# Patient Record
Sex: MALE | Race: WHITE | Employment: OTHER | URBAN - METROPOLITAN AREA
[De-identification: names, ages, dates, MRNs, and addresses within clinical notes are randomized per-mention and may not be internally consistent; named-entity substitution may affect disease eponyms.]

---

## 2017-07-09 ENCOUNTER — HOSPITAL ENCOUNTER (INPATIENT)
Age: 82
LOS: 1 days | Discharge: HOME | DRG: 682 | End: 2017-07-10
Attending: EMERGENCY MEDICINE | Admitting: INTERNAL MEDICINE
Payer: MEDICARE

## 2017-07-09 ENCOUNTER — APPOINTMENT (OUTPATIENT)
Dept: ULTRASOUND IMAGING | Age: 82
DRG: 682 | End: 2017-07-09
Payer: MEDICARE

## 2017-07-09 ENCOUNTER — APPOINTMENT (OUTPATIENT)
Dept: GENERAL RADIOLOGY | Age: 82
DRG: 682 | End: 2017-07-09
Attending: EMERGENCY MEDICINE
Payer: MEDICARE

## 2017-07-09 ENCOUNTER — APPOINTMENT (OUTPATIENT)
Dept: CT IMAGING | Age: 82
DRG: 682 | End: 2017-07-09
Attending: EMERGENCY MEDICINE
Payer: MEDICARE

## 2017-07-09 DIAGNOSIS — N30.00 ACUTE CYSTITIS WITHOUT HEMATURIA: ICD-10-CM

## 2017-07-09 DIAGNOSIS — R41.0 CONFUSION: ICD-10-CM

## 2017-07-09 DIAGNOSIS — D72.829 LEUKOCYTOSIS, UNSPECIFIED TYPE: ICD-10-CM

## 2017-07-09 DIAGNOSIS — R53.83 MALAISE AND FATIGUE: Primary | ICD-10-CM

## 2017-07-09 DIAGNOSIS — R53.81 MALAISE AND FATIGUE: Primary | ICD-10-CM

## 2017-07-09 PROBLEM — N18.30 CKD (CHRONIC KIDNEY DISEASE) STAGE 3, GFR 30-59 ML/MIN (HCC): Chronic | Status: ACTIVE | Noted: 2017-07-09

## 2017-07-09 PROBLEM — Z99.89 OSA ON CPAP: Chronic | Status: ACTIVE | Noted: 2017-07-09

## 2017-07-09 PROBLEM — I48.91 A-FIB (HCC): Chronic | Status: ACTIVE | Noted: 2017-07-09

## 2017-07-09 PROBLEM — G93.40 ACUTE ENCEPHALOPATHY: Status: ACTIVE | Noted: 2017-07-09

## 2017-07-09 PROBLEM — I95.9 HYPOTENSION: Status: ACTIVE | Noted: 2017-07-09

## 2017-07-09 PROBLEM — G47.33 OSA ON CPAP: Chronic | Status: ACTIVE | Noted: 2017-07-09

## 2017-07-09 PROBLEM — N39.0 UTI (URINARY TRACT INFECTION): Status: ACTIVE | Noted: 2017-07-09

## 2017-07-09 PROBLEM — R79.89 ELEVATED SERUM CREATININE: Status: ACTIVE | Noted: 2017-07-09

## 2017-07-09 PROBLEM — G93.41 METABOLIC ENCEPHALOPATHY: Status: ACTIVE | Noted: 2017-07-09

## 2017-07-09 LAB
ALBUMIN SERPL BCP-MCNC: 3.2 G/DL (ref 3.2–4.6)
ALBUMIN/GLOB SERPL: 0.9 {RATIO} (ref 1.2–3.5)
ALP SERPL-CCNC: 45 U/L (ref 50–136)
ALT SERPL-CCNC: 17 U/L (ref 12–65)
ANION GAP BLD CALC-SCNC: 13 MMOL/L (ref 7–16)
AST SERPL W P-5'-P-CCNC: 15 U/L (ref 15–37)
ATRIAL RATE: 71 BPM
BACTERIA URNS QL MICRO: 0 /HPF
BASOPHILS # BLD AUTO: 0 K/UL (ref 0–0.2)
BASOPHILS # BLD: 0 % (ref 0–2)
BILIRUB SERPL-MCNC: 1 MG/DL (ref 0.2–1.1)
BNP SERPL-MCNC: 609 PG/ML
BUN SERPL-MCNC: 26 MG/DL (ref 8–23)
CALCIUM SERPL-MCNC: 8.5 MG/DL (ref 8.3–10.4)
CALCULATED P AXIS, ECG09: 80 DEGREES
CALCULATED R AXIS, ECG10: 61 DEGREES
CALCULATED T AXIS, ECG11: -69 DEGREES
CASTS URNS QL MICRO: ABNORMAL /LPF
CHLORIDE SERPL-SCNC: 100 MMOL/L (ref 98–107)
CO2 SERPL-SCNC: 24 MMOL/L (ref 21–32)
CREAT SERPL-MCNC: 1.85 MG/DL (ref 0.8–1.5)
DIAGNOSIS, 93000: NORMAL
DIFFERENTIAL METHOD BLD: ABNORMAL
EOSINOPHIL # BLD: 0 K/UL (ref 0–0.8)
EOSINOPHIL NFR BLD: 0 % (ref 0.5–7.8)
EPI CELLS #/AREA URNS HPF: 0 /HPF
ERYTHROCYTE [DISTWIDTH] IN BLOOD BY AUTOMATED COUNT: 13.8 % (ref 11.9–14.6)
GLOBULIN SER CALC-MCNC: 3.7 G/DL (ref 2.3–3.5)
GLUCOSE SERPL-MCNC: 194 MG/DL (ref 65–100)
HCT VFR BLD AUTO: 41.1 % (ref 41.1–50.3)
HGB BLD-MCNC: 13.8 G/DL (ref 13.6–17.2)
IMM GRANULOCYTES # BLD: 0.1 K/UL (ref 0–0.5)
IMM GRANULOCYTES NFR BLD AUTO: 0.3 % (ref 0–5)
INR PPP: 1.3 (ref 0.9–1.2)
LACTATE BLD-SCNC: 1.7 MMOL/L (ref 0.5–1.9)
LIPASE SERPL-CCNC: 55 U/L (ref 73–393)
LYMPHOCYTES # BLD AUTO: 2 % (ref 13–44)
LYMPHOCYTES # BLD: 0.5 K/UL (ref 0.5–4.6)
MAGNESIUM SERPL-MCNC: 1.7 MG/DL (ref 1.8–2.4)
MCH RBC QN AUTO: 30.3 PG (ref 26.1–32.9)
MCHC RBC AUTO-ENTMCNC: 33.6 G/DL (ref 31.4–35)
MCV RBC AUTO: 90.1 FL (ref 79.6–97.8)
MONOCYTES # BLD: 2.2 K/UL (ref 0.1–1.3)
MONOCYTES NFR BLD AUTO: 10 % (ref 4–12)
MUCOUS THREADS URNS QL MICRO: 0 /LPF
NEUTS SEG # BLD: 19.4 K/UL (ref 1.7–8.2)
NEUTS SEG NFR BLD AUTO: 88 % (ref 43–78)
OTHER OBSERVATIONS,UCOM: ABNORMAL
PLATELET # BLD AUTO: 216 K/UL (ref 150–450)
PMV BLD AUTO: 9.9 FL (ref 10.8–14.1)
POTASSIUM SERPL-SCNC: 4 MMOL/L (ref 3.5–5.1)
PROCALCITONIN SERPL-MCNC: 2.6 NG/ML
PROT SERPL-MCNC: 6.9 G/DL (ref 6.3–8.2)
PROTHROMBIN TIME: 13.9 SEC (ref 9.6–12)
Q-T INTERVAL, ECG07: 380 MS
QRS DURATION, ECG06: 76 MS
QTC CALCULATION (BEZET), ECG08: 418 MS
RBC # BLD AUTO: 4.56 M/UL (ref 4.23–5.67)
RBC #/AREA URNS HPF: ABNORMAL /HPF
SODIUM SERPL-SCNC: 137 MMOL/L (ref 136–145)
TROPONIN I BLD-MCNC: 0.03 NG/ML (ref 0–0.08)
VENTRICULAR RATE, ECG03: 73 BPM
WBC # BLD AUTO: 22.2 K/UL (ref 4.3–11.1)
WBC URNS QL MICRO: >100 /HPF

## 2017-07-09 PROCEDURE — 86580 TB INTRADERMAL TEST: CPT

## 2017-07-09 PROCEDURE — 77030018846 HC SOL IRR STRL H20 ICUM -A

## 2017-07-09 PROCEDURE — 74011000302 HC RX REV CODE- 302

## 2017-07-09 PROCEDURE — 87040 BLOOD CULTURE FOR BACTERIA: CPT | Performed by: EMERGENCY MEDICINE

## 2017-07-09 PROCEDURE — 83880 ASSAY OF NATRIURETIC PEPTIDE: CPT | Performed by: EMERGENCY MEDICINE

## 2017-07-09 PROCEDURE — 74011250637 HC RX REV CODE- 250/637

## 2017-07-09 PROCEDURE — 83605 ASSAY OF LACTIC ACID: CPT

## 2017-07-09 PROCEDURE — 96361 HYDRATE IV INFUSION ADD-ON: CPT | Performed by: EMERGENCY MEDICINE

## 2017-07-09 PROCEDURE — 74011250636 HC RX REV CODE- 250/636

## 2017-07-09 PROCEDURE — 84145 PROCALCITONIN (PCT): CPT | Performed by: EMERGENCY MEDICINE

## 2017-07-09 PROCEDURE — 65660000000 HC RM CCU STEPDOWN

## 2017-07-09 PROCEDURE — 71010 XR CHEST PORT: CPT

## 2017-07-09 PROCEDURE — 93005 ELECTROCARDIOGRAM TRACING: CPT | Performed by: EMERGENCY MEDICINE

## 2017-07-09 PROCEDURE — 87086 URINE CULTURE/COLONY COUNT: CPT | Performed by: EMERGENCY MEDICINE

## 2017-07-09 PROCEDURE — 96365 THER/PROPH/DIAG IV INF INIT: CPT | Performed by: EMERGENCY MEDICINE

## 2017-07-09 PROCEDURE — 81015 MICROSCOPIC EXAM OF URINE: CPT | Performed by: EMERGENCY MEDICINE

## 2017-07-09 PROCEDURE — 85610 PROTHROMBIN TIME: CPT | Performed by: EMERGENCY MEDICINE

## 2017-07-09 PROCEDURE — 83690 ASSAY OF LIPASE: CPT | Performed by: EMERGENCY MEDICINE

## 2017-07-09 PROCEDURE — 87186 SC STD MICRODIL/AGAR DIL: CPT | Performed by: EMERGENCY MEDICINE

## 2017-07-09 PROCEDURE — 84484 ASSAY OF TROPONIN QUANT: CPT

## 2017-07-09 PROCEDURE — 80053 COMPREHEN METABOLIC PANEL: CPT | Performed by: EMERGENCY MEDICINE

## 2017-07-09 PROCEDURE — 93880 EXTRACRANIAL BILAT STUDY: CPT

## 2017-07-09 PROCEDURE — 81003 URINALYSIS AUTO W/O SCOPE: CPT | Performed by: EMERGENCY MEDICINE

## 2017-07-09 PROCEDURE — 70450 CT HEAD/BRAIN W/O DYE: CPT

## 2017-07-09 PROCEDURE — 74011250636 HC RX REV CODE- 250/636: Performed by: EMERGENCY MEDICINE

## 2017-07-09 PROCEDURE — 85025 COMPLETE CBC W/AUTO DIFF WBC: CPT | Performed by: EMERGENCY MEDICINE

## 2017-07-09 PROCEDURE — 87088 URINE BACTERIA CULTURE: CPT | Performed by: EMERGENCY MEDICINE

## 2017-07-09 PROCEDURE — 77030033269 HC SLV COMPR SCD KNE2 CARD -B

## 2017-07-09 PROCEDURE — 74011000258 HC RX REV CODE- 258: Performed by: EMERGENCY MEDICINE

## 2017-07-09 PROCEDURE — 83735 ASSAY OF MAGNESIUM: CPT | Performed by: INTERNAL MEDICINE

## 2017-07-09 PROCEDURE — 99285 EMERGENCY DEPT VISIT HI MDM: CPT | Performed by: EMERGENCY MEDICINE

## 2017-07-09 RX ORDER — SODIUM CHLORIDE 0.9 % (FLUSH) 0.9 %
5 SYRINGE (ML) INJECTION EVERY 8 HOURS
Status: DISCONTINUED | OUTPATIENT
Start: 2017-07-09 | End: 2017-07-10 | Stop reason: HOSPADM

## 2017-07-09 RX ORDER — SODIUM CHLORIDE 9 MG/ML
100 INJECTION, SOLUTION INTRAVENOUS CONTINUOUS
Status: DISCONTINUED | OUTPATIENT
Start: 2017-07-09 | End: 2017-07-10 | Stop reason: HOSPADM

## 2017-07-09 RX ORDER — DOFETILIDE 0.12 MG/1
125 CAPSULE ORAL 2 TIMES DAILY
Status: DISCONTINUED | OUTPATIENT
Start: 2017-07-09 | End: 2017-07-09

## 2017-07-09 RX ORDER — DOFETILIDE 0.12 MG/1
125 CAPSULE ORAL EVERY 12 HOURS
Status: DISCONTINUED | OUTPATIENT
Start: 2017-07-09 | End: 2017-07-10 | Stop reason: HOSPADM

## 2017-07-09 RX ORDER — DOFETILIDE 0.12 MG/1
125 CAPSULE ORAL 2 TIMES DAILY
Status: DISCONTINUED | OUTPATIENT
Start: 2017-07-09 | End: 2017-07-09 | Stop reason: SDUPTHER

## 2017-07-09 RX ORDER — SODIUM CHLORIDE 0.9 % (FLUSH) 0.9 %
5-10 SYRINGE (ML) INJECTION AS NEEDED
Status: DISCONTINUED | OUTPATIENT
Start: 2017-07-09 | End: 2017-07-10 | Stop reason: HOSPADM

## 2017-07-09 RX ORDER — NITROGLYCERIN 0.4 MG/1
0.4 TABLET SUBLINGUAL AS NEEDED
Status: DISCONTINUED | OUTPATIENT
Start: 2017-07-09 | End: 2017-07-10 | Stop reason: HOSPADM

## 2017-07-09 RX ORDER — SIMVASTATIN 20 MG/1
20 TABLET, FILM COATED ORAL
Status: DISCONTINUED | OUTPATIENT
Start: 2017-07-09 | End: 2017-07-10 | Stop reason: HOSPADM

## 2017-07-09 RX ORDER — SOLIFENACIN SUCCINATE 10 MG/1
10 TABLET, FILM COATED ORAL DAILY
Status: DISCONTINUED | OUTPATIENT
Start: 2017-07-10 | End: 2017-07-10 | Stop reason: HOSPADM

## 2017-07-09 RX ORDER — RANOLAZINE 500 MG/1
500 TABLET, EXTENDED RELEASE ORAL 2 TIMES DAILY
Status: DISCONTINUED | OUTPATIENT
Start: 2017-07-09 | End: 2017-07-10 | Stop reason: HOSPADM

## 2017-07-09 RX ADMIN — DOFETILIDE 125 MCG: 0.12 CAPSULE ORAL at 21:00

## 2017-07-09 RX ADMIN — SODIUM CHLORIDE 100 ML/HR: 900 INJECTION, SOLUTION INTRAVENOUS at 21:29

## 2017-07-09 RX ADMIN — CEFTRIAXONE 1 G: 1 INJECTION, POWDER, FOR SOLUTION INTRAMUSCULAR; INTRAVENOUS at 14:03

## 2017-07-09 RX ADMIN — Medication 5 ML: at 22:00

## 2017-07-09 RX ADMIN — SODIUM CHLORIDE 1000 ML: 900 INJECTION, SOLUTION INTRAVENOUS at 13:45

## 2017-07-09 RX ADMIN — RANOLAZINE 500 MG: 500 TABLET, FILM COATED, EXTENDED RELEASE ORAL at 21:29

## 2017-07-09 RX ADMIN — TUBERCULIN PURIFIED PROTEIN DERIVATIVE 5 UNITS: 5 INJECTION, SOLUTION INTRADERMAL at 21:30

## 2017-07-09 RX ADMIN — SIMVASTATIN 20 MG: 20 TABLET, FILM COATED ORAL at 21:30

## 2017-07-09 RX ADMIN — Medication 5 ML: at 16:50

## 2017-07-09 RX ADMIN — SODIUM CHLORIDE 1000 ML: 900 INJECTION, SOLUTION INTRAVENOUS at 14:09

## 2017-07-09 RX ADMIN — APIXABAN 2.5 MG: 2.5 TABLET, FILM COATED ORAL at 18:27

## 2017-07-09 NOTE — Clinical Note
Patient Class[de-identified] Observation [682] Type of Bed: Medical [8] Reason for Observation: metabolic encephalopathy Admitting Diagnosis: Metabolic encephalopathy [060.53. ICD-9-CM] Admitting Physician: Serene Garland Attending Physician: Serene Garland

## 2017-07-09 NOTE — IP AVS SNAPSHOT
Current Discharge Medication List  
  
START taking these medications Dose & Instructions Dispensing Information Comments Morning Noon Evening Bedtime  
 cefpodoxime 200 mg tablet Commonly known as:  Royal Ramming Your last dose was: Your next dose is:    
   
   
 Dose:  200 mg Take 1 Tab by mouth two (2) times a day for 8 days. Quantity:  16 Tab Refills:  0 CONTINUE these medications which have NOT CHANGED Dose & Instructions Dispensing Information Comments Morning Noon Evening Bedtime  
 apixaban 2.5 mg tablet Commonly known as:  Carolyn Raynesford Your last dose was: Your next dose is:    
   
   
 Dose:  2.5 mg Take 1 Tab by mouth two (2) times a day. Quantity:  60 Tab Refills:  11 DETROL LA 4 mg ER capsule Generic drug:  tolterodine ER Your last dose was: Your next dose is:    
   
   
 Dose:  4 mg Take 4 mg by mouth daily. Refills:  0  
     
   
   
   
  
 metoprolol succinate 25 mg XL tablet Commonly known as:  TOPROL-XL Your last dose was: Your next dose is:    
   
   
 Dose:  12.5 mg Take 12.5 mg by mouth nightly. Refills:  0  
     
   
   
   
  
 nitroglycerin 400 mcg/spray spray Commonly known as:  Burnard Plain Your last dose was: Your next dose is:    
   
   
 Dose:  1 Spray 1 Spray by SubLINGual route every five (5) minutes as needed. Refills:  0  
     
   
   
   
  
 ranolazine  mg SR tablet Commonly known as:  RANEXA Your last dose was: Your next dose is:    
   
   
 Dose:  500 mg Take 1 Tab by mouth two (2) times a day. Quantity:  60 Tab Refills:  11  
     
   
   
   
  
 simvastatin 20 mg tablet Commonly known as:  ZOCOR Your last dose was: Your next dose is:    
   
   
 Dose:  20 mg Take 1 Tab by mouth nightly. Quantity:  90 Tab Refills:  3 tamsulosin 0.4 mg capsule Commonly known as:  FLOMAX Your last dose was: Your next dose is:    
   
   
 Dose:  0.4 mg Take 0.4 mg by mouth daily. Refills:  0 TIKOSYN 125 mcg capsule Generic drug:  dofetilide Your last dose was: Your next dose is:    
   
   
 Dose:  125 mcg Take 125 mcg by mouth two (2) times a day. Refills:  0 Where to Get Your Medications Information on where to get these meds will be given to you by the nurse or doctor. ! Ask your nurse or doctor about these medications  
  cefpodoxime 200 mg tablet

## 2017-07-09 NOTE — PROGRESS NOTES
Pt admitted to room 374. On arrival, pt alert and oriented x4. Follows commands appropriately. Neuro intact. Lung sounds clear. O2 Sat 95% on RA, RR even and unlabored. NSR w/freq PACs and 1st degree HB; alternating w/Afib, HR 60-70s. SBP 100s. Abdomen soft, intact, bowel sounds present. Voided via urinal in ER; monitoring UOP. Urinal in reach at bedside. Dual skin assessment complete w/ADRIAN Lindsey. No redness or breakdown noted. Allevyn placed to sacrum. Pt bathed w/CHG wipes and repositioned in bed for comfort. Denies pain or complaints at this time. Call light in reach. Son at bedside.

## 2017-07-09 NOTE — PROGRESS NOTES
TRANSFER - IN REPORT:    Verbal report received from ADRIAN Blevins on Susy Mcdaniel  being received from ER for routine progression of care      Report consisted of patients Situation, Background, Assessment and   Recommendations(SBAR). Information from the following report(s) SBAR, Kardex, ED Summary, Intake/Output, MAR, Recent Results and Cardiac Rhythm Afib was reviewed with the receiving nurse. Opportunity for questions and clarification was provided. Assessment completed upon patients arrival to unit and care assumed.

## 2017-07-09 NOTE — ROUTINE PROCESS
TRANSFER - OUT REPORT:    Verbal report given to Jony Cosme RN(name) on Jeff Guerra  being transferred to room #374(unit) for routine progression of care       Report consisted of patients Situation, Background, Assessment and   Recommendations(SBAR). Information from the following report(s) ED Summary was reviewed with the receiving nurse. Lines:       Opportunity for questions and clarification was provided.

## 2017-07-09 NOTE — PROGRESS NOTES
Bedside shift report received from [facility]. Pt is alert, oriented x 4, follows commands, PERRLA. Pt is afebrile, hr 78, /68. Pt is eating dinner, denies pain, and states he is feeling much better after IV fluids. Lungs are clear, bowel sounds are active. Full assessment in flow sheet.

## 2017-07-09 NOTE — ED PROVIDER NOTES
HPI Comments: Patient with history of high blood pressure heart disease and chronic kidney disease. Has recently been in the hospital with his sick wife caring for. Was there for the past 2 days. Yesterday developed some weakness and slight confusion which is slightly worse this morning so came in. Son is noted some mild slurred speech. Patient is hypotensive in the ER bed. 05B systolic. Patient is a 80 y.o. male presenting with dizziness. The history is provided by the patient. No  was used. Dizziness   This is a new problem. The current episode started yesterday. The problem has not changed since onset. There was no focality noted. Primary symptoms include slurred speech, speech difficulty and mental status change. Pertinent negatives include no focal weakness, no loss of sensation, no movement disorder, no visual change and no unresponsiveness. There has been no fever. Associated symptoms include confusion. Pertinent negatives include no shortness of breath, no chest pain, no vomiting, no headaches, no nausea, no bowel incontinence and no bladder incontinence. Past Medical History:   Diagnosis Date    Arrhythmia     PALPITATION    Arthritis     CAD (coronary artery disease)     STENTS HEART 2008    Chronic kidney disease     HAS ONLY 1 KIDNEY    Hypertension        Past Surgical History:   Procedure Laterality Date    CARDIAC SURG PROCEDURE UNLIST      stent to circ    COLONOSCOPY      HX APPENDECTOMY      HX UROLOGICAL      Kidney removed 1970    OTHER CELL      LEFT KIDNEY REMOVED    PTCA W/ STENT, EA ADD           Family History:   Problem Relation Age of Onset    Heart Attack Mother 80     MI    Heart Disease Mother        Social History     Social History    Marital status:      Spouse name: N/A    Number of children: N/A    Years of education: N/A     Occupational History    Not on file.      Social History Main Topics    Smoking status: Former Smoker     Packs/day: 0.25     Years: 20.00     Quit date: 1/1/1967    Smokeless tobacco: Never Used    Alcohol use Yes      Comment: OCCAISONALLY    Drug use: No    Sexual activity: Not on file     Other Topics Concern    Not on file     Social History Narrative         ALLERGIES: Adhesive tape-silicones and Pcn [penicillins]    Review of Systems   Constitutional: Positive for fatigue. Negative for chills and fever. HENT: Negative for rhinorrhea and sore throat. Eyes: Negative for pain and redness. Respiratory: Negative for chest tightness, shortness of breath and wheezing. Cardiovascular: Negative for chest pain and leg swelling. Gastrointestinal: Negative for abdominal pain, bowel incontinence, diarrhea, nausea and vomiting. Genitourinary: Negative for bladder incontinence, dysuria and hematuria. Musculoskeletal: Negative for back pain, gait problem, neck pain and neck stiffness. Skin: Negative for color change and rash. Neurological: Positive for dizziness, speech difficulty, weakness and light-headedness. Negative for focal weakness, numbness and headaches. Psychiatric/Behavioral: Positive for confusion. Vitals:    07/09/17 1201   BP: 96/51   Pulse: 78   Resp: 18   Temp: 97.7 °F (36.5 °C)   SpO2: 96%   Weight: 67.1 kg (148 lb)            Physical Exam   Constitutional: He is oriented to person, place, and time. He appears well-developed and well-nourished. No distress. HENT:   Head: Normocephalic and atraumatic. Eyes: Conjunctivae and EOM are normal. Pupils are equal, round, and reactive to light. Neck: Normal range of motion. Neck supple. Cardiovascular: Normal rate. An irregular rhythm present. No murmur heard. Pulmonary/Chest: Effort normal and breath sounds normal. He has no wheezes. Abdominal: Soft. Bowel sounds are normal. There is no tenderness. Musculoskeletal: Normal range of motion. He exhibits no edema.    Neurological: He is alert and oriented to person, place, and time. No cranial nerve deficit. He exhibits normal muscle tone. Coordination normal.   Skin: Skin is warm and dry. Nursing note and vitals reviewed. MDM  Number of Diagnoses or Management Options  Diagnosis management comments: Weak, fatigued, and confused with hypotension and elevated WBC. Likely UTI. Will admit. Amount and/or Complexity of Data Reviewed  Clinical lab tests: ordered and reviewed  Tests in the radiology section of CPT®: ordered and reviewed  Tests in the medicine section of CPT®: ordered and reviewed    Patient Progress  Patient progress: stable    ED Course       Procedures    EKG: normal sinus rhythm, nonspecific ST and T waves changes, PAC's noted, 1st degree AV block. Rate 73.       Results Include:    Recent Results (from the past 24 hour(s))   EKG, 12 LEAD, INITIAL    Collection Time: 07/09/17 12:26 PM   Result Value Ref Range    Ventricular Rate 73 BPM    Atrial Rate 71 BPM    QRS Duration 76 ms    Q-T Interval 380 ms    QTC Calculation (Bezet) 418 ms    Calculated P Axis 80 degrees    Calculated R Axis 61 degrees    Calculated T Axis -69 degrees    Diagnosis       !! AGE AND GENDER SPECIFIC ECG ANALYSIS !!  Undetermined rhythm  ST & T wave abnormality, consider inferolateral ischemia  Abnormal ECG  When compared with ECG of 16-JUL-2016 07:47,  Current undetermined rhythm precludes rhythm comparison, needs review  Non-specific change in ST segment in Inferior leads  Non-specific change in ST segment in Anterior leads  T wave inversion now evident in Inferior leads     POC TROPONIN-I    Collection Time: 07/09/17 12:34 PM   Result Value Ref Range    Troponin-I (POC) 0.03 0.0 - 0.08 ng/ml   BNP    Collection Time: 07/09/17 12:34 PM   Result Value Ref Range     pg/mL   CBC WITH AUTOMATED DIFF    Collection Time: 07/09/17 12:35 PM   Result Value Ref Range    WBC 22.2 (H) 4.3 - 11.1 K/uL    RBC 4.56 4.23 - 5.67 M/uL    HGB 13.8 13.6 - 17.2 g/dL    HCT 41.1 41.1 - 50.3 %    MCV 90.1 79.6 - 97.8 FL    MCH 30.3 26.1 - 32.9 PG    MCHC 33.6 31.4 - 35.0 g/dL    RDW 13.8 11.9 - 14.6 %    PLATELET 880 331 - 430 K/uL    MPV 9.9 (L) 10.8 - 14.1 FL    DF AUTOMATED      NEUTROPHILS 88 (H) 43 - 78 %    LYMPHOCYTES 2 (L) 13 - 44 %    MONOCYTES 10 4.0 - 12.0 %    EOSINOPHILS 0 (L) 0.5 - 7.8 %    BASOPHILS 0 0.0 - 2.0 %    IMMATURE GRANULOCYTES 0.3 0.0 - 5.0 %    ABS. NEUTROPHILS 19.4 (H) 1.7 - 8.2 K/UL    ABS. LYMPHOCYTES 0.5 0.5 - 4.6 K/UL    ABS. MONOCYTES 2.2 (H) 0.1 - 1.3 K/UL    ABS. EOSINOPHILS 0.0 0.0 - 0.8 K/UL    ABS. BASOPHILS 0.0 0.0 - 0.2 K/UL    ABS. IMM. GRANS. 0.1 0.0 - 0.5 K/UL   METABOLIC PANEL, COMPREHENSIVE    Collection Time: 07/09/17 12:35 PM   Result Value Ref Range    Sodium 137 136 - 145 mmol/L    Potassium 4.0 3.5 - 5.1 mmol/L    Chloride 100 98 - 107 mmol/L    CO2 24 21 - 32 mmol/L    Anion gap 13 7 - 16 mmol/L    Glucose 194 (H) 65 - 100 mg/dL    BUN 26 (H) 8 - 23 MG/DL    Creatinine 1.85 (H) 0.8 - 1.5 MG/DL    GFR est AA 45 (L) >60 ml/min/1.73m2    GFR est non-AA 37 (L) >60 ml/min/1.73m2    Calcium 8.5 8.3 - 10.4 MG/DL    Bilirubin, total 1.0 0.2 - 1.1 MG/DL    ALT (SGPT) 17 12 - 65 U/L    AST (SGOT) 15 15 - 37 U/L    Alk.  phosphatase 45 (L) 50 - 136 U/L    Protein, total 6.9 6.3 - 8.2 g/dL    Albumin 3.2 3.2 - 4.6 g/dL    Globulin 3.7 (H) 2.3 - 3.5 g/dL    A-G Ratio 0.9 (L) 1.2 - 3.5     PROTHROMBIN TIME + INR    Collection Time: 07/09/17 12:35 PM   Result Value Ref Range    Prothrombin time 13.9 (H) 9.6 - 12.0 sec    INR 1.3 (H) 0.9 - 1.2     LIPASE    Collection Time: 07/09/17 12:35 PM   Result Value Ref Range    Lipase 55 (L) 73 - 393 U/L   POC LACTIC ACID    Collection Time: 07/09/17  1:57 PM   Result Value Ref Range    Lactic Acid (POC) 1.7 0.5 - 1.9 mmol/L       CT HEAD WO CONT (Final result) Result time: 07/09/17 13:36:35     Final result by Josi Garcia MD (07/09/17 13:36:35)     Impression:     Impression:    No Acute Abnormality         Narrative:     CT Brain dated 7/9/2017      Comparison: None    Clinical Information:  Increasing confusion today       5 mm axial images were obtained from skull base to vertex without contrast.    Radiation dose reduction techniques were used for this study.  Our scanners use  one or all of the following:  Automated exposure control, adjustment of the mA  and/or kV according to patient size, iterative reconstruction. Findings:    Atrophy is present. There is no midline shift. Ill-defined hypodensity in the  cerebral white matter bilaterally is consistent chronic ischemic white matter  change. No hemorrhage, mass, mass effect or acute abnormality. .  No extra-axial  abnormality. No skull fracture.  Mastoid air cells and visualized paranasal  sinuses are aerated and unremarkable.                 XR CHEST PORT (Final result) Result time: 07/09/17 13:24:23     Final result by Annamarie Carrington MD (07/09/17 13:24:23)     Impression:     IMPRESSION: No acute abnormality     Narrative:     Portable AP supine chest dated 7/9/2017    CLINICAL INFORMATION: Lightheaded and confused    No comparison    Heart is upper limits normal in size and mediastinum unremarkable. Lungs are  clear.  No pleural effusion or pneumothorax.

## 2017-07-09 NOTE — IP AVS SNAPSHOT
Michael [address] 
[phone] Patient: Carlos Nicolas MRN: [id number] :1929 You are allergic to the following Allergen Reactions Adhesive Tape-Silicones Contact Dermatitis Pcn (Penicillins) Rash Immunizations Administered for This Admission Name Date  
 TB Skin Test (PPD) Intradermal 2017 Recent Documentation Height Weight BMI Smoking Status 1.727 m 71.3 kg 23.9 kg/m2 Former Smoker Unresulted Labs Order Current Status CULTURE, BLOOD Preliminary result CULTURE, BLOOD Preliminary result CULTURE, URINE Preliminary result Emergency Contacts Name Discharge Info Relation Home Work Mobile Duncan Hoffman  Child [2]   [phone] About your hospitalization You were admitted on:  2017 You last received care in the:  [facility] 3 ICU You were discharged on:  July 10, 2017 Unit phone number:  [phone] Why you were hospitalized Your primary diagnosis was:  Metabolic Encephalopathy Your diagnoses also included:  Elevated Serum Creatinine, Leukocytosis, Ckd (Chronic Kidney Disease) Stage 3, Gfr 30-59 Ml/Min, Hypotension, Hypertension, Magno On Cpap, Acute Encephalopathy, Uti (Urinary Tract Infection), A-Fib (Hcc), Dyslipidemia Providers Seen During Your Hospitalizations Provider Role Specialty Primary office phone Jeanette Francis MD Attending Provider Emergency Medicine [phone] Elizabeth Stokes DO Attending Provider Internal Medicine [phone] Your Primary Care Physician (PCP) Primary Care Physician Office Phone Office Fax Mike Pettit [phone] Follow-up Information Follow up With Details Comments Contact Info Dina Echeverria MD Schedule an appointment as soon as possible for a visit on 2017 at [address] [place] [address] [facility] [place] [address] 
[phone] Your Appointments Thursday August 17, 2017  9:30 AM EDT Office Visit with Marisol Felix MD  
[facility] ([address]) [address]  
[address] Faisal Gonzales 81  
[phone] Current Discharge Medication List  
  
START taking these medications Dose & Instructions Dispensing Information Comments Morning Noon Evening Bedtime  
 cefpodoxime 200 mg tablet Commonly known as:  Marcio Ochoa Your last dose was: Your next dose is:    
   
   
 Dose:  200 mg Take 1 Tab by mouth two (2) times a day for 8 days. Quantity:  16 Tab Refills:  0 CONTINUE these medications which have NOT CHANGED Dose & Instructions Dispensing Information Comments Morning Noon Evening Bedtime  
 apixaban 2.5 mg tablet Commonly known as:  Adwoa Fairchild Your last dose was: Your next dose is:    
   
   
 Dose:  2.5 mg Take 1 Tab by mouth two (2) times a day. Quantity:  60 Tab Refills:  11 DETROL LA 4 mg ER capsule Generic drug:  tolterodine ER Your last dose was: Your next dose is:    
   
   
 Dose:  4 mg Take 4 mg by mouth daily. Refills:  0  
     
   
   
   
  
 metoprolol succinate 25 mg XL tablet Commonly known as:  TOPROL-XL Your last dose was: Your next dose is:    
   
   
 Dose:  12.5 mg Take 12.5 mg by mouth nightly. Refills:  0  
     
   
   
   
  
 nitroglycerin 400 mcg/spray spray Commonly known as:  Raiza Philip Your last dose was: Your next dose is:    
   
   
 Dose:  1 Spray 1 Spray by SubLINGual route every five (5) minutes as needed. Refills:  0  
     
   
   
   
  
 ranolazine  mg SR tablet Commonly known as:  RANEXA Your last dose was:     
   
Your next dose is:    
   
   
 Dose:  500 mg  
 Take 1 Tab by mouth two (2) times a day. Quantity:  60 Tab Refills:  11  
     
   
   
   
  
 simvastatin 20 mg tablet Commonly known as:  ZOCOR Your last dose was: Your next dose is:    
   
   
 Dose:  20 mg Take 1 Tab by mouth nightly. Quantity:  90 Tab Refills:  3  
     
   
   
   
  
 tamsulosin 0.4 mg capsule Commonly known as:  FLOMAX Your last dose was: Your next dose is:    
   
   
 Dose:  0.4 mg Take 0.4 mg by mouth daily. Refills:  0 TIKOSYN 125 mcg capsule Generic drug:  dofetilide Your last dose was: Your next dose is:    
   
   
 Dose:  125 mcg Take 125 mcg by mouth two (2) times a day. Refills:  0 Where to Get Your Medications Information on where to get these meds will be given to you by the nurse or doctor. ! Ask your nurse or doctor about these medications  
  cefpodoxime 200 mg tablet Discharge Instructions DISCHARGE SUMMARY from Nurse The following personal items are in your possession at time of discharge: 
 
Dental Appliances: None Visual Aid: Glasses, With patient Hearing Aids/Status: Bilateral, With patient Home Medications: Sent to pharmacy (Tikosyn verified by pharmacy) Jewelry: Ring (wedding band) Clothing: Belt, Pants, Undergarments, Socks, Footwear, [employer] Other Valuables: Eyeglasses, Cell Phone Personal Items Sent to Safe: none PATIENT INSTRUCTIONS: 
 
 
F-face looks uneven A-arms unable to move or move unevenly S-speech slurred or non-existent T-time-call 911 as soon as signs and symptoms begin-DO NOT go  
 Back to bed or wait to see if you get better-TIME IS BRAIN. Warning Signs of HEART ATTACK Call 911 if you have these symptoms: 
? Chest discomfort. Most heart attacks involve discomfort in the center of the chest that lasts more than a few minutes, or that goes away and comes back. It can feel like uncomfortable pressure, squeezing, fullness, or pain. ? Discomfort in other areas of the upper body. Symptoms can include pain or discomfort in one or both arms, the back, neck, jaw, or stomach. ? Shortness of breath with or without chest discomfort. ? Other signs may include breaking out in a cold sweat, nausea, or lightheadedness. Don't wait more than five minutes to call [address]! Fast action can save your life. Calling 911 is almost always the fastest way to get lifesaving treatment. Emergency Medical Services staff can begin treatment when they arrive  up to an hour sooner than if someone gets to the hospital by car. The discharge information has been reviewed with the patient. The patient verbalized understanding. Discharge medications reviewed with the patient and appropriate educational materials and side effects teaching were provided. Discharge Instructions Attachments/References DEHYDRATION (ENGLISH) RENAL FAILURE: ACUTE (ENGLISH) Discharge Orders None [employer] Announcement We are excited to announce that we are making your provider's discharge notes available to you in Open[employer]. You will see these notes when they are completed and signed by the physician that discharged you from your recent hospital stay. If you have any questions or concerns about any information you see in Mamaher[employer], please call the Health Information Department where you were seen or reach out to your Primary Care Provider for more information about your plan of care. Introducing [facility]! Dear Elizabeth Will: Thank you for requesting a [employer] account. Our records indicate that you already have an active [employer] account. You can access your account anytime at [email or web address]. [email or web address] Did you know that you can access your hospital and ER discharge instructions at any time in [employer]? You can also review all of your test results from your hospital stay or ER visit. Additional Information If you have questions, please visit the Frequently Asked Questions section of the [employer] website at [email or web address]. [email or web address] Remember, [employer] is NOT to be used for urgent needs. For medical emergencies, dial 911. Now available from your iPhone and Android! General Information Please provide this summary of care documentation to your next provider. Patient Signature:  ____________________________________________________________ Date:  ____________________________________________________________  
  
Eduarda Neal Provider Signature:  ____________________________________________________________ Date:  ____________________________________________________________ More Information Dehydration: Care Instructions Your Care Instructions Dehydration happens when your body loses too much fluid. This might happen when you do not drink enough water or you lose large amounts of fluids from your body because of diarrhea, vomiting, or sweating. Severe dehydration can be life-threatening. Water and minerals called electrolytes help put your body fluids back in balance. Learn the early signs of fluid loss, and drink more fluids to prevent dehydration. Follow-up care is a key part of your treatment and safety. Be sure to make and go to all appointments, and call your doctor if you are having problems. It's also a good idea to know your test results and keep a list of the medicines you take. How can you care for yourself at home? · To prevent dehydration, drink plenty of fluids, enough so that your urine is light yellow or clear like water. Choose water and other caffeine-free clear liquids until you feel better. If you have kidney, heart, or liver disease and have to limit fluids, talk with your doctor before you increase the amount of fluids you drink. · If you do not feel like eating or drinking, try taking small sips of water, sports drinks, or other rehydration drinks. · Get plenty of rest. 
To prevent dehydration · Add more fluids to your diet and daily routine, unless your doctor has told you not to. · During hot weather, drink more fluids. Drink even more fluids if you exercise a lot. Stay away from drinks with alcohol or caffeine. · Watch for the symptoms of dehydration. These include: ¨ A dry, sticky mouth. ¨ Dark yellow urine, and not much of it. ¨ Dry and sunken eyes. ¨ Feeling very tired. · Learn what problems can lead to dehydration. These include: ¨ Diarrhea, fever, and vomiting. ¨ Any illness with a fever, such as pneumonia or the flu. ¨ Activities that cause heavy sweating, such as endurance races and heavy outdoor work in hot or humid weather. ¨ Alcohol or drug abuse or withdrawal. 
¨ Certain medicines, such as cold and allergy pills (antihistamines), diet pills (diuretics), and laxatives. ¨ Certain diseases, such as diabetes, cancer, and heart or kidney disease. When should you call for help? Call 911 anytime you think you may need emergency care. For example, call if: 
· You passed out (lost consciousness). Call your doctor now or seek immediate medical care if: 
· You are confused and cannot think clearly. · You are dizzy or lightheaded, or you feel like you may faint. · You have signs of needing more fluids. You have sunken eyes and a dry mouth, and you pass only a little dark urine. · You cannot keep fluids down. Watch closely for changes in your health, and be sure to contact your doctor if: · You are not making tears. · Your skin is very dry and sags slowly back into place after you pinch it. · Your mouth and eyes are very dry. Where can you learn more? Go to http://tejas-brittney.info/. Enter E513 in the search box to learn more about \"Dehydration: Care Instructions. \" Current as of: March 20, 2017 Content Version: 11.3 © 0530-8945 [employer]. Care instructions adapted under license by [employer] (which disclaims liability or warranty for this information). If you have questions about a medical condition or this instruction, always ask your healthcare professional. [place] [address] any warranty or liability for your use of this information. Acute Kidney Injury: Care Instructions Your Care Instructions Acute kidney injury is the sudden loss of kidney function that happens when the kidneys stop working over a period of hours, days or, in some cases, weeks. It is also known as acute renal failure. Common causes of acute kidney injury are dehydration, blood loss, and medicines. When acute kidney injury happens, the kidneys cannot remove waste and excess fluids from the body. The waste and fluids build up and become harmful. Kidney function may return to normal if the cause of acute kidney injury is treated quickly. Your chance of a full recovery depends on what caused the problem, how quickly the cause was treated, and what other medical problems you have. A machine may be used to help your kidneys remove waste and fluids for a short period of time. This is called dialysis. Follow-up care is a key part of your treatment and safety. Be sure to make and go to all appointments, and call your doctor if you are having problems. It's also a good idea to know your test results and keep a list of the medicines you take. How can you care for yourself at home? · Talk to your doctor about how much fluid you should drink. · Eat a balanced diet. Talk to your doctor or a dietitian about what type of diet may be best for you. · Follow the instructions and schedule for dialysis that your doctor gives you. · Do not smoke. Smoking can make your condition worse. If you need help quitting, talk to your doctor about stop-smoking programs and medicines. These can increase your chances of quitting for good. · Do not drink alcohol. · Review all of your medicines with your doctor. Do not take any medicines, including nonsteroidal anti-inflammatory drugs (NSAIDs) such as ibuprofen (Advil, Motrin) or naproxen (Aleve), unless your doctor says it is safe for you to do so. · Make sure that anyone treating you for any health problem knows that you have had acute kidney injury. When should you call for help? Call 911 anytime you think you may need emergency care. For example, call if: 
· You passed out (lost consciousness). · You have severe trouble breathing. Call your doctor now or seek immediate medical care if: 
· You have less urine than normal or no urine. · You have trouble urinating or can urinate only very small amounts. · You are confused or have trouble thinking clearly. · You feel weaker or more tired than usual. 
· You are very thirsty, lightheaded, or dizzy. · You have nausea and vomiting. · You have new swelling of your arms or feet, or your swelling is worse. · You have blood in your urine. · Your body weight goes up every day. · You have new or worse trouble breathing. Watch closely for changes in your health, and be sure to contact your doctor if: 
· You do not get better as expected. Where can you learn more? Go to http://tejas-brittney.info/. Enter P341 in the search box to learn more about \"Acute Kidney Injury: Care Instructions. \" Current as of: April 3, 2017 Content Version: 11.3 © 8898-9260 [employer].  Care instructions adapted under license by [address] (which disclaims liability or warranty for this information). If you have questions about a medical condition or this instruction, always ask your healthcare professional. [address] any warranty or liability for your use of this information.

## 2017-07-09 NOTE — PROGRESS NOTES
Bedside and Verbal shift change report given to Dora Pride RN (oncoming nurse) by Linwood Figueroa RN (offgoing nurse). Report included the following information SBAR, Kardex, ED Summary, Intake/Output, MAR, Recent Results and Cardiac Rhythm NSR/Afib w/1st deg HB. Dual skin assessment reviewed.

## 2017-07-09 NOTE — H&P
HOSPITALIST H&P  NAME:  Shantanu Islas   Age:  80 y.o.  :   1929   MRN:   [id number]  PCP: Cristobal Perez MD  Treatment Team: Attending Provider: Nora Harris DO; Primary Nurse: Salvador Henriquez RN  HPI:   Shantanu Islas is a 80 y.o. male that presented to the ED today with 2 day history of generalized weakness, dizziness, confusion, dysarthria, chills, mild dyspnea on exertion, decreased oral intake and concentrated urine. He denies fever, cough, chest pain, abdominal pain, dysuria or diarrhea. He has recently been caring for his spouse who is currently hospitalized. ED evaluation detailed below. He was noted to be hypotensive on arrival with SBP in the 90's and WBC of 22.2, Creatinine 1.85 (baseline 1.3-1.4). Urinalysis c/w UTI. The hospitalist have been asked to admit.      Results summary of Diagnostic Studies/Procedures copied from within [facility] EMR:  CXR  IMPRESSION: No acute abnormality    Head CT  No Acute Abnormality    Complete ROS done and is as stated in HPI or otherwise negative  Past Medical History:   Diagnosis Date    A-fib ([address]     Arrhythmia     PALPITATION    Arthritis     CAD (coronary artery disease)     STENTS HEART     Chronic kidney disease     HAS ONLY 1 KIDNEY    Hypertension     ALEX on CPAP       Past Surgical History:   Procedure Laterality Date    CARDIAC SURG PROCEDURE UNLIST      stent to circ    COLONOSCOPY      HX APPENDECTOMY      HX UROLOGICAL      Kidney removed     OTHER CELL      LEFT KIDNEY REMOVED    PTCA W/ STENT, EA ADD        Allergies   Allergen Reactions    Adhesive Tape-Silicones Contact Dermatitis    Pcn [Penicillins] Rash      Social History   Substance Use Topics    Smoking status: Former Smoker     Packs/day: 0.25     Years: 20.00     Quit date: 1967    Smokeless tobacco: Never Used    Alcohol use Yes      Comment: OCCAISONALLY      Family History   Problem Relation Age of Onset    Heart Attack Mother 80 MI    Heart Disease Mother         Objective:     Visit Vitals    BP 95/55    Pulse 67    Temp 97.7 °F (36.5 °C)    Resp 15    Wt 67.1 kg (148 lb)    SpO2 95%    BMI 23.18 kg/m2      Temp (24hrs), Av.7 °F (36.5 °C), Min:97.7 °F (36.5 °C), Max:97.7 °F (36.5 °C)    Oxygen Therapy  O2 Sat (%): 95 % (17 1530)  Pulse via Oximetry: 66 beats per minute (17 1530)  Physical Exam:  General:    Alert, cooperative, no distress, appears stated age. Head:   Normocephalic, without obvious abnormality, atraumatic. Nose:  Nares normal. No drainage or sinus tenderness. Lungs:   CTA  Heart:  irreg irreg   Abdomen:   Soft, non-tender. Not distended. Bowel sounds normal. No masses  Extremities: No cyanosis. No edema. No clubbing  Skin:     Texture, turgor normal. No rashes or lesions.   Not Jaundiced  Neurologic: Alert and oriented times 3, mild dysarthria, no facial asymmetry, generalized weakness, non-focal    Data Review:   Recent Results (from the past 24 hour(s))   EKG, 12 LEAD, INITIAL    Collection Time: 17 12:26 PM   Result Value Ref Range    Ventricular Rate 73 BPM    Atrial Rate 71 BPM    QRS Duration 76 ms    Q-T Interval 380 ms    QTC Calculation (Bezet) 418 ms    Calculated P Axis 80 degrees    Calculated R Axis 61 degrees    Calculated T Axis -69 degrees    Diagnosis       !! AGE AND GENDER SPECIFIC ECG ANALYSIS !!  NSR wIth first degree AV block   with PAC's  ST & T wave abnormality, consider inferolateral ischemia  Abnormal ECG  When compared with ECG of 2016 07:47,  Current undetermined rhythm precludes rhythm comparison, needs review  Non-specific change in ST segment in Inferior leads  Non-specific change in ST segment in Anterior leads  T wave inversion now evident in Inferior leads  Confirmed by FRANCISCO DONOVAN (), DAMIEN CAGLE ([id number]) on 2017 3:12:33 PM     POC TROPONIN-I    Collection Time: 17 12:34 PM   Result Value Ref Range    Troponin-I (POC) 0.03 0.0 - 0.08 ng/ml BNP    Collection Time: 07/09/17 12:34 PM   Result Value Ref Range     pg/mL   CBC WITH AUTOMATED DIFF    Collection Time: 07/09/17 12:35 PM   Result Value Ref Range    WBC 22.2 (H) 4.3 - 11.1 K/uL    RBC 4.56 4.23 - 5.67 M/uL    HGB 13.8 13.6 - 17.2 g/dL    HCT 41.1 41.1 - 50.3 %    MCV 90.1 79.6 - 97.8 FL    MCH 30.3 26.1 - 32.9 PG    MCHC 33.6 31.4 - 35.0 g/dL    RDW 13.8 11.9 - 14.6 %    PLATELET 397 759 - 407 K/uL    MPV 9.9 (L) 10.8 - 14.1 FL    DF AUTOMATED      NEUTROPHILS 88 (H) 43 - 78 %    LYMPHOCYTES 2 (L) 13 - 44 %    MONOCYTES 10 4.0 - 12.0 %    EOSINOPHILS 0 (L) 0.5 - 7.8 %    BASOPHILS 0 0.0 - 2.0 %    IMMATURE GRANULOCYTES 0.3 0.0 - 5.0 %    ABS. NEUTROPHILS 19.4 (H) 1.7 - 8.2 K/UL    ABS. LYMPHOCYTES 0.5 0.5 - 4.6 K/UL    ABS. MONOCYTES 2.2 (H) 0.1 - 1.3 K/UL    ABS. EOSINOPHILS 0.0 0.0 - 0.8 K/UL    ABS. BASOPHILS 0.0 0.0 - 0.2 K/UL    ABS. IMM. GRANS. 0.1 0.0 - 0.5 K/UL   METABOLIC PANEL, COMPREHENSIVE    Collection Time: 07/09/17 12:35 PM   Result Value Ref Range    Sodium 137 136 - 145 mmol/L    Potassium 4.0 3.5 - 5.1 mmol/L    Chloride 100 98 - 107 mmol/L    CO2 24 21 - 32 mmol/L    Anion gap 13 7 - 16 mmol/L    Glucose 194 (H) 65 - 100 mg/dL    BUN 26 (H) 8 - 23 MG/DL    Creatinine 1.85 (H) 0.8 - 1.5 MG/DL    GFR est AA 45 (L) >60 ml/min/1.73m2    GFR est non-AA 37 (L) >60 ml/min/1.73m2    Calcium 8.5 8.3 - 10.4 MG/DL    Bilirubin, total 1.0 0.2 - 1.1 MG/DL    ALT (SGPT) 17 12 - 65 U/L    AST (SGOT) 15 15 - 37 U/L    Alk.  phosphatase 45 (L) 50 - 136 U/L    Protein, total 6.9 6.3 - 8.2 g/dL    Albumin 3.2 3.2 - 4.6 g/dL    Globulin 3.7 (H) 2.3 - 3.5 g/dL    A-G Ratio 0.9 (L) 1.2 - 3.5     PROTHROMBIN TIME + INR    Collection Time: 07/09/17 12:35 PM   Result Value Ref Range    Prothrombin time 13.9 (H) 9.6 - 12.0 sec    INR 1.3 (H) 0.9 - 1.2     LIPASE    Collection Time: 07/09/17 12:35 PM   Result Value Ref Range    Lipase 55 (L) 73 - 393 U/L   PROCALCITONIN    Collection Time: 07/09/17 12:35 PM   Result Value Ref Range    Procalcitonin 2.6 ng/mL   POC LACTIC ACID    Collection Time: 07/09/17  1:57 PM   Result Value Ref Range    Lactic Acid (POC) 1.7 0.5 - 1.9 mmol/L   URINE MICROSCOPIC    Collection Time: 07/09/17  2:20 PM   Result Value Ref Range    WBC >100 (H) 0 /hpf    RBC 0-3 0 /hpf    Epithelial cells 0 0 /hpf    Bacteria 0 0 /hpf    Casts 3-5 0 /lpf    Mucus 0 0 /lpf    Other observations RESULTS VERIFIED MANUALLY       Patient discussed with and assessment and plan made in collaboration with  supervising physician, Dr. Alcazar Mems and Plan: Active Hospital Problems    Diagnosis Date Noted    Metabolic encephalopathy 58/67/8309    Elevated serum creatinine 07/09/2017    Leukocytosis 07/09/2017    CKD (chronic kidney disease) stage 3, GFR 30-59 ml/min 07/09/2017    Hypotension 07/09/2017    ALEX on CPAP 07/09/2017    Acute encephalopathy 07/09/2017    UTI (urinary tract infection) 07/09/2017    A-fib ([address] 07/09/2017    Hypertension 07/12/2016    Dyslipidemia 07/12/2016   Admit to telemetry   Continue IVF hydration and Rocephin.  Follow urine and blood cultures  Consider MRI in AM if not improving   Check ECHO and Carotid US  Follow up labs in AM  Continue home medications as ordered   FULL CODE   Surrogate decision maker: Tony mckeon  Estimated length of stay:>2 midnights   Gordo Patience., PA

## 2017-07-10 VITALS
TEMPERATURE: 98.9 F | WEIGHT: 157.2 LBS | HEART RATE: 73 BPM | RESPIRATION RATE: 22 BRPM | DIASTOLIC BLOOD PRESSURE: 63 MMHG | SYSTOLIC BLOOD PRESSURE: 115 MMHG | BODY MASS INDEX: 23.82 KG/M2 | OXYGEN SATURATION: 94 % | HEIGHT: 68 IN

## 2017-07-10 LAB
ANION GAP BLD CALC-SCNC: 8 MMOL/L (ref 7–16)
BASOPHILS # BLD AUTO: 0 K/UL (ref 0–0.2)
BASOPHILS # BLD: 0 % (ref 0–2)
BUN SERPL-MCNC: 27 MG/DL (ref 8–23)
CALCIUM SERPL-MCNC: 7.6 MG/DL (ref 8.3–10.4)
CHLORIDE SERPL-SCNC: 105 MMOL/L (ref 98–107)
CO2 SERPL-SCNC: 23 MMOL/L (ref 21–32)
CREAT SERPL-MCNC: 1.55 MG/DL (ref 0.8–1.5)
DIFFERENTIAL METHOD BLD: ABNORMAL
EOSINOPHIL # BLD: 0 K/UL (ref 0–0.8)
EOSINOPHIL NFR BLD: 0 % (ref 0.5–7.8)
ERYTHROCYTE [DISTWIDTH] IN BLOOD BY AUTOMATED COUNT: 13.7 % (ref 11.9–14.6)
GLUCOSE SERPL-MCNC: 126 MG/DL (ref 65–100)
HCT VFR BLD AUTO: 35.6 % (ref 41.1–50.3)
HGB BLD-MCNC: 11.6 G/DL (ref 13.6–17.2)
IMM GRANULOCYTES # BLD: 0 K/UL (ref 0–0.5)
IMM GRANULOCYTES NFR BLD AUTO: 0.2 % (ref 0–5)
LYMPHOCYTES # BLD AUTO: 5 % (ref 13–44)
LYMPHOCYTES # BLD: 0.5 K/UL (ref 0.5–4.6)
MCH RBC QN AUTO: 29.5 PG (ref 26.1–32.9)
MCHC RBC AUTO-ENTMCNC: 32.6 G/DL (ref 31.4–35)
MCV RBC AUTO: 90.6 FL (ref 79.6–97.8)
MONOCYTES # BLD: 1.4 K/UL (ref 0.1–1.3)
MONOCYTES NFR BLD AUTO: 13 % (ref 4–12)
NEUTS SEG # BLD: 8.7 K/UL (ref 1.7–8.2)
NEUTS SEG NFR BLD AUTO: 82 % (ref 43–78)
PLATELET # BLD AUTO: 169 K/UL (ref 150–450)
PMV BLD AUTO: 9.8 FL (ref 10.8–14.1)
POTASSIUM SERPL-SCNC: 4.2 MMOL/L (ref 3.5–5.1)
RBC # BLD AUTO: 3.93 M/UL (ref 4.23–5.67)
SODIUM SERPL-SCNC: 136 MMOL/L (ref 136–145)
WBC # BLD AUTO: 10.7 K/UL (ref 4.3–11.1)

## 2017-07-10 PROCEDURE — 74011250637 HC RX REV CODE- 250/637: Performed by: INTERNAL MEDICINE

## 2017-07-10 PROCEDURE — 74011250636 HC RX REV CODE- 250/636

## 2017-07-10 PROCEDURE — 85025 COMPLETE CBC W/AUTO DIFF WBC: CPT

## 2017-07-10 PROCEDURE — 80048 BASIC METABOLIC PNL TOTAL CA: CPT

## 2017-07-10 PROCEDURE — 97161 PT EVAL LOW COMPLEX 20 MIN: CPT

## 2017-07-10 PROCEDURE — 97165 OT EVAL LOW COMPLEX 30 MIN: CPT

## 2017-07-10 PROCEDURE — 92610 EVALUATE SWALLOWING FUNCTION: CPT | Performed by: SPEECH-LANGUAGE PATHOLOGIST

## 2017-07-10 PROCEDURE — 94760 N-INVAS EAR/PLS OXIMETRY 1: CPT

## 2017-07-10 PROCEDURE — 74011250637 HC RX REV CODE- 250/637

## 2017-07-10 PROCEDURE — 36415 COLL VENOUS BLD VENIPUNCTURE: CPT

## 2017-07-10 RX ORDER — CEFPODOXIME PROXETIL 200 MG/1
200 TABLET, FILM COATED ORAL 2 TIMES DAILY
Qty: 16 TAB | Refills: 0 | Status: SHIPPED | OUTPATIENT
Start: 2017-07-10 | End: 2017-07-10

## 2017-07-10 RX ORDER — CEFPODOXIME PROXETIL 200 MG/1
200 TABLET, FILM COATED ORAL 2 TIMES DAILY
Qty: 16 TAB | Refills: 0 | Status: SHIPPED | OUTPATIENT
Start: 2017-07-10 | End: 2017-07-18

## 2017-07-10 RX ADMIN — SODIUM CHLORIDE 100 ML/HR: 900 INJECTION, SOLUTION INTRAVENOUS at 07:24

## 2017-07-10 RX ADMIN — Medication 5 ML: at 05:56

## 2017-07-10 RX ADMIN — RANOLAZINE 500 MG: 500 TABLET, FILM COATED, EXTENDED RELEASE ORAL at 08:35

## 2017-07-10 RX ADMIN — DOFETILIDE 125 MCG: 0.12 CAPSULE ORAL at 08:35

## 2017-07-10 RX ADMIN — SOLIFENACIN SUCCINATE 10 MG: 10 TABLET, FILM COATED ORAL at 08:35

## 2017-07-10 RX ADMIN — APIXABAN 2.5 MG: 2.5 TABLET, FILM COATED ORAL at 08:35

## 2017-07-10 NOTE — DISCHARGE SUMMARY
Physician Discharge Summary       Patient: Shantanu Islas MRN: [id number]  SSN: [id number]    YOB: 1929  Age: 80 y.o. Sex: male    PCP: Cristobal Perez MD    Admit date: 7/9/2017  Admitting Provider: Nora Harris DO    Discharge date: 7/10/2017  Discharging Provider: Raiza Hernandez MD    * Admission Diagnoses: Metabolic encephalopathy  Acute encephalopathy    * Discharge Diagnoses:    Hospital Problems as of 7/10/2017  Date Reviewed: 2/16/2017          Codes Class Noted - Resolved POA    * (Principal)Metabolic encephalopathy EBU-45-LP: G93.41  ICD-9-CM: 348.31  7/9/2017 - Present Unknown        Elevated serum creatinine ICD-10-CM: R79.89  ICD-9-CM: 790.99  7/9/2017 - Present Unknown        Leukocytosis ICD-10-CM: W68.850  ICD-9-CM: 288.60  7/9/2017 - Present Unknown        CKD (chronic kidney disease) stage 3, GFR 30-59 ml/min (Chronic) ICD-10-CM: N18.3  ICD-9-CM: 585.3  7/9/2017 - Present Unknown        Hypotension ICD-10-CM: I95.9  ICD-9-CM: 458.9  7/9/2017 - Present Unknown        ALEX on CPAP (Chronic) ICD-10-CM: G47.33, Z99.89  ICD-9-CM: 327.23, V46.8  7/9/2017 - Present Unknown        Acute encephalopathy ICD-10-CM: G93.40  ICD-9-CM: 348.30  7/9/2017 - Present Unknown        UTI (urinary tract infection) ICD-10-CM: N39.0  ICD-9-CM: 599.0  7/9/2017 - Present Unknown        A-fib (HCC) (Chronic) ICD-10-CM: I48.91  ICD-9-CM: 427.31  7/9/2017 - Present Unknown        Hypertension ICD-10-CM: I10  ICD-9-CM: 401.9  7/12/2016 - Present Yes        Dyslipidemia ICD-10-CM: E78.5  ICD-9-CM: 272.4  7/12/2016 - Present Yes              * Hospital Course:     Mr. Samara Camacho is an 81 yo WM, with a pmh of atrial fibrillation for which he takes coumadin, who presented 7/9 for increased confusion, dysuria and concentrated urine and was found to have pre renal azotemia/ROD from dehydration along with a UTI. CT head negative for acute abnormality.   He was aggressively hydrated overnight with IVFs and given IV rocephin and states he feels back to his baseline. He is being changed to oral vantin for the remainder of his antibiotic course and is medically stable for discharge. Significant Diagnostic Studies:     CT Brain dated 7/9/2017       Comparison: None     Clinical Information:  Increasing confusion today     5 mm axial images were obtained from skull base to vertex without contrast.     Radiation dose reduction techniques were used for this study. Our scanners use  one or all of the following:  Automated exposure control, adjustment of the mA  and/or kV according to patient size, iterative reconstruction.     Findngs:     Atrophy is present. There is no midline shift. Ill-defined hypodensity in the  cerebral white matter bilaterally is consistent chronic ischemic white matter  change. No hemorrhage, mass, mass effect or acute abnormality. .  No extra-axial  abnormality. No skull fracture. Mastoid air cells and visualized paranasal  sinuses are aerated and unremarkable.     IMPRESSION  Impression:     No Acute Abnormality       TITLE:  Carotid and Vertebral Ultrasound Examination.     INDICATION:  Transient ischemic attack.     TECHNIQUE: Grayscale, color, and Doppler interrogation performed. All velocity  measurements are made in relation to the distal internal carotid artery. The  degree of stenosis is inferred from velocity parameters and cross referenced to  published correlations. Velocity criteria are extrapolated from diameter data  as defined in the [address] [place] [address] Road symptomatic carotid endarterectomy trial  (NASCET).      COMPARISON: None.     RIGHT NECK:  The peak systolic velocity in the Common Carotid Artery = 55  cm/sec; Internal Carotid Artery = 62 cm/sec. Ratio = 1.1.       Intimal thickening in the mid and distal CCA. Intimal thickening with sessile  plaque in the carotid bulb/proximal ICA.   Anterograde flow in the vertebral  artery.     LEFT  NECK:  The peak systolic velocity in the Common Carotid Artery = 58  cm/sec; Internal Carotid Artery = 55 cm/sec. Ratio = 0.9.       Intimal thickening in the distal CCA. Intimal thickening and echogenic plaque in  the carotid bulb/proximal ICA. Anterograde flow in the vertebral artery.     IMPRESSION  IMPRESSION:    MARLENA:  No hemodynamically significant stenosis.     LICA:  No hemodynamically significant stenosis.     There is atherosclerotic disease in both common carotid and internal carotid  arteries    Discharge Exam:    General: awake, alert, oriented, no acute distress  Eyes; non icteric, EoMI  Neck; supple  CV: IRIR, normal rate  PULM: CTAB  Abd; soft, non tender, non distended, no rebound/guarding, active bowel sounds  Neuro: cranial nerves II-XII grossly intact, 5/5 strength of all extremities. * Discharge Condition: stable  * Disposition: home    Discharge Medications:  Current Discharge Medication List      START taking these medications    Details   cefpodoxime (VANTIN) 200 mg tablet Take 1 Tab by mouth two (2) times a day for 8 days. Qty: 16 Tab, Refills: 0         CONTINUE these medications which have NOT CHANGED    Details   dofetilide (TIKOSYN) 125 mcg capsule Take 125 mcg by mouth two (2) times a day. simvastatin (ZOCOR) 20 mg tablet Take 1 Tab by mouth nightly. Qty: 90 Tab, Refills: 3      apixaban (ELIQUIS) 2.5 mg tablet Take 1 Tab by mouth two (2) times a day. Qty: 60 Tab, Refills: 11    Associated Diagnoses: Atrial fibrillation with rapid ventricular response (HCC)      tolterodine ER (DETROL LA) 4 mg ER capsule Take 4 mg by mouth daily. ranolazine ER (RANEXA) 500 mg SR tablet Take 1 Tab by mouth two (2) times a day. Qty: 60 Tab, Refills: 11      tamsulosin (FLOMAX) 0.4 mg capsule Take 0.4 mg by mouth daily. metoprolol-XL (TOPROL-XL) 25 mg XL tablet Take 12.5 mg by mouth nightly. nitroglycerin (NITROLINGUAL) 0.4 mg/dose spray 1 Spray by SubLINGual route every five (5) minutes as needed.                * Follow-up Care/Patient Instructions: Activity: ad salbador  Diet: cardiac    Follow-up Information     Follow up With Details Comments Mario So MD Schedule an appointment as soon as possible for a visit in 1 week  [address] [facility]  [phone]          35 minutes spent in discharge planning and coordination of care.      Signed:  Arias Moran MD  7/10/2017  10:45 AM

## 2017-07-10 NOTE — PROGRESS NOTES
Problem: Self Care Deficits Care Plan (Adult)  Goal: *Acute Goals and Plan of Care (Insert Text)      OCCUPATIONAL THERAPY: Initial Assessment and Discharge 7/10/2017  INPATIENT: Hospital Day: 2  [place] / Product Type: Medicare /      NAME/AGE/GENDER: Shantanu Islas is a 80 y.o. male           PRIMARY DIAGNOSIS:  Metabolic encephalopathy  Acute encephalopathy Metabolic encephalopathy Metabolic encephalopathy        ICD-10: Treatment Diagnosis:        · Other lack of cordination (R27.8)   Precautions/Allergies:         Adhesive tape-silicones and Pcn [penicillins]       ASSESSMENT:      Mr. Samara Camacho admitted with above diagnosis; pt is independent with ADLs and supervision with functional mobility. No skilled OT indicated at this time. Will discharge OT. This section established at most recent assessment   PROBLEM LIST (Impairments causing functional limitations):       INTERVENTIONS PLANNED: (Benefits and precautions of occupational therapy have been discussed with the patient)      TREATMENT PLAN: Frequency/Duration:discharge OT  Rehabilitation Potential For Stated Goals: GOOD      RECOMMENDED REHABILITATION/EQUIPMENT: (at time of discharge pending progress): None. OCCUPATIONAL PROFILE AND HISTORY:   History of Present Injury/Illness (Reason for Referral):  Metabolic encephalopathy  Past Medical History/Comorbidities:   Mr. Samara Camacho  has a past medical history of A-fib ([address] Arrhythmia; Arthritis; CAD (coronary artery disease); Chronic kidney disease; Hypertension; and ALEX on CPAP. He also has no past medical history of Aneurysm ([address] Asthma; Autoimmune disease ([address] Cancer ([address] Chronic pain; Coagulation defects; COPD; Diabetes ([address] Difficult intubation; GERD (gastroesophageal reflux disease); Heart failure ([address] Liver disease; Malignant hyperthermia due to anesthesia; Morbid obesity ([address] Nausea & vomiting;  Other ill-defined conditions; Pseudocholinesterase deficiency; Psychiatric disorder; PUD (peptic ulcer disease); Seizures ([facility] Stroke [facility]); Thromboembolus ([facility] Thyroid disease; Unspecified adverse effect of anesthesia; or Unspecified sleep apnea. Mr. Aguila Ramey  has a past surgical history that includes ptca w/ stent, ea add; other cell; appendectomy; colonoscopy; cardiac surg procedure unlist; and urological.  Social History/Living Environment:   Home Environment: Independent living [facility]  # Steps to Enter: 0  One/Two Story Residence: One story  Living Alone: Yes  Support Systems: Child(esther)  Patient Expects to be Discharged to[de-identified] Private residence  Current DME Used/Available at Home: None  Prior Level of Function/Work/Activity:  Independent at [employer]      Number of Personal Factors/Comorbidities that affect the Plan of Care: Brief history (0):  LOW COMPLEXITY   ASSESSMENT OF OCCUPATIONAL PERFORMANCE[de-identified]   Activities of Daily Living:           Basic ADLs (From Assessment) Complex ADLs (From Assessment)   Basic ADL  Feeding: Independent  Oral Facial Hygiene/Grooming: Independent  Bathing: Independent  Upper Body Dressing: Independent  Lower Body Dressing: Independent  Toileting: Independent     Grooming/Bathing/Dressing Activities of Daily Living     Cognitive Retraining  Safety/Judgement: Awareness of environment                 Functional Transfers  Toilet Transfer : Supervision  Shower Transfer: Supervision     Bed/Mat Mobility  Supine to Sit: Independent  Sit to Supine: Independent  Sit to Stand: Supervision  Bed to Chair: Supervision  Scooting: Independent          Most Recent Physical Functioning:   Gross Assessment:  AROM: Within functional limits (BUEs)  Strength: Within functional limits (BUEs)  Coordination: Within functional limits (BUEs)  Tone: Normal  Sensation: Intact               Posture:     Balance:  Sitting: Intact  Standing: Intact; Without support Bed Mobility:  Supine to Sit: Independent  Sit to Supine: Independent  Scooting: Independent  Wheelchair Mobility:     Transfers:  Sit to Stand: Supervision  Stand to Sit: Supervision  Bed to Chair: Supervision                 Patient Vitals for the past 6 hrs:       BP SpO2 Pulse   07/10/17 0326 112/66 - 75   07/10/17 0352 112/66 94 % 71   07/10/17 0426 101/53 - 70   07/10/17 0526 103/52 - 71   07/10/17 0559 - 94 % 75   07/10/17 0626 97/56 95 % 74   07/10/17 0726 113/60 95 % 64   07/10/17 0826 110/65 95 % 77   07/10/17 0835 110/65 - 80        Mental Status  Neurologic State: Alert  Orientation Level: Oriented X4  Cognition: Follows commands  Perception: Appears intact  Perseveration: No perseveration noted  Safety/Judgement: Awareness of environment              LLE Assessment  LLE Assessment (WDL): Within defined limits RLE Assessment  RLE Assessment (WDL): Within defined limits              Physical Skills Involved:  1. Balance  2. Strength  3. Activity Tolerance Cognitive Skills Affected (resulting in the inability to perform in a timely and safe manner): 1. none Psychosocial Skills Affected:  1. none   Number of elements that affect the Plan of Care: 1-3:  LOW COMPLEXITY   CLINICAL DECISION MAKIN[address] [place] [id number] AM-PAC 6 Clicks   Daily Activity Inpatient Short Form  How much help from another person does the patient currently need. .. Total A Lot A Little None   1. Putting on and taking off regular lower body clothing?   [ ] 1   [ ] 2   [ ] 3   [X] 4   2. Bathing (including washing, rinsing, drying)? [ ] 1   [ ] 2   [ ] 3   [X] 4   3. Toileting, which includes using toilet, bedpan or urinal?   [ ] 1   [ ] 2   [ ] 3   [X] 4   4. Putting on and taking off regular upper body clothing?   [ ] 1   [ ] 2   [ ] 3   [X] 4   5. Taking care of personal grooming such as brushing teeth? [ ] 1   [ ] 2   [ ] 3   [X] 4   6. Eating meals? [ ] 1   [ ] 2   [ ] 3   [X] 4   © , Trustees of [address] [place] [address] [address] [address], under license to [employer].  All rights reserved    Score:  Initial: 24 Most Recent: X (Date: -- )     Interpretation of Tool:  Represents activities that are increasingly more difficult (i.e. Bed mobility, Transfers, Gait).        Score 24 23 22-20 19-15 14-10 9-7 6       Modifier CH CI CJ CK CL CM CN         · Self Care:               - CURRENT STATUS:    CH - 0% impaired, limited or restricted               - GOAL STATUS:           CH - 0% impaired, limited or restricted               - D/C STATUS:                       [address] [place] [address] - 0% impaired, limited or restricted  [place] / Product Type: Medicare /       Medical Necessity:     · Discharge OT  Reason for Services/Other Comments: discharge OT   Use of outcome tool(s) and clinical judgement create a POC that gives a: LOW COMPLEXITY             TREATMENT:   (In addition to Assessment/Re-Assessment sessions the following treatments were rendered)      Pre-treatment Symptoms/Complaints:  No compliants  Pain: Initial:   Pain Intensity 1: 0  Post Session:  0      Assessment/Reassessment only, no treatment provided today     Braces/Orthotics/Lines/Etc:   · ICU monitors  · O2 Device: CPAP nasal (home)  Treatment/Session Assessment:    · Response to Treatment:  Tolerated well  · Interdisciplinary Collaboration:  · Physical Therapist  · Registered Nurse  · After treatment position/precautions:  · Supine in bed  · Bed/Chair-wheels locked  · Bed in low position  · Caregiver at bedside  · Call light within reach  · RN notified  · Side rails x 3  · Compliance with Program/Exercises: discharge OT  · Recommendations/Intent for next treatment session: discharge OT  Total Treatment Duration:  OT Patient Time In/Time Out  Time In: 0745  Time Out: 0800     Shahida Pinto OT

## 2017-07-10 NOTE — PROGRESS NOTES
Problem: Mobility Impaired (Adult and Pediatric)  Goal: *Acute Goals and Plan of Care (Insert Text)  ALL GOALS MET 7/10/17  (1.)Mr. Omega Spatz will move from supine to sit and sit to supine with INDEPENDENT. (2.)Mr. Omega Spatz will transfer from bed to chair and chair to bed with SUPERVISION   (3.)Mr. Omega Spatz will ambulate with SUPERVISION for 300 feet.     ________________________________________________________________________________________________       PHYSICAL THERAPY: INITIAL ASSESSMENT, TREATMENT DAY: DAY OF ASSESSMENT, AM 7/10/2017  INPATIENT: Hospital Day: 2  [place] / Product Type: Medicare /      NAME/AGE/GENDER: Vitaliy Muniz is a 80 y.o. male           PRIMARY DIAGNOSIS: Metabolic encephalopathy  Acute encephalopathy Metabolic encephalopathy Metabolic encephalopathy        ICD-10: Treatment Diagnosis:       · Generalized Muscle Weakness (M62.81)   Precaution/Allergies:  Adhesive tape-silicones and Pcn [penicillins]       ASSESSMENT:      Mr. Omega Spatz presents with slight weakness with but not remarkable deficit with gait or transfers. No skilled PT follow up indicated. Pt should continue to improve with normal activity. This section established at most recent assessment   PROBLEM LIST (Impairments causing functional limitations):  1. NA    INTERVENTIONS PLANNED: (Benefits and precautions of physical therapy have been discussed with the patient.)  1. NA      TREATMENT PLAN: Frequency/Duration:NA  Rehabilitation Potential For Stated Goals: NA      RECOMMENDED REHABILITATION/EQUIPMENT: (at time of discharge pending progress): pt to have someone close when first attempting uneven or inclined surface. HISTORY:   History of Present Injury/Illness (Reason for Referral):   Vitaliy Muniz is a 80 y.o. male that presented to the ED today with 2 day history of generalized weakness, dizziness, confusion, dysarthria, chills, mild dyspnea on exertion, decreased oral intake and concentrated urine. He denies fever, cough, chest pain, abdominal pain, dysuria or diarrhea. He has recently been caring for his spouse who is currently hospitalized. ED evaluation detailed below. He was noted to be hypotensive on arrival with SBP in the 90's and WBC of 22.2, Creatinine 1.85 (baseline 1.3-1.4). Urinalysis c/w UTI. The hospitalist have been asked to admit. Past Medical History/Comorbidities:   Mr. Sheppard Moritz  has a past medical history of A-fib ([facility] Arrhythmia; Arthritis; CAD (coronary artery disease); Chronic kidney disease; Hypertension; and ALEX on CPAP. He also has no past medical history of Aneurysm ([facility] Asthma; Autoimmune disease ([facility] Cancer ([address] Chronic pain; Coagulation defects; COPD; Diabetes ([address] Difficult intubation; GERD (gastroesophageal reflux disease); Heart failure ([address] Liver disease; Malignant hyperthermia due to anesthesia; Morbid obesity ([address] Nausea & vomiting; Other ill-defined conditions; Pseudocholinesterase deficiency; Psychiatric disorder; PUD (peptic ulcer disease); Seizures ([facility] Stroke [facility]); Thromboembolus ([facility] Thyroid disease; Unspecified adverse effect of anesthesia; or Unspecified sleep apnea. Mr. Sheppard Moritz  has a past surgical history that includes ptca w/ stent, ea add; other cell; appendectomy; colonoscopy; cardiac surg procedure unlist; and urological.  Social History/Living Environment:   Home Environment: Independent living [facility]  # Steps to Enter: 0  One/Two Story Residence: One story  Living Alone: Yes  Support Systems: Child(esther)  Patient Expects to be Discharged to[de-identified] Private residence  Current DME Used/Available at Home: None  Prior Level of Function/Work/Activity:  Pt was independent without an assistive device prior to this admission  Personal Factors:           Other factors that influence how disability is experienced by the patient:  Current & PMH   Number of Personal Factors/Comorbidities that affect the Plan of Care: 3+: HIGH COMPLEXITY   EXAMINATION:   Most Recent Physical Functioning:   Gross Assessment: both LE's  AROM: Within functional limits  Strength: Within functional limits  Coordination: Generally decreased, functional                     Balance:  Sitting: Intact; Without support  Standing: Intact; Without support Bed Mobility:  Supine to Sit: Independent  Sit to Supine: Independent  Scooting: Independent        Transfers:  Sit to Stand: Supervision  Stand to Sit: Supervision  Bed to Chair: Supervision  Gait:     Gait Abnormalities: Decreased step clearance  Distance (ft): 300 Feet (ft)  Assistive Device:  (none)  Ambulation - Level of Assistance: Supervision   Functional Mobility:         Gait/Ambulation:  sup        Transfers:  sup        Bed Mobility:  Ind   Body Structures Involved:  1. Joints  2. Muscles Body Functions Affected:  1. Movement Related Activities and Participation Affected:  1. General Tasks and Demands  2. Mobility   Number of elements that affect the Plan of Care: 4+: HIGH COMPLEXITY   CLINICAL PRESENTATION:   Presentation: Stable and uncomplicated: LOW COMPLEXITY   CLINICAL DECISION MAKIN [place] [address] AM-PAC 6 Clicks   Basic Mobility Inpatient Short Form  How much difficulty does the patient currently have. .. Unable A Lot A Little None   1. Turning over in bed (including adjusting bedclothes, sheets and blankets)? [ ] 1   [ ] 2   [ ] 3   [X] 4   2. Sitting down on and standing up from a chair with arms ( e.g., wheelchair, bedside commode, etc.)   [ ] 1   [ ] 2   [ ] 3   [X] 4   3. Moving from lying on back to sitting on the side of the bed? [ ] 1   [ ] 2   [ ] 3   [X] 4   How much help from another person does the patient currently need. .. Total A Lot A Little None   4. Moving to and from a bed to a chair (including a wheelchair)? [ ] 1   [ ] 2   [ ] 3   [X] 4   5. Need to walk in hospital room? [ ] 1   [ ] 2   [ ] 3   [X] 4   6.   Climbing 3-5 steps with a railing? [ ] 1   [ ] 2   [X] 3   [ ] 4   © 2007, Trustees of [address] [place] [address] [address] [address], under license to [employer]. All rights reserved    Score:  Initial: 23 Most Recent: X (Date: -- )     Interpretation of Tool:  Represents activities that are increasingly more difficult (i.e. Bed mobility, Transfers, Gait). Score 24 23 22-20 19-15 14-10 9-7 6       Modifier CH CI CJ CK CL CM CN         · Mobility - Walking and Moving Around:               - CURRENT STATUS:    CI - 1%-19% impaired, limited or restricted               - GOAL STATUS:           CI - 1%-19% impaired, limited or restricted               - D/C STATUS:                       CI - 1%-19% impaired, limited or restricted  [place] / Product Type: Medicare /       Medical Necessity:     · no skilled PT follow indicated. Reason for Services/Other Comments:  · No skilled PT follow up needed. Use of outcome tool(s) and clinical judgement create a POC that gives a: Clear prediction of patient's progress: LOW COMPLEXITY                 TREATMENT:   (In addition to Assessment/Re-Assessment sessions the following treatments were rendered)   Pre-treatment Symptoms/Complaints:  none  Pain: Initial: visual scale  Pain Intensity 1: 0  Post Session:  0/10      Assessment/Reassessment only, no treatment provided today     Braces/Orthotics/Lines/Etc:   · icu monitors  Treatment/Session Assessment:    · Response to Treatment: tolerated well  · Interdisciplinary Collaboration:  · Occupational Therapist  · Registered Nurse  · After treatment position/precautions:  · Supine in bed  · Bed/Chair-wheels locked  · Bed in low position  · Call light within reach  · RN notified  · Family at bedside  · Compliance with Program/Exercises: compliant all of the time.   · Recommendations/Discharge PT services after evaluation  Total Treatment Duration:  PT Patient Time In/Time Out  Time In: 0730  Time Out: Humberto Lopez Kelle Zhao

## 2017-07-10 NOTE — DISCHARGE INSTRUCTIONS
DISCHARGE SUMMARY from Nurse    The following personal items are in your possession at time of discharge:    Dental Appliances: None  Visual Aid: Glasses, With patient  Hearing Aids/Status: Bilateral, With patient  Home Medications: Sent to pharmacy (Tikosyn verified by pharmacy)  Jewelry: Ring (wedding band)  Clothing: Belt, Pants, Undergarments, Socks, Footwear, Shirt  Other Valuables: Eyeglasses, Cell Phone  Personal Items Sent to Safe: none          PATIENT INSTRUCTIONS:    After general anesthesia or intravenous sedation, for 24 hours or while taking prescription Narcotics:  · Limit your activities  · Do not drive and operate hazardous machinery  · Do not make important personal or business decisions  · Do  not drink alcoholic beverages  · If you have not urinated within 8 hours after discharge, please contact your surgeon on call. Report the following to your surgeon:  · Excessive pain, swelling, redness or odor of or around the surgical area  · Temperature over 100.5  · Nausea and vomiting lasting longer than 4 hours or if unable to take medications  · Any signs of decreased circulation or nerve impairment to extremity: change in color, persistent  numbness, tingling, coldness or increase pain  · Any questions        What to do at Home:  Recommended activity: Activity as tolerated    If you experience any of the following symptoms dizziness, please follow up with primary care physician or please go to the nearest emergency room. *  Please give a list of your current medications to your Primary Care Provider. *  Please update this list whenever your medications are discontinued, doses are      changed, or new medications (including over-the-counter products) are added. *  Please carry medication information at all times in case of emergency situations.           These are general instructions for a healthy lifestyle:    No smoking/ No tobacco products/ Avoid exposure to second hand smoke    Surgeon General's Warning:  Quitting smoking now greatly reduces serious risk to your health. Obesity, smoking, and sedentary lifestyle greatly increases your risk for illness    A healthy diet, regular physical exercise & weight monitoring are important for maintaining a healthy lifestyle    You may be retaining fluid if you have a history of heart failure or if you experience any of the following symptoms:  Weight gain of 3 pounds or more overnight or 5 pounds in a week, increased swelling in our hands or feet or shortness of breath while lying flat in bed. Please call your doctor as soon as you notice any of these symptoms; do not wait until your next office visit. Recognize signs and symptoms of STROKE:    F-face looks uneven    A-arms unable to move or move unevenly    S-speech slurred or non-existent    T-time-call 911 as soon as signs and symptoms begin-DO NOT go       Back to bed or wait to see if you get better-TIME IS BRAIN. Warning Signs of HEART ATTACK     Call 911 if you have these symptoms:   Chest discomfort. Most heart attacks involve discomfort in the center of the chest that lasts more than a few minutes, or that goes away and comes back. It can feel like uncomfortable pressure, squeezing, fullness, or pain.  Discomfort in other areas of the upper body. Symptoms can include pain or discomfort in one or both arms, the back, neck, jaw, or stomach.  Shortness of breath with or without chest discomfort.  Other signs may include breaking out in a cold sweat, nausea, or lightheadedness. Don't wait more than five minutes to call 911 - MINUTES MATTER! Fast action can save your life. Calling 911 is almost always the fastest way to get lifesaving treatment. Emergency Medical Services staff can begin treatment when they arrive -- up to an hour sooner than if someone gets to the hospital by car. The discharge information has been reviewed with the patient.   The patient verbalized understanding. Discharge medications reviewed with the patient and appropriate educational materials and side effects teaching were provided.

## 2017-07-10 NOTE — PROGRESS NOTES
's initial visit with Mr. Chani Alanis. Offered spiritual interventions, including empathic listening, affirmation of emotions/markus, and exploration of support system and coping skills.      Cuco Ann 68  Board Certified

## 2017-07-10 NOTE — PROGRESS NOTES
Problem: Dysphagia (Adult)  Goal: *Speech Goal: (INSERT TEXT)  NO SPEECH GOALS  SPEECH LANGUAGE PATHOLOGY: BEDSIDE SWALLOW NOTE: INITIAL ASSESSMENT AND DISCHARGE     NAME/AGE/GENDER: Vitaliy Muniz is a 80 y.o. male  DATE: 7/10/2017  PRIMARY DIAGNOSIS: Metabolic encephalopathy  Acute encephalopathy       ICD-10: Treatment Diagnosis: Dysphagia Other, R13.19  INTERDISCIPLINARY COLLABORATION: Speech Therapist  PRECAUTIONS/ALLERGIES: Adhesive tape-silicones and Pcn [penicillins]   ASSESSMENT:   Based on the objective data described below, Mr. Omega Spatz presents with zero clinical s/sx of Dysphagia. OME was essentially WNL's for patient's age. Patient was given trials of thin liquids via cup/straw, puree, mixed consistencies and solids. No overt clinical s/sx of aspiration was observed with any trials. Swallow function is WNL's for patient's age. He was alert and oriented. No further ST is warranted at this time. If changes in patient's current status, please re-consult. SLP recommends continue with current diet. Patient will benefit from skilled intervention to address the below impairments. PLAN OF CARE:   Patient will benefit from skilled intervention to address the following impairments. RECOMMENDATIONS AND PLANNED INTERVENTIONS (Benefits and precautions of therapy have been discussed with the patient.):  · continue prescribed diet  · Liquids:  regular thin  MEDICATIONS:  · With liquid  · With Thickened Liquid  COMPENSATORY STRATEGIES/MODIFICATIONS INCLUDING:  · Small sips and bites  OTHER RECOMMENDATIONS (including follow up treatment recommendations):   · none  RECOMMENDED DIET MODIFICATIONS DISCUSSED WITH:  · Patient      RECOMMENDED REHABILITATION/EQUIPMENT: (at time of discharge pending progress):   None. SUBJECTIVE:   \"I am doing much better\"  History of Present Injury/Illness: Mr. Omega Spatz  has a past medical history of A-fib ([facility] Arrhythmia; Arthritis; CAD (coronary artery disease);  Chronic kidney disease; Hypertension; and ALEX on CPAP. He also has no past medical history of Aneurysm ([facility] [address] Asthma; Autoimmune disease ([facility] Cancer ([facility] Chronic pain; Coagulation defects; COPD; Diabetes ([facility] Difficult intubation; GERD (gastroesophageal reflux disease); Heart failure ([facility] Liver disease; Malignant hyperthermia due to anesthesia; Morbid obesity ([facility] Nausea & vomiting; Other ill-defined conditions; Pseudocholinesterase deficiency; Psychiatric disorder; PUD (peptic ulcer disease); Seizures ([facility] Stroke [facility]); Thromboembolus ([facility] Thyroid disease; Unspecified adverse effect of anesthesia; or Unspecified sleep apnea. .  He also  has a past surgical history that includes ptca w/ stent, ea add; other cell; appendectomy; colonoscopy; cardiac surg procedure unlist; and urological.   Present Symptoms:    Pain Intensity 1: 0  Current Medications:   No current facility-administered medications on file prior to encounter. Current Outpatient Prescriptions on File Prior to Encounter   Medication Sig Dispense Refill    dofetilide (TIKOSYN) 125 mcg capsule Take 125 mcg by mouth two (2) times a day.  simvastatin (ZOCOR) 20 mg tablet Take 1 Tab by mouth nightly. 90 Tab 3    apixaban (ELIQUIS) 2.5 mg tablet Take 1 Tab by mouth two (2) times a day. 60 Tab 11    tolterodine ER (DETROL LA) 4 mg ER capsule Take 4 mg by mouth daily.  ranolazine ER (RANEXA) 500 mg SR tablet Take 1 Tab by mouth two (2) times a day. 60 Tab 11    tamsulosin (FLOMAX) 0.4 mg capsule Take 0.4 mg by mouth daily.  metoprolol-XL (TOPROL-XL) 25 mg XL tablet Take 12.5 mg by mouth nightly.  nitroglycerin (NITROLINGUAL) 0.4 mg/dose spray 1 Spray by SubLINGual route every five (5) minutes as needed.            Current Dietary Status:  Regular/Thin           History of reflux:  NO   · Reflux medication:None  Social History/Home Situation:    Home Environment: Independent living [facility]  # Steps to Enter: 0  One/Two Story Residence: One story  Living Alone: Yes  Support Systems: Child(esther)  Patient Expects to be Discharged to[de-identified] Private residence  Current DME Used/Available at Home: None      OBJECTIVE:   Respiratory Status:  Room air     CXR Results:N/A  MRI/CT Results:no acute abnormality  Oral Motor Structure/Speech:  Oral-Motor Structure/Motor Speech  Labial: No impairment  Dentition: Natural  Oral Hygiene: good  Lingual: No impairment  Velum: No impairment     Cognitive and Communication Status:  Neurologic State: Alert  Orientation Level: Oriented X4  Cognition: Follows commands  Perception: Appears intact  Perseveration: No perseveration noted  Safety/Judgement: Awareness of environment     BEDSIDE SWALLOW EVALUATION  Oral Assessment:  Oral Assessment  Labial: No impairment  Dentition: Natural  Oral Hygiene: good  Lingual: No impairment  Velum: No impairment  Gag Reflex: Present  P.O. Trials:  Patient Position: upright in bed     The patient was given teaspoon to tablespoon   amounts of the following:   Consistency Presented: Thin liquid; Solid;Puree;Mixed consistency  How Presented: Self-fed/presented;Cup/sip;Straw;Spoon; Successive swallows     ORAL PHASE:  Bolus Acceptance: No impairment  Bolus Formation/Control: No impairment  Propulsion: No impairment     Oral Residue: None     PHARYNGEAL PHASE:  Initiation of Swallow: No impairment  Laryngeal Elevation: Functional  Aspiration Signs/Symptoms: None  Vocal Quality: No impairment           Pharyngeal Phase Characteristics: No impairment, issues, or problems      OTHER OBSERVATIONS:  Rate/bite size: WNL         Endurance: WNL            Comments:        Tool Used: Dysphagia Outcome and Severity Scale (JUSTINA)     Score Comments   Normal Diet  [X] 7 With no strategies or extra time needed   Functional Swallow  [ ] 6 May have mild oral or pharyngeal delay         Mild Dysphagia     [ ] 5 Which may require one diet consistency restricted (those who demonstrate penetration which is entirely cleared on MBS would be included)   Mild-Moderate Dysphagia  [ ] 4 With 1-2 diet consistencies restricted         Moderate Dysphagia  [ ] 3 With 2 or more diet consistencies restricted         Moderately Severe Dysphagia  [ ] 2 With partial PO strategies (trials with ST only)         Severe Dysphagia  [ ] 1 With inability to tolerate any PO safely            Score:  Initial: 7 Most Recent: X (Date: -- )   Interpretation of Tool: The Dysphagia Outcome and Severity Scale (JUSTINA) is a simple, easy-to-use, 7-point scale developed to systematically rate the functional severity of dysphagia based on objective assessment and make recommendations for diet level, independence level, and type of nutrition.        Score 7 6 5 4 3 2 1   Modifier CH CI CJ CK CL CM CN   · Swallowing:               - CURRENT STATUS:           CH - 0% impaired, limited or restricted               - GOAL STATUS:                   CH - 0% impaired, limited or restricted               - D/C STATUS:                       CH - 0% impaired, limited or restricted  [place] / [place] / Product Type: Medicare /       TREATMENT:         (In addition to Assessment/Re-Assessment sessions the following treatments were rendered)  Assessment/Reassessment only, no treatment provided today  MODALITIES:                                                                     ORAL MOTOR  EXERCISES:                                                                                                                                                                       LARYNGEAL / PHARYNGEAL EXERCISES:                                                                                                                                      __________________________________________________________________________________________________  Safety:   After treatment position/precautions:  · Upright in Bed  Treatment Assessment:  Swallow evaluation. Swallow function is essentially WNL's. No further ST is warranted at this time. Total Treatment Duration:  Time In: 0930  Time Out: Fredi Bonilla, Derrick Ortiz. Dane Mosley

## 2017-07-10 NOTE — PROGRESS NOTES
Patient have home CPAP at bedside with in reach and ready for pt use. Sterile water added to pt's cpap for humidity.

## 2017-07-13 LAB
BACTERIA SPEC CULT: ABNORMAL
SERVICE CMNT-IMP: ABNORMAL

## 2017-07-14 LAB
BACTERIA SPEC CULT: NORMAL
BACTERIA SPEC CULT: NORMAL
SERVICE CMNT-IMP: NORMAL
SERVICE CMNT-IMP: NORMAL

## 2019-08-29 ENCOUNTER — HOSPITAL ENCOUNTER (OUTPATIENT)
Dept: LAB | Age: 84
Discharge: HOME | End: 2019-08-29
Attending: INTERNAL MEDICINE
Payer: MEDICARE

## 2019-08-29 DIAGNOSIS — I48.91 ATRIAL FIBRILLATION WITH RAPID VENTRICULAR RESPONSE (HCC): ICD-10-CM

## 2019-08-29 LAB
ANION GAP SERPL CALC-SCNC: 6 MMOL/L (ref 7–16)
BUN SERPL-MCNC: 20 MG/DL (ref 8–23)
CALCIUM SERPL-MCNC: 9.2 MG/DL (ref 8.3–10.4)
CHLORIDE SERPL-SCNC: 104 MMOL/L (ref 98–107)
CO2 SERPL-SCNC: 27 MMOL/L (ref 21–32)
CREAT SERPL-MCNC: 1.6 MG/DL (ref 0.8–1.5)
GLUCOSE SERPL-MCNC: 149 MG/DL (ref 65–100)
POTASSIUM SERPL-SCNC: 4.4 MMOL/L (ref 3.5–5.1)
PSA SERPL-MCNC: 2.4 NG/ML
SODIUM SERPL-SCNC: 137 MMOL/L (ref 136–145)

## 2019-08-29 PROCEDURE — 84153 ASSAY OF PSA TOTAL: CPT

## 2019-08-29 PROCEDURE — 36415 COLL VENOUS BLD VENIPUNCTURE: CPT

## 2019-08-29 PROCEDURE — 80048 BASIC METABOLIC PNL TOTAL CA: CPT

## 2021-09-22 ENCOUNTER — HOSPITAL ENCOUNTER (OUTPATIENT)
Dept: LAB | Age: 86
Discharge: HOME | End: 2021-09-22
Payer: MEDICARE

## 2021-09-22 DIAGNOSIS — N18.32 STAGE 3B CHRONIC KIDNEY DISEASE (HCC): Chronic | ICD-10-CM

## 2021-09-22 DIAGNOSIS — I48.0 PAROXYSMAL ATRIAL FIBRILLATION (HCC): Chronic | ICD-10-CM

## 2021-09-22 LAB
ANION GAP SERPL CALC-SCNC: 6 MMOL/L (ref 7–16)
BASOPHILS # BLD: 0 K/UL (ref 0–0.2)
BASOPHILS NFR BLD: 0 % (ref 0–2)
BUN SERPL-MCNC: 31 MG/DL (ref 8–23)
CALCIUM SERPL-MCNC: 9.2 MG/DL (ref 8.3–10.4)
CHLORIDE SERPL-SCNC: 109 MMOL/L (ref 98–107)
CO2 SERPL-SCNC: 28 MMOL/L (ref 21–32)
CREAT SERPL-MCNC: 1.71 MG/DL (ref 0.8–1.5)
DIFFERENTIAL METHOD BLD: ABNORMAL
EOSINOPHIL # BLD: 0.1 K/UL (ref 0–0.8)
EOSINOPHIL NFR BLD: 2 % (ref 0.5–7.8)
ERYTHROCYTE [DISTWIDTH] IN BLOOD BY AUTOMATED COUNT: 14.3 % (ref 11.9–14.6)
GLUCOSE SERPL-MCNC: 142 MG/DL (ref 65–100)
HCT VFR BLD AUTO: 42.7 % (ref 41.1–50.3)
HGB BLD-MCNC: 13.3 G/DL (ref 13.6–17.2)
IMM GRANULOCYTES # BLD AUTO: 0.1 K/UL (ref 0–0.5)
IMM GRANULOCYTES NFR BLD AUTO: 1 % (ref 0–5)
LYMPHOCYTES # BLD: 1.4 K/UL (ref 0.5–4.6)
LYMPHOCYTES NFR BLD: 17 % (ref 13–44)
MCH RBC QN AUTO: 28.4 PG (ref 26.1–32.9)
MCHC RBC AUTO-ENTMCNC: 31.1 G/DL (ref 31.4–35)
MCV RBC AUTO: 91.2 FL (ref 79.6–97.8)
MONOCYTES # BLD: 1.2 K/UL (ref 0.1–1.3)
MONOCYTES NFR BLD: 14 % (ref 4–12)
NEUTS SEG # BLD: 5.4 K/UL (ref 1.7–8.2)
NEUTS SEG NFR BLD: 66 % (ref 43–78)
NRBC # BLD: 0 K/UL (ref 0–0.2)
PLATELET # BLD AUTO: 261 K/UL (ref 150–450)
PMV BLD AUTO: 10.2 FL (ref 9.4–12.3)
POTASSIUM SERPL-SCNC: 4.6 MMOL/L (ref 3.5–5.1)
RBC # BLD AUTO: 4.68 M/UL (ref 4.23–5.6)
SODIUM SERPL-SCNC: 143 MMOL/L (ref 136–145)
WBC # BLD AUTO: 8.2 K/UL (ref 4.3–11.1)

## 2021-09-22 PROCEDURE — 80048 BASIC METABOLIC PNL TOTAL CA: CPT

## 2021-09-22 PROCEDURE — 85025 COMPLETE CBC W/AUTO DIFF WBC: CPT

## 2021-09-22 PROCEDURE — 36415 COLL VENOUS BLD VENIPUNCTURE: CPT

## 2022-03-19 PROBLEM — D72.829 LEUKOCYTOSIS: Status: ACTIVE | Noted: 2017-07-09

## 2022-03-19 PROBLEM — R79.89 ELEVATED SERUM CREATININE: Status: ACTIVE | Noted: 2017-07-09

## 2022-03-19 PROBLEM — G47.33 OSA ON CPAP: Status: ACTIVE | Noted: 2017-07-09

## 2022-03-19 PROBLEM — I95.9 HYPOTENSION: Status: ACTIVE | Noted: 2017-07-09

## 2022-03-19 PROBLEM — N18.30 CKD (CHRONIC KIDNEY DISEASE) STAGE 3, GFR 30-59 ML/MIN (HCC): Status: ACTIVE | Noted: 2017-07-09

## 2022-03-19 PROBLEM — N39.0 UTI (URINARY TRACT INFECTION): Status: ACTIVE | Noted: 2017-07-09

## 2022-03-19 PROBLEM — G93.40 ACUTE ENCEPHALOPATHY: Status: ACTIVE | Noted: 2017-07-09

## 2022-03-19 PROBLEM — Z99.89 OSA ON CPAP: Status: ACTIVE | Noted: 2017-07-09

## 2022-03-19 PROBLEM — I48.91 A-FIB (HCC): Status: ACTIVE | Noted: 2017-07-09

## 2022-03-20 PROBLEM — G93.41 METABOLIC ENCEPHALOPATHY: Status: ACTIVE | Noted: 2017-07-09

## 2022-04-11 PROBLEM — G47.31 CENTRAL SLEEP APNEA: Status: ACTIVE | Noted: 2022-04-11

## 2022-05-24 ENCOUNTER — TELEPHONE (OUTPATIENT)
Dept: PULMONOLOGY | Age: 87
End: 2022-05-24

## 2022-06-27 ENCOUNTER — OFFICE VISIT (OUTPATIENT)
Dept: UROLOGY | Age: 87
End: 2022-06-27
Payer: MEDICARE

## 2022-06-27 DIAGNOSIS — N40.1 BENIGN PROSTATIC HYPERPLASIA WITH LOWER URINARY TRACT SYMPTOMS, SYMPTOM DETAILS UNSPECIFIED: Primary | ICD-10-CM

## 2022-06-27 DIAGNOSIS — N32.81 OVERACTIVE BLADDER: ICD-10-CM

## 2022-06-27 DIAGNOSIS — R39.15 URINARY URGENCY: ICD-10-CM

## 2022-06-27 LAB
BILIRUBIN, URINE, POC: NEGATIVE
BLOOD URINE, POC: NORMAL
GLUCOSE URINE, POC: NEGATIVE
KETONES, URINE, POC: NEGATIVE
LEUKOCYTE ESTERASE, URINE, POC: NORMAL
NITRITE, URINE, POC: NEGATIVE
PH, URINE, POC: 6 (ref 4.6–8)
PROTEIN,URINE, POC: 100
SPECIFIC GRAVITY, URINE, POC: 1.02 (ref 1–1.03)
URINALYSIS CLARITY, POC: NORMAL
URINALYSIS COLOR, POC: NORMAL
UROBILINOGEN, POC: NORMAL

## 2022-06-27 PROCEDURE — 81003 URINALYSIS AUTO W/O SCOPE: CPT | Performed by: UROLOGY

## 2022-06-27 PROCEDURE — 1123F ACP DISCUSS/DSCN MKR DOCD: CPT | Performed by: UROLOGY

## 2022-06-27 PROCEDURE — 99213 OFFICE O/P EST LOW 20 MIN: CPT | Performed by: UROLOGY

## 2022-06-27 RX ORDER — TAMSULOSIN HYDROCHLORIDE 0.4 MG/1
0.4 CAPSULE ORAL DAILY
Qty: 90 CAPSULE | Refills: 3 | Status: SHIPPED | OUTPATIENT
Start: 2022-06-27 | End: 2022-07-04

## 2022-06-27 ASSESSMENT — ENCOUNTER SYMPTOMS: BACK PAIN: 0

## 2022-06-27 NOTE — PROGRESS NOTES
[facility]  [address] [facility] [address] [address], [address] [facility]  [phone]    Javier Guo  : 1929    Chief Complaint   Patient presents with    Follow-up    Benign Prostatic Hypertrophy          HPI     Javier Guo is a 80 y.o.  male with a PMH of BPH/LUTS s/p PAE   and congenital solitary kidney who was referred to clinic for persistent BPH/LUTS. Seen 22 and had minimal improvement with flomax. Completed of urinary urgency, frequency, nocturia 2-3 times per night, urge urinary incontinence daily. No retention. No hematuria. Mirabegron 50 mg daily was started and is working well. Needs refills.        PVR: 35 cc at last visit.      IPSS: 20 at last visit. 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      Past Surgical History:   Procedure Laterality Date    APPENDECTOMY      exploratory     COLONOSCOPY      COLONOSCOPY      OTHER CELL      LEFT KIDNEY REMOVED for malformation; no cancer     OK CARDIAC SURG PROCEDURE UNLIST      stent to circ    PTCA W/ STENT, EA ADD      UROLOGICAL SURGERY      Kidney removed      Current Outpatient Medications   Medication Sig Dispense Refill    mirabegron (MYRBETRIQ) 50 MG TB24 Take 50 mg by mouth daily 90 tablet 3    Lancets MISC Check fasting blood sugar daily      apixaban (ELIQUIS) 2.5 MG TABS tablet TAKE 1 TABLET TWICE A DAY      dofetilide (TIKOSYN) 125 MCG capsule TAKE 1 CAPSULE TWICE A DAY      gabapentin (NEURONTIN) 100 MG capsule Take 300 mg by mouth.       melatonin 1 MG tablet 1 mg      metoprolol succinate (TOPROL XL) 25 MG extended release tablet TAKE ONE-HALF (/2) TABLET NIGHTLY      nitroGLYCERIN (NITROLINGUAL) 0.4 MG/SPRAY 0.4 mg spray Place 1 spray under the tongue      ranolazine (RANEXA) 500 MG extended release tablet Take 500 mg by mouth 2 times daily      simvastatin (ZOCOR) 20 MG tablet TAKE 1 TABLET NIGHTLY      tamsulosin (FLOMAX) 0.4 MG capsule Take 1 capsule by mouth daily for 10 days 10 capsule 0    cefdinir (OMNICEF) 300 MG capsule Take 1 capsule by mouth 2 times daily for 7 days 14 capsule 0     No current facility-administered medications for this visit. Allergies   Allergen Reactions    Penicillins Rash     Social History     Socioeconomic History    Marital status:      Spouse name: Not on file    Number of children: Not on file    Years of education: Not on file    Highest education level: Not on file   Occupational History    Not on file   Tobacco Use    Smoking status: Former Smoker     Packs/day: 0.50     Quit date: 1967     Years since quittin.5    Smokeless tobacco: Never Used   Substance and Sexual Activity    Alcohol use: Yes    Drug use: No    Sexual activity: Not on file   Other Topics Concern    Not on file   Social History Narrative    Not on file     Social Determinants of Health     Financial Resource Strain:     Difficulty of Paying Living Expenses: Not on file   Food Insecurity:     Worried About Running Out of Food in the Last Year: Not on file    Hazel of Food in the Last Year: Not on file   Transportation Needs:     Lack of Transportation (Medical): Not on file    Lack of Transportation (Non-Medical):  Not on file   Physical Activity:     Days of Exercise per Week: Not on file    Minutes of Exercise per Session: Not on file   Stress:     Feeling of Stress : Not on file   Social Connections:     Frequency of Communication with Friends and Family: Not on file    Frequency of Social Gatherings with Friends and Family: Not on file    Attends Restoration Services: Not on file   Keyla Holliday Active Member of Clubs or Organizations: Not on file    Attends Club or Organization Meetings: Not on file    Marital Status: Not on file   Intimate Partner Violence:     Fear of Current or Ex-Partner: Not on file    Emotionally Abused: Not on file    Physically Abused: Not on file    Sexually Abused: Not on file   Housing Stability:     Unable to Pay for Housing in the Last Year: Not on file    Number of Places Lived in the Last Year: Not on file    Unstable Housing in the Last Year: Not on file     Family History   Problem Relation Age of Onset    Diabetes Brother     Diabetes Sister     Diabetes Mother     Heart Disease Mother     Heart Attack Mother 80        MI    Kidney Disease Brother         ESRD 2/2 DM     Cancer Brother         liver        Review of Systems  Constitutional:   Negative for fever. Genitourinary:  Negative for urinary burning. Musculoskeletal:  Negative for back pain. Urinalysis  UA - Dipstick  Results for orders placed or performed in visit on 06/27/22   AMB POC URINALYSIS DIP STICK AUTO W/O MICRO   Result Value Ref Range    Color (UA POC)      Clarity (UA POC)      Glucose, Urine, POC Negative Negative    Bilirubin, Urine, POC Negative Negative    KETONES, Urine, POC Negative Negative    Specific Gravity, Urine, POC 1.025 1.001 - 1.035    Blood (UA POC) Trace-intact Negative    pH, Urine, POC 6.0 4.6 - 8.0    Protein, Urine,   Negative    Urobilinogen, POC Normal     Nitrite, Urine, POC Negative Negative    Leukocyte Esterase, Urine, POC Small Negative       There were no vitals taken for this visit.      GENERAL: No acute distress, Awake, Alert, Oriented X 3, Gait normal  CARDIAC: regular rate and rhythm  CHEST AND LUNG: Easy work of breathing, clear to auscultation bilaterally, no cyanosis  ABDOMEN: soft, non tender, non-distended, positive bowel sounds, no organomegaly, no palpable masses, no guarding, no rebound tenderness  SKIN: No rash, no erythema, no lacerations or abrasions, no ecchymosis  NEUROLOGIC: cranial nerves 2-12 grossly intact         Assessment and Plan    ICD-10-CM    1. Benign prostatic hyperplasia with lower urinary tract symptoms, symptom details unspecified  N40.1 AMB POC URINALYSIS DIP STICK AUTO W/O MICRO     DISCONTINUED: tamsulosin (FLOMAX) 0.4 MG capsule   2. Urinary urgency  R39.15 mirabegron (MYRBETRIQ) 50 MG TB24   3. Overactive bladder  N32.81      BPH/LUTS and Overactive Bladder:   Much improved on flomax + mirabegron 50 mg daily. Refills sent. Follow up 1 year or sooner if needed    I have spent 20 minutes today reviewing previous notes, test results and face to face with the patient as well as documenting. David Keith M.D.     [facility]  [place] [address] [address] [facility]  Phone: [phone]  Fax: [phone]

## 2022-07-04 ENCOUNTER — HOSPITAL ENCOUNTER (EMERGENCY)
Dept: CT IMAGING | Age: 87
Discharge: HOME | End: 2022-07-07
Payer: MEDICARE

## 2022-07-04 ENCOUNTER — HOSPITAL ENCOUNTER (EMERGENCY)
Age: 87
Discharge: HOME | End: 2022-07-04
Attending: STUDENT IN AN ORGANIZED HEALTH CARE EDUCATION/TRAINING PROGRAM
Payer: MEDICARE

## 2022-07-04 VITALS
TEMPERATURE: 98.9 F | WEIGHT: 142 LBS | BODY MASS INDEX: 21.52 KG/M2 | OXYGEN SATURATION: 97 % | DIASTOLIC BLOOD PRESSURE: 72 MMHG | HEIGHT: 68 IN | HEART RATE: 68 BPM | SYSTOLIC BLOOD PRESSURE: 155 MMHG | RESPIRATION RATE: 16 BRPM

## 2022-07-04 DIAGNOSIS — K80.20 CALCULUS OF GALLBLADDER WITHOUT CHOLECYSTITIS WITHOUT OBSTRUCTION: ICD-10-CM

## 2022-07-04 DIAGNOSIS — N30.90 CYSTITIS WITHOUT HEMATURIA: ICD-10-CM

## 2022-07-04 DIAGNOSIS — N20.0 KIDNEY STONE: Primary | ICD-10-CM

## 2022-07-04 LAB
ALBUMIN SERPL-MCNC: 3.9 G/DL (ref 3.2–4.6)
ALBUMIN/GLOB SERPL: 1 {RATIO} (ref 1.2–3.5)
ALP SERPL-CCNC: 58 U/L (ref 50–136)
ALT SERPL-CCNC: 15 U/L (ref 12–65)
ANION GAP SERPL CALC-SCNC: 6 MMOL/L (ref 7–16)
APPEARANCE UR: CLEAR
AST SERPL-CCNC: 18 U/L (ref 15–37)
BACTERIA URNS QL MICRO: 0 /HPF
BASOPHILS # BLD: 0 K/UL (ref 0–0.2)
BASOPHILS NFR BLD: 0 % (ref 0–2)
BILIRUB SERPL-MCNC: 1 MG/DL (ref 0.2–1.1)
BILIRUB UR QL: NEGATIVE
BUN SERPL-MCNC: 27 MG/DL (ref 8–23)
CALCIUM SERPL-MCNC: 9.2 MG/DL (ref 8.3–10.4)
CHLORIDE SERPL-SCNC: 106 MMOL/L (ref 98–107)
CO2 SERPL-SCNC: 26 MMOL/L (ref 21–32)
COLOR UR: ABNORMAL
CREAT SERPL-MCNC: 1.43 MG/DL (ref 0.8–1.5)
DIFFERENTIAL METHOD BLD: ABNORMAL
EOSINOPHIL # BLD: 0.1 K/UL (ref 0–0.8)
EOSINOPHIL NFR BLD: 1 % (ref 0.5–7.8)
EPI CELLS #/AREA URNS HPF: ABNORMAL /HPF
ERYTHROCYTE [DISTWIDTH] IN BLOOD BY AUTOMATED COUNT: 13.9 % (ref 11.9–14.6)
GLOBULIN SER CALC-MCNC: 3.8 G/DL (ref 2.3–3.5)
GLUCOSE SERPL-MCNC: 112 MG/DL (ref 65–100)
GLUCOSE UR STRIP.AUTO-MCNC: NEGATIVE MG/DL
HCT VFR BLD AUTO: 42 % (ref 41.1–50.3)
HGB BLD-MCNC: 13.6 G/DL (ref 13.6–17.2)
HGB UR QL STRIP: NEGATIVE
IMM GRANULOCYTES # BLD AUTO: 0.1 K/UL (ref 0–0.5)
IMM GRANULOCYTES NFR BLD AUTO: 1 % (ref 0–5)
KETONES UR QL STRIP.AUTO: NEGATIVE MG/DL
LACTATE SERPL-SCNC: 1.3 MMOL/L (ref 0.4–2)
LEUKOCYTE ESTERASE UR QL STRIP.AUTO: ABNORMAL
LYMPHOCYTES # BLD: 0.8 K/UL (ref 0.5–4.6)
LYMPHOCYTES NFR BLD: 7 % (ref 13–44)
MCH RBC QN AUTO: 29.2 PG (ref 26.1–32.9)
MCHC RBC AUTO-ENTMCNC: 32.4 G/DL (ref 31.4–35)
MCV RBC AUTO: 90.3 FL (ref 79.6–97.8)
MONOCYTES # BLD: 1.4 K/UL (ref 0.1–1.3)
MONOCYTES NFR BLD: 12 % (ref 4–12)
NEUTS SEG # BLD: 9.4 K/UL (ref 1.7–8.2)
NEUTS SEG NFR BLD: 80 % (ref 43–78)
NITRITE UR QL STRIP.AUTO: NEGATIVE
NRBC # BLD: 0 K/UL (ref 0–0.2)
OTHER OBSERVATIONS: ABNORMAL
PH UR STRIP: 6 [PH] (ref 5–9)
PLATELET # BLD AUTO: 248 K/UL (ref 150–450)
PMV BLD AUTO: 9.4 FL (ref 9.4–12.3)
POTASSIUM SERPL-SCNC: 4.5 MMOL/L (ref 3.5–5.1)
PROT SERPL-MCNC: 7.7 G/DL (ref 6.3–8.2)
PROT UR STRIP-MCNC: 100 MG/DL
RBC # BLD AUTO: 4.65 M/UL (ref 4.23–5.6)
RBC #/AREA URNS HPF: ABNORMAL /HPF
SODIUM SERPL-SCNC: 138 MMOL/L (ref 136–145)
SP GR UR REFRACTOMETRY: 1.02 (ref 1–1.02)
UROBILINOGEN UR QL STRIP.AUTO: 1 EU/DL (ref 0.2–1)
WBC # BLD AUTO: 11.8 K/UL (ref 4.3–11.1)
WBC URNS QL MICRO: ABNORMAL /HPF

## 2022-07-04 PROCEDURE — 85025 COMPLETE CBC W/AUTO DIFF WBC: CPT

## 2022-07-04 PROCEDURE — 74176 CT ABD & PELVIS W/O CONTRAST: CPT

## 2022-07-04 PROCEDURE — 87186 SC STD MICRODIL/AGAR DIL: CPT

## 2022-07-04 PROCEDURE — 80053 COMPREHEN METABOLIC PANEL: CPT

## 2022-07-04 PROCEDURE — 87086 URINE CULTURE/COLONY COUNT: CPT

## 2022-07-04 PROCEDURE — 99284 EMERGENCY DEPT VISIT MOD MDM: CPT

## 2022-07-04 PROCEDURE — 6360000004 HC RX CONTRAST MEDICATION

## 2022-07-04 PROCEDURE — 6370000000 HC RX 637 (ALT 250 FOR IP)

## 2022-07-04 PROCEDURE — 87088 URINE BACTERIA CULTURE: CPT

## 2022-07-04 PROCEDURE — 81001 URINALYSIS AUTO W/SCOPE: CPT

## 2022-07-04 PROCEDURE — 83605 ASSAY OF LACTIC ACID: CPT

## 2022-07-04 RX ORDER — CEFDINIR 300 MG/1
300 CAPSULE ORAL 2 TIMES DAILY
Qty: 14 CAPSULE | Refills: 0 | Status: SHIPPED | OUTPATIENT
Start: 2022-07-04 | End: 2022-07-11

## 2022-07-04 RX ORDER — CEFDINIR 300 MG/1
300 CAPSULE ORAL ONCE
Status: COMPLETED | OUTPATIENT
Start: 2022-07-04 | End: 2022-07-04

## 2022-07-04 RX ORDER — TAMSULOSIN HYDROCHLORIDE 0.4 MG/1
0.4 CAPSULE ORAL DAILY
Qty: 10 CAPSULE | Refills: 0 | Status: SHIPPED | OUTPATIENT
Start: 2022-07-04 | End: 2022-07-11

## 2022-07-04 RX ADMIN — CEFDINIR 300 MG: 300 CAPSULE ORAL at 21:54

## 2022-07-04 RX ADMIN — DIATRIZOATE MEGLUMINE AND DIATRIZOATE SODIUM 15 ML: 660; 100 LIQUID ORAL; RECTAL at 19:37

## 2022-07-04 ASSESSMENT — ENCOUNTER SYMPTOMS
VOMITING: 0
NAUSEA: 0
ABDOMINAL PAIN: 1
SHORTNESS OF BREATH: 0
DIARRHEA: 0

## 2022-07-04 ASSESSMENT — PAIN - FUNCTIONAL ASSESSMENT: PAIN_FUNCTIONAL_ASSESSMENT: NONE - DENIES PAIN

## 2022-07-04 NOTE — ED TRIAGE NOTES
Pt c/o fever of 100.6, urinary pain and urinary frequency. Pt states he has a history of UTI's. Pt denies taking tylenol or ibuprofen today.

## 2022-07-04 NOTE — ED PROVIDER NOTES
[facility] Emergency Department Provider Note                   PCP:                Inder Kang DO               Age: 80 y.o. Sex: male       ICD-10-CM    1. Kidney stone  N20.0 tamsulosin (FLOMAX) 0.4 MG capsule   2. Calculus of gallbladder without cholecystitis without obstruction  K80.20    3. Cystitis without hematuria  N30.90 cefdinir (OMNICEF) 300 MG capsule       DISPOSITION Decision To Discharge 07/04/2022 09:31:05 PM       New Prescriptions    CEFDINIR (OMNICEF) 300 MG CAPSULE    Take 1 capsule by mouth 2 times daily for 7 days    TAMSULOSIN (FLOMAX) 0.4 MG CAPSULE    Take 1 capsule by mouth daily for 10 days       Orders Placed This Encounter   Procedures    Culture, Urine    CT ABDOMEN PELVIS WO CONTRAST Additional Contrast? Oral    Comprehensive Metabolic Panel    CBC with Auto Differential    Urinalysis w rflx microscopic    Lactic Acid    POCT Urine Dipstick        MDM  Number of Diagnoses or Management Options  Calculus of gallbladder without cholecystitis without obstruction  Cystitis without hematuria  Kidney stone  Diagnosis management comments: Vital signs reviewed, patient stable, NAD, afebrile, nontoxic in appearance    Obtain CBC, CMP, urinalysis  CBC shows very mildly elevated white count 11.8, no anemia noted  CMP is consistent with stage III chronic kidney disease  Urinalysis and micro negative for bacteria, negative for nitrates, positive for small leukocyte Estrace. Added a lactic due to age, history of A. fib  Based on history and physical exam, as well as patient's age will obtain CT abdomen pelvis with oral contrast due to left lower quadrant abdominal pain, tenderness to palpation in this region. Patient will drink oral contrast due to history of multiple abdominal surgeries.     Differential diagnosis includes, but is not limited to, diverticulitis, cystitis, gastroenteritis, colitis, constipation, intestinal ischemia    CT ABDOMEN PELVIS WO CONTRAST Additional Contrast? Oral   Final Result    1. Mild cholelithiasis. 2.  Small nonobstructing right kidney stone. No hydronephrosis. I discussed physical exam findings, laboratory and/or imaging findings, treatment and follow-up with the patient and those who were present. I answered any questions they had. They verbalized that they understood and were in agreement with treatment and disposition. I discussed signs and symptoms that would warrant a prompt return to the emergency department with the patient. I included the signs and symptoms on discharge paperwork. Patient verbalized that they understood. Patient given 1 dose of Omnicef here in the emergency department prior to discharge    Urine has been sent for culture    Patient discharged home in stable condition with a prescription for Omnicef and Flomax. He is to contact his urologist tomorrow morning to establish a follow-up. Reiterated strict return to ED precautions. Patient stated he understood and he was in agreement with this.        Amount and/or Complexity of Data Reviewed  Clinical lab tests: ordered and reviewed  Tests in the radiology section of CPT®: ordered and reviewed  Review and summarize past medical records: yes  Independent visualization of images, tracings, or specimens: yes    Risk of Complications, Morbidity, and/or Mortality  Presenting problems: moderate  Diagnostic procedures: moderate  Management options: low    Patient Progress  Patient progress: stable  Of 11.8    Segun Dai is a 80 y.o. male who presents to the Emergency Department with chief complaint of    Chief Complaint   Patient presents with    Fever    Urinary Pain    Urinary Frequency      75-year-old male tree of hypertension, dyslipidemia, CAD, A. fib, left nephrectomy, frequent urinary tract infections, ANABELLE, stage 3 chronic kidney disease constipation presents to the emergency department today with chief complaint of urinary frequency that began last night, mild dysuria, fever of 100.6 today around noon. Patient states he did not take anything for his fever today prior to arrival in the emergency department. Patient states he is also had some left lower abdominal pain that also started today and is sharp in nature. States last bowel movement was 3 to 4 days ago, but this is normal for him. Patient denies chest pain, shortness of breath, nausea, vomiting, diarrhea, headache , sore throat. The history is provided by the patient. No  was used. Review of Systems   Constitutional: Positive for fever. Negative for chills. Respiratory: Negative for shortness of breath. Cardiovascular: Negative for chest pain. Gastrointestinal: Positive for abdominal pain (Left lower). Negative for diarrhea, nausea and vomiting. Genitourinary: Positive for dysuria and frequency. Negative for hematuria and penile pain. Neurological: Negative for headaches. All other systems reviewed and are negative.       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     [place] CARDIAC SURG PROCEDURE UNLIST      stent to circ    PTCA W/ STENT, EA ADD      UROLOGICAL SURGERY      Kidney removed 1970        Family History   Problem Relation Age of Onset    Diabetes Brother     Diabetes Sister     Diabetes Mother     Heart Disease Mother     Heart Attack Mother 80        MI    Kidney Disease Brother         ESRD 2/2 DM     Cancer Brother liver            Social Connections:     Frequency of Communication with Friends and Family: Not on file    Frequency of Social Gatherings with Friends and Family: Not on file    Attends Anabaptism Services: Not on file    Active Member of Clubs or Organizations: Not on file    Attends Club or Organization Meetings: Not on file    Marital Status: Not on file        Allergies   Allergen Reactions    Penicillins Rash        Vitals signs and nursing note reviewed. Patient Vitals for the past 4 hrs:   Temp Pulse Resp BP SpO2   07/04/22 1822 98.9 °F (37.2 °C) 68 16 (!) 155/72 97 %          Physical Exam     Procedures      Labs Reviewed   COMPREHENSIVE METABOLIC PANEL - Abnormal; Notable for the following components:       Result Value    Anion Gap 6 (*)     Glucose 112 (*)     BUN 27 (*)     GFR  59 (*)     GFR Non- 49 (*)     Globulin 3.8 (*)     Albumin/Globulin Ratio 1.0 (*)     All other components within normal limits   CBC WITH AUTO DIFFERENTIAL - Abnormal; Notable for the following components:    WBC 11.8 (*)     Seg Neutrophils 80 (*)     Lymphocytes 7 (*)     Segs Absolute 9.4 (*)     Absolute Mono # 1.4 (*)     All other components within normal limits   URINALYSIS - Abnormal; Notable for the following components:    Protein,  (*)     Leukocyte Esterase, Urine SMALL (*)     All other components within normal limits   CULTURE, URINE   LACTIC ACID        CT ABDOMEN PELVIS WO CONTRAST Additional Contrast? Oral   Final Result   1. Mild cholelithiasis. 2.  Small nonobstructing right kidney stone. No hydronephrosis.                              ED Course as of 07/04/22 2135   Mon Jul 04, 2022   1911 CBC with Auto Differential(!):    WBC 11.8(!)   RBC 4.65   Hemoglobin Quant 13.6   Hematocrit 42.0   MCV 90.3   MCH 29.2   MCHC 32.4   RDW 13.9   Platelet Count 118   MPV 9.4   Nucleated Red Blood Cells 0.00   Differential Type AUTOMATED   Seg Neutrophils 80(!) Lymphocytes 7(!)   Monocytes 12   Eosinophils % 1   Basophils 0   Immature Granulocytes 1   Segs Absolute 9.4(!)   Absolute Lymph # 0.8   Absolute Mono # 1.4(!)   Absolute Eos # 0.1   Basophils Absolute 0.0   Absolute Immature Granulocyte 0.1 [JG]   1912 Comprehensive Metabolic Panel(!):    Sodium 138   Potassium 4.5   Chloride 106   CO2 26   Anion Gap 6(!)   GLUCOSE, FASTING,(!)   BUN,BUNPL 27(!)   Creatinine 1.43   GFR  59(!)   GFR Non- 49(!)   CALCIUM, SERUM, 750059 9.2   Bilirubin 1.0   ALT 15   AST 18   Alk Phosphatase 58   Total Protein 7.7   Albumin 3.9   Globulin 3.8(!)   ALBUMIN/GLOBULIN RATIO 1.0(!) [JG]   1912 Urinalysis w rflx microscopic(!):    Color, UA YELLOW/STRAW   Appearance CLEAR   Specific Gravity, UA 1.018   pH, Urine 6.0   Protein, (!)   Glucose, UA Negative   Ketones, Urine Negative   Bilirubin, Urine Negative   Blood, Urine Negative   Urobilinogen, Urine 1.0   Nitrite, Urine Negative   Leukocyte Esterase, Urine SMALL(!)   WBC, UA 10-20   RBC, UA 0-3   Epithelial Cells, UA 0-3   Bacteria, UA 0   OTHER OBSERVATIONS RESULTS VERIFIED MANUALLY [JG]   2038 Lactic Acid:    Lactic Acid, Plasma 1.3 [JG]   2124 CT ABDOMEN PELVIS WO CONTRAST Additional Contrast? Oral  FINDINGS:  - LUNG BASES: No acute infiltrates or masses.     - LIVER: Normal in size and appearance. - GALLBLADDER/BILE DUCTS: Cholelithiasis. No bile duct dilatation.  - PANCREAS: Normal.  - SPLEEN: Normal.     - ADRENALS: Normal.  - KIDNEYS/URETERS: The left kidney is absent. There are multiple exophytic  right renal cyst.  There is a small nonobstructing right kidney stone. No  hydronephrosis. - BLADDER: Mild bladder wall thickening, probably due to chronic outlet  obstruction.  - REPRODUCTIVE ORGANS: Mild prostate prominence.     - BOWEL: Normal caliber.   No inflammatory changes.  - LYMPH NODES: No significant retroperitoneal, mesenteric, or pelvic adenopathy.  - BONES: No fracture

## 2022-07-05 ENCOUNTER — TELEPHONE (OUTPATIENT)
Dept: UROLOGY | Age: 87
End: 2022-07-05

## 2022-07-05 NOTE — TELEPHONE ENCOUNTER
Pt called stated he went to ER yesterday. Imaging showed small nonobstructive stone. Called pt on home and mobile number ldvm regarding appt 07/11/22 at 11:30 am with Ej Mitchell.

## 2022-07-05 NOTE — ED NOTES
I have reviewed discharge instructions with the patient. The patient verbalized understanding. Patient left ED via Discharge Method: ambulatory to Home with self. Opportunity for questions and clarification provided. Patient given 2 scripts. To continue your aftercare when you leave the hospital, you may receive an automated call from our care team to check in on how you are doing. This is a free service and part of our promise to provide the best care and service to meet your aftercare needs.  If you have questions, or wish to unsubscribe from this service please call [phone]. Thank you for Choosing our [facility] Emergency Department.         Josh ThompsonSe[facility]  07/04/22 7247

## 2022-07-06 ENCOUNTER — TELEPHONE (OUTPATIENT)
Dept: UROLOGY | Age: 87
End: 2022-07-06

## 2022-07-06 DIAGNOSIS — N32.81 OVERACTIVE BLADDER: Primary | ICD-10-CM

## 2022-07-07 RX ORDER — VIBEGRON 75 MG/1
75 TABLET, FILM COATED ORAL DAILY
Qty: 30 TABLET | Refills: 1 | Status: SHIPPED | OUTPATIENT
Start: 2022-07-07 | End: 2022-10-11

## 2022-07-07 NOTE — TELEPHONE ENCOUNTER
Message received from Cover My Meds. Prior authorization for the patient's prescription for Myrbetriq was denied.       Reason for denial is not stated, however likely due to not trying Gemtesa first. Last question on PA form was:

## 2022-07-07 NOTE — TELEPHONE ENCOUNTER
New prescription for [facility] sent to [facility] script. Called pt ldvm regarding changing medication to [facility].

## 2022-07-08 LAB
BACTERIA SPEC CULT: ABNORMAL
SERVICE CMNT-IMP: ABNORMAL

## 2022-07-11 ENCOUNTER — OFFICE VISIT (OUTPATIENT)
Dept: UROLOGY | Age: 87
End: 2022-07-11
Payer: MEDICARE

## 2022-07-11 DIAGNOSIS — N40.1 BENIGN PROSTATIC HYPERPLASIA WITH LOWER URINARY TRACT SYMPTOMS, SYMPTOM DETAILS UNSPECIFIED: ICD-10-CM

## 2022-07-11 DIAGNOSIS — N39.0 ACUTE UTI: ICD-10-CM

## 2022-07-11 DIAGNOSIS — N20.0 KIDNEY STONE ON RIGHT SIDE: Primary | ICD-10-CM

## 2022-07-11 DIAGNOSIS — N32.81 OVERACTIVE BLADDER: ICD-10-CM

## 2022-07-11 DIAGNOSIS — R39.15 URINARY URGENCY: ICD-10-CM

## 2022-07-11 LAB
BILIRUBIN, URINE, POC: NEGATIVE
BLOOD URINE, POC: NEGATIVE
GLUCOSE URINE, POC: NEGATIVE
KETONES, URINE, POC: NEGATIVE
LEUKOCYTE ESTERASE, URINE, POC: NEGATIVE
NITRITE, URINE, POC: NEGATIVE
PH, URINE, POC: 6 (ref 4.6–8)
PROTEIN,URINE, POC: 30
SPECIFIC GRAVITY, URINE, POC: 1.02 (ref 1–1.03)
URINALYSIS CLARITY, POC: ABNORMAL
URINALYSIS COLOR, POC: ABNORMAL
UROBILINOGEN, POC: ABNORMAL

## 2022-07-11 PROCEDURE — 1123F ACP DISCUSS/DSCN MKR DOCD: CPT | Performed by: NURSE PRACTITIONER

## 2022-07-11 PROCEDURE — G8420 CALC BMI NORM PARAMETERS: HCPCS | Performed by: NURSE PRACTITIONER

## 2022-07-11 PROCEDURE — 81003 URINALYSIS AUTO W/O SCOPE: CPT | Performed by: NURSE PRACTITIONER

## 2022-07-11 PROCEDURE — 99214 OFFICE O/P EST MOD 30 MIN: CPT | Performed by: NURSE PRACTITIONER

## 2022-07-11 PROCEDURE — G8427 DOCREV CUR MEDS BY ELIG CLIN: HCPCS | Performed by: NURSE PRACTITIONER

## 2022-07-11 PROCEDURE — 1036F TOBACCO NON-USER: CPT | Performed by: NURSE PRACTITIONER

## 2022-07-11 RX ORDER — TAMSULOSIN HYDROCHLORIDE 0.4 MG/1
0.4 CAPSULE ORAL DAILY
Qty: 90 CAPSULE | Refills: 3 | Status: SHIPPED | OUTPATIENT
Start: 2022-07-11

## 2022-07-11 NOTE — PROGRESS NOTES
[facility]  [address]    [address], [address] [facility]  [phone]          Vida Han  : 1929    Chief Complaint   Patient presents with    Nephrolithiasis          HPI     Vida Han is a 80 y.o. male being seen today for ER follow up. Presented to [address] [place] [address] on  w c/o fever and UF. Sent for CT A/P wo contrast revealing small non obstructing right renal stone and BWT. Cr 1.43. WBC 11.8. UA w small leuks. Discharged home w scripts for flomax and cefdinir w instructions to follow up w our office. UC positive for enterococcus. He is followed by Dr. Jossie Daigle for BPH/LUTS s/p PAE in 2019. Hx of solitary kidney. Seen 22 and had minimal improvement with flomax. Completed of urinary urgency, frequency, nocturia 2-3 times per night, urge urinary incontinence daily.  No retention. No hematuria. Mirabegron 50 mg daily was started and was working well, however PA was denied for medication. UA is clear today. He has been out of myrbetriq for about 1 month. Report UF has increased w daily urine leakage. Gemtesa sent to [facility] on 22. Pt informed. Prior to ER visit pt reports being out of flomax. Restarted medication 2-3 days prior to ER visit. No c/o flank pain. Afebrile. KUB in office today reviewed by myself and unable to visualize right renal stone; sig amount of gas/stool. No prior hx of kidney stones.      Past Medical History:   Diagnosis Date    A-fib [facility])     Arrhythmia     PALPITATION    Arthritis     BPH (benign prostatic hyperplasia)     CAD (coronary artery disease)     STENTS HEART     Chronic kidney disease     HAS ONLY 1 KIDNEY    Hypercholesterolemia     Hypertension     ANABELLE on CPAP     Stroke ([address]     TIA secondary to holding of eliquis      Past Surgical History:   Procedure Laterality Date    APPENDECTOMY      exploratory     COLONOSCOPY      COLONOSCOPY      OTHER CELL      LEFT KIDNEY REMOVED for malformation; 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     [place] CARDIAC SURG PROCEDURE UNLIST      stent to circ    PTCA W/ STENT, EA ADD      UROLOGICAL SURGERY      Kidney removed      Current Outpatient Medications   Medication Sig Dispense Refill    tamsulosin (FLOMAX) 0.4 MG capsule Take 1 capsule by mouth daily 90 capsule 3    cefdinir (OMNICEF) 300 MG capsule Take 1 capsule by mouth 2 times daily for 7 days 14 capsule 0    Lancets MISC Check fasting blood sugar daily      apixaban (ELIQUIS) 2.5 MG TABS tablet TAKE 1 TABLET TWICE A DAY      dofetilide (TIKOSYN) 125 MCG capsule TAKE 1 CAPSULE TWICE A DAY      gabapentin (NEURONTIN) 100 MG capsule Take 300 mg by mouth.  melatonin 1 MG tablet 1 mg      metoprolol succinate (TOPROL XL) 25 MG extended release tablet TAKE ONE-HALF (1/2) TABLET NIGHTLY      nitroGLYCERIN (NITROLINGUAL) 0.4 MG/SPRAY 0.4 mg spray Place 1 spray under the tongue      ranolazine (RANEXA) 500 MG extended release tablet Take 500 mg by mouth 2 times daily      simvastatin (ZOCOR) 20 MG tablet TAKE 1 TABLET NIGHTLY      Vibegron (GEMTESA) 75 MG TABS Take 75 mg by mouth daily (Patient not taking: Reported on 2022) 30 tablet 1    mirabegron (MYRBETRIQ) 50 MG TB24 Take 50 mg by mouth daily (Patient not taking: Reported on 2022) 90 tablet 3     No current facility-administered medications for this visit. Allergies   Allergen Reactions    Penicillins Rash     Social History     Socioeconomic History    Marital status:      Spouse name: Not on file    Number of children: Not on file    Years of education: Not on file    Highest education level: Not on file   Occupational History    Not on file   Tobacco Use    Smoking status: Former Smoker     Packs/day: 0.50     Quit date: 1967     Years since quittin.5    Smokeless tobacco: Never Used   Substance and Sexual Activity    Alcohol use:  Yes    Drug use: No    Sexual activity: Not on file   Other Topics Concern    Not Urine, POC Negative Negative    KETONES, Urine, POC Negative Negative    Specific Gravity, Urine, POC 1.020 1.001 - 1.035    Blood (UA POC) Negative Negative    pH, Urine, POC 6.0 4.6 - 8.0    Protein, Urine, POC 30  Negative    Urobilinogen, POC 2 mg/dL     Nitrite, Urine, POC Negative Negative    Leukocyte Esterase, Urine, POC Negative Negative       PHYSICAL EXAM    General appearance - well appearing and in no distress  Mental status - alert, oriented to person, place, and time  Neck - supple, no significant adenopathy  Chest/Lung-  Quiet, even and easy respiratory effort without use of accessory muscles  Skin - normal coloration and turgor, no rashes        Assessment and Plan    ICD-10-CM    1. Kidney stone on right side  N20.0    2. Acute UTI  N39.0    3. Benign prostatic hyperplasia with lower urinary tract symptoms, symptom details unspecified  N40.1 AMB POC URINALYSIS DIP STICK AUTO W/O MICRO     AMB POC XRAY ABDOMEN 1 VIEW     tamsulosin (FLOMAX) 0.4 MG capsule   4. Overactive bladder  N32.81 AMB POC URINALYSIS DIP STICK AUTO W/O MICRO     AMB POC XRAY ABDOMEN 1 VIEW   5. Urinary urgency  R39.15 AMB POC URINALYSIS DIP STICK AUTO W/O MICRO     AMB POC XRAY ABDOMEN 1 VIEW   Tx options for right renal stone discussed including observation vs URS. Pt opts for observation. Risks, benefits, and alternatives reviewed. Pt to inform provider w changes in sx. Stone prevention discussed. UA clear. No s/sx of UTI today. Will follow. Continue flomax. Refill sent. Avoid bladder irritants. Pt awaiting Gemtesa script from [employer]. Should be arriving soon. Will ROV in 3 months. To call sooner if needed. Joie Hahn is supervising physician today and he approves plan of care.

## 2022-07-12 ENCOUNTER — TELEPHONE (OUTPATIENT)
Dept: SLEEP MEDICINE | Age: 87
End: 2022-07-12

## 2022-07-12 ENCOUNTER — OFFICE VISIT (OUTPATIENT)
Dept: INTERNAL MEDICINE CLINIC | Facility: CLINIC | Age: 87
End: 2022-07-12
Payer: MEDICARE

## 2022-07-12 VITALS
BODY MASS INDEX: 23.3 KG/M2 | HEART RATE: 55 BPM | WEIGHT: 145 LBS | TEMPERATURE: 97.2 F | OXYGEN SATURATION: 96 % | SYSTOLIC BLOOD PRESSURE: 110 MMHG | HEIGHT: 66 IN | DIASTOLIC BLOOD PRESSURE: 60 MMHG

## 2022-07-12 DIAGNOSIS — N30.00 ACUTE CYSTITIS WITHOUT HEMATURIA: ICD-10-CM

## 2022-07-12 DIAGNOSIS — G47.33 OSA ON CPAP: Primary | ICD-10-CM

## 2022-07-12 DIAGNOSIS — Z99.89 OSA ON CPAP: Primary | ICD-10-CM

## 2022-07-12 DIAGNOSIS — N20.0 KIDNEY STONE: Primary | ICD-10-CM

## 2022-07-12 PROCEDURE — 99213 OFFICE O/P EST LOW 20 MIN: CPT | Performed by: NURSE PRACTITIONER

## 2022-07-12 PROCEDURE — 1036F TOBACCO NON-USER: CPT | Performed by: NURSE PRACTITIONER

## 2022-07-12 PROCEDURE — G8427 DOCREV CUR MEDS BY ELIG CLIN: HCPCS | Performed by: NURSE PRACTITIONER

## 2022-07-12 PROCEDURE — 1123F ACP DISCUSS/DSCN MKR DOCD: CPT | Performed by: NURSE PRACTITIONER

## 2022-07-12 PROCEDURE — G8420 CALC BMI NORM PARAMETERS: HCPCS | Performed by: NURSE PRACTITIONER

## 2022-07-12 ASSESSMENT — PATIENT HEALTH QUESTIONNAIRE - PHQ9
SUM OF ALL RESPONSES TO PHQ QUESTIONS 1-9: 0
SUM OF ALL RESPONSES TO PHQ QUESTIONS 1-9: 0
1. LITTLE INTEREST OR PLEASURE IN DOING THINGS: 0
SUM OF ALL RESPONSES TO PHQ9 QUESTIONS 1 & 2: 0
2. FEELING DOWN, DEPRESSED OR HOPELESS: 0
SUM OF ALL RESPONSES TO PHQ QUESTIONS 1-9: 0
SUM OF ALL RESPONSES TO PHQ QUESTIONS 1-9: 0

## 2022-07-12 ASSESSMENT — ENCOUNTER SYMPTOMS
NAUSEA: 0
DIARRHEA: 0
ABDOMINAL PAIN: 0
SHORTNESS OF BREATH: 0
VOMITING: 0

## 2022-07-12 NOTE — PROGRESS NOTES
[facility]      2022    Patient Name: Zohaib Soliman  :  1929      Chief Complaint:  Chief Complaint   Patient presents with    ED Follow-up         HPI   Patient presents today for follow-up after a recent ER visit. He presented to the ER at [facility] on 22 with complaint of fever, dysuria and urinary frequency. Lab work significant for WBC count of 11.8, UA showing small leukocytes. CT abdomen and pelvis showed small non-obstructing right kidney stone. No hydronephrosis. He was discharged to home with prescriptions for DESAI SIRISHA and Flomax. Patient followed up with his urologist yesterday. Urine culture from the ER was positive for enterococcus. UA was clear yesterday when checked by urology. KUB in the office yesterday--unable to visualize right renal stone. Patient reports complete resolution of symptoms today. Completed medications as prescribed and denies any concerns today.        Past Medical History:   Diagnosis Date    A-fib [facility])     Arrhythmia     PALPITATION    Arthritis     BPH (benign prostatic hyperplasia)     CAD (coronary artery disease)     STENTS HEART     Chronic kidney disease     HAS ONLY 1 KIDNEY    Hypercholesterolemia     Hypertension     ANABELLE on CPAP     Stroke ([facility] [address]     TIA secondary to holding of eliquis        Past Surgical History:   Procedure Laterality Date    APPENDECTOMY      exploratory     COLONOSCOPY      COLONOSCOPY      OTHER CELL      LEFT KIDNEY REMOVED for malformation; 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     [place] CARDIAC SURG PROCEDURE UNLIST      stent to circ    PTCA W/ STENT, EA ADD      UROLOGICAL SURGERY      Kidney removed        Family History   Problem Relation Age of Onset    Diabetes Brother     Diabetes Sister     Diabetes Mother     Heart Disease Mother     Heart Attack Mother 80        MI    Kidney Disease Brother         ESRD 2/2 DM     Cancer Brother         liver        Social History     Tobacco Use    Smoking status: Former Smoker     Packs/day: 0.50     Quit date: 1967     Years since quittin.5    Smokeless tobacco: Never Used   Vaping Use    Vaping Use: Never used   Substance Use Topics    Alcohol use:  Yes    Drug use: No       Current Outpatient Medications:     blood glucose test strips (ASCENSIA AUTODISC VI;ONE TOUCH ULTRA TEST VI) strip, 1 each by Other route once, Disp: , Rfl:     blood glucose test strips (ASCENSIA AUTODISC VI;ONE TOUCH ULTRA TEST VI) strip, Check fasting blood sugar daily, Disp: , Rfl:     Blood Glucose-BP Monitor (BLOOD GLUCOSE-WRIST BP MONITOR) ELSA, Check fasting blood sugar daily, Disp: , Rfl:     tamsulosin (FLOMAX) 0.4 MG capsule, Take 1 capsule by mouth daily, Disp: 90 capsule, Rfl: 3    Vibegron (GEMTESA) 75 MG TABS, Take 75 mg by mouth daily, Disp: 30 tablet, Rfl: 1    mirabegron (MYRBETRIQ) 50 MG TB24, Take 50 mg by mouth daily, Disp: 90 tablet, Rfl: 3    Lancets MISC, Check fasting blood sugar daily, Disp: , Rfl:     apixaban (ELIQUIS) 2.5 MG TABS tablet, TAKE 1 TABLET TWICE A DAY, Disp: , Rfl:     dofetilide (TIKOSYN) 125 MCG capsule, TAKE 1 CAPSULE TWICE A DAY, Disp: , Rfl:     gabapentin (NEURONTIN) 100 MG capsule, Take 300 mg by mouth., Disp: , Rfl:     metoprolol succinate (TOPROL XL) 25 MG extended release tablet, TAKE ONE-HALF (1/2) TABLET NIGHTLY, Disp: , Rfl:     nitroGLYCERIN (NITROLINGUAL) 0.4 MG/SPRAY 0.4 mg spray, Place 1 spray under the tongue, Disp: , Rfl:     ranolazine (RANEXA) 500 MG extended release tablet, Take 500 mg by mouth 2 times daily, Disp: , Rfl:     simvastatin (ZOCOR) 20 MG tablet, TAKE 1 TABLET NIGHTLY, Disp: , Rfl:     melatonin 1 MG tablet, 1 mg (Patient not taking: Reported on 2022), Disp: , Rfl:     Allergies   Allergen Reactions    Ciprofloxacin      Other reaction(s): Rash-Allergy    Penicillins Rash    Silicone Dermatitis     Other reaction(s): Rash-Allergy  Other reaction(s): Contact Dermatitis         Review of Systems   Constitutional: Negative for chills and fever. Respiratory: Negative for shortness of breath. Cardiovascular: Negative for chest pain and palpitations. Gastrointestinal: Negative for abdominal pain, diarrhea, nausea and vomiting. Genitourinary: Negative for dysuria, flank pain, frequency and hematuria. Neurological: Negative for dizziness, light-headedness and headaches. Objective:  /60 (Site: Left Upper Arm, Position: Sitting, Cuff Size: Small Adult)   Pulse 55   Temp 97.2 °F (36.2 °C) (Temporal)   Ht 5' 6\" (1.676 m)   Wt 145 lb (65.8 kg)   SpO2 96%   BMI 23.40 kg/m²       Examination:  Physical Exam  Vitals and nursing note reviewed. Constitutional:       General: He is not in acute distress. Appearance: Normal appearance. Cardiovascular:      Rate and Rhythm: Normal rate and regular rhythm. Pulmonary:      Effort: Pulmonary effort is normal. No respiratory distress. Breath sounds: Normal breath sounds. Abdominal:      General: Bowel sounds are normal.      Palpations: Abdomen is soft. Tenderness: There is no abdominal tenderness. There is no right CVA tenderness, left CVA tenderness, guarding or rebound. Skin:     General: Skin is warm and dry. Neurological:      Mental Status: He is alert and oriented to person, place, and time. Psychiatric:         Mood and Affect: Mood normal.           Assessment/Plan:    Adelia Grey was seen today for ed follow-up. Diagnoses and all orders for this visit:    Kidney stone  Symptoms resolved. KUB in urology office yesterday--provider unable to visualize stone. Acute cystitis without hematuria  Patient completed course of Omnicef. Symptoms resolved. UA clear yesterday. Follow-up and Dispositions    · Return if symptoms worsen or fail to improve.          On this date, 7/12/22, I have spent 25 minutes reviewing previous notes, test results and face to face with the patient discussing the diagnosis and importance of compliance with the treatment plan as well as documenting on the day of the visit. An electronic signature was used to authenticate this note.   LATOYA Mazariegos

## 2022-07-19 ENCOUNTER — OFFICE VISIT (OUTPATIENT)
Dept: INTERNAL MEDICINE CLINIC | Facility: CLINIC | Age: 87
End: 2022-07-19
Payer: MEDICARE

## 2022-07-19 VITALS
DIASTOLIC BLOOD PRESSURE: 68 MMHG | OXYGEN SATURATION: 97 % | TEMPERATURE: 98 F | HEIGHT: 66 IN | SYSTOLIC BLOOD PRESSURE: 138 MMHG | WEIGHT: 147.4 LBS | BODY MASS INDEX: 23.69 KG/M2 | HEART RATE: 58 BPM

## 2022-07-19 DIAGNOSIS — I25.10 CORONARY ARTERY DISEASE INVOLVING NATIVE CORONARY ARTERY OF NATIVE HEART WITHOUT ANGINA PECTORIS: ICD-10-CM

## 2022-07-19 DIAGNOSIS — N40.1 BENIGN PROSTATIC HYPERPLASIA WITH URINARY FREQUENCY: ICD-10-CM

## 2022-07-19 DIAGNOSIS — R35.0 BENIGN PROSTATIC HYPERPLASIA WITH URINARY FREQUENCY: ICD-10-CM

## 2022-07-19 DIAGNOSIS — I10 ESSENTIAL HYPERTENSION: ICD-10-CM

## 2022-07-19 DIAGNOSIS — G62.9 NEUROPATHY: ICD-10-CM

## 2022-07-19 DIAGNOSIS — Z79.01 CURRENT USE OF LONG TERM ANTICOAGULATION: ICD-10-CM

## 2022-07-19 DIAGNOSIS — R73.01 IMPAIRED FASTING GLUCOSE: ICD-10-CM

## 2022-07-19 DIAGNOSIS — E78.5 DYSLIPIDEMIA: ICD-10-CM

## 2022-07-19 DIAGNOSIS — I48.4 ATYPICAL ATRIAL FLUTTER (HCC): Primary | ICD-10-CM

## 2022-07-19 DIAGNOSIS — N18.30 STAGE 3 CHRONIC KIDNEY DISEASE, UNSPECIFIED WHETHER STAGE 3A OR 3B CKD (HCC): ICD-10-CM

## 2022-07-19 LAB — HBA1C MFR BLD: 6.2 %

## 2022-07-19 PROCEDURE — G8420 CALC BMI NORM PARAMETERS: HCPCS | Performed by: STUDENT IN AN ORGANIZED HEALTH CARE EDUCATION/TRAINING PROGRAM

## 2022-07-19 PROCEDURE — 1123F ACP DISCUSS/DSCN MKR DOCD: CPT | Performed by: STUDENT IN AN ORGANIZED HEALTH CARE EDUCATION/TRAINING PROGRAM

## 2022-07-19 PROCEDURE — G8427 DOCREV CUR MEDS BY ELIG CLIN: HCPCS | Performed by: STUDENT IN AN ORGANIZED HEALTH CARE EDUCATION/TRAINING PROGRAM

## 2022-07-19 PROCEDURE — 83036 HEMOGLOBIN GLYCOSYLATED A1C: CPT | Performed by: STUDENT IN AN ORGANIZED HEALTH CARE EDUCATION/TRAINING PROGRAM

## 2022-07-19 PROCEDURE — 1036F TOBACCO NON-USER: CPT | Performed by: STUDENT IN AN ORGANIZED HEALTH CARE EDUCATION/TRAINING PROGRAM

## 2022-07-19 PROCEDURE — 99214 OFFICE O/P EST MOD 30 MIN: CPT | Performed by: STUDENT IN AN ORGANIZED HEALTH CARE EDUCATION/TRAINING PROGRAM

## 2022-07-19 ASSESSMENT — PATIENT HEALTH QUESTIONNAIRE - PHQ9
SUM OF ALL RESPONSES TO PHQ9 QUESTIONS 1 & 2: 0
SUM OF ALL RESPONSES TO PHQ QUESTIONS 1-9: 0
2. FEELING DOWN, DEPRESSED OR HOPELESS: 0
SUM OF ALL RESPONSES TO PHQ QUESTIONS 1-9: 0
1. LITTLE INTEREST OR PLEASURE IN DOING THINGS: 0

## 2022-07-19 ASSESSMENT — ENCOUNTER SYMPTOMS
ABDOMINAL PAIN: 0
SHORTNESS OF BREATH: 0

## 2022-08-28 NOTE — ED NOTES
[facility] Provider Note       This is a late entry addendum to the ED note from date 2022  Please add the below information to the original ED note. 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     [place] CARDIAC SURG PROCEDURE UNLIST      stent to circ    PTCA W/ STENT, EA ADD      UROLOGICAL SURGERY      Kidney removed 1970        Family History   Problem Relation Age of Onset    Diabetes Brother     Diabetes Sister     Diabetes Mother     Heart Disease Mother     Heart Attack Mother 80        MI    Kidney Disease Brother         ESRD 2/2 DM     Cancer Brother         liver         Social History     Socioeconomic History    Marital status:    Tobacco Use    Smoking status: Former     Packs/day: 0.50     Types: Cigarettes     Quit date: 1967     Years since quittin.6    Smokeless tobacco: Never   Vaping Use    Vaping Use: Never used   Substance and Sexual Activity    Alcohol use: Yes    Drug use: No        Physical Exam  Vitals and nursing note reviewed. Constitutional:       General: He is not in acute distress. Appearance: Normal appearance. He is normal weight. He is not ill-appearing, toxic-appearing or diaphoretic. HENT:      Head: Normocephalic and atraumatic. Mouth/Throat:      Pharynx: Oropharynx is clear. Eyes:      General: No scleral icterus. Extraocular Movements: Extraocular movements intact.       Conjunctiva/sclera: Conjunctivae normal. Cardiovascular:      Rate and Rhythm: Normal rate. Pulses: Normal pulses. Heart sounds: Normal heart sounds. Pulmonary:      Effort: Pulmonary effort is normal.      Breath sounds: Normal breath sounds. Abdominal:      General: Bowel sounds are normal.      Palpations: Abdomen is soft. Tenderness: There is abdominal tenderness (LLQ). There is no right CVA tenderness, left CVA tenderness, guarding or rebound. Musculoskeletal:         General: Normal range of motion. Cervical back: Normal range of motion and neck supple. Right lower leg: No edema. Left lower leg: No edema. Skin:     General: Skin is warm and dry. Capillary Refill: Capillary refill takes less than 2 seconds. Coloration: Skin is not jaundiced. Neurological:      General: No focal deficit present. Mental Status: He is alert and oriented to person, place, and time. Psychiatric:         Mood and Affect: Mood normal.         Behavior: Behavior normal.         Thought Content: Thought content normal.         Judgment: Judgment normal.        Procedures     Voice dictation software was used during the making of this note. This software is not perfect and grammatical and other typographical errors may be present. This note has not been completely proofread for errors.          Evelin Chery  08/28/22 [id number]

## 2022-10-05 NOTE — PROGRESS NOTES
[address] [facility]. [address], [address] [facility]  [phone]    Patient Name:  Belkys Reid  YOB: 1929      Office Visit 10/6/2022    CHIEF COMPLAINT:    Chief Complaint   Patient presents with    CPAP/BiPAP    Sleep Apnea         HISTORY OF PRESENT ILLNESS:      This is a 26-year-old gentleman seen in follow-up of obstructive and central sleep apnea. He primarily has central sleep apnea related to underlying chronic atrial fibrillation. He has been treated with APAP at 8-15 cmH2O with an EPR of 3. At his last visit 6 months ago, his AHI was running 13.5 and his Millington score was 4/24 consistent with no hypersomnia. The patient returns today on the same settings and demonstrates 99% compliance over the past 179 days. Average usage is running about 8.5 hours per night and his AHI is running 16.0. The maximum pressure achieved on his machine is 13.2 cm water. He does have a moderate leak reaching a maximum of 60 L/min. The patient's Millington score today is 5/24 consistent with no hypersomnia. At this point, the patient reports that his machine is about 10years old he would like to get a new one. I discussed with him that ideally we would do a BiPAP/BiPAP ST titration to appropriately control his central sleep apnea. His APAP at this point is not adequate despite the fact that he feels relatively well during the day. He is agreeable to a BiPAP/BiPAP ST titration. The patient does report that he has begun to get a little bit more forgetful of late. He is 89 to just about to turn 89. While age is likely to be the main factor for his forgetfulness, his poorly controlled sleep apnea may be contributing somewhat. He does have mild chronic renal insufficiency which could potentially be contributing to the memory issues. His last CBC revealed no evidence of polycythemia to suggest chronic nocturnal hypoxemia however.       Sleep Medicine 10/6/2022 Sitting and reading 3   Watching TV 0   Sitting, inactive in a public place (e.g. a theatre or a meeting) 0   As a passenger in a car for an hour without a break 0   Lying down to rest in the afternoon when circumstances permit 1   Sitting and talking to someone 0   Sitting quietly after a lunch without alcohol 1   In a car, while stopped for a few minutes in traffic 0   Marshall Sleepiness Score 5            Component Ref Range & Units 7/4/22 1844 4/20/22 1030 3/2/22 0954 9/22/21 1501   WBC 4.3 - 11.1 K/uL 11.8 High   8.0 R  7.8 R  8.2    RBC 4.23 - 5.6 M/uL 4.65  4.30 R  4.48 R  4.68    Hemoglobin 13.6 - 17.2 g/dL 13.6  12.6 Low  R  13.4 R  13.3 Low     Hematocrit 41.1 - 50.3 % 42.0  39.2 R  41.6 R  42.7    MCV 79.6 - 97.8 FL 90.3  91 R  93 R  91.2    MCH 26.1 - 32.9 PG 29.2  29.3 R  29.9 R  28.4    MCHC 31.4 - 35.0 g/dL 32.4  32.1 R  32.2 R  31.1 Low     RDW 11.9 - 14.6 % 13.9  12.8 R  13.0 R  14.3    Platelets 077 - 678 K/uL 248  353 R  229 R  261      7/4/22 1844 4/20/22 1030 3/2/22 0954 9/22/21 1501 8/29/19 1207     Sodium 136 - 145 mmol/L 138  141 R  140 R  143  137    Potassium 3.5 - 5.1 mmol/L 4.5  4.6 R  5.4 High  R  4.6  4.4    Chloride 98 - 107 mmol/L 106  105 R  103 R  109 High   104    CO2 21 - 32 mmol/L 26  19 Low  R  22 R  28  27    Anion Gap 7 - 16 mmol/L 6 Low     6 Low   6 Low     Glucose 65 - 100 mg/dL 112 High   124 High  R  140 High  R  142 High  CM  149 High     Comment: 47 - 60 mg/dl Consistent with, but not fully diagnostic of hypoglycemia.    101 - 125 mg/dl Impaired fasting glucose/consistent with pre-diabetes mellitus   > 126 mg/dl Fasting glucose consistent with overt diabetes mellitus    BUN 8 - 23 MG/DL 27 High   27 R  20 R  31 High   20    Creatinine 0.8 - 1.5 MG/DL 1.43  1.46 High  R  1.48 High  R  1.71 High   1.60 High       Past Medical History:   Diagnosis Date    A-fib (HCC)     Arrhythmia     PALPITATION    Arthritis     BPH (benign prostatic hyperplasia)     CAD (coronary artery disease)     STENTS HEART     Chronic kidney disease     HAS ONLY 1 KIDNEY    Hypercholesterolemia     Hypertension     ANABELLE on CPAP     Stroke ([facility] [address]     TIA secondary to holding of eliquis          [unfilled]      Past Surgical History:   Procedure Laterality Date    APPENDECTOMY      exploratory     COLONOSCOPY      COLONOSCOPY      OTHER CELL      LEFT KIDNEY REMOVED for malformation; 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     [place] CARDIAC SURG PROCEDURE UNLIST      stent to circ    PTCA W/ STENT, EA ADD      UROLOGICAL SURGERY      Kidney removed        [unfilled]        Social History     Socioeconomic History    Marital status:      Spouse name: Not on file    Number of children: Not on file    Years of education: Not on file    Highest education level: Not on file   Occupational History    Not on file   Tobacco Use    Smoking status: Former     Packs/day: 0.50     Types: Cigarettes     Quit date: 1967     Years since quittin.8    Smokeless tobacco: Never   Vaping Use    Vaping Use: Never used   Substance and Sexual Activity    Alcohol use: Yes    Drug use: No    Sexual activity: Not on file   Other Topics Concern    Not on file   Social History Narrative    Not on file     Social Determinants of Health     Financial Resource Strain: Not on file   Food Insecurity: Not on file   Transportation Needs: Not on file   Physical Activity: Not on file   Stress: Not on file   Social Connections: Not on file   Intimate Partner Violence: Not on file   Housing Stability: Not on file         Family History   Problem Relation Age of Onset    Diabetes Brother     Diabetes Sister     Diabetes Mother     Heart Disease Mother     Heart Attack Mother 80        MI    Kidney Disease Brother         ESRD 2/2 DM     Cancer Brother         liver          Allergies   Allergen Reactions    Ciprofloxacin      Other reaction(s): Rash-Allergy    Penicillins Rash    Silicone Dermatitis     Other reaction(s): Rash-Allergy  Other reaction(s): Contact Dermatitis           Current Outpatient Medications   Medication Sig    blood glucose test strips (ASCENSIA AUTODISC VI;ONE TOUCH ULTRA TEST VI) strip 1 each by Other route once    blood glucose test strips (ASCENSIA AUTODISC VI;ONE TOUCH ULTRA TEST VI) strip Check fasting blood sugar daily    Blood Glucose-BP Monitor (BLOOD GLUCOSE-WRIST BP MONITOR) ELSA Check fasting blood sugar daily    tamsulosin (FLOMAX) 0.4 MG capsule Take 1 capsule by mouth daily    Vibegron (GEMTESA) 75 MG TABS Take 75 mg by mouth daily    Lancets MISC Check fasting blood sugar daily    apixaban (ELIQUIS) 2.5 MG TABS tablet TAKE 1 TABLET TWICE A DAY    dofetilide (TIKOSYN) 125 MCG capsule TAKE 1 CAPSULE TWICE A DAY    gabapentin (NEURONTIN) 100 MG capsule Take 300 mg by mouth.    melatonin 1 MG tablet 1 mg    metoprolol succinate (TOPROL XL) 25 MG extended release tablet TAKE ONE-HALF (1/2) TABLET NIGHTLY    nitroGLYCERIN (NITROLINGUAL) 0.4 MG/SPRAY 0.4 mg spray Place 1 spray under the tongue    ranolazine (RANEXA) 500 MG extended release tablet Take 500 mg by mouth in the morning. simvastatin (ZOCOR) 20 MG tablet TAKE 1 TABLET NIGHTLY     No current facility-administered medications for this visit. REVIEW OF SYSTEMS:   CONSTITUTIONAL:   There is no history of fever, chills, night sweats, weight loss, weight gain, persistent fatigue, or lethargy/hypersomnolence. CARDIAC:   No chest pain, pressure, discomfort, palpitations, orthopnea, murmurs, or edema. GI:   No dysphagia, heartburn reflux, nausea/vomiting, diarrhea, abdominal pain, or bleeding. NEURO:   There is no history of AMS, persistent headache, decreased level of consciousness, seizures, or motor or sensory deficits. PHYSICAL EXAM:    Vitals:    10/06/22 1455   BP: (!) 151/75   Pulse: 68   Resp: 18   Temp: 96.8 °F (36 °C)   SpO2: 98%        GENERAL APPEARANCE:   The patient is normal weight and in no respiratory distress. HEENT:   PERRL. Conjunctivae unremarkable. Nasal mucosa is without epistaxis, exudate, or polyps. Gums and dentition are unremarkable. There is no oropharyngeal narrowing. NECK/LYMPHATIC:   Symmetrical with no elevation of jugular venous pulsation. Trachea midline. No thyroid enlargement. No cervical adenopathy. LUNGS:   Normal respiratory effort with symmetrical lung expansion. Breath sounds are clear bilaterally. HEART:   There is a fairly regular rate and rhythm. No murmur, rub, or gallop. There is no edema in the lower extremities. ABDOMEN:   Soft and non-tender. Bowel sounds are normal.     NEURO:   The patient is alert and oriented to person, place, and time. Memory appears intact and mood is normal.  No gross sensorimotor deficits are present. ASSESSMENT:  (Medical Decision Making)       ICD-10-CM    1. ANABELLE on CPAP  G47.33 The patient's obstructive sleep apnea is very well controlled at this time. His AHI however is significantly elevated due to central events and events marked as unknown. He probably would be best served by performing an in lab BiPAP/BiPAP ST titration to define the optimal treatment. This might actually help his memory issues. Z99.89       2. Central sleep apnea  G47.31 These are the predominant abnormality on his current download. It is likely related to his underlying chronic atrial fibrillation      3. Chronic atrial fibrillation (HCC)  I48.20 The patient's clinical exam reveals a fairly regular rhythm. He is tolerating Tikosyn well. PLAN:    The patient was scheduled for a BiPAP/BiPAP ST titration. Change therapy according to the results of the study. Follow-up will be in 4 months.       Orders Placed This Encounter   Procedures    Ambulatory Referral to Sleep Studies     Referral Priority:   Routine     Referral Type:   Sleep Study     Referral Reason:   Specialty Services Required     Number of Visits Requested:   1        No orders of the defined types were placed in this encounter. Leatha Chavira MD  Electronically signed    Over 50% of today's office visit was spent in face to face time reviewing test results, prognosis, importance of compliance, education about disease process, benefits of medications, instructions for management of acute flare-ups, and follow up plans. Total face to face time spent with the patient and charting was 22 minutes. Dictated using voice recognition software.   Proof read but unrecognized errors may exist.

## 2022-10-06 ENCOUNTER — OFFICE VISIT (OUTPATIENT)
Dept: SLEEP MEDICINE | Age: 87
End: 2022-10-06
Payer: MEDICARE

## 2022-10-06 VITALS
RESPIRATION RATE: 18 BRPM | DIASTOLIC BLOOD PRESSURE: 75 MMHG | OXYGEN SATURATION: 98 % | HEIGHT: 66 IN | WEIGHT: 141.8 LBS | HEART RATE: 68 BPM | TEMPERATURE: 96.8 F | BODY MASS INDEX: 22.79 KG/M2 | SYSTOLIC BLOOD PRESSURE: 151 MMHG

## 2022-10-06 DIAGNOSIS — G47.31 CENTRAL SLEEP APNEA: ICD-10-CM

## 2022-10-06 DIAGNOSIS — I48.20 CHRONIC ATRIAL FIBRILLATION (HCC): ICD-10-CM

## 2022-10-06 DIAGNOSIS — Z99.89 OSA ON CPAP: Primary | ICD-10-CM

## 2022-10-06 DIAGNOSIS — G47.33 OSA ON CPAP: Primary | ICD-10-CM

## 2022-10-06 PROCEDURE — G8484 FLU IMMUNIZE NO ADMIN: HCPCS | Performed by: INTERNAL MEDICINE

## 2022-10-06 PROCEDURE — 99214 OFFICE O/P EST MOD 30 MIN: CPT | Performed by: INTERNAL MEDICINE

## 2022-10-06 PROCEDURE — 1036F TOBACCO NON-USER: CPT | Performed by: INTERNAL MEDICINE

## 2022-10-06 PROCEDURE — G8427 DOCREV CUR MEDS BY ELIG CLIN: HCPCS | Performed by: INTERNAL MEDICINE

## 2022-10-06 PROCEDURE — G8420 CALC BMI NORM PARAMETERS: HCPCS | Performed by: INTERNAL MEDICINE

## 2022-10-06 PROCEDURE — 1123F ACP DISCUSS/DSCN MKR DOCD: CPT | Performed by: INTERNAL MEDICINE

## 2022-10-06 ASSESSMENT — SLEEP AND FATIGUE QUESTIONNAIRES
HOW LIKELY ARE YOU TO NOD OFF OR FALL ASLEEP WHILE SITTING AND TALKING TO SOMEONE: 0
HOW LIKELY ARE YOU TO NOD OFF OR FALL ASLEEP IN A CAR, WHILE STOPPED FOR A FEW MINUTES IN TRAFFIC: 0
HOW LIKELY ARE YOU TO NOD OFF OR FALL ASLEEP WHILE SITTING INACTIVE IN A PUBLIC PLACE: 0
HOW LIKELY ARE YOU TO NOD OFF OR FALL ASLEEP WHILE WATCHING TV: 0
HOW LIKELY ARE YOU TO NOD OFF OR FALL ASLEEP WHILE SITTING AND READING: 3
HOW LIKELY ARE YOU TO NOD OFF OR FALL ASLEEP WHILE SITTING QUIETLY AFTER LUNCH WITHOUT ALCOHOL: 1
ESS TOTAL SCORE: 5
HOW LIKELY ARE YOU TO NOD OFF OR FALL ASLEEP WHEN YOU ARE A PASSENGER IN A CAR FOR AN HOUR WITHOUT A BREAK: 0
HOW LIKELY ARE YOU TO NOD OFF OR FALL ASLEEP WHILE LYING DOWN TO REST IN THE AFTERNOON WHEN CIRCUMSTANCES PERMIT: 1

## 2022-10-11 ENCOUNTER — OFFICE VISIT (OUTPATIENT)
Dept: UROLOGY | Age: 87
End: 2022-10-11
Payer: MEDICARE

## 2022-10-11 DIAGNOSIS — N40.1 BPH WITH OBSTRUCTION/LOWER URINARY TRACT SYMPTOMS: Primary | ICD-10-CM

## 2022-10-11 DIAGNOSIS — N20.0 RENAL STONE: ICD-10-CM

## 2022-10-11 DIAGNOSIS — N32.81 OVERACTIVE BLADDER: ICD-10-CM

## 2022-10-11 DIAGNOSIS — N13.8 BPH WITH OBSTRUCTION/LOWER URINARY TRACT SYMPTOMS: Primary | ICD-10-CM

## 2022-10-11 DIAGNOSIS — Q60.0 SOLITARY KIDNEY, CONGENITAL: ICD-10-CM

## 2022-10-11 LAB
BILIRUBIN, URINE, POC: NEGATIVE
BLOOD URINE, POC: NEGATIVE
GLUCOSE URINE, POC: 100
KETONES, URINE, POC: NEGATIVE
LEUKOCYTE ESTERASE, URINE, POC: ABNORMAL
NITRITE, URINE, POC: NEGATIVE
PH, URINE, POC: 6 (ref 4.6–8)
PROTEIN,URINE, POC: 30
SPECIFIC GRAVITY, URINE, POC: 1.02 (ref 1–1.03)
URINALYSIS CLARITY, POC: ABNORMAL
URINALYSIS COLOR, POC: ABNORMAL
UROBILINOGEN, POC: ABNORMAL

## 2022-10-11 PROCEDURE — G8427 DOCREV CUR MEDS BY ELIG CLIN: HCPCS | Performed by: NURSE PRACTITIONER

## 2022-10-11 PROCEDURE — 99214 OFFICE O/P EST MOD 30 MIN: CPT | Performed by: NURSE PRACTITIONER

## 2022-10-11 PROCEDURE — G8420 CALC BMI NORM PARAMETERS: HCPCS | Performed by: NURSE PRACTITIONER

## 2022-10-11 PROCEDURE — G8484 FLU IMMUNIZE NO ADMIN: HCPCS | Performed by: NURSE PRACTITIONER

## 2022-10-11 PROCEDURE — 1036F TOBACCO NON-USER: CPT | Performed by: NURSE PRACTITIONER

## 2022-10-11 PROCEDURE — 81003 URINALYSIS AUTO W/O SCOPE: CPT | Performed by: NURSE PRACTITIONER

## 2022-10-11 PROCEDURE — 1123F ACP DISCUSS/DSCN MKR DOCD: CPT | Performed by: NURSE PRACTITIONER

## 2022-10-11 NOTE — PROGRESS NOTES
[facility]  [address] [facility]    [facility] [address], [address] [facility]  [phone]          Roya Nava  : 1929    Chief Complaint   Patient presents with    Follow-up    Nephrolithiasis          HPI     Roya Nava is a 80 y.o. male being seen today for follow up. Presented to [facility] on  w c/o fever and UF. Sent for CT A/P wo contrast revealing small non obstructing right renal stone and BWT. Cr 1.43. WBC 11.8. UA w small leuks. Discharged home w scripts for flomax and cefdinir w instructions to follow up w our office. UC positive for enterococcus. He is followed by Dr. Louis Gustafson for BPH/LUTS s/p PAE in 2019. Hx of solitary kidney. Seen 22 and had minimal improvement with flomax. Completed of urinary urgency, frequency, nocturia 2-3 times per night, urge urinary incontinence daily. No retention. No hematuria. Mirabegron 50 mg daily was started and was working well, however PA was denied for medication. [facility]. He never received medication. Has started on super beta prostate. He feels this has improved flow. Continues w flomax. Continues to have UU, UF, and nocturia 1-2. KUB in office today reviewed by myself and shows gallstone and 1 small RUP.            Past Medical History:   Diagnosis Date    A-fib [facility])     Arrhythmia     PALPITATION    Arthritis     BPH (benign prostatic hyperplasia)     CAD (coronary artery disease)     STENTS HEART     Chronic kidney disease     HAS ONLY 1 KIDNEY    Hypercholesterolemia     Hypertension     ANABELLE on CPAP     Stroke ([facility] [address]     TIA secondary to holding of eliquis      Past Surgical History:   Procedure Laterality Date    APPENDECTOMY      exploratory     COLONOSCOPY      COLONOSCOPY      OTHER CELL      LEFT KIDNEY REMOVED for malformation; 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     [place] CARDIAC SURG PROCEDURE UNLIST      stent to circ    PTCA W/ STENT, EA ADD      UROLOGICAL SURGERY      Kidney removed  Current Outpatient Medications   Medication Sig Dispense Refill    Vibegron 75 MG TABS Take 1 tablet by mouth daily 30 tablet 1    blood glucose test strips (ASCENSIA AUTODISC VI;ONE TOUCH ULTRA TEST VI) strip 1 each by Other route once      blood glucose test strips (ASCENSIA AUTODISC VI;ONE TOUCH ULTRA TEST VI) strip Check fasting blood sugar daily      Blood Glucose-BP Monitor (BLOOD GLUCOSE-WRIST BP MONITOR) ELSA Check fasting blood sugar daily      tamsulosin (FLOMAX) 0.4 MG capsule Take 1 capsule by mouth daily 90 capsule 3    Lancets MISC Check fasting blood sugar daily      apixaban (ELIQUIS) 2.5 MG TABS tablet TAKE 1 TABLET TWICE A DAY      dofetilide (TIKOSYN) 125 MCG capsule TAKE 1 CAPSULE TWICE A DAY      gabapentin (NEURONTIN) 100 MG capsule Take 300 mg by mouth.      melatonin 1 MG tablet 1 mg      metoprolol succinate (TOPROL XL) 25 MG extended release tablet TAKE ONE-HALF (1/2) TABLET NIGHTLY      nitroGLYCERIN (NITROLINGUAL) 0.4 MG/SPRAY 0.4 mg spray Place 1 spray under the tongue      ranolazine (RANEXA) 500 MG extended release tablet Take 500 mg by mouth in the morning. simvastatin (ZOCOR) 20 MG tablet TAKE 1 TABLET NIGHTLY       No current facility-administered medications for this visit.      Allergies   Allergen Reactions    Ciprofloxacin      Other reaction(s): Rash-Allergy    Penicillins Rash    Silicone Dermatitis     Other reaction(s): Rash-Allergy  Other reaction(s): Contact Dermatitis       Social History     Socioeconomic History    Marital status:      Spouse name: Not on file    Number of children: Not on file    Years of education: Not on file    Highest education level: Not on file   Occupational History    Not on file   Tobacco Use    Smoking status: Former     Packs/day: 0.50     Types: Cigarettes     Quit date: 1967     Years since quittin.8    Smokeless tobacco: Never   Vaping Use    Vaping Use: Never used   Substance and Sexual Activity    Alcohol use: Yes    Drug use: No    Sexual activity: Not on file   Other Topics Concern    Not on file   Social History Narrative    Not on file     Social Determinants of Health     Financial Resource Strain: Not on file   Food Insecurity: Not on file   Transportation Needs: Not on file   Physical Activity: Not on file   Stress: Not on file   Social Connections: Not on file   Intimate Partner Violence: Not on file   Housing Stability: Not on file     Family History   Problem Relation Age of Onset    Diabetes Brother     Diabetes Sister     Diabetes Mother     Heart Disease Mother     Heart Attack Mother 80        MI    Kidney Disease Brother         ESRD 2/2 DM     Cancer Brother         liver        Review of Systems  Constitutional: Negative  GI: Neg GI ROS  Genitourinary: Genitourinary negative    Urinalysis  UA - Dipstick  Results for orders placed or performed in visit on 10/11/22   AMB POC URINALYSIS DIP STICK AUTO W/O MICRO   Result Value Ref Range    Color (UA POC)      Clarity (UA POC)      Glucose, Urine,  Negative    Bilirubin, Urine, POC Negative Negative    KETONES, Urine, POC Negative Negative    Specific Gravity, Urine, POC 1.020 1.001 - 1.035    Blood (UA POC) Negative Negative    pH, Urine, POC 6.0 4.6 - 8.0    Protein, Urine, POC 30 Negative    Urobilinogen, POC 1 mg/dL     Nitrite, Urine, POC Negative Negative    Leukocyte Esterase, Urine, POC Small Negative       PHYSICAL EXAM    General appearance - well appearing and in no distress  Mental status - alert, oriented to person, place, and time  Neck - supple, no significant adenopathy  Chest/Lung-  Quiet, even and easy respiratory effort without use of accessory muscles  Skin - normal coloration and turgor, no rashes        Assessment and Plan    ICD-10-CM    1. BPH with obstruction/lower urinary tract symptoms  N40.1 Vibegron 75 MG TABS    N13.8       2. Overactive bladder  N32.81 Vibegron 75 MG TABS      3. Renal stone  N20.0       4.  Solitary kidney, congenital  Q60.0         He will continue flomax. Avoid bladder irritants. Will [facility]. Samples provided today. Would avoid anticholinergics due to advanced age and risk of urinary retention. Asx today. UA clear. Will follow. ROV in 2 months. To call sooner if needed. Creig Manual  Dr. Lynette Zee is supervising physician today and he approves plan of care.

## 2022-10-28 DIAGNOSIS — G62.9 NEUROPATHY: Primary | ICD-10-CM

## 2022-10-28 RX ORDER — GABAPENTIN 100 MG/1
300 CAPSULE ORAL 3 TIMES DAILY
Qty: 270 CAPSULE | Refills: 0 | Status: SHIPPED | OUTPATIENT
Start: 2022-10-28 | End: 2022-11-30

## 2022-10-28 RX ORDER — GABAPENTIN 100 MG/1
300 CAPSULE ORAL 3 TIMES DAILY
Qty: 270 CAPSULE | Refills: 0 | Status: SHIPPED | OUTPATIENT
Start: 2022-10-28 | End: 2022-10-28 | Stop reason: SDUPTHER

## 2022-10-28 RX ORDER — GABAPENTIN 100 MG/1
300 CAPSULE ORAL 3 TIMES DAILY
Qty: 270 CAPSULE | Refills: 2 | Status: SHIPPED | OUTPATIENT
Start: 2022-10-28 | End: 2022-10-28 | Stop reason: SDUPTHER

## 2022-10-28 NOTE — TELEPHONE ENCOUNTER
Prescribing system will not allow prescriptions for both short-term and long-term to be sent at the same time. Short-term prescription sent to [facility]. Please have patient call when he has 2 weeks worth of his current prescription and a new prescription to mail order will be sent to the pharmacy. Orders Placed This Encounter    DISCONTD: gabapentin (NEURONTIN) 100 MG capsule     Sig: Take 3 capsules by mouth 3 times daily for 30 days. Dispense:  270 capsule     Refill:  0    DISCONTD: gabapentin (NEURONTIN) 100 MG capsule     Sig: Take 3 capsules by mouth 3 times daily for 90 days. Dispense:  270 capsule     Refill:  2    gabapentin (NEURONTIN) 100 MG capsule     Sig: Take 3 capsules by mouth 3 times daily for 30 days.      Dispense:  270 capsule     Refill:  0

## 2022-10-28 NOTE — TELEPHONE ENCOUNTER
Patient is requesting refill on Neurontin. He will be out Monday. Please send a short term supply to [facility] [address] and a long term supply to [facility].

## 2022-11-14 ENCOUNTER — TELEPHONE (OUTPATIENT)
Dept: INTERNAL MEDICINE CLINIC | Facility: CLINIC | Age: 87
End: 2022-11-14

## 2022-11-14 DIAGNOSIS — R73.01 IMPAIRED FASTING GLUCOSE: Primary | ICD-10-CM

## 2022-11-14 RX ORDER — LANCETS 30 GAUGE
EACH MISCELLANEOUS
Qty: 100 EACH | Refills: 0 | Status: SHIPPED | OUTPATIENT
Start: 2022-11-14

## 2022-11-14 RX ORDER — BLOOD-GLUCOSE METER
KIT MISCELLANEOUS
Qty: 100 EACH | Refills: 3 | Status: SHIPPED | OUTPATIENT
Start: 2022-11-14

## 2022-11-14 NOTE — TELEPHONE ENCOUNTER
Prescription for diabetic test strips and lancets sent to pharmacy    Orders Placed This Encounter    blood glucose test strips (FREESTYLE LITE) strip     Sig: Check fasting blood sugar daily     Dispense:  100 each     Refill:  3    Lancets MISC     Sig: Check fasting blood sugar daily     Dispense:  100 each     Refill:  0

## 2022-11-25 DIAGNOSIS — G62.9 NEUROPATHY: ICD-10-CM

## 2022-11-25 NOTE — PROGRESS NOTES
Dl (:  1929) is a 80 y.o. male,Established patient, here for evaluation of the following chief complaint(s):  Follow-up, Blood Sugar Problem, and Peripheral Neuropathy         ASSESSMENT/PLAN:  1. Atypical atrial flutter ([address]  2. Current use of long term anticoagulation  Status post AMERICA guided DCCV on 2016  Echo on 2018 technically difficult study but EF of 55 to 65%, moderate concentric LVH, mild/moderate TR, severely dilated LA, no evidence of PFO, moderate MAC  Continue metoprolol succinate, dofetilide, Eliquis (renally dosed) per cardiology    - Magnesium; Future    3. Coronary artery disease involving native coronary artery of native heart without angina pectoris  Left heart cath on 2015 with patent proximal LAD stent, apical LAD  with collaterals, D1 stent patent, diffuse distal small vessel disease  Echo as above  Continue simvastatin, metoprolol succinate, Ranexa, as needed nitroglycerin  Follow-up with cardiology    - Lipid Panel; Future    4. Stage 3 chronic kidney disease, unspecified whether stage 3a or 3b CKD (HCC)  Chart review shows baseline creatinine 1.4-1.7 with baseline GFR in the 40s  Likely multifactorial due to age and hypertension. 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 Essential hypertension  Continue metoprolol succinate    - CBC with Auto Differential; Future  - Comprehensive Metabolic Panel; Future  - T4, Free; Future  - TSH; Future    6. Dyslipidemia  Continue simvastatin    - Lipid Panel; Future  - T4, Free; Future  - TSH; Future    7. Benign prostatic hyperplasia with urinary frequency  Continue Flomax   Patient is also started taking super beta prostate over-the-counter    8.  Neuropathy  Labs from 3/2/2022 with normal B12, iron saturation and ferritin  Possible underlying diabetic neuropathy given history of elevated A1c  Continue gabapentin    9. Impaired fasting glucose  A1c 6.9% on 3/9/2022 however improved to 6.2% on 7/19/2022  A1c in office today 6.2%  Check fasting blood sugar regularly at home. Continue lifestyle modifications    - AMB POC HEMOGLOBIN A1C    10. Obstructive sleep apnea  Sleep medicine notes reviewed with significantly elevated AHI due to central events and events marked as unknown  Per specialist patient would best be served by performing an in lab BiPAP/BiPAP ST titration trial to define optimal treatment                Return in about 3 months (around 2/28/2023) for EOV; fasting labs prior . Subjective   SUBJECTIVE/OBJECTIVE:  Patient is a 80-year-old  male who presents to the office today for follow-up. Patient still endorses intermittent neuropathy in both feet up to the ankle described as hot versus cold sensation with questionable numbness. No sores on the feet. No difficulty walking, driving. Occasionally feels unsteady on his feet but no falls. Fasting blood sugar checks intermittently throughout the week fluctuate from the 110s up to the 150s. No hypoglycemic episodes. Patient tries to limit his intake of starches. Has started taking super beta prostate over-the-counter and has noticed decreased frequency of nighttime urination. Denies dysuria, hematuria, chest pain, shortness of breath, palpitations, melena or hematochezia. Review of Systems   HENT:  Negative for trouble swallowing. Respiratory:  Negative for shortness of breath. Cardiovascular:  Negative for chest pain and palpitations. Gastrointestinal:  Positive for constipation. Negative for abdominal pain, anal bleeding and blood in stool. Genitourinary:  Positive for difficulty urinating (Positive for nocturia). Negative for dysuria and hematuria.    Neurological:  Positive for numbness (Intermittent involving both feet). Objective   Physical Exam  Vitals reviewed. Constitutional:       General: He is not in acute distress. Appearance: He is not ill-appearing, toxic-appearing or diaphoretic. Comments: Elderly, frail  male in no acute cardiopulmonary distress   HENT:      Head: Normocephalic and atraumatic. Eyes:      General:         Right eye: No discharge. Left eye: No discharge. Extraocular Movements: Extraocular movements intact. Neck:      Vascular: No carotid bruit. Cardiovascular:      Rate and Rhythm: Normal rate and regular rhythm. Heart sounds: No murmur heard. No friction rub. No gallop. Pulmonary:      Effort: Pulmonary effort is normal. No respiratory distress. Breath sounds: No wheezing, rhonchi or rales. Abdominal:      General: Bowel sounds are normal.      Palpations: Abdomen is soft. Tenderness: There is no abdominal tenderness. There is no guarding. Musculoskeletal:      Right foot: No foot drop. Left foot: Deformity (Osteophyte/bony hypertrophy along medial aspect of arch) present. No foot drop. Feet:      Right foot:      Protective Sensation: 7 sites tested. 7 sites sensed. Skin integrity: No ulcer, blister, erythema, warmth, dry skin or fissure. Left foot:      Protective Sensation: 7 sites tested. 7 sites sensed. Skin integrity: Callus present. No ulcer, blister, erythema, warmth, dry skin or fissure. Skin:     General: Skin is dry. Coloration: Skin is not jaundiced. Neurological:      Mental Status: He is alert. Psychiatric:         Attention and Perception: Attention normal.         Mood and Affect: Mood and affect normal. Mood is not anxious or depressed. Affect is not tearful. Speech: Speech normal. Speech is not rapid and pressured or slurred. Behavior: Behavior normal. Behavior is not agitated, aggressive or combative.  Behavior is cooperative. An electronic signature was used to authenticate this note.     --Maikol Jury, DO

## 2022-11-27 ENCOUNTER — HOSPITAL ENCOUNTER (OUTPATIENT)
Dept: SLEEP CENTER | Age: 87
Discharge: HOME | End: 2022-11-30
Payer: MEDICARE

## 2022-11-27 PROCEDURE — 95811 POLYSOM 6/>YRS CPAP 4/> PARM: CPT

## 2022-11-28 RX ORDER — GABAPENTIN 100 MG/1
CAPSULE ORAL
Qty: 270 CAPSULE | Refills: 0 | OUTPATIENT
Start: 2022-11-28

## 2022-11-30 ENCOUNTER — OFFICE VISIT (OUTPATIENT)
Dept: INTERNAL MEDICINE CLINIC | Facility: CLINIC | Age: 87
End: 2022-11-30
Payer: MEDICARE

## 2022-11-30 VITALS
HEIGHT: 66 IN | WEIGHT: 145.6 LBS | HEART RATE: 62 BPM | DIASTOLIC BLOOD PRESSURE: 76 MMHG | OXYGEN SATURATION: 98 % | SYSTOLIC BLOOD PRESSURE: 146 MMHG | TEMPERATURE: 97.7 F | BODY MASS INDEX: 23.4 KG/M2

## 2022-11-30 DIAGNOSIS — Z79.01 CURRENT USE OF LONG TERM ANTICOAGULATION: ICD-10-CM

## 2022-11-30 DIAGNOSIS — R73.01 IMPAIRED FASTING GLUCOSE: ICD-10-CM

## 2022-11-30 DIAGNOSIS — N18.30 STAGE 3 CHRONIC KIDNEY DISEASE, UNSPECIFIED WHETHER STAGE 3A OR 3B CKD (HCC): ICD-10-CM

## 2022-11-30 DIAGNOSIS — I48.4 ATYPICAL ATRIAL FLUTTER (HCC): Primary | ICD-10-CM

## 2022-11-30 DIAGNOSIS — G47.33 OBSTRUCTIVE SLEEP APNEA: ICD-10-CM

## 2022-11-30 DIAGNOSIS — I25.10 CORONARY ARTERY DISEASE INVOLVING NATIVE CORONARY ARTERY OF NATIVE HEART WITHOUT ANGINA PECTORIS: ICD-10-CM

## 2022-11-30 DIAGNOSIS — E78.5 DYSLIPIDEMIA: ICD-10-CM

## 2022-11-30 DIAGNOSIS — I10 ESSENTIAL HYPERTENSION: ICD-10-CM

## 2022-11-30 DIAGNOSIS — R35.0 BENIGN PROSTATIC HYPERPLASIA WITH URINARY FREQUENCY: ICD-10-CM

## 2022-11-30 DIAGNOSIS — N40.1 BENIGN PROSTATIC HYPERPLASIA WITH URINARY FREQUENCY: ICD-10-CM

## 2022-11-30 DIAGNOSIS — G62.9 NEUROPATHY: ICD-10-CM

## 2022-11-30 LAB — HBA1C MFR BLD: 6.2 %

## 2022-11-30 PROCEDURE — 1123F ACP DISCUSS/DSCN MKR DOCD: CPT | Performed by: STUDENT IN AN ORGANIZED HEALTH CARE EDUCATION/TRAINING PROGRAM

## 2022-11-30 PROCEDURE — G8484 FLU IMMUNIZE NO ADMIN: HCPCS | Performed by: STUDENT IN AN ORGANIZED HEALTH CARE EDUCATION/TRAINING PROGRAM

## 2022-11-30 PROCEDURE — 83036 HEMOGLOBIN GLYCOSYLATED A1C: CPT | Performed by: STUDENT IN AN ORGANIZED HEALTH CARE EDUCATION/TRAINING PROGRAM

## 2022-11-30 PROCEDURE — G8427 DOCREV CUR MEDS BY ELIG CLIN: HCPCS | Performed by: STUDENT IN AN ORGANIZED HEALTH CARE EDUCATION/TRAINING PROGRAM

## 2022-11-30 PROCEDURE — G8420 CALC BMI NORM PARAMETERS: HCPCS | Performed by: STUDENT IN AN ORGANIZED HEALTH CARE EDUCATION/TRAINING PROGRAM

## 2022-11-30 PROCEDURE — 99214 OFFICE O/P EST MOD 30 MIN: CPT | Performed by: STUDENT IN AN ORGANIZED HEALTH CARE EDUCATION/TRAINING PROGRAM

## 2022-11-30 PROCEDURE — 1036F TOBACCO NON-USER: CPT | Performed by: STUDENT IN AN ORGANIZED HEALTH CARE EDUCATION/TRAINING PROGRAM

## 2022-11-30 ASSESSMENT — ENCOUNTER SYMPTOMS
ANAL BLEEDING: 0
ABDOMINAL PAIN: 0
SHORTNESS OF BREATH: 0
CONSTIPATION: 1
TROUBLE SWALLOWING: 0
BLOOD IN STOOL: 0

## 2022-11-30 ASSESSMENT — PATIENT HEALTH QUESTIONNAIRE - PHQ9
SUM OF ALL RESPONSES TO PHQ QUESTIONS 1-9: 0
SUM OF ALL RESPONSES TO PHQ9 QUESTIONS 1 & 2: 0
2. FEELING DOWN, DEPRESSED OR HOPELESS: 0
1. LITTLE INTEREST OR PLEASURE IN DOING THINGS: 0
SUM OF ALL RESPONSES TO PHQ QUESTIONS 1-9: 0

## 2022-12-01 ENCOUNTER — TELEPHONE (OUTPATIENT)
Dept: SLEEP MEDICINE | Age: 87
End: 2022-12-01

## 2022-12-01 DIAGNOSIS — G47.31 CENTRAL SLEEP APNEA: Primary | ICD-10-CM

## 2022-12-01 NOTE — TELEPHONE ENCOUNTER
Patient had recent sleep study showing severe sleep apnea. Bipap therapy is recommended per sleep interp. Order for the [facility] will be sent to [address] [place] [address].

## 2022-12-12 ENCOUNTER — OFFICE VISIT (OUTPATIENT)
Dept: UROLOGY | Age: 87
End: 2022-12-12
Payer: MEDICARE

## 2022-12-12 DIAGNOSIS — N13.8 BPH WITH OBSTRUCTION/LOWER URINARY TRACT SYMPTOMS: Primary | ICD-10-CM

## 2022-12-12 DIAGNOSIS — N32.81 OVERACTIVE BLADDER: ICD-10-CM

## 2022-12-12 DIAGNOSIS — N20.0 RENAL STONE: ICD-10-CM

## 2022-12-12 DIAGNOSIS — N40.1 BPH WITH OBSTRUCTION/LOWER URINARY TRACT SYMPTOMS: Primary | ICD-10-CM

## 2022-12-12 DIAGNOSIS — Q60.0 SOLITARY KIDNEY, CONGENITAL: ICD-10-CM

## 2022-12-12 LAB
BILIRUBIN, URINE, POC: NEGATIVE
BLOOD URINE, POC: NEGATIVE
GLUCOSE URINE, POC: 250
KETONES, URINE, POC: NEGATIVE
LEUKOCYTE ESTERASE, URINE, POC: ABNORMAL
NITRITE, URINE, POC: NEGATIVE
PH, URINE, POC: 5.5 (ref 4.6–8)
PROTEIN,URINE, POC: 30
PVR, POC: ABNORMAL CC
SPECIFIC GRAVITY, URINE, POC: 1.02 (ref 1–1.03)
URINALYSIS CLARITY, POC: ABNORMAL
URINALYSIS COLOR, POC: ABNORMAL
UROBILINOGEN, POC: ABNORMAL

## 2022-12-12 PROCEDURE — G8420 CALC BMI NORM PARAMETERS: HCPCS | Performed by: NURSE PRACTITIONER

## 2022-12-12 PROCEDURE — 51798 US URINE CAPACITY MEASURE: CPT | Performed by: NURSE PRACTITIONER

## 2022-12-12 PROCEDURE — 81003 URINALYSIS AUTO W/O SCOPE: CPT | Performed by: NURSE PRACTITIONER

## 2022-12-12 PROCEDURE — 99214 OFFICE O/P EST MOD 30 MIN: CPT | Performed by: NURSE PRACTITIONER

## 2022-12-12 PROCEDURE — 1123F ACP DISCUSS/DSCN MKR DOCD: CPT | Performed by: NURSE PRACTITIONER

## 2022-12-12 PROCEDURE — G8427 DOCREV CUR MEDS BY ELIG CLIN: HCPCS | Performed by: NURSE PRACTITIONER

## 2022-12-12 PROCEDURE — G8484 FLU IMMUNIZE NO ADMIN: HCPCS | Performed by: NURSE PRACTITIONER

## 2022-12-12 PROCEDURE — 1036F TOBACCO NON-USER: CPT | Performed by: NURSE PRACTITIONER

## 2022-12-12 RX ORDER — TAMSULOSIN HYDROCHLORIDE 0.4 MG/1
0.4 CAPSULE ORAL DAILY
Qty: 90 CAPSULE | Refills: 3 | Status: SHIPPED | OUTPATIENT
Start: 2022-12-12

## 2022-12-12 NOTE — PROGRESS NOTES
[facility]  [address]    Jovita Squibb [address], [address] [facility]  [phone]          Krystal Arevalo  : 1929    Chief Complaint   Patient presents with    Follow-up     BPH/LUTS           HPI     Krystal Arevalo is a 80 y.o. male   followed by Dr. Socorro Schultz for BPH/LUTS s/p PAE in 2019. Hx of solitary kidney being seen today for follow up. Presented to [address] [place] [address] on  w c/o fever and UF. Sent for CT A/P wo contrast revealing small non obstructing right renal stone and BWT. Images reviewed. Cr 1.43. WBC 11.8. UA w small leuks. Discharged home w scripts for flomax and cefdinir w instructions to follow up w our office. UC positive for enterococcus. Last seen by Dr. Socorro Schultz on 22 and had minimal improvement with flomax. Complained of urinary urgency, frequency, nocturia 2-3 times per night, urge urinary incontinence daily. No retention. No hematuria. Mirabegron 50 mg daily was started and was working well, however PA was denied for medication. Gem[facility]a [facility] on 22. He never received medication. Has started on super beta prostate. He feels this has improved flow. Continues w flomax. Main complaint is nocturia 1-2. Myrbetriq did improve this. PVR 29 cc via u/s today. Prev KUB in office reviewed by myself and shows gallstone and 1 small RUP.               Past Medical History:   Diagnosis Date    A-fib [facility])     Arrhythmia     PALPITATION    Arthritis     BPH (benign prostatic hyperplasia)     CAD (coronary artery disease)     STENTS HEART     Chronic kidney disease     HAS ONLY 1 KIDNEY    Hypercholesterolemia     Hypertension     ANABELLE on CPAP     Stroke ([facility])     TIA secondary to holding of eliquis      Past Surgical History:   Procedure Laterality Date    APPENDECTOMY      exploratory     COLONOSCOPY      COLONOSCOPY      OTHER CELL      LEFT KIDNEY REMOVED for malformation; no cancer     NE CARDIAC SURG PROCEDURE UNLIST      stent to circ    PTCA W/ STENT, EA ADD      UROLOGICAL SURGERY      Kidney removed 1970     Current Outpatient Medications   Medication Sig Dispense Refill    tamsulosin (FLOMAX) 0.4 MG capsule Take 1 capsule by mouth daily 90 capsule 3    mirabegron (MYRBETRIQ) 50 MG TB24 Take 50 mg by mouth daily 90 tablet 3    blood glucose test strips (FREESTYLE LITE) strip Check fasting blood sugar daily 100 each 3    Lancets MISC Check fasting blood sugar daily 100 each 0    blood glucose test strips (ASCENSIA AUTODISC VI;ONE TOUCH ULTRA TEST VI) strip 1 each by Other route once      Blood Glucose-BP Monitor (BLOOD GLUCOSE-WRIST BP MONITOR) ELSA Check fasting blood sugar daily      apixaban (ELIQUIS) 2.5 MG TABS tablet TAKE 1 TABLET TWICE A DAY      dofetilide (TIKOSYN) 125 MCG capsule TAKE 1 CAPSULE TWICE A DAY      metoprolol succinate (TOPROL XL) 25 MG extended release tablet TAKE ONE-HALF (1/2) TABLET NIGHTLY      nitroGLYCERIN (NITROLINGUAL) 0.4 MG/SPRAY 0.4 mg spray Place 1 spray under the tongue      ranolazine (RANEXA) 500 MG extended release tablet Take 500 mg by mouth in the morning. simvastatin (ZOCOR) 20 MG tablet TAKE 1 TABLET NIGHTLY      gabapentin (NEURONTIN) 100 MG capsule Take 3 capsules by mouth 3 times daily for 30 days. (Patient taking differently: Take 300 mg by mouth daily. Pt taking 3 caps daily) 270 capsule 0     No current facility-administered medications for this visit. Allergies   Allergen Reactions    Ciprofloxacin      Other reaction(s): Rash-Allergy    Penicillins Rash    Silicone Dermatitis     Other reaction(s): Rash-Allergy  Other reaction(s): Contact Dermatitis       Social History     Socioeconomic History    Marital status:       Spouse name: Not on file    Number of children: Not on file    Years of education: Not on file    Highest education level: Not on file   Occupational History    Not on file   Tobacco Use    Smoking status: Former     Packs/day: 0.50     Types: Cigarettes Quit date: 1967     Years since quittin.9    Smokeless tobacco: Never   Vaping Use    Vaping Use: Never used   Substance and Sexual Activity    Alcohol use: Yes    Drug use: No    Sexual activity: Not on file   Other Topics Concern    Not on file   Social History Narrative    Not on file     Social Determinants of Health     Financial Resource Strain: Not on file   Food Insecurity: Not on file   Transportation Needs: Not on file   Physical Activity: Not on file   Stress: Not on file   Social Connections: Not on file   Intimate Partner Violence: Not on file   Housing Stability: Not on file     Family History   Problem Relation Age of Onset    Diabetes Brother     Diabetes Sister     Diabetes Mother     Heart Disease Mother     Heart Attack Mother 80        MI    Kidney Disease Brother         ESRD 2/2 DM     Cancer Brother         liver        Review of Systems  Constitutional: Negative  GI: Neg GI ROS  Genitourinary: Genitourinary negative    Urinalysis  UA - Dipstick  Results for orders placed or performed in visit on 22   AMB POC URINALYSIS DIP STICK AUTO W/O MICRO   Result Value Ref Range    Color (UA POC)      Clarity (UA POC)      Glucose, Urine,  Negative    Bilirubin, Urine, POC Negative Negative    KETONES, Urine, POC Negative Negative    Specific Gravity, Urine, POC 1.020 1.001 - 1.035    Blood (UA POC) Negative Negative    pH, Urine, POC 5.5 4.6 - 8.0    Protein, Urine, POC 30 Negative    Urobilinogen, POC 2 mg/dL     Nitrite, Urine, POC Negative Negative    Leukocyte Esterase, Urine, POC Small Negative       PHYSICAL EXAM    General appearance - well appearing and in no distress  Mental status - alert, oriented to person, place, and time  Neck - supple, no significant adenopathy  Chest/Lung-  Quiet, even and easy respiratory effort without use of accessory muscles  Skin - normal coloration and turgor, no rashes      Assessment and Plan    ICD-10-CM    1.  BPH with obstruction/lower urinary tract symptoms  N40.1 AMB POC PVR, FAYE,POST-VOID RES,US,NON-IMAGING    N13.8 AMB POC URINALYSIS DIP STICK AUTO W/O MICRO     tamsulosin (FLOMAX) 0.4 MG capsule     mirabegron (MYRBETRIQ) 50 MG TB24      2. Overactive bladder  N32.81 AMB POC PVR, FAYE,POST-VOID RES,US,NON-IMAGING     AMB POC URINALYSIS DIP STICK AUTO W/O MICRO     mirabegron (MYRBETRIQ) 50 MG TB24      3. Renal stone  N20.0       4. Solitary kidney, congenital  Q60.0         Continue flomax. Refill sent. Myrbetriq did improve sx. Insurance does not cover. Provided pt w 6 months of samples. He is advised to avoid bladder irritants. Stone very small and non obstructing on CT. Asx. Will follow. Stone prevention discussed. Repeat KUB at next visit. ROV in 5 months. To call sooner if needed. Marcos Galarza is supervising physician today and he approves plan of care.

## 2023-01-06 ENCOUNTER — HOSPITAL ENCOUNTER (EMERGENCY)
Age: 88
Discharge: HOME | End: 2023-01-06
Attending: GENERAL PRACTICE
Payer: MEDICARE

## 2023-01-06 ENCOUNTER — HOSPITAL ENCOUNTER (EMERGENCY)
Dept: GENERAL RADIOLOGY | Age: 88
End: 2023-01-06
Payer: MEDICARE

## 2023-01-06 ENCOUNTER — HOSPITAL ENCOUNTER (EMERGENCY)
Dept: CT IMAGING | Age: 88
End: 2023-01-06
Payer: MEDICARE

## 2023-01-06 VITALS
DIASTOLIC BLOOD PRESSURE: 70 MMHG | BODY MASS INDEX: 21.22 KG/M2 | TEMPERATURE: 98.6 F | HEART RATE: 92 BPM | SYSTOLIC BLOOD PRESSURE: 149 MMHG | RESPIRATION RATE: 18 BRPM | HEIGHT: 68 IN | OXYGEN SATURATION: 94 % | WEIGHT: 140 LBS

## 2023-01-06 DIAGNOSIS — R42 DIZZINESS: Primary | ICD-10-CM

## 2023-01-06 DIAGNOSIS — N39.0 URINARY TRACT INFECTION WITHOUT HEMATURIA, SITE UNSPECIFIED: ICD-10-CM

## 2023-01-06 DIAGNOSIS — R55 NEAR SYNCOPE: ICD-10-CM

## 2023-01-06 LAB
ALBUMIN SERPL-MCNC: 3.8 G/DL (ref 3.2–4.6)
ALBUMIN/GLOB SERPL: 1 (ref 0.4–1.6)
ALP SERPL-CCNC: 53 U/L (ref 50–136)
ALT SERPL-CCNC: 26 U/L (ref 12–65)
ANION GAP SERPL CALC-SCNC: 10 MMOL/L (ref 2–11)
APPEARANCE UR: ABNORMAL
AST SERPL-CCNC: 33 U/L (ref 15–37)
BACTERIA URNS QL MICRO: ABNORMAL /HPF
BASOPHILS # BLD: 0 K/UL (ref 0–0.2)
BASOPHILS NFR BLD: 0 % (ref 0–2)
BILIRUB SERPL-MCNC: 1 MG/DL (ref 0.2–1.1)
BILIRUB UR QL: NEGATIVE
BUN SERPL-MCNC: 33 MG/DL (ref 8–23)
CALCIUM SERPL-MCNC: 9 MG/DL (ref 8.3–10.4)
CASTS URNS QL MICRO: ABNORMAL /LPF
CHLORIDE SERPL-SCNC: 101 MMOL/L (ref 101–110)
CO2 SERPL-SCNC: 24 MMOL/L (ref 21–32)
COLOR UR: ABNORMAL
CREAT SERPL-MCNC: 1.78 MG/DL (ref 0.8–1.5)
DIFFERENTIAL METHOD BLD: ABNORMAL
EKG ATRIAL RATE: 82 BPM
EKG ATRIAL RATE: 82 BPM
EKG DIAGNOSIS: NORMAL
EKG DIAGNOSIS: NORMAL
EKG P AXIS: 81 DEGREES
EKG P AXIS: 81 DEGREES
EKG P-R INTERVAL: 308 MS
EKG P-R INTERVAL: 308 MS
EKG Q-T INTERVAL: 408 MS
EKG Q-T INTERVAL: 408 MS
EKG QRS DURATION: 86 MS
EKG QRS DURATION: 86 MS
EKG QTC CALCULATION (BAZETT): 476 MS
EKG QTC CALCULATION (BAZETT): 476 MS
EKG R AXIS: 73 DEGREES
EKG R AXIS: 73 DEGREES
EKG T AXIS: 219 DEGREES
EKG T AXIS: 219 DEGREES
EKG VENTRICULAR RATE: 82 BPM
EKG VENTRICULAR RATE: 82 BPM
EOSINOPHIL # BLD: 0 K/UL (ref 0–0.8)
EOSINOPHIL NFR BLD: 0 % (ref 0.5–7.8)
EPI CELLS #/AREA URNS HPF: ABNORMAL /HPF
ERYTHROCYTE [DISTWIDTH] IN BLOOD BY AUTOMATED COUNT: 13.8 % (ref 11.9–14.6)
GLOBULIN SER CALC-MCNC: 3.8 G/DL (ref 2.8–4.5)
GLUCOSE SERPL-MCNC: 181 MG/DL (ref 65–100)
GLUCOSE UR STRIP.AUTO-MCNC: NEGATIVE MG/DL
HCT VFR BLD AUTO: 42.1 % (ref 41.1–50.3)
HGB BLD-MCNC: 13.7 G/DL (ref 13.6–17.2)
HGB UR QL STRIP: ABNORMAL
IMM GRANULOCYTES # BLD AUTO: 0.1 K/UL (ref 0–0.5)
IMM GRANULOCYTES NFR BLD AUTO: 1 % (ref 0–5)
KETONES UR QL STRIP.AUTO: NEGATIVE MG/DL
LEUKOCYTE ESTERASE UR QL STRIP.AUTO: ABNORMAL
LYMPHOCYTES # BLD: 0.4 K/UL (ref 0.5–4.6)
LYMPHOCYTES NFR BLD: 2 % (ref 13–44)
MCH RBC QN AUTO: 29.7 PG (ref 26.1–32.9)
MCHC RBC AUTO-ENTMCNC: 32.5 G/DL (ref 31.4–35)
MCV RBC AUTO: 91.1 FL (ref 82–102)
MONOCYTES # BLD: 1.4 K/UL (ref 0.1–1.3)
MONOCYTES NFR BLD: 8 % (ref 4–12)
NEUTS SEG # BLD: 14.9 K/UL (ref 1.7–8.2)
NEUTS SEG NFR BLD: 89 % (ref 43–78)
NITRITE UR QL STRIP.AUTO: NEGATIVE
NRBC # BLD: 0 K/UL (ref 0–0.2)
PH UR STRIP: 5 (ref 5–9)
PLATELET # BLD AUTO: 227 K/UL (ref 150–450)
PMV BLD AUTO: 9.3 FL (ref 9.4–12.3)
POTASSIUM SERPL-SCNC: 4.3 MMOL/L (ref 3.5–5.1)
PROT SERPL-MCNC: 7.6 G/DL (ref 6.3–8.2)
PROT UR STRIP-MCNC: 30 MG/DL
RBC # BLD AUTO: 4.62 M/UL (ref 4.23–5.6)
RBC #/AREA URNS HPF: ABNORMAL /HPF
SODIUM SERPL-SCNC: 135 MMOL/L (ref 133–143)
SP GR UR REFRACTOMETRY: 1.02 (ref 1–1.02)
UROBILINOGEN UR QL STRIP.AUTO: 0.2 EU/DL (ref 0.2–1)
WBC # BLD AUTO: 16.9 K/UL (ref 4.3–11.1)
WBC URNS QL MICRO: >100 /HPF

## 2023-01-06 PROCEDURE — 80053 COMPREHEN METABOLIC PANEL: CPT

## 2023-01-06 PROCEDURE — 81001 URINALYSIS AUTO W/SCOPE: CPT

## 2023-01-06 PROCEDURE — 2580000003 HC RX 258: Performed by: GENERAL PRACTICE

## 2023-01-06 PROCEDURE — 6360000002 HC RX W HCPCS: Performed by: GENERAL PRACTICE

## 2023-01-06 PROCEDURE — 99285 EMERGENCY DEPT VISIT HI MDM: CPT

## 2023-01-06 PROCEDURE — 96365 THER/PROPH/DIAG IV INF INIT: CPT

## 2023-01-06 PROCEDURE — 70450 CT HEAD/BRAIN W/O DYE: CPT

## 2023-01-06 PROCEDURE — 71045 X-RAY EXAM CHEST 1 VIEW: CPT

## 2023-01-06 PROCEDURE — 85025 COMPLETE CBC W/AUTO DIFF WBC: CPT

## 2023-01-06 PROCEDURE — 93005 ELECTROCARDIOGRAM TRACING: CPT | Performed by: GENERAL PRACTICE

## 2023-01-06 RX ORDER — 0.9 % SODIUM CHLORIDE 0.9 %
1000 INTRAVENOUS SOLUTION INTRAVENOUS ONCE
Status: COMPLETED | OUTPATIENT
Start: 2023-01-06 | End: 2023-01-06

## 2023-01-06 RX ORDER — CEPHALEXIN 500 MG/1
500 CAPSULE ORAL 3 TIMES DAILY
Qty: 21 CAPSULE | Refills: 0 | Status: SHIPPED | OUTPATIENT
Start: 2023-01-06 | End: 2023-01-13

## 2023-01-06 RX ADMIN — CEFTRIAXONE 1000 MG: 1 INJECTION, POWDER, FOR SOLUTION INTRAMUSCULAR; INTRAVENOUS at 16:37

## 2023-01-06 RX ADMIN — SODIUM CHLORIDE 1000 ML: 9 INJECTION, SOLUTION INTRAVENOUS at 13:43

## 2023-01-06 ASSESSMENT — PAIN - FUNCTIONAL ASSESSMENT: PAIN_FUNCTIONAL_ASSESSMENT: NONE - DENIES PAIN

## 2023-01-06 NOTE — ED NOTES
Patient seen by Dr. Jose Quezada in triage complaining of waking up dizzy at 0100 then fell. Patient with skin tear to arm however denies any pain at this time, bandaged and unable to be visualized at this time. Patient with no further complaints at this time.       Joselin Nguyen RN  01/06/23 7808

## 2023-01-06 NOTE — ED PROVIDER NOTES
Emergency Department Provider Note                   PCP:                Jayde Johnson DO               Age: 80 y.o. Sex: male     No diagnosis found. DISPOSITION          Medical Decision Making  Patient remained asymptomatic here. He no longer had dizziness or weakness or any near syncope events. Patient ambulated without difficulty here. I have considered cardiopulmonary emergencies such as ACS, dysrhythmias, PE, CHF, bradycardic rhythms. But patient has nothing to suggest any cardiopulmonary emergencies. I have considered neurological emergency such as CVA, intracranial hemorrhage, other central processes. However, with patient's normal neurological exam and resolution of symptoms I doubt any neurological emergencies at this time. Patient does have evidence of a UTI and very mild renal insufficiency though with comparing to previous labs the patient's creatinine is at the high end of range that he has been before. Patient will be treated for a UTI which as I now bring it up to him he does state that he has had increased urgency and minimal burning with urination. There is nothing to suggest upper tract infection or sepsis. I discussed the case also with the patient's son who will help make sure that he is safe over the next few days as he continues to rehydrate and take his antibiotics. Return precautions are given. Problems Addressed:  Dizziness: acute illness or injury  Near syncope: acute illness or injury  Urinary tract infection without hematuria, site unspecified: acute illness or injury    Amount and/or Complexity of Data Reviewed  Labs: ordered. Radiology: ordered. ECG/medicine tests: ordered and independent interpretation performed. Risk  Prescription drug management. Complexity of Problem: 2 or more self-limited or minor problems. (3)  Complexity of Problem: 1 acute, uncomplicated illness or injury.  (3)  The patients assessment required an independent historian: I spoke with a family member. I have conducted an independent ordering and review of Labs. I have conducted an independent ordering and review of EKG. I have conducted an independent ordering and review of CT Scan. Considerations: Shared decision making was utilized in the care of this patient. Considerations: Hospitalization was considered. Shared decision making with the patient and son was utilized regarding the patient's unlikely having a central cause for dizziness. They understand that this is likely due to potentially dehydration and UTI. We discussed hospitalization but felt that it was not necessary at this time due to his resolution of symptoms and demonstration of safety and ability to walk here. Patient felt much improved after IV fluids and IV antibiotics. Orders Placed This Encounter   Procedures    CT Head W/O Contrast    CBC with Auto Differential    CMP    EKG 12 Lead        Medications - No data to display    New Prescriptions    No medications on file        Dejuan Cherry is a 80 y.o. male who presents to the Emergency Department with chief complaint of    Chief Complaint   Patient presents with    Dizziness    Fall      Patient lives at a senior living facility and he states that he got up to go to the restroom at about 1 in the morning and had a severe vertigo episode where the room was spinning. Patient states he went down to the right side and did sustain a skin tear to his right elbow. He does not think he hit his head or had loss of consciousness. Patient states he was unable to get up due to the dizziness and was finally able to call the  of his senior living facility and they called the ambulance. Patient now states he feels fine and no longer has any vertiginous symptoms or dizziness. Patient states he did have a headache when he fell but does not now. Patient does have atrial fibrillation and takes Eliquis.   Patient denies any recent illness such as cough, fevers, chest pain, vomiting, diarrhea or any other symptoms at this time. Review of Systems   All other systems reviewed and are negative. Past Medical History:   Diagnosis Date    A-fib [facility])     Arrhythmia     PALPITATION    Arthritis     BPH (benign prostatic hyperplasia)     CAD (coronary artery disease)     STENTS HEART     Chronic kidney disease     HAS ONLY 1 KIDNEY    Hypercholesterolemia     Hypertension     ANABELLE on CPAP     Stroke ([address]     TIA secondary to holding of eliquis         Past Surgical History:   Procedure Laterality Date    APPENDECTOMY      exploratory     COLONOSCOPY      COLONOSCOPY      OTHER CELL      LEFT KIDNEY REMOVED for malformation; no cancer     [place] CARDIAC SURG PROCEDURE UNLIST      stent to circ    PTCA W/ STENT, EA ADD      UROLOGICAL SURGERY      Kidney removed         Family History   Problem Relation Age of Onset    Diabetes Brother     Diabetes Sister     Diabetes Mother     Heart Disease Mother     Heart Attack Mother 80        MI    Kidney Disease Brother         ESRD 2/2 DM     Cancer Brother         liver         Social History     Socioeconomic History    Marital status:     Tobacco Use    Smoking status: Former     Packs/day: 0.50     Types: Cigarettes     Quit date: 1967     Years since quittin.0    Smokeless tobacco: Never   Vaping Use    Vaping Use: Never used   Substance and Sexual Activity    Alcohol use: Yes    Drug use: No         Ciprofloxacin, Penicillins, and Silicone     Previous Medications    APIXABAN (ELIQUIS) 2.5 MG TABS TABLET    TAKE 1 TABLET TWICE A DAY    BLOOD GLUCOSE TEST STRIPS (ASCENSIA AUTODISC VI;ONE TOUCH ULTRA TEST VI) STRIP    1 each by Other route once    BLOOD GLUCOSE TEST STRIPS (FREESTYLE LITE) STRIP    Check fasting blood sugar daily    BLOOD GLUCOSE-BP MONITOR (BLOOD GLUCOSE-WRIST BP MONITOR) ELSA    Check fasting blood sugar daily    DOFETILIDE (TIKOSYN) 125 MCG CAPSULE    TAKE 1 CAPSULE TWICE A DAY    GABAPENTIN (NEURONTIN) 100 MG CAPSULE    Take 3 capsules by mouth 3 times daily for 30 days. LANCETS MISC    Check fasting blood sugar daily    METOPROLOL SUCCINATE (TOPROL XL) 25 MG EXTENDED RELEASE TABLET    TAKE ONE-HALF (1/2) TABLET NIGHTLY    MIRABEGRON (MYRBETRIQ) 50 MG TB24    Take 50 mg by mouth daily    NITROGLYCERIN (NITROLINGUAL) 0.4 MG/SPRAY 0.4 MG SPRAY    Place 1 spray under the tongue    RANOLAZINE (RANEXA) 500 MG EXTENDED RELEASE TABLET    Take 500 mg by mouth in the morning. SIMVASTATIN (ZOCOR) 20 MG TABLET    TAKE 1 TABLET NIGHTLY    TAMSULOSIN (FLOMAX) 0.4 MG CAPSULE    Take 1 capsule by mouth daily        Vitals signs and nursing note reviewed. Patient Vitals for the past 4 hrs:   Temp Pulse Resp BP SpO2   01/06/23 1045 98.2 °F (36.8 °C) 97 16 119/61 96 %          Physical Exam  Constitutional:       General: He is not in acute distress. HENT:      Head: Normocephalic and atraumatic. Right Ear: External ear normal.      Left Ear: External ear normal.      Nose: No congestion or rhinorrhea. Mouth/Throat:      Mouth: Mucous membranes are moist.   Eyes:      Extraocular Movements: Extraocular movements intact. Pupils: Pupils are equal, round, and reactive to light. Cardiovascular:      Rate and Rhythm: Normal rate and regular rhythm. Heart sounds: Normal heart sounds. Pulmonary:      Effort: Pulmonary effort is normal.      Breath sounds: Normal breath sounds. Abdominal:      General: There is no distension. Palpations: Abdomen is soft. Tenderness: There is no abdominal tenderness. Musculoskeletal:         General: No swelling or tenderness. Normal range of motion. Cervical back: Normal range of motion. Skin:     Findings: No rash. Neurological:      General: No focal deficit present. Mental Status: He is alert and oriented to person, place, and time. Cranial Nerves: No cranial nerve deficit. Sensory: No sensory deficit. Motor: No weakness. Coordination: Coordination normal.      Gait: Gait normal.      Comments: Patient has no dysmetria and normal rapid alternating movements. no worrisome nystagmus   Psychiatric:         Mood and Affect: Mood normal.        EKG 12 Lead    Date/Time: 1/6/2023 11:16 AM  Performed by: Flavio Baker DO  Authorized by: Flavio Baker DO     ECG reviewed by ED Physician in the absence of a cardiologist: yes    Previous ECG:     Previous ECG:  Compared to current    Similarity:  No change    Comparison ECG info:  7/9/2017  Interpretation:     Interpretation: normal    Rate:     ECG rate:  82    ECG rate assessment: normal    Rhythm:     Rhythm: sinus rhythm and A-V block      A-V block: 1st Degree    ST segments:     ST segments:  Non-specific  T waves:     T waves: inverted      Details:  T wave inversions also present on July 9, 2017 EKG    Inverted:  V4, V6 and V5    No results found for any visits on 01/06/23. CT Head W/O Contrast    (Results Pending)                       Voice dictation software was used during the making of this note. This software is not perfect and grammatical and other typographical errors may be present. This note has not been completely proofread for errors.      Flavio Baker DO  01/12/23 3925

## 2023-01-06 NOTE — ED NOTES
I have reviewed discharge instructions with the patient and son. The patient and son verbalized understanding. Patient left ED via Discharge Method: wheelchair to Home with son. Opportunity for questions and clarification provided. Patient given 1 scripts. To continue your aftercare when you leave the hospital, you may receive an automated call from our care team to check in on how you are doing. This is a free service and part of our promise to provide the best care and service to meet your aftercare needs.  If you have questions, or wish to unsubscribe from this service please call [phone]. Thank you for Choosing our [facility] Emergency Department.         Veronika Galarza RN  01/06/23 3487

## 2023-01-20 ENCOUNTER — OFFICE VISIT (OUTPATIENT)
Dept: INTERNAL MEDICINE CLINIC | Facility: CLINIC | Age: 88
End: 2023-01-20

## 2023-01-20 VITALS
WEIGHT: 150 LBS | HEIGHT: 68 IN | BODY MASS INDEX: 22.73 KG/M2 | OXYGEN SATURATION: 98 % | RESPIRATION RATE: 16 BRPM | HEART RATE: 52 BPM | DIASTOLIC BLOOD PRESSURE: 60 MMHG | TEMPERATURE: 97 F | SYSTOLIC BLOOD PRESSURE: 132 MMHG

## 2023-01-20 DIAGNOSIS — N39.0 URINARY TRACT INFECTION WITHOUT HEMATURIA, SITE UNSPECIFIED: ICD-10-CM

## 2023-01-20 DIAGNOSIS — Z87.440 HISTORY OF UTI: Primary | ICD-10-CM

## 2023-01-20 LAB
BASOPHILS # BLD: 0 K/UL (ref 0–0.2)
BASOPHILS NFR BLD: 0 % (ref 0–2)
BILIRUBIN, URINE, POC: NEGATIVE
BLOOD URINE, POC: NEGATIVE
DIFFERENTIAL METHOD BLD: ABNORMAL
EOSINOPHIL # BLD: 0.2 K/UL (ref 0–0.8)
EOSINOPHIL NFR BLD: 2 % (ref 0.5–7.8)
ERYTHROCYTE [DISTWIDTH] IN BLOOD BY AUTOMATED COUNT: 13.5 % (ref 11.9–14.6)
GLUCOSE URINE, POC: 100
HCT VFR BLD AUTO: 38.8 % (ref 41.1–50.3)
HGB BLD-MCNC: 12.1 G/DL (ref 13.6–17.2)
IMM GRANULOCYTES # BLD AUTO: 0 K/UL (ref 0–0.5)
IMM GRANULOCYTES NFR BLD AUTO: 1 % (ref 0–5)
KETONES, URINE, POC: NEGATIVE
LEUKOCYTE ESTERASE, URINE, POC: NEGATIVE
LYMPHOCYTES # BLD: 1.4 K/UL (ref 0.5–4.6)
LYMPHOCYTES NFR BLD: 16 % (ref 13–44)
MCH RBC QN AUTO: 29.7 PG (ref 26.1–32.9)
MCHC RBC AUTO-ENTMCNC: 31.2 G/DL (ref 31.4–35)
MCV RBC AUTO: 95.1 FL (ref 82–102)
MONOCYTES # BLD: 0.9 K/UL (ref 0.1–1.3)
MONOCYTES NFR BLD: 10 % (ref 4–12)
NEUTS SEG # BLD: 6.3 K/UL (ref 1.7–8.2)
NEUTS SEG NFR BLD: 71 % (ref 43–78)
NITRITE, URINE, POC: NEGATIVE
NRBC # BLD: 0 K/UL (ref 0–0.2)
PH, URINE, POC: 7 (ref 4.6–8)
PLATELET # BLD AUTO: 306 K/UL (ref 150–450)
PMV BLD AUTO: 9.5 FL (ref 9.4–12.3)
PROTEIN,URINE, POC: ABNORMAL
RBC # BLD AUTO: 4.08 M/UL (ref 4.23–5.6)
SPECIFIC GRAVITY, URINE, POC: 1.02 (ref 1–1.03)
URINALYSIS CLARITY, POC: ABNORMAL
URINALYSIS COLOR, POC: YELLOW
UROBILINOGEN, POC: 0.2
WBC # BLD AUTO: 8.8 K/UL (ref 4.3–11.1)

## 2023-01-20 ASSESSMENT — ENCOUNTER SYMPTOMS
SHORTNESS OF BREATH: 0
NAUSEA: 0
VOMITING: 0
ABDOMINAL PAIN: 0

## 2023-01-20 ASSESSMENT — PATIENT HEALTH QUESTIONNAIRE - PHQ9
1. LITTLE INTEREST OR PLEASURE IN DOING THINGS: 0
SUM OF ALL RESPONSES TO PHQ QUESTIONS 1-9: 0
SUM OF ALL RESPONSES TO PHQ9 QUESTIONS 1 & 2: 0
SUM OF ALL RESPONSES TO PHQ QUESTIONS 1-9: 0
2. FEELING DOWN, DEPRESSED OR HOPELESS: 0

## 2023-01-20 NOTE — PROGRESS NOTES
[facility]      2023    Patient Name: Zamzam Bermeo  :  1929      Chief Complaint:  Chief Complaint   Patient presents with    Follow-up     UTI         HPI  Patient presents today for folow-up on recent ER visit. He presented to the ER on 23 for complaint of dizziness which resulted in a fall that occurred PTA. Low suspicion for cardiopulmonary emergency. He had evidence of a UTI and mild renal insufficiency. He was given Rocephin in the ER and was discharged to home with RX for cephalexin. He has completed the prescription. Denies any urinary symptoms or recurrent dizziness.        Past Medical History:   Diagnosis Date    A-fib 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     NM UNLISTED PROCEDURE CARDIAC SURGERY      stent to circ    PTCA W/ STENT, EA ADD      UROLOGICAL SURGERY      Kidney removed        Family History   Problem Relation Age of Onset    Diabetes Brother     Diabetes Sister     Diabetes Mother     Heart Disease Mother     Heart Attack Mother 80        MI    Kidney Disease Brother         ESRD 2/2 DM     Cancer Brother         liver        Social History     Tobacco Use    Smoking status: Former     Packs/day: 0.50     Types: Cigarettes     Quit date: 1967     Years since quittin.0    Smokeless tobacco: Never   Vaping Use    Vaping Use: Never used   Substance Use Topics    Alcohol use: Yes    Drug use: No       Current Outpatient Medications:     UNABLE TO FIND, Super Beta Prostate, Disp: , Rfl: tamsulosin (FLOMAX) 0.4 MG capsule, Take 1 capsule by mouth daily, Disp: 90 capsule, Rfl: 3    blood glucose test strips (FREESTYLE LITE) strip, Check fasting blood sugar daily, Disp: 100 each, Rfl: 3    Lancets MISC, Check fasting blood sugar daily, Disp: 100 each, Rfl: 0    blood glucose test strips (ASCENSIA AUTODISC VI;ONE TOUCH ULTRA TEST VI) strip, 1 each by Other route once, Disp: , Rfl:     Blood Glucose-BP Monitor (BLOOD GLUCOSE-WRIST BP MONITOR) ELSA, Check fasting blood sugar daily, Disp: , Rfl:     apixaban (ELIQUIS) 2.5 MG TABS tablet, TAKE 1 TABLET TWICE A DAY, Disp: , Rfl:     dofetilide (TIKOSYN) 125 MCG capsule, TAKE 1 CAPSULE TWICE A DAY, Disp: , Rfl:     metoprolol succinate (TOPROL XL) 25 MG extended release tablet, TAKE ONE-HALF (1/2) TABLET NIGHTLY, Disp: , Rfl:     nitroGLYCERIN (NITROLINGUAL) 0.4 MG/SPRAY 0.4 mg spray, Place 1 spray under the tongue, Disp: , Rfl:     ranolazine (RANEXA) 500 MG extended release tablet, Take 500 mg by mouth in the morning., Disp: , Rfl:     simvastatin (ZOCOR) 20 MG tablet, TAKE 1 TABLET NIGHTLY, Disp: , Rfl:     gabapentin (NEURONTIN) 100 MG capsule, Take 3 capsules by mouth 3 times daily for 30 days. (Patient taking differently: Take 300 mg by mouth daily. Pt taking 3 caps daily), Disp: 270 capsule, Rfl: 0    Allergies   Allergen Reactions    Ciprofloxacin      Other reaction(s): Rash-Allergy    Penicillins Rash    Silicone Dermatitis     Other reaction(s): Rash-Allergy  Other reaction(s): Contact Dermatitis         Review of Systems   Constitutional:  Negative for chills and fever. Respiratory:  Negative for shortness of breath. Cardiovascular:  Negative for chest pain. Gastrointestinal:  Negative for abdominal pain, nausea and vomiting. Genitourinary:  Negative for dysuria, flank pain, frequency, hematuria and urgency.        Objective:  /60 (Site: Left Upper Arm, Position: Sitting, Cuff Size: Medium Adult)   Pulse 52   Temp 97 °F (36.1 °C) (Temporal)   Resp 16   Ht 5' 8\" (1.727 m)   Wt 150 lb (68 kg)   SpO2 98%   BMI 22.81 kg/m²     Examination:  Physical Exam  Vitals and nursing note reviewed. Constitutional:       General: He is not in acute distress. Appearance: Normal appearance. Cardiovascular:      Rate and Rhythm: Normal rate and regular rhythm. Pulmonary:      Effort: Pulmonary effort is normal. No respiratory distress. Breath sounds: Normal breath sounds. Abdominal:      General: Bowel sounds are normal.      Palpations: Abdomen is soft. Tenderness: There is no abdominal tenderness. There is no right CVA tenderness or left CVA tenderness. Skin:     General: Skin is warm and dry. Neurological:      Mental Status: He is alert and oriented to person, place, and time. Psychiatric:         Mood and Affect: Mood normal.     Lab Results   Component Value Date/Time    APPEARANCE CLOUDY 01/06/2023 01:45 PM    COLORU DARK YELLOW 01/06/2023 01:45 PM    LABPH 5.0 01/06/2023 01:45 PM    NITRU Negative 01/06/2023 01:45 PM    GLUCOSEU Negative 01/06/2023 01:45 PM    KETUA Negative 01/06/2023 01:45 PM    UROBILINOGEN 0.2 01/06/2023 01:45 PM    BILIRUBINUR Negative 01/06/2023 01:45 PM     Lab Results   Component Value Date    WBC 16.9 (H) 01/06/2023    HGB 13.7 01/06/2023    HCT 42.1 01/06/2023    MCV 91.1 01/06/2023     01/06/2023     Lab Results   Component Value Date     01/06/2023    K 4.3 01/06/2023     01/06/2023    CO2 24 01/06/2023    BUN 33 (H) 01/06/2023    CREATININE 1.78 (H) 01/06/2023    GLUCOSE 181 (H) 01/06/2023    CALCIUM 9.0 01/06/2023    PROT 7.6 01/06/2023    LABALBU 3.8 01/06/2023    BILITOT 1.0 01/06/2023    ALKPHOS 53 01/06/2023    AST 33 01/06/2023    ALT 26 01/06/2023    LABGLOM 35 (L) 01/06/2023    GFRAA 59 (L) 07/04/2022    AGRATIO 2.0 03/02/2022    GLOB 3.8 01/06/2023                 Assessment/Plan:    Sangeeta Turpin was seen today for follow-up.     Diagnoses and all orders for this visit:    Urinary tract infection without hematuria, site unspecified  -     AMB POC URINALYSIS DIP STICK AUTO W/O MICRO  -     Basic Metabolic Panel; Future  -     CBC with Auto Differential; Future  UA collected- no signs of infection. Recheck renal function and WBC count. Follow-up and Dispositions    Return if symptoms worsen or fail to improve. On this date, 1/20/23, I have spent 25 minutes reviewing previous notes, test results and face to face with the patient discussing the diagnosis and importance of compliance with the treatment plan as well as documenting on the day of the visit. An electronic signature was used to authenticate this note.   LATOYA Souza

## 2023-01-21 LAB
ANION GAP SERPL CALC-SCNC: 13 MMOL/L (ref 2–11)
BUN SERPL-MCNC: 22 MG/DL (ref 8–23)
CALCIUM SERPL-MCNC: 10 MG/DL (ref 8.3–10.4)
CHLORIDE SERPL-SCNC: 107 MMOL/L (ref 101–110)
CO2 SERPL-SCNC: 21 MMOL/L (ref 21–32)
CREAT SERPL-MCNC: 1.6 MG/DL (ref 0.8–1.5)
GLUCOSE SERPL-MCNC: 191 MG/DL (ref 65–100)
POTASSIUM SERPL-SCNC: 4.4 MMOL/L (ref 3.5–5.1)
SODIUM SERPL-SCNC: 141 MMOL/L (ref 133–143)

## 2023-02-01 NOTE — PROGRESS NOTES
[address] [place] [address] Court, [address] [facility]  [phone]    PATIENT ID    Mr. Chetan Manrique  11/29/1929  His primary provider is Richar Soliman DO    CC: Mr. Maria Esther Fall returns to the clinic today for follow up of his obstructive sleep apnea. INTERVAL HISTORY  Mr. Maria Esther Fall was originally diagnosed with severe obstructive sleep apnea in 2016 with an AHI of 50, central apnea index less than half of this. He has past medical history notable for9 coronary artery disease, hypertension, atrial fibrillation, chronic kidney disease, notes a remote history of restless leg syndrome as well. I am meeting him for the first time today. He was last seen by Dr. Maria De Jesus Anaya in 10/6/2022 and was ordered to undergo BiPAP titration for poor control of central events on CPAP. A BiPAP titration was ordered and he was started on BiPAP 14/10 with a backup rate of 12 based on that study. He notes he uses a nasal pillow and was told by his son over the weekend when he was visiting that he is having weak out of his mouth. He notes he does have variable nocturia some nights getting up only once but other night 4-5 times, denies morning headaches. He does get sleepy at times during the day and feels like his sleep is not restorative. He has an oronasal mask that he is not using and notes when he wakes up in the morning he sees the :-( on his machine. His main concern today is he feels like it is difficult to fall asleep most nights because of tingling burning in his feet that he thinks is neuropathy, has been started on gabapentin 300 mg for this without much benefit. He notes he will get in bed and sometimes take an hour or more to fall asleep and he feels like he needs to move his legs or massage them from the knee down and this does help the feeling of discomfort. He denies any history of iron deficiency or vitamin D deficiency.     On BiPAP download review today he is utilizing hiswunderloopPAP machine 79% of the time, greater than 4 hours per night, for a median daily usage of 9 hours 21 minutes per night. His AHI is down to 14 but at times as high as 30 and his leak rate is unacceptable most nights. Past medical and surgical histories, medications and allergies, problem list, and social history were reviewed. ROS   Review of systems was otherwise negative unless noted in HPI. Sleep Medicine 2/6/2023 10/6/2022 4/11/2022   Sitting and reading 2 3 0   Watching TV 1 0 2   Sitting, inactive in a public place (e.g. a theatre or a meeting) 0 0 0   As a passenger in a car for an hour without a break 0 0 1   Lying down to rest in the afternoon when circumstances permit 1 1 1   Sitting and talking to someone 0 0 0   Sitting quietly after a lunch without alcohol 2 1 0   In a car, while stopped for a few minutes in traffic 0 0 0   Astor Sleepiness Score 6 5 4           MEDS  Current Outpatient Medications   Medication Sig Dispense Refill    UNABLE TO FIND Super Beta Prostate      tamsulosin (FLOMAX) 0.4 MG capsule Take 1 capsule by mouth daily 90 capsule 3    blood glucose test strips (FREESTYLE LITE) strip Check fasting blood sugar daily 100 each 3    Lancets MISC Check fasting blood sugar daily 100 each 0    blood glucose test strips (ASCENSIA AUTODISC VI;ONE TOUCH ULTRA TEST VI) strip 1 each by Other route once      Blood Glucose-BP Monitor (BLOOD GLUCOSE-WRIST BP MONITOR) ELSA Check fasting blood sugar daily      apixaban (ELIQUIS) 2.5 MG TABS tablet TAKE 1 TABLET TWICE A DAY      dofetilide (TIKOSYN) 125 MCG capsule TAKE 1 CAPSULE TWICE A DAY      metoprolol succinate (TOPROL XL) 25 MG extended release tablet TAKE ONE-HALF (1/2) TABLET NIGHTLY      nitroGLYCERIN (NITROLINGUAL) 0.4 MG/SPRAY 0.4 mg spray Place 1 spray under the tongue      ranolazine (RANEXA) 500 MG extended release tablet Take 500 mg by mouth in the morning.       simvastatin (ZOCOR) 20 MG tablet TAKE 1 TABLET NIGHTLY      gabapentin (NEURONTIN) 100 MG capsule Take 3 capsules by mouth 3 times daily for 30 days. (Patient taking differently: Take 300 mg by mouth daily. Pt taking 3 caps daily) 270 capsule 0     No current facility-administered medications for this visit. ALLERGIES  Allergies   Allergen Reactions    Ciprofloxacin      Other reaction(s): Rash-Allergy    Penicillins Rash    Silicone Dermatitis     Other reaction(s): Rash-Allergy  Other reaction(s): Contact Dermatitis         OBJECTIVE   /76   Pulse 61   Temp 97.2 °F (36.2 °C)   Resp 16   Ht 5' 8\" (1.727 m)   Wt 149 lb 6.4 oz (67.8 kg)   SpO2 99%   BMI 22.72 kg/m²         Exam:  Constitutional: Pleasant 80 y.o. male, appears well-developed and well-nourished. Not in acute distress. Eyes: Conjunctivae and lids appear normal. No pallor or icterus   Skin: Not diaphoretic. No lesions  HENT: Head normocephalic. Nares patent   Pulmonary: Respiratory effort is normal without labored breathing or accessory muscle use. No respiratory distress/cough/wheezng  Neurological: alert , Attention, speech and language are normal. There is facial symmetry. Musculoskeletal:  Moves all extremities equally and no focal weakness noted. Gait is unassisted. Psychiatric: pleasant, mood and affect well adjusted. cooperative and behavior is appropriate. Results:  BIPAP TITRATION 11/27/22                ASSESSMENT AND PLAN  1. Central sleep apnea  2. RLS (restless legs syndrome)  Assessment & Plan:  Patient is taking 300 mg of gabapentin for reported neuropathy but his symptoms sound very close to restless leg syndrome as the predilection for nighttime manifestation and relieved with movement/massage, he does have a history of restless leg syndrome in the past.  We discussed increasing to 600 mg taken 1 to 2 hours before bedtime to see if this helps. We will check underlying iron magnesium and vitamin D to ensure these are all adequate. He does have a history of CKD stage III.   This is his main concern today and is affecting his sleep quality. Orders:  -     Magnesium; Future  -     Iron; Future  -     Total Iron Binding Capacity; Future  -     Ferritin; Future  -     C-Reactive Protein; Future  -     Vitamin D 25 Hydroxy; Future  3. Other fatigue  -     Magnesium; Future  -     Iron; Future  -     Total Iron Binding Capacity; Future  -     Ferritin; Future  -     C-Reactive Protein; Future  -     Vitamin D 25 Hydroxy; Future  4. ANABELLE (obstructive sleep apnea)  Assessment & Plan:  Patient has mixed obstructive and central sleep apnea, previous baseline study reviewed and showed less than 50% centrals but he is having worsening centrals on PAP therapy, recent BiPAP titration showed improved but not total control. Download today suggest he is having significant oral leak and we will try to switch to an oronasal mask which he already has, showed him out to monitor for leak as well as his events per hour on his machine each morning. If this is not well controlled he is to give us a call and we briefly discussed ASV titration which would be our next step. He is symptomatic with nocturia and daytime sleepiness at times despite bilevel therapy. .       Mr. Rio Palma is  compliant with appropriate usage and getting fair clinical response to his ANABELLE treatment. The change in his AHI was compared to the baseline, and the benefits of treatment were discussed. Usage, with need for compliance to see best benefits, was explained with need to use the device more than 4 hours a night. He should not drive if drowsy or if he has had inadequate sleep. I will see him back in 3 months.          Time Spent: Time spent total 30 minutes exclusive of procedures, during the 24 hour period of the date of encounter: preparing to see the patient (e.g. reviewing chart notes, tests), performing a medically appropriate examination and/or evaluation, documenting clinical information in the electronic or other health record, counseling and educating the patient/family/caregiver, obtaining and/or reviewing separately obtained history and ordering medications, tests, procedures. Bipap download was reviewed with the patient in detail today.         Rashida Patient MD  Sleep Medicine/Internal Medicine/Pediatrics

## 2023-02-06 ENCOUNTER — OFFICE VISIT (OUTPATIENT)
Dept: SLEEP MEDICINE | Age: 88
End: 2023-02-06
Payer: MEDICARE

## 2023-02-06 VITALS
DIASTOLIC BLOOD PRESSURE: 76 MMHG | OXYGEN SATURATION: 99 % | WEIGHT: 149.4 LBS | TEMPERATURE: 97.2 F | RESPIRATION RATE: 16 BRPM | HEIGHT: 68 IN | SYSTOLIC BLOOD PRESSURE: 130 MMHG | BODY MASS INDEX: 22.64 KG/M2 | HEART RATE: 61 BPM

## 2023-02-06 DIAGNOSIS — G47.31 CENTRAL SLEEP APNEA: Primary | ICD-10-CM

## 2023-02-06 DIAGNOSIS — R53.83 OTHER FATIGUE: ICD-10-CM

## 2023-02-06 DIAGNOSIS — G47.33 OSA (OBSTRUCTIVE SLEEP APNEA): ICD-10-CM

## 2023-02-06 DIAGNOSIS — G25.81 RLS (RESTLESS LEGS SYNDROME): ICD-10-CM

## 2023-02-06 PROCEDURE — 1123F ACP DISCUSS/DSCN MKR DOCD: CPT | Performed by: INTERNAL MEDICINE

## 2023-02-06 PROCEDURE — G8420 CALC BMI NORM PARAMETERS: HCPCS | Performed by: INTERNAL MEDICINE

## 2023-02-06 PROCEDURE — 1036F TOBACCO NON-USER: CPT | Performed by: INTERNAL MEDICINE

## 2023-02-06 PROCEDURE — 99214 OFFICE O/P EST MOD 30 MIN: CPT | Performed by: INTERNAL MEDICINE

## 2023-02-06 PROCEDURE — G8484 FLU IMMUNIZE NO ADMIN: HCPCS | Performed by: INTERNAL MEDICINE

## 2023-02-06 PROCEDURE — G8427 DOCREV CUR MEDS BY ELIG CLIN: HCPCS | Performed by: INTERNAL MEDICINE

## 2023-02-06 ASSESSMENT — SLEEP AND FATIGUE QUESTIONNAIRES
HOW LIKELY ARE YOU TO NOD OFF OR FALL ASLEEP WHILE WATCHING TV: 1
HOW LIKELY ARE YOU TO NOD OFF OR FALL ASLEEP WHILE SITTING INACTIVE IN A PUBLIC PLACE: 0
HOW LIKELY ARE YOU TO NOD OFF OR FALL ASLEEP WHILE LYING DOWN TO REST IN THE AFTERNOON WHEN CIRCUMSTANCES PERMIT: 1
ESS TOTAL SCORE: 6
HOW LIKELY ARE YOU TO NOD OFF OR FALL ASLEEP WHILE SITTING AND READING: 2
HOW LIKELY ARE YOU TO NOD OFF OR FALL ASLEEP WHEN YOU ARE A PASSENGER IN A CAR FOR AN HOUR WITHOUT A BREAK: 0
HOW LIKELY ARE YOU TO NOD OFF OR FALL ASLEEP IN A CAR, WHILE STOPPED FOR A FEW MINUTES IN TRAFFIC: 0
HOW LIKELY ARE YOU TO NOD OFF OR FALL ASLEEP WHILE SITTING AND TALKING TO SOMEONE: 0
HOW LIKELY ARE YOU TO NOD OFF OR FALL ASLEEP WHILE SITTING QUIETLY AFTER LUNCH WITHOUT ALCOHOL: 2

## 2023-02-06 NOTE — PATIENT INSTRUCTIONS
It was a pleasure meeting you today. Here are some items that we discussed:    1. For your obstructive and central sleep apnea, your new machine is not controlling your events well enough. You are having a high leak. Please switch to the mask covering both your nose and mouth and monitor your number of events each morning. If it is consistently above 10 please give me a call    2. For your leg discomfort, okay to increase gabapentin to 600 mg. I am not sure if this is truly neuropathy or restless leg syndrome but gabapentin treats both. 3.   We ordered the following labs today: CRP, iron, ferritin, vitamin D, magnesium  Please call us if you have any trouble getting them done or if you have not heard from us a week after the lab draw.       Calin Salomon MD  [phone]

## 2023-02-06 NOTE — ASSESSMENT & PLAN NOTE
Patient is taking 300 mg of gabapentin for reported neuropathy but his symptoms sound very close to restless leg syndrome as the predilection for nighttime manifestation and relieved with movement/massage, he does have a history of restless leg syndrome in the past.  We discussed increasing to 600 mg taken 1 to 2 hours before bedtime to see if this helps. We will check underlying iron magnesium and vitamin D to ensure these are all adequate. He does have a history of CKD stage III. This is his main concern today and is affecting his sleep quality.

## 2023-02-22 ENCOUNTER — HOSPITAL ENCOUNTER (OUTPATIENT)
Dept: LAB | Age: 88
Discharge: HOME | End: 2023-02-25

## 2023-02-22 DIAGNOSIS — I10 ESSENTIAL HYPERTENSION: ICD-10-CM

## 2023-02-22 DIAGNOSIS — E78.5 DYSLIPIDEMIA: ICD-10-CM

## 2023-02-22 DIAGNOSIS — I25.10 CORONARY ARTERY DISEASE INVOLVING NATIVE CORONARY ARTERY OF NATIVE HEART WITHOUT ANGINA PECTORIS: ICD-10-CM

## 2023-02-22 DIAGNOSIS — N18.30 STAGE 3 CHRONIC KIDNEY DISEASE, UNSPECIFIED WHETHER STAGE 3A OR 3B CKD (HCC): ICD-10-CM

## 2023-02-22 LAB
ALBUMIN SERPL-MCNC: 3.8 G/DL (ref 3.2–4.6)
ALBUMIN/GLOB SERPL: 1.3 (ref 0.4–1.6)
ALP SERPL-CCNC: 55 U/L (ref 50–136)
ALT SERPL-CCNC: 23 U/L (ref 12–65)
ANION GAP SERPL CALC-SCNC: 4 MMOL/L (ref 2–11)
APPEARANCE UR: CLEAR
AST SERPL-CCNC: 14 U/L (ref 15–37)
BACTERIA URNS QL MICRO: NEGATIVE /HPF
BASOPHILS # BLD: 0 K/UL (ref 0–0.2)
BASOPHILS NFR BLD: 0 % (ref 0–2)
BILIRUB SERPL-MCNC: 0.8 MG/DL (ref 0.2–1.1)
BILIRUB UR QL: NEGATIVE
BUN SERPL-MCNC: 34 MG/DL (ref 8–23)
CALCIUM SERPL-MCNC: 9.1 MG/DL (ref 8.3–10.4)
CASTS URNS QL MICRO: ABNORMAL /LPF (ref 0–2)
CHLORIDE SERPL-SCNC: 112 MMOL/L (ref 101–110)
CHOLEST SERPL-MCNC: 162 MG/DL
CO2 SERPL-SCNC: 24 MMOL/L (ref 21–32)
COLOR UR: ABNORMAL
CREAT SERPL-MCNC: 1.9 MG/DL (ref 0.8–1.5)
CREAT UR-MCNC: 100 MG/DL
DIFFERENTIAL METHOD BLD: ABNORMAL
EOSINOPHIL # BLD: 0.2 K/UL (ref 0–0.8)
EOSINOPHIL NFR BLD: 3 % (ref 0.5–7.8)
EPI CELLS #/AREA URNS HPF: ABNORMAL /HPF (ref 0–5)
ERYTHROCYTE [DISTWIDTH] IN BLOOD BY AUTOMATED COUNT: 14.1 % (ref 11.9–14.6)
GLOBULIN SER CALC-MCNC: 2.9 G/DL (ref 2.8–4.5)
GLUCOSE SERPL-MCNC: 118 MG/DL (ref 65–100)
GLUCOSE UR STRIP.AUTO-MCNC: NEGATIVE MG/DL
HCT VFR BLD AUTO: 39.4 % (ref 41.1–50.3)
HDLC SERPL-MCNC: 59 MG/DL (ref 40–60)
HDLC SERPL: 2.7
HGB BLD-MCNC: 12.4 G/DL (ref 13.6–17.2)
HGB UR QL STRIP: NEGATIVE
IMM GRANULOCYTES # BLD AUTO: 0 K/UL (ref 0–0.5)
IMM GRANULOCYTES NFR BLD AUTO: 0 % (ref 0–5)
KETONES UR QL STRIP.AUTO: NEGATIVE MG/DL
LDLC SERPL CALC-MCNC: 85.8 MG/DL
LEUKOCYTE ESTERASE UR QL STRIP.AUTO: NEGATIVE
LYMPHOCYTES # BLD: 1.1 K/UL (ref 0.5–4.6)
LYMPHOCYTES NFR BLD: 15 % (ref 13–44)
MCH RBC QN AUTO: 29.5 PG (ref 26.1–32.9)
MCHC RBC AUTO-ENTMCNC: 31.5 G/DL (ref 31.4–35)
MCV RBC AUTO: 93.6 FL (ref 82–102)
MICROALBUMIN UR-MCNC: 11 MG/DL (ref 0–3)
MICROALBUMIN/CREAT UR-RTO: 110 MG/G (ref 0–30)
MONOCYTES # BLD: 1.1 K/UL (ref 0.1–1.3)
MONOCYTES NFR BLD: 15 % (ref 4–12)
NEUTS SEG # BLD: 4.8 K/UL (ref 1.7–8.2)
NEUTS SEG NFR BLD: 67 % (ref 43–78)
NITRITE UR QL STRIP.AUTO: NEGATIVE
NRBC # BLD: 0 K/UL (ref 0–0.2)
PH UR STRIP: 6 (ref 5–9)
PLATELET # BLD AUTO: 247 K/UL (ref 150–450)
PMV BLD AUTO: 10.2 FL (ref 9.4–12.3)
POTASSIUM SERPL-SCNC: 4.2 MMOL/L (ref 3.5–5.1)
PROT SERPL-MCNC: 6.7 G/DL (ref 6.3–8.2)
PROT UR STRIP-MCNC: ABNORMAL MG/DL
RBC # BLD AUTO: 4.21 M/UL (ref 4.23–5.6)
RBC #/AREA URNS HPF: ABNORMAL /HPF (ref 0–5)
SODIUM SERPL-SCNC: 140 MMOL/L (ref 133–143)
SP GR UR REFRACTOMETRY: 1.02 (ref 1–1.02)
T4 FREE SERPL-MCNC: 1.1 NG/DL (ref 0.78–1.46)
TRIGL SERPL-MCNC: 86 MG/DL (ref 35–150)
TSH, 3RD GENERATION: 1.71 UIU/ML (ref 0.36–3.74)
UROBILINOGEN UR QL STRIP.AUTO: 0.2 EU/DL (ref 0.2–1)
VLDLC SERPL CALC-MCNC: 17.2 MG/DL (ref 6–23)
WBC # BLD AUTO: 7.2 K/UL (ref 4.3–11.1)
WBC URNS QL MICRO: ABNORMAL /HPF (ref 0–4)

## 2023-02-26 NOTE — PROGRESS NOTES
Dl (:  1929) is a 80 y.o. male,Established patient, here for evaluation of the following chief complaint(s):  Follow-up (3 month Follow Up/Having some urinary issues he would like to discuss today. ), Discuss Labs, and Enuresis         ASSESSMENT/PLAN:  1. Atypical atrial flutter ([facility] [address]  2. Current use of long term anticoagulation  Status post AMERICA guided DCCV on 2016  Echo on 2018 technically difficult study but EF of 55 to 65%, moderate concentric LVH, mild/moderate TR, severely dilated LA, no evidence of PFO, moderate MAC  Continue metoprolol succinate, dofetilide, Eliquis (renally dosed) per cardiology    3. Coronary artery disease involving native coronary artery of native heart without angina pectoris  Left heart cath on 2015 with patent proximal LAD stent, apical LAD  with collaterals, D1 stent patent, diffuse distal small vessel disease  Echo as above  Continue simvastatin, metoprolol succinate, Ranexa, as needed nitroglycerin  Follow-up with cardiology    4. Stage 3 chronic kidney disease, unspecified whether stage 3a or 3b CKD ([facility] [address]  5. Microalbuminuria  Chart review shows baseline creatinine 1.4-1.7 with baseline GFR in the 40s  Recent labs with worsening creatinine of 1.9 and GFR of 32  Urine microalbumin to creatinine ratio elevated at 110  Urinalysis with trace protein  Likely multifactorial due to age and hypertension and borderline diabetes. 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 Does not routinely take NSAIDs  Renally dose medications, continue BP control, minimize nephrotoxic agents  Recheck kidney function and if improved back to baseline consider starting low-dose lisinopril given proteinuria    - Comprehensive Metabolic Panel; Future    6.  Essential hypertension  Continue metoprolol succinate  Patient to bring blood pressure cuff at next visit to determine accuracy compared with manual reading    7. Dyslipidemia  From 2/22/2023 reviewed with LDL slightly above goal at 85  Continue simvastatin    8. Benign prostatic hyperplasia with urinary frequency  Continue Flomax. Also taking super beta prostate over-the-counter  Suspect worsening enuresis due to increase sedating effect from nighttime gabapentin corresponding to increase dosage to help with neuropathy, compounded by BPH. No symptoms of urinary tract infection or back pain/bowel incontinence to suggest spinal cord injury  Recent urinalysis with trace protein but no signs of infection    9. Neuropathy  Labs from 3/2/2022 with normal B12, iron saturation and ferritin  Possible underlying diabetic neuropathy given history of elevated A1c  Continue gabapentin, counseled on sedating effects and if patient starts feeling more unsteady on his feet he is to alert provider    10. Impaired fasting glucose  A1c 6.2% on 11/30/2022  Check fasting blood sugar regularly, continue lifestyle modifications    11. Sleep apnea, unspecified type  Sleep medicine notes reviewed with significantly elevated AHI due to central events and events marked as unknown  Sleep medicine notes reviewed with some but not complete control during BiPAP trial  Follow-up and treatment per sleep medicine    12. Normocytic anemia  Per chart review baseline hemoglobin fluctuates from 12.1-13.7  Normal B12, iron saturation and ferritin in March 2022  Recheck CBC and iron studies and if evidence of iron deficiency consider GI evaluation for possible endoscopy  No current signs of bleeding but monitor closely given use of Eliquis    - CBC with Auto Differential; Future  - Transferrin Saturation; Future  - Ferritin; Future      Return in about 3 months (around 6/1/2023) for EOV; nonfasting labs in the next 2 weeks .          Subjective   SUBJECTIVE/OBJECTIVE:  Patient is a 72-year-old  male who presents to the office today to discuss labs and for follow-up. Patient has been checking his fasting blood sugars with values fluctuating from the 90s up to the 130s. Had a one-time value in the 170s. Checks his blood pressure at home regularly with heart rate in the 25I, systolic values in the 441C up to the 130s. Despite multiple attempts to clarify what his diastolic values are unable to accurately determine. Has followed up with sleep medicine currently on positive pressure ventilation at nighttime given history of sleep apnea. Told the specialist about his neuropathy in both feet and was told he could increase his gabapentin to 600 mg. Has gradually increased dose of gabapentin now taking 5 capsules of 100 mg apiece at nighttime. Notes significant improvement in his neuropathy. At baseline neuropathy symptoms are only at nighttime. However patient states he is slightly more groggy in the morning but quickly resolves. In the past has had history of some nighttime urination without realizing it but has noticed it has gotten worse since increasing the dose of gabapentin, sometimes saturating the Tylenol he sleeps on. During the daytime will only have urinary incontinence if he is around water such as at the sink. No loss of bowel control. No dysuria, hematuria, fevers or chills. No back pain. Does have occasional discomfort and swelling in the left groin if he is up and more active but states that he lays down the swelling will resolve. No symptoms today. Has occasional episodes of lightheadedness at random but no syncope. Has been trying to increase his water intake. Does have some constipation. No abdominal pain, nausea, vomiting, chest pain, shortness of breath, palpitations, melena or hematochezia. Review of Systems   Constitutional:  Negative for chills and fever. Respiratory:  Negative for shortness of breath. Cardiovascular:  Negative for chest pain and palpitations. Gastrointestinal:  Positive for constipation.  Negative for abdominal pain, anal bleeding, blood in stool, nausea and vomiting. Genitourinary:  Negative for dysuria. Occasional left groin pain and swelling   Musculoskeletal:  Negative for back pain. Neurological:  Positive for light-headedness and numbness (Mild involving both feet). Negative for syncope. Objective   Physical Exam  Vitals reviewed. Constitutional:       General: He is not in acute distress. Appearance: Normal appearance. He is not ill-appearing, toxic-appearing or diaphoretic. HENT:      Head: Normocephalic and atraumatic. Eyes:      General:         Right eye: No discharge. Left eye: No discharge. Extraocular Movements: Extraocular movements intact. Neck:      Vascular: No carotid bruit. Cardiovascular:      Rate and Rhythm: Normal rate and regular rhythm. Pulses:           Dorsalis pedis pulses are 2+ on the right side. Heart sounds: Murmur heard. Systolic murmur is present with a grade of 3/6. No friction rub. No gallop. Pulmonary:      Effort: Pulmonary effort is normal. No respiratory distress. Breath sounds: No wheezing, rhonchi or rales. Abdominal:      General: Bowel sounds are normal.      Palpations: Abdomen is soft. Tenderness: There is no abdominal tenderness. There is no guarding. Musculoskeletal:      Right lower leg: Edema present. Left lower leg: Edema present. Comments: 1+ pitting edema of bilateral lower extremities below the knee including dorsal aspects of the feet   Feet:      Right foot:      Protective Sensation: 7 sites tested. 7 sites sensed. Skin integrity: Dry skin present. No ulcer, blister or fissure. Left foot:      Protective Sensation: 7 sites tested. 7 sites sensed. Skin integrity: Ulcer (Superficial along dorsal aspect of fourth toe without surrounding erythema or streaking, bleeding or drainage), callus (Medial aspect of great toe) and dry skin present.  No blister or fissure. Skin:     Coloration: Skin is not jaundiced. Neurological:      Mental Status: He is alert. Cranial Nerves: No cranial nerve deficit. Sensory: No sensory deficit. Motor: No weakness (Muscle strength 5/5 in all 4 extremities). Psychiatric:         Attention and Perception: Attention normal.         Mood and Affect: Mood and affect normal. Mood is not anxious or depressed. Affect is not tearful. Speech: Speech normal. Speech is not rapid and pressured or slurred. Behavior: Behavior normal. Behavior is not agitated, aggressive or combative. Behavior is cooperative. Thought Content: Thought content normal.          On this date 3/1/2023 I have spent 45 minutes reviewing previous notes, test results and face to face with the patient discussing the diagnosis and importance of compliance with the treatment plan as well as documenting on the day of the visit. An electronic signature was used to authenticate this note.     --Dal Boas, DO

## 2023-03-01 ENCOUNTER — OFFICE VISIT (OUTPATIENT)
Dept: INTERNAL MEDICINE CLINIC | Facility: CLINIC | Age: 88
End: 2023-03-01
Payer: MEDICARE

## 2023-03-01 VITALS
RESPIRATION RATE: 16 BRPM | HEIGHT: 68 IN | HEART RATE: 76 BPM | DIASTOLIC BLOOD PRESSURE: 76 MMHG | TEMPERATURE: 96.7 F | WEIGHT: 148.6 LBS | OXYGEN SATURATION: 98 % | SYSTOLIC BLOOD PRESSURE: 124 MMHG | BODY MASS INDEX: 22.52 KG/M2

## 2023-03-01 DIAGNOSIS — R80.9 MICROALBUMINURIA: ICD-10-CM

## 2023-03-01 DIAGNOSIS — I48.4 ATYPICAL ATRIAL FLUTTER (HCC): Primary | ICD-10-CM

## 2023-03-01 DIAGNOSIS — N18.30 STAGE 3 CHRONIC KIDNEY DISEASE, UNSPECIFIED WHETHER STAGE 3A OR 3B CKD (HCC): ICD-10-CM

## 2023-03-01 DIAGNOSIS — R35.0 BENIGN PROSTATIC HYPERPLASIA WITH URINARY FREQUENCY: ICD-10-CM

## 2023-03-01 DIAGNOSIS — D64.9 NORMOCYTIC ANEMIA: ICD-10-CM

## 2023-03-01 DIAGNOSIS — R73.01 IMPAIRED FASTING GLUCOSE: ICD-10-CM

## 2023-03-01 DIAGNOSIS — I10 ESSENTIAL HYPERTENSION: ICD-10-CM

## 2023-03-01 DIAGNOSIS — N40.1 BENIGN PROSTATIC HYPERPLASIA WITH URINARY FREQUENCY: ICD-10-CM

## 2023-03-01 DIAGNOSIS — G62.9 NEUROPATHY: ICD-10-CM

## 2023-03-01 DIAGNOSIS — Z79.01 CURRENT USE OF LONG TERM ANTICOAGULATION: ICD-10-CM

## 2023-03-01 DIAGNOSIS — E78.5 DYSLIPIDEMIA: ICD-10-CM

## 2023-03-01 DIAGNOSIS — G47.30 SLEEP APNEA, UNSPECIFIED TYPE: ICD-10-CM

## 2023-03-01 DIAGNOSIS — I25.10 CORONARY ARTERY DISEASE INVOLVING NATIVE CORONARY ARTERY OF NATIVE HEART WITHOUT ANGINA PECTORIS: ICD-10-CM

## 2023-03-01 PROBLEM — G45.9 TIA (TRANSIENT ISCHEMIC ATTACK): Status: ACTIVE | Noted: 2018-11-02

## 2023-03-01 PROCEDURE — G8484 FLU IMMUNIZE NO ADMIN: HCPCS | Performed by: STUDENT IN AN ORGANIZED HEALTH CARE EDUCATION/TRAINING PROGRAM

## 2023-03-01 PROCEDURE — G8427 DOCREV CUR MEDS BY ELIG CLIN: HCPCS | Performed by: STUDENT IN AN ORGANIZED HEALTH CARE EDUCATION/TRAINING PROGRAM

## 2023-03-01 PROCEDURE — 1036F TOBACCO NON-USER: CPT | Performed by: STUDENT IN AN ORGANIZED HEALTH CARE EDUCATION/TRAINING PROGRAM

## 2023-03-01 PROCEDURE — G8420 CALC BMI NORM PARAMETERS: HCPCS | Performed by: STUDENT IN AN ORGANIZED HEALTH CARE EDUCATION/TRAINING PROGRAM

## 2023-03-01 PROCEDURE — 99215 OFFICE O/P EST HI 40 MIN: CPT | Performed by: STUDENT IN AN ORGANIZED HEALTH CARE EDUCATION/TRAINING PROGRAM

## 2023-03-01 PROCEDURE — 1123F ACP DISCUSS/DSCN MKR DOCD: CPT | Performed by: STUDENT IN AN ORGANIZED HEALTH CARE EDUCATION/TRAINING PROGRAM

## 2023-03-01 SDOH — ECONOMIC STABILITY: INCOME INSECURITY: HOW HARD IS IT FOR YOU TO PAY FOR THE VERY BASICS LIKE FOOD, HOUSING, MEDICAL CARE, AND HEATING?: NOT HARD AT ALL

## 2023-03-01 SDOH — ECONOMIC STABILITY: HOUSING INSECURITY
IN THE LAST 12 MONTHS, WAS THERE A TIME WHEN YOU DID NOT HAVE A STEADY PLACE TO SLEEP OR SLEPT IN A SHELTER (INCLUDING NOW)?: NO

## 2023-03-01 SDOH — ECONOMIC STABILITY: FOOD INSECURITY: WITHIN THE PAST 12 MONTHS, YOU WORRIED THAT YOUR FOOD WOULD RUN OUT BEFORE YOU GOT MONEY TO BUY MORE.: NEVER TRUE

## 2023-03-01 SDOH — ECONOMIC STABILITY: FOOD INSECURITY: WITHIN THE PAST 12 MONTHS, THE FOOD YOU BOUGHT JUST DIDN'T LAST AND YOU DIDN'T HAVE MONEY TO GET MORE.: NEVER TRUE

## 2023-03-01 ASSESSMENT — PATIENT HEALTH QUESTIONNAIRE - PHQ9
SUM OF ALL RESPONSES TO PHQ QUESTIONS 1-9: 0
2. FEELING DOWN, DEPRESSED OR HOPELESS: 0
SUM OF ALL RESPONSES TO PHQ QUESTIONS 1-9: 0
1. LITTLE INTEREST OR PLEASURE IN DOING THINGS: 0
SUM OF ALL RESPONSES TO PHQ9 QUESTIONS 1 & 2: 0

## 2023-03-01 ASSESSMENT — ENCOUNTER SYMPTOMS
ABDOMINAL PAIN: 0
SHORTNESS OF BREATH: 0
CONSTIPATION: 1
BLOOD IN STOOL: 0
BACK PAIN: 0
NAUSEA: 0
ANAL BLEEDING: 0
VOMITING: 0

## 2023-03-03 ENCOUNTER — TELEPHONE (OUTPATIENT)
Dept: CARDIOLOGY CLINIC | Age: 88
End: 2023-03-03

## 2023-03-03 NOTE — TELEPHONE ENCOUNTER
----- Message from Jemima Pitts MD sent at 3/2/2023  3:28 PM EST -----  Regarding: RE: Ko Wellsabril patient appointment  We will get him rescheduled. Thanks for reaching out, I apologize he has had a problem getting through to the office. ----- Message -----  From: Hu Sanchez DO  Sent: 3/2/2023   3:12 PM EST  To: Jemima Pitts MD  Subject: Ko Mccann patient appointment                     Hi Dr. Fawn Ganser,    This is Hu Sanchez from [facility]. I am writing in regards to our mutual patient Mr. Nara Maldonado whom you follow. He states he forgot about his recent appointment with you and missed it. He has tried to contact your office multiple times to reschedule but has not heard back. I was wondering if your staff was aware and if he needed to do anything else to get a rescheduled appointment with you? Thanks!     Hu Sanchez DO   [facility]

## 2023-03-03 NOTE — TELEPHONE ENCOUNTER
LVM for patient to call for scheduling. Subjective:    Awake and alert. No problems overnight. Denies chest pain or dyspnea. Denies abdominal pain. Tolerate prep but still with bloody stool    Objective:    /66   Pulse 73   Temp 98 °F (36.7 °C) (Oral)   Resp 18   Ht 6' (1.829 m)   Wt (!) 355 lb 14.4 oz (161.4 kg)   SpO2 94%   BMI 48.27 kg/m²   Skin: Warm and dry  Neck: Supple, no JVD  Heart:  RRR, no murmurs, gallops, or rubs. Lungs:  CTA bilaterally, no wheeze, rales or rhonchi  Abd: bowel sounds present, nontender, nondistended, no masses  Extrem:  No clubbing, cyanosis, or edema, pulses intact    I/O last 3 completed shifts: In: 8793 [P.O.:940; I.V.:887]  Out: 2950 [Urine:2950]    Laboratory:       Component Value Units    HEMOGLOBIN AND HEMATOCRIT, BLOOD [[id number]] (Abnormal) Collected: 10/19/18 1127   Updated: 10/19/18 1158    Specimen Source: Blood     Hemoglobin 7.8 (L) g/dL    Hematocrit 24.4 (L) %   POCT Glucose [[id number]] (Abnormal) Collected: 10/19/18 0923   Updated: 10/19/18 0926     Meter Glucose 155 (H) mg/dL   PREPARE RBC (CROSSMATCH) Number of Units: 2, 2 Units [[id number]] Collected: 10/16/18 1426     Results for Rodrigue Shipamn (MRN [id number]) as of 10/19/2018 12:30   Ref.  Range 10/18/2018 13:30   Hemoglobin A1C Latest Ref Range: 4.0 - 5.6 % 5.2       Current Facility-Administered Medications   Medication Dose Route Frequency Provider Last Rate Last Dose    ceFAZolin (ANCEF) 1 g in dextrose 4 % 50 mL IVPB (duplex)  1 g Intravenous See Admin Instructions HELENA Jack CNS        pantoprazole (PROTONIX) tablet 40 mg  40 mg Oral BID AC Tati A Sugar, APRN - CNP   40 mg at 10/19/18 0657    0.9 % sodium chloride bolus  250 mL Intravenous Once Dawna Pineda MD        octreotide (SANDOSTATIN) 500 mcg in sodium chloride 0.9 % 100 mL infusion  50 mcg/hr Intravenous Continuous HELENA Jack - CNS 10 mL/hr at 10/19/18 0122 50 mcg/hr at 10/19/18 0122    0.9 % sodium chloride bolus  250 mL Intravenous Once Eliel Alfredo DO        acetaminophen (TYLENOL) tablet 650 mg  650 mg Oral Q4H PRN Shawn Solorzano MD        0.9 % sodium chloride infusion   Intravenous Continuous Shawn Solorzano MD 75 mL/hr at 10/19/18 0700      sodium chloride flush 0.9 % injection 10 mL  10 mL Intravenous 2 times per day Shawn Solorzano MD   10 mL at 10/19/18 1015    sodium chloride flush 0.9 % injection 10 mL  10 mL Intravenous PRN Shawn Solorzano MD   20 mL at 10/17/18 1247    magnesium hydroxide (MILK OF MAGNESIA) 400 MG/5ML suspension 30 mL  30 mL Oral Daily PRN Shawn Solorzano MD        ondansetron [facility]) injection 4 mg  4 mg Intravenous Q6H PRN Shawn Solorzano MD        enoxaparin (LOVENOX) injection 40 mg  40 mg Subcutaneous Daily Shawn Solorzano MD   Stopped at 10/17/18 0919    insulin lispro (HUMALOG) injection vial 0-6 Units  0-6 Units Subcutaneous TID WC Shawn Solorzano MD   Stopped at 10/19/18 0934    insulin lispro (HUMALOG) injection vial 0-3 Units  0-3 Units Subcutaneous Nightly Shawn Solorzano MD   Stopped at 10/18/18 2144    glucose (GLUTOSE) 40 % oral gel 15 g  15 g Oral PRN Shawn Solorzano MD        dextrose 50 % solution 12.5 g  12.5 g Intravenous PRN Shawn Solorzano MD        glucagon (rDNA) injection 1 mg  1 mg Intramuscular PRN Shawn Solorzano MD        dextrose 5 % solution  100 mL/hr Intravenous PRN Shawn Solorzano MD           Problem list:    Patient Active Problem List   Diagnosis    Primary osteoarthritis of left hip    Primary osteoarthritis of left hip    Instability of right hip joint    Acute GI bleeding       Assessment:       1. Acute GI bleed     2. Acute blood loss anemia secondary to GI bleeding      3. 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 Gouty osteoarthritis     5. Diabetes mellitus type II, controlled     6. Obstructive sleep apnea     7. Morbid obesity    Plan:    1. Continue to monitor H/H    2. Colonoscopy today.      3. Further plans pending results of colonoscopy      Eliel Alfredo D.O., [facility]  12:29 PM  10/19/2018

## 2023-03-07 ENCOUNTER — TELEPHONE (OUTPATIENT)
Dept: CARDIOLOGY CLINIC | Age: 88
End: 2023-03-07

## 2023-03-08 ENCOUNTER — TELEPHONE (OUTPATIENT)
Dept: CARDIOLOGY CLINIC | Age: 88
End: 2023-03-08

## 2023-03-15 ENCOUNTER — HOSPITAL ENCOUNTER (OUTPATIENT)
Dept: LAB | Age: 88
Discharge: HOME | End: 2023-03-18

## 2023-03-15 DIAGNOSIS — N18.30 STAGE 3 CHRONIC KIDNEY DISEASE, UNSPECIFIED WHETHER STAGE 3A OR 3B CKD (HCC): ICD-10-CM

## 2023-03-15 DIAGNOSIS — D64.9 NORMOCYTIC ANEMIA: ICD-10-CM

## 2023-03-15 LAB
BASOPHILS # BLD: 0 K/UL (ref 0–0.2)
BASOPHILS NFR BLD: 0 % (ref 0–2)
DIFFERENTIAL METHOD BLD: ABNORMAL
EOSINOPHIL # BLD: 0.1 K/UL (ref 0–0.8)
EOSINOPHIL NFR BLD: 2 % (ref 0.5–7.8)
ERYTHROCYTE [DISTWIDTH] IN BLOOD BY AUTOMATED COUNT: 14.4 % (ref 11.9–14.6)
HCT VFR BLD AUTO: 38.3 % (ref 41.1–50.3)
HGB BLD-MCNC: 12 G/DL (ref 13.6–17.2)
IMM GRANULOCYTES # BLD AUTO: 0 K/UL (ref 0–0.5)
IMM GRANULOCYTES NFR BLD AUTO: 1 % (ref 0–5)
LYMPHOCYTES # BLD: 1.1 K/UL (ref 0.5–4.6)
LYMPHOCYTES NFR BLD: 14 % (ref 13–44)
MCH RBC QN AUTO: 30 PG (ref 26.1–32.9)
MCHC RBC AUTO-ENTMCNC: 31.3 G/DL (ref 31.4–35)
MCV RBC AUTO: 95.8 FL (ref 82–102)
MONOCYTES # BLD: 1.1 K/UL (ref 0.1–1.3)
MONOCYTES NFR BLD: 13 % (ref 4–12)
NEUTS SEG # BLD: 5.6 K/UL (ref 1.7–8.2)
NEUTS SEG NFR BLD: 70 % (ref 43–78)
NRBC # BLD: 0 K/UL (ref 0–0.2)
PLATELET # BLD AUTO: 245 K/UL (ref 150–450)
PMV BLD AUTO: 10 FL (ref 9.4–12.3)
RBC # BLD AUTO: 4 M/UL (ref 4.23–5.6)
WBC # BLD AUTO: 8 K/UL (ref 4.3–11.1)

## 2023-03-16 LAB
ALBUMIN SERPL-MCNC: 3.6 G/DL (ref 3.2–4.6)
ALBUMIN/GLOB SERPL: 1.2 (ref 0.4–1.6)
ALP SERPL-CCNC: 53 U/L (ref 50–136)
ALT SERPL-CCNC: 19 U/L (ref 12–65)
ANION GAP SERPL CALC-SCNC: 4 MMOL/L (ref 2–11)
AST SERPL-CCNC: 14 U/L (ref 15–37)
BILIRUB SERPL-MCNC: 0.7 MG/DL (ref 0.2–1.1)
BUN SERPL-MCNC: 29 MG/DL (ref 8–23)
CALCIUM SERPL-MCNC: 9.1 MG/DL (ref 8.3–10.4)
CHLORIDE SERPL-SCNC: 109 MMOL/L (ref 101–110)
CO2 SERPL-SCNC: 27 MMOL/L (ref 21–32)
CREAT SERPL-MCNC: 1.7 MG/DL (ref 0.8–1.5)
FERRITIN SERPL-MCNC: 97 NG/ML (ref 8–388)
GLOBULIN SER CALC-MCNC: 3 G/DL (ref 2.8–4.5)
GLUCOSE SERPL-MCNC: 157 MG/DL (ref 65–100)
IRON SATN MFR SERPL: 23 %
IRON SERPL-MCNC: 70 UG/DL (ref 35–150)
POTASSIUM SERPL-SCNC: 4.5 MMOL/L (ref 3.5–5.1)
PROT SERPL-MCNC: 6.6 G/DL (ref 6.3–8.2)
SODIUM SERPL-SCNC: 140 MMOL/L (ref 133–143)
TIBC SERPL-MCNC: 299 UG/DL (ref 250–450)

## 2023-03-28 RX ORDER — DOFETILIDE 0.12 MG/1
CAPSULE ORAL
Qty: 180 CAPSULE | Refills: 0 | Status: SHIPPED | OUTPATIENT
Start: 2023-03-28

## 2023-04-20 ENCOUNTER — OFFICE VISIT (OUTPATIENT)
Dept: CARDIOLOGY CLINIC | Age: 88
End: 2023-04-20

## 2023-04-20 VITALS
SYSTOLIC BLOOD PRESSURE: 132 MMHG | HEIGHT: 68 IN | WEIGHT: 150 LBS | HEART RATE: 71 BPM | BODY MASS INDEX: 22.73 KG/M2 | DIASTOLIC BLOOD PRESSURE: 76 MMHG

## 2023-04-20 DIAGNOSIS — I25.119 ATHEROSCLEROSIS OF NATIVE CORONARY ARTERY OF NATIVE HEART WITH ANGINA PECTORIS (HCC): ICD-10-CM

## 2023-04-20 DIAGNOSIS — I48.19 PERSISTENT ATRIAL FIBRILLATION (HCC): Primary | ICD-10-CM

## 2023-04-20 DIAGNOSIS — D68.69 SECONDARY HYPERCOAGULABLE STATE (HCC): ICD-10-CM

## 2023-04-20 DIAGNOSIS — I25.118 ATHEROSCLEROTIC HEART DISEASE OF NATIVE CORONARY ARTERY WITH OTHER FORMS OF ANGINA PECTORIS (HCC): ICD-10-CM

## 2023-04-20 DIAGNOSIS — I20.9 ANGINA PECTORIS, UNSPECIFIED (HCC): ICD-10-CM

## 2023-04-20 RX ORDER — METOPROLOL SUCCINATE 25 MG/1
25 TABLET, EXTENDED RELEASE ORAL DAILY
Qty: 45 TABLET | Refills: 3 | Status: SHIPPED | OUTPATIENT
Start: 2023-04-20

## 2023-04-20 RX ORDER — SIMVASTATIN 20 MG
20 TABLET ORAL NIGHTLY
Qty: 90 TABLET | Refills: 3 | Status: SHIPPED | OUTPATIENT
Start: 2023-04-20

## 2023-04-20 RX ORDER — RANOLAZINE 500 MG/1
500 TABLET, EXTENDED RELEASE ORAL DAILY
Qty: 180 TABLET | Refills: 3 | Status: SHIPPED | OUTPATIENT
Start: 2023-04-20

## 2023-04-20 RX ORDER — NITROGLYCERIN 400 UG/1
1 SPRAY ORAL EVERY 5 MIN PRN
Qty: 1 EACH | Refills: 3 | Status: SHIPPED | OUTPATIENT
Start: 2023-04-20

## 2023-04-20 NOTE — PROGRESS NOTES
[address] [place], [place]  [address], [address], [address]  [address]  PHONE: [phone]  11/29/1929    SUBJECTIVE:   Candie Schultz is a 80 y.o. male seen for a follow up visit regarding the following:     Chief Complaint   Patient presents with    Atrial Fibrillation    Annual Exam       HPI:    Candie Schultz is a very pleasant 80 y.o. male with a past medical and cardiac history significant for atrial flutter s/p AMERICA guided DCCV and initiation of tikosyn and has been doing  well. Pt denies worsening shortness of breath, palpitations, fast or irregular heart rates. Staying active, no exertional complaints. Cardiac PMH: (Old records have been reviewed and summarized below)    Reviewed office note Dr. Marisol Pineda 3/1/23    Past Medical History, Past Surgical History, Family history, Social History, and Medications were all reviewed with the patient today and updated as necessary.      Current Outpatient Medications   Medication Sig Dispense Refill    metoprolol succinate (TOPROL XL) 25 MG extended release tablet Take 1 tablet by mouth daily TAKE ONE-HALF (1/2) TABLET NIGHTLY 45 tablet 3    apixaban (ELIQUIS) 2.5 MG TABS tablet Take 1 tablet by mouth 2 times daily TAKE 1 TABLET TWICE A  tablet 3    nitroGLYCERIN (NITROLINGUAL) 0.4 MG/SPRAY 0.4 mg spray Place 1 spray under the tongue every 5 minutes as needed for Chest pain 1 each 3    ranolazine (RANEXA) 500 MG extended release tablet Take 1 tablet by mouth daily 180 tablet 3    simvastatin (ZOCOR) 20 MG tablet Take 1 tablet by mouth nightly TAKE 1 TABLET NIGHTLY 90 tablet 3    tamsulosin (FLOMAX) 0.4 MG capsule Take 1 capsule by mouth daily 90 capsule 3    dofetilide (TIKOSYN) 125 MCG capsule TAKE 1 CAPSULE TWICE A  capsule 0    blood glucose test strips (FREESTYLE LITE) strip Check fasting blood sugar daily 100 each 3    Lancets MISC Check fasting blood sugar daily 100 each 0    gabapentin (NEURONTIN) 100 MG capsule Take 3

## 2023-04-25 RX ORDER — METOPROLOL SUCCINATE 25 MG/1
25 TABLET, EXTENDED RELEASE ORAL DAILY
Qty: 90 TABLET | Refills: 3 | Status: SHIPPED | OUTPATIENT
Start: 2023-04-25

## 2023-04-25 NOTE — TELEPHONE ENCOUNTER
Requested Prescriptions     Signed Prescriptions Disp Refills    metoprolol succinate (TOPROL XL) 25 MG extended release tablet 90 tablet 3     Sig: Take 1 tablet by mouth daily     Authorizing Provider: Griselda Rosenthal

## 2023-05-08 ENCOUNTER — OFFICE VISIT (OUTPATIENT)
Dept: UROLOGY | Age: 88
End: 2023-05-08
Payer: MEDICARE

## 2023-05-08 DIAGNOSIS — N20.0 KIDNEY STONE: ICD-10-CM

## 2023-05-08 DIAGNOSIS — K59.00 CONSTIPATION, UNSPECIFIED CONSTIPATION TYPE: ICD-10-CM

## 2023-05-08 DIAGNOSIS — N32.81 OVERACTIVE BLADDER: ICD-10-CM

## 2023-05-08 DIAGNOSIS — N40.1 BPH WITH OBSTRUCTION/LOWER URINARY TRACT SYMPTOMS: Primary | ICD-10-CM

## 2023-05-08 DIAGNOSIS — N18.31 STAGE 3A CHRONIC KIDNEY DISEASE (CKD) (HCC): ICD-10-CM

## 2023-05-08 DIAGNOSIS — Q60.0 SOLITARY KIDNEY, CONGENITAL: ICD-10-CM

## 2023-05-08 DIAGNOSIS — N13.8 BPH WITH OBSTRUCTION/LOWER URINARY TRACT SYMPTOMS: Primary | ICD-10-CM

## 2023-05-08 LAB
BILIRUBIN, URINE, POC: NORMAL
BLOOD URINE, POC: NEGATIVE
GLUCOSE URINE, POC: NEGATIVE
KETONES, URINE, POC: NEGATIVE
LEUKOCYTE ESTERASE, URINE, POC: NORMAL
NITRITE, URINE, POC: POSITIVE
PH, URINE, POC: 5.5 (ref 4.6–8)
PROTEIN,URINE, POC: 100
SPECIFIC GRAVITY, URINE, POC: 1.02 (ref 1–1.03)
URINALYSIS CLARITY, POC: NORMAL
URINALYSIS COLOR, POC: NORMAL
UROBILINOGEN, POC: NORMAL

## 2023-05-08 PROCEDURE — 1123F ACP DISCUSS/DSCN MKR DOCD: CPT | Performed by: NURSE PRACTITIONER

## 2023-05-08 PROCEDURE — 81003 URINALYSIS AUTO W/O SCOPE: CPT | Performed by: NURSE PRACTITIONER

## 2023-05-08 PROCEDURE — 74018 RADEX ABDOMEN 1 VIEW: CPT | Performed by: NURSE PRACTITIONER

## 2023-05-08 PROCEDURE — G8427 DOCREV CUR MEDS BY ELIG CLIN: HCPCS | Performed by: NURSE PRACTITIONER

## 2023-05-08 PROCEDURE — G8420 CALC BMI NORM PARAMETERS: HCPCS | Performed by: NURSE PRACTITIONER

## 2023-05-08 PROCEDURE — 99214 OFFICE O/P EST MOD 30 MIN: CPT | Performed by: NURSE PRACTITIONER

## 2023-05-08 PROCEDURE — 1036F TOBACCO NON-USER: CPT | Performed by: NURSE PRACTITIONER

## 2023-05-08 ASSESSMENT — ENCOUNTER SYMPTOMS: BACK PAIN: 0

## 2023-05-08 NOTE — PROGRESS NOTES
[address] [place] [address] [address], [address] [facility]  [phone]    PATIENT ID    Mr. Sethi Leaver  11/29/1929  His primary provider is Bry Galindo DO    CC: Mr. Ham Hernandez returns to the clinic today for follow up of his obstructive sleep apnea. INTERVAL HISTORY  Mr. Ham Hernandez was originally diagnosed with severe obstructive sleep apnea in 2016 with an AHI of 50, central apnea index less than half of this. He has past medical history notable for  coronary artery disease, hypertension, atrial fibrillation, chronic kidney disease with solitary kidney, restless leg syndrome, hearing loss, constipation. He has been struggling trying to find the right mask to wear as he is having leak issues with most of them, he did get an oronasal mask and brings all his mask today but unfortunately forgot his PAP hose. He also notes he is struggling with nocturnal enuresis this is nightly he is gotten to the point where he needs to wear adult diapers. He saw urology yesterday and urine culture was ordered and was started on MiraLAX. He is not having any symptoms during the daytime. He sleeps alone and is unsure to what degree he is snoring. He did increase the gabapentin with some relief of his restless leg symptomatology but was told this may be causing nocturnal uresis has gone down to 500 mg. He is still having breakthrough symptoms. He did not get his labs drawn as requested last visit for unclear reasons. He has never seen a geriatrician previously. He was last seen by me in 2/6/2023 and was having trouble with leak and poor control of ANABELLE on BiPAP therapy. On BiPAP download review today he is utilizing hisBiPAP machine 97% of the time, greater than 4 hours per night, for a median daily usage of 8 hours 3 minutes per night. His AHI is still high at 16 and some nights as high as 30 and his leak rate is unacceptably high most nights.   Past medical and surgical histories,

## 2023-05-08 NOTE — PROGRESS NOTES
[facility]  [address], [address] [facility]  [phone]          Kaushik Maldonado  : 1929    Chief Complaint   Patient presents with    Follow-up          HPI     Kaushik Maldonado is a 80 y.o. male followed by Dr. Bina Sena for BPH/LUTS s/p PAE in 2019. Hx of solitary kidney being seen today for follow up. Presented to [address] [place] [address] on  w c/o fever and UF. Sent for CT A/P wo contrast revealing small non obstructing right renal stone and BWT. Images reviewed. Cr 1.43. WBC 11.8. UA w small leuks. Discharged home w scripts for flomax and cefdinir w instructions to follow up w our office. UC positive for enterococcus. Last seen by Dr. Bina Sena on 22 and had minimal improvement with flomax. Complained of urinary urgency, frequency, nocturia 2-3 times per night, urge urinary incontinence daily. No retention. No hematuria. Mirabegron 50 mg daily was started and was working well, however PA was denied for medication. Alt tx w Mary Carreon [facility] on 22. He never received medication. Has started on super beta prostate. I gave pt mirabegron samples, however he has since stopped medication due to UTI. He reports 2 UTI since starting medication. Last UTI was 2023 prompting an ER visit after visit to [place]. Prev PVR 29 cc via u/s. He tells me UF and nocturia have worsened, but feels this may be secondary to higher dosage of gabapentin. Sleeps soundly w medication. UA appears infected today. There are sig issues w constipation. Cr 1.70 in . Baseline appears to be ~1.4-1.70. he reports he was prev followed by nephrology. He has concerns for DM. Has checked BS for 1 month. Brings log to office today. All BS are elevated. KUB in office today reviewed by myself and shows no stone.        Past Medical History:   Diagnosis Date    A-fib [facility])     Arrhythmia     PALPITATION    Arthritis     BPH (benign prostatic hyperplasia)     CAD

## 2023-05-09 ENCOUNTER — OFFICE VISIT (OUTPATIENT)
Dept: SLEEP MEDICINE | Age: 88
End: 2023-05-09
Payer: MEDICARE

## 2023-05-09 VITALS
BODY MASS INDEX: 22.58 KG/M2 | HEIGHT: 68 IN | DIASTOLIC BLOOD PRESSURE: 88 MMHG | OXYGEN SATURATION: 92 % | SYSTOLIC BLOOD PRESSURE: 124 MMHG | RESPIRATION RATE: 16 BRPM | HEART RATE: 66 BPM | TEMPERATURE: 96.8 F | WEIGHT: 149 LBS

## 2023-05-09 DIAGNOSIS — N39.44 NOCTURNAL ENURESIS: ICD-10-CM

## 2023-05-09 DIAGNOSIS — G47.31 CENTRAL SLEEP APNEA: Primary | ICD-10-CM

## 2023-05-09 DIAGNOSIS — G47.33 OSA (OBSTRUCTIVE SLEEP APNEA): ICD-10-CM

## 2023-05-09 DIAGNOSIS — G25.81 RLS (RESTLESS LEGS SYNDROME): ICD-10-CM

## 2023-05-09 PROCEDURE — 99214 OFFICE O/P EST MOD 30 MIN: CPT | Performed by: INTERNAL MEDICINE

## 2023-05-09 PROCEDURE — 1123F ACP DISCUSS/DSCN MKR DOCD: CPT | Performed by: INTERNAL MEDICINE

## 2023-05-09 PROCEDURE — G8427 DOCREV CUR MEDS BY ELIG CLIN: HCPCS | Performed by: INTERNAL MEDICINE

## 2023-05-09 PROCEDURE — 1036F TOBACCO NON-USER: CPT | Performed by: INTERNAL MEDICINE

## 2023-05-09 PROCEDURE — G8420 CALC BMI NORM PARAMETERS: HCPCS | Performed by: INTERNAL MEDICINE

## 2023-05-09 ASSESSMENT — SLEEP AND FATIGUE QUESTIONNAIRES
HOW LIKELY ARE YOU TO NOD OFF OR FALL ASLEEP WHILE WATCHING TV: 3
HOW LIKELY ARE YOU TO NOD OFF OR FALL ASLEEP WHILE SITTING AND READING: 2
HOW LIKELY ARE YOU TO NOD OFF OR FALL ASLEEP WHEN YOU ARE A PASSENGER IN A CAR FOR AN HOUR WITHOUT A BREAK: 1
HOW LIKELY ARE YOU TO NOD OFF OR FALL ASLEEP WHILE SITTING AND TALKING TO SOMEONE: 0
HOW LIKELY ARE YOU TO NOD OFF OR FALL ASLEEP IN A CAR, WHILE STOPPED FOR A FEW MINUTES IN TRAFFIC: 0
ESS TOTAL SCORE: 12
HOW LIKELY ARE YOU TO NOD OFF OR FALL ASLEEP WHILE SITTING INACTIVE IN A PUBLIC PLACE: 1
HOW LIKELY ARE YOU TO NOD OFF OR FALL ASLEEP WHILE LYING DOWN TO REST IN THE AFTERNOON WHEN CIRCUMSTANCES PERMIT: 3
HOW LIKELY ARE YOU TO NOD OFF OR FALL ASLEEP WHILE SITTING QUIETLY AFTER LUNCH WITHOUT ALCOHOL: 2

## 2023-05-09 NOTE — ASSESSMENT & PLAN NOTE
Patient is having nocturnal enuresis, much worse symptomatology than during the day, just saw urology yesterday and a urine culture was ordered, also struggling with constipation and started on MiraLAX. Association with inadequately controlled sleep disordered breathing and nocturnal uresis reviewed and this is another reason we will be more aggressive about optimizing therapy. Patient also may benefit from geriatrics consultation and he is agreeable and I will make this referral today.

## 2023-05-09 NOTE — ASSESSMENT & PLAN NOTE
This is still problematic but has improved a little bit with gabapentin up to 500 mg, encouraged him to go ahead and take 600 mg nightly. I do not believe this is a major factor in his urinary incontinence. Advised him to go ahead and get labs ordered as requested last visit. I will call him to discuss the results.

## 2023-05-09 NOTE — PATIENT INSTRUCTIONS
It was a pleasure meeting you today. Here are some items that we discussed:    1.   Okay to increase gabapentin to 600 mg nightly, I do not think this is causing your urinary issues at night. This will help with restless leg syndrome. 2.   Please get your labs drawn today on the way out as ordered last visit    3. We will order a new sleep study to look at a new type of therapy called adaptive servo ventilation, bring all your masks to the study and they can also help with troubleshooting mask fitting. 4. We will make a referral to Geriatric medicine  today, please call us if you have not heard from them to get scheduled within a week.       Gi Lane MD  [phone]

## 2023-05-09 NOTE — ASSESSMENT & PLAN NOTE
Patient has severe obstructive sleep apnea with treatment worsening centrals, last titration not optimal from November and he is still uncontrolled with current settings of bilevel 14/10 with a backup rate of 12. We were unable to do a mask fitting today because of that this to be known, but at this point I do feel like ASV therapy is the next option, we will order an in lab study and he is to bring all his masks, I have requested to use a fullface mask for ASV titration. We will call him to discuss the results and likely get a new machine at that time. Follow-up in 3 months.

## 2023-05-11 ENCOUNTER — TELEPHONE (OUTPATIENT)
Dept: UROLOGY | Age: 88
End: 2023-05-11

## 2023-05-11 LAB
BACTERIA SPEC CULT: NORMAL
BACTERIA SPEC CULT: NORMAL
SERVICE CMNT-IMP: NORMAL

## 2023-05-12 ENCOUNTER — OFFICE VISIT (OUTPATIENT)
Dept: UROLOGY | Age: 88
End: 2023-05-12

## 2023-05-12 DIAGNOSIS — N20.0 KIDNEY STONE: Primary | ICD-10-CM

## 2023-05-12 LAB
BILIRUBIN, URINE, POC: NEGATIVE
BLOOD URINE, POC: NEGATIVE
GLUCOSE URINE, POC: NEGATIVE
KETONES, URINE, POC: NEGATIVE
LEUKOCYTE ESTERASE, URINE, POC: NORMAL
NITRITE, URINE, POC: POSITIVE
PH, URINE, POC: 5.5 (ref 4.6–8)
PROTEIN,URINE, POC: 100
SPECIFIC GRAVITY, URINE, POC: 1.02 (ref 1–1.03)
URINALYSIS CLARITY, POC: NORMAL
URINALYSIS COLOR, POC: NORMAL
UROBILINOGEN, POC: NORMAL

## 2023-05-12 NOTE — PROGRESS NOTES
Results for orders placed or performed in visit on 05/12/23   AMB POC URINALYSIS DIP STICK AUTO W/O MICRO   Result Value Ref Range    Color (UA POC)      Clarity (UA POC)      Glucose, Urine, POC Negative Negative    Bilirubin, Urine, POC Negative Negative    KETONES, Urine, POC Negative Negative    Specific Gravity, Urine, POC 1.025 1.001 - 1.035    Blood (UA POC) Negative Negative    pH, Urine, POC 5.5 4.6 - 8.0    Protein, Urine,  Negative    Urobilinogen, POC 0.2 mg/dL     Nitrite, Urine, POC Positive Negative    Leukocyte Esterase, Urine, POC Trace Negative     Send culture. Will call results.    Lorena Corbin, APRN - CNP

## 2023-05-14 LAB
BACTERIA SPEC CULT: NORMAL
BACTERIA SPEC CULT: NORMAL
SERVICE CMNT-IMP: NORMAL

## 2023-05-15 ENCOUNTER — TELEPHONE (OUTPATIENT)
Dept: UROLOGY | Age: 88
End: 2023-05-15

## 2023-05-15 LAB
BACTERIA SPEC CULT: NORMAL
BACTERIA SPEC CULT: NORMAL
SERVICE CMNT-IMP: NORMAL

## 2023-05-18 ENCOUNTER — TELEPHONE (OUTPATIENT)
Dept: UROLOGY | Age: 88
End: 2023-05-18

## 2023-05-19 ENCOUNTER — NURSE ONLY (OUTPATIENT)
Dept: UROLOGY | Age: 88
End: 2023-05-19

## 2023-05-19 ENCOUNTER — TELEPHONE (OUTPATIENT)
Dept: SLEEP MEDICINE | Age: 88
End: 2023-05-19

## 2023-05-19 DIAGNOSIS — R53.83 OTHER FATIGUE: ICD-10-CM

## 2023-05-19 DIAGNOSIS — N39.0 ACUTE UTI: Primary | ICD-10-CM

## 2023-05-19 DIAGNOSIS — G25.81 RLS (RESTLESS LEGS SYNDROME): ICD-10-CM

## 2023-05-19 LAB
25(OH)D3 SERPL-MCNC: 32.1 NG/ML (ref 30–100)
CRP SERPL-MCNC: 0.5 MG/DL (ref 0–0.9)
FERRITIN SERPL-MCNC: 92 NG/ML (ref 8–388)
IRON SERPL-MCNC: 67 UG/DL (ref 35–150)
MAGNESIUM SERPL-MCNC: 2.3 MG/DL (ref 1.8–2.4)
TIBC SERPL-MCNC: 310 UG/DL (ref 250–450)

## 2023-05-19 NOTE — PROGRESS NOTES
Urine specimen collected by in and out catheter using sterile technique. Patient tolerated procedure well. Urine sent for culture.

## 2023-05-21 LAB
BACTERIA SPEC CULT: NORMAL
SERVICE CMNT-IMP: NORMAL

## 2023-05-22 ENCOUNTER — TELEPHONE (OUTPATIENT)
Dept: UROLOGY | Age: 88
End: 2023-05-22

## 2023-05-22 LAB
BACTERIA SPEC CULT: NORMAL
SERVICE CMNT-IMP: NORMAL

## 2023-05-22 NOTE — TELEPHONE ENCOUNTER
LDVM on cell and house and spoke with son about urine. Apt with  6/28 @10    ----- Message from ROSETTE Long CNP sent at 5/22/2023  9:20 AM EDT -----  Urine not infected. Keep fu appt w Dr. Giovanna Oglesby next month.

## 2023-05-23 NOTE — TELEPHONE ENCOUNTER
Tried to call pt and lvm. We received the referral back from [facility]. Pt does not meet criteria for memory daron evaluation . I will send a copy to patient and dr. Berto Gonzalez.

## 2023-06-02 ENCOUNTER — HOSPITAL ENCOUNTER (OUTPATIENT)
Dept: CT IMAGING | Age: 88
End: 2023-06-02
Attending: STUDENT IN AN ORGANIZED HEALTH CARE EDUCATION/TRAINING PROGRAM
Payer: MEDICARE

## 2023-06-02 ENCOUNTER — OFFICE VISIT (OUTPATIENT)
Dept: INTERNAL MEDICINE CLINIC | Facility: CLINIC | Age: 88
End: 2023-06-02
Payer: MEDICARE

## 2023-06-02 VITALS
HEIGHT: 68 IN | TEMPERATURE: 97.5 F | SYSTOLIC BLOOD PRESSURE: 132 MMHG | WEIGHT: 147 LBS | BODY MASS INDEX: 22.28 KG/M2 | DIASTOLIC BLOOD PRESSURE: 76 MMHG | OXYGEN SATURATION: 98 % | HEART RATE: 45 BPM

## 2023-06-02 DIAGNOSIS — I48.4 ATYPICAL ATRIAL FLUTTER (HCC): Primary | ICD-10-CM

## 2023-06-02 DIAGNOSIS — M54.2 NECK PAIN: ICD-10-CM

## 2023-06-02 DIAGNOSIS — G47.33 OBSTRUCTIVE SLEEP APNEA: ICD-10-CM

## 2023-06-02 DIAGNOSIS — S09.90XA TRAUMATIC INJURY OF HEAD, INITIAL ENCOUNTER: ICD-10-CM

## 2023-06-02 DIAGNOSIS — G62.9 NEUROPATHY: ICD-10-CM

## 2023-06-02 DIAGNOSIS — E11.9 TYPE 2 DIABETES MELLITUS WITHOUT COMPLICATION, WITHOUT LONG-TERM CURRENT USE OF INSULIN (HCC): ICD-10-CM

## 2023-06-02 DIAGNOSIS — G25.81 RESTLESS LEG SYNDROME: ICD-10-CM

## 2023-06-02 DIAGNOSIS — Z79.01 CURRENT USE OF LONG TERM ANTICOAGULATION: ICD-10-CM

## 2023-06-02 DIAGNOSIS — N18.30 STAGE 3 CHRONIC KIDNEY DISEASE, UNSPECIFIED WHETHER STAGE 3A OR 3B CKD (HCC): ICD-10-CM

## 2023-06-02 DIAGNOSIS — R80.9 MICROALBUMINURIA: ICD-10-CM

## 2023-06-02 DIAGNOSIS — I25.10 CORONARY ARTERY DISEASE INVOLVING NATIVE CORONARY ARTERY OF NATIVE HEART WITHOUT ANGINA PECTORIS: ICD-10-CM

## 2023-06-02 DIAGNOSIS — I10 ESSENTIAL HYPERTENSION: ICD-10-CM

## 2023-06-02 LAB — HBA1C MFR BLD: 6.1 %

## 2023-06-02 PROCEDURE — 99214 OFFICE O/P EST MOD 30 MIN: CPT | Performed by: STUDENT IN AN ORGANIZED HEALTH CARE EDUCATION/TRAINING PROGRAM

## 2023-06-02 PROCEDURE — G8427 DOCREV CUR MEDS BY ELIG CLIN: HCPCS | Performed by: STUDENT IN AN ORGANIZED HEALTH CARE EDUCATION/TRAINING PROGRAM

## 2023-06-02 PROCEDURE — 1036F TOBACCO NON-USER: CPT | Performed by: STUDENT IN AN ORGANIZED HEALTH CARE EDUCATION/TRAINING PROGRAM

## 2023-06-02 PROCEDURE — G8420 CALC BMI NORM PARAMETERS: HCPCS | Performed by: STUDENT IN AN ORGANIZED HEALTH CARE EDUCATION/TRAINING PROGRAM

## 2023-06-02 PROCEDURE — 72125 CT NECK SPINE W/O DYE: CPT

## 2023-06-02 PROCEDURE — 1123F ACP DISCUSS/DSCN MKR DOCD: CPT | Performed by: STUDENT IN AN ORGANIZED HEALTH CARE EDUCATION/TRAINING PROGRAM

## 2023-06-02 PROCEDURE — 70450 CT HEAD/BRAIN W/O DYE: CPT

## 2023-06-02 PROCEDURE — 83036 HEMOGLOBIN GLYCOSYLATED A1C: CPT | Performed by: STUDENT IN AN ORGANIZED HEALTH CARE EDUCATION/TRAINING PROGRAM

## 2023-06-02 RX ORDER — GABAPENTIN 100 MG/1
300 CAPSULE ORAL 2 TIMES DAILY
Qty: 540 CAPSULE | Refills: 1 | Status: SHIPPED | OUTPATIENT
Start: 2023-06-02 | End: 2023-11-29

## 2023-06-02 SDOH — ECONOMIC STABILITY: FOOD INSECURITY: WITHIN THE PAST 12 MONTHS, THE FOOD YOU BOUGHT JUST DIDN'T LAST AND YOU DIDN'T HAVE MONEY TO GET MORE.: NEVER TRUE

## 2023-06-02 SDOH — ECONOMIC STABILITY: INCOME INSECURITY: HOW HARD IS IT FOR YOU TO PAY FOR THE VERY BASICS LIKE FOOD, HOUSING, MEDICAL CARE, AND HEATING?: NOT HARD AT ALL

## 2023-06-02 SDOH — ECONOMIC STABILITY: FOOD INSECURITY: WITHIN THE PAST 12 MONTHS, YOU WORRIED THAT YOUR FOOD WOULD RUN OUT BEFORE YOU GOT MONEY TO BUY MORE.: NEVER TRUE

## 2023-06-02 ASSESSMENT — PATIENT HEALTH QUESTIONNAIRE - PHQ9
SUM OF ALL RESPONSES TO PHQ QUESTIONS 1-9: 0
SUM OF ALL RESPONSES TO PHQ9 QUESTIONS 1 & 2: 0
SUM OF ALL RESPONSES TO PHQ QUESTIONS 1-9: 0
1. LITTLE INTEREST OR PLEASURE IN DOING THINGS: 0
SUM OF ALL RESPONSES TO PHQ QUESTIONS 1-9: 0
2. FEELING DOWN, DEPRESSED OR HOPELESS: 0
SUM OF ALL RESPONSES TO PHQ QUESTIONS 1-9: 0

## 2023-06-02 ASSESSMENT — ENCOUNTER SYMPTOMS
ANAL BLEEDING: 0
BLOOD IN STOOL: 0
VOMITING: 0
SHORTNESS OF BREATH: 0
CONSTIPATION: 1
NAUSEA: 0
ABDOMINAL PAIN: 0
TROUBLE SWALLOWING: 0

## 2023-06-05 ENCOUNTER — TELEPHONE (OUTPATIENT)
Dept: INTERNAL MEDICINE CLINIC | Facility: CLINIC | Age: 88
End: 2023-06-05

## 2023-06-26 ENCOUNTER — TELEPHONE (OUTPATIENT)
Dept: SLEEP MEDICINE | Age: 88
End: 2023-06-26

## 2023-06-28 ENCOUNTER — OFFICE VISIT (OUTPATIENT)
Dept: UROLOGY | Age: 88
End: 2023-06-28
Payer: MEDICARE

## 2023-06-28 DIAGNOSIS — N40.1 BPH WITH OBSTRUCTION/LOWER URINARY TRACT SYMPTOMS: Primary | ICD-10-CM

## 2023-06-28 DIAGNOSIS — N13.8 BPH WITH OBSTRUCTION/LOWER URINARY TRACT SYMPTOMS: Primary | ICD-10-CM

## 2023-06-28 DIAGNOSIS — N39.0 ACUTE UTI: ICD-10-CM

## 2023-06-28 LAB
BILIRUBIN, URINE, POC: NEGATIVE
BLOOD URINE, POC: NORMAL
GLUCOSE URINE, POC: NEGATIVE
KETONES, URINE, POC: NEGATIVE
LEUKOCYTE ESTERASE, URINE, POC: NORMAL
NITRITE, URINE, POC: POSITIVE
PH, URINE, POC: 6.5 (ref 4.6–8)
PROTEIN,URINE, POC: 30
SPECIFIC GRAVITY, URINE, POC: 1.02 (ref 1–1.03)
URINALYSIS CLARITY, POC: NORMAL
URINALYSIS COLOR, POC: NORMAL
UROBILINOGEN, POC: NORMAL

## 2023-06-28 PROCEDURE — 99214 OFFICE O/P EST MOD 30 MIN: CPT | Performed by: UROLOGY

## 2023-06-28 PROCEDURE — 1036F TOBACCO NON-USER: CPT | Performed by: UROLOGY

## 2023-06-28 PROCEDURE — 81003 URINALYSIS AUTO W/O SCOPE: CPT | Performed by: UROLOGY

## 2023-06-28 PROCEDURE — G8427 DOCREV CUR MEDS BY ELIG CLIN: HCPCS | Performed by: UROLOGY

## 2023-06-28 PROCEDURE — 1123F ACP DISCUSS/DSCN MKR DOCD: CPT | Performed by: UROLOGY

## 2023-06-28 PROCEDURE — G8420 CALC BMI NORM PARAMETERS: HCPCS | Performed by: UROLOGY

## 2023-06-28 RX ORDER — TAMSULOSIN HYDROCHLORIDE 0.4 MG/1
0.4 CAPSULE ORAL DAILY
Qty: 90 CAPSULE | Refills: 3 | Status: CANCELLED | OUTPATIENT
Start: 2023-06-28

## 2023-06-28 RX ORDER — TAMSULOSIN HYDROCHLORIDE 0.4 MG/1
0.4 CAPSULE ORAL DAILY
Qty: 90 CAPSULE | Refills: 3 | Status: SHIPPED | OUTPATIENT
Start: 2023-06-28

## 2023-06-28 ASSESSMENT — ENCOUNTER SYMPTOMS
SKIN LESIONS: 0
SHORTNESS OF BREATH: 0
COUGH: 0
ABDOMINAL PAIN: 0
INDIGESTION: 0
EYE DISCHARGE: 0
BLOOD IN STOOL: 0
WHEEZING: 0
VOMITING: 0
DIARRHEA: 0
NAUSEA: 0
EYE PAIN: 0
CONSTIPATION: 0
BACK PAIN: 0
HEARTBURN: 0

## 2023-07-01 LAB
BACTERIA SPEC CULT: NORMAL
BACTERIA SPEC CULT: NORMAL
SERVICE CMNT-IMP: NORMAL

## 2023-07-06 DIAGNOSIS — N32.81 OVERACTIVE BLADDER: Primary | ICD-10-CM

## 2023-07-06 RX ORDER — VIBEGRON 75 MG/1
75 TABLET, FILM COATED ORAL DAILY
Qty: 90 TABLET | Refills: 3 | Status: SHIPPED | OUTPATIENT
Start: 2023-07-06

## 2023-08-25 NOTE — PROGRESS NOTES
[address] [place] [address]  [phone]    PATIENT ID    Mr. Steffi Gomez  11/29/1929  His primary provider is Sofi Hawley DO    CC: Mr. Bina Stacy returns to the clinic today for follow up of his obstructive sleep apnea. INTERVAL HISTORY  Mr. Bina Stacy was originally diagnosed with severe obstructive sleep apnea in 2016 with an AHI of 50, central apnea index less than half of this. He has past medical history notable for  coronary artery disease, hypertension, atrial fibrillation, chronic kidney disease with solitary kidney, restless leg syndrome, hearing loss, constipation. He did pull out an old nasal wisp mask and notes this is fitting better and he is having less leak, he is still struggling with his nocturnal enuresis which he attributes to taking gabapentin however is getting great relief with his restless legs and would like to continue this, he is asking for refill. He has seen neurology for nocturnal uresis. Interestingly on the night of the ASV titration he did not have any enuresis, he would like to go over the results of the study. He does note that he felt any improved energy during the day after his titration study. He has not had any interval change in health or medications otherwise since last seen. He was last seen by me in 5/23 and was compliant but not getting good clinical benefits from BiPAP therapy. Past medical and surgical histories, medications and allergies, problem list, and social history were reviewed. From 5/9/23  He has been struggling trying to find the right mask to wear as he is having leak issues with most of them, he did get an oronasal mask and brings all his mask today but unfortunately forgot his PAP hose. He also notes he is struggling with nocturnal enuresis this is nightly he is gotten to the point where he needs to wear adult diapers.   He saw urology yesterday and urine culture was ordered and was

## 2023-08-29 ENCOUNTER — OFFICE VISIT (OUTPATIENT)
Dept: SLEEP MEDICINE | Age: 88
End: 2023-08-29
Payer: MEDICARE

## 2023-08-29 VITALS
DIASTOLIC BLOOD PRESSURE: 60 MMHG | HEART RATE: 53 BPM | SYSTOLIC BLOOD PRESSURE: 150 MMHG | TEMPERATURE: 97.2 F | HEIGHT: 68 IN | RESPIRATION RATE: 16 BRPM | BODY MASS INDEX: 22.55 KG/M2 | OXYGEN SATURATION: 98 % | WEIGHT: 148.8 LBS

## 2023-08-29 DIAGNOSIS — G47.31 CENTRAL SLEEP APNEA: ICD-10-CM

## 2023-08-29 DIAGNOSIS — G47.33 OSA (OBSTRUCTIVE SLEEP APNEA): Primary | ICD-10-CM

## 2023-08-29 DIAGNOSIS — G62.9 NEUROPATHY: ICD-10-CM

## 2023-08-29 DIAGNOSIS — G25.81 RLS (RESTLESS LEGS SYNDROME): ICD-10-CM

## 2023-08-29 PROCEDURE — G8427 DOCREV CUR MEDS BY ELIG CLIN: HCPCS | Performed by: INTERNAL MEDICINE

## 2023-08-29 PROCEDURE — G8420 CALC BMI NORM PARAMETERS: HCPCS | Performed by: INTERNAL MEDICINE

## 2023-08-29 PROCEDURE — 1123F ACP DISCUSS/DSCN MKR DOCD: CPT | Performed by: INTERNAL MEDICINE

## 2023-08-29 PROCEDURE — 99214 OFFICE O/P EST MOD 30 MIN: CPT | Performed by: INTERNAL MEDICINE

## 2023-08-29 PROCEDURE — 1036F TOBACCO NON-USER: CPT | Performed by: INTERNAL MEDICINE

## 2023-08-29 RX ORDER — GABAPENTIN 300 MG/1
600 CAPSULE ORAL NIGHTLY
Qty: 180 CAPSULE | Refills: 1 | Status: SHIPPED | OUTPATIENT
Start: 2023-08-29 | End: 2024-02-25

## 2023-08-29 ASSESSMENT — SLEEP AND FATIGUE QUESTIONNAIRES
ESS TOTAL SCORE: 8
HOW LIKELY ARE YOU TO NOD OFF OR FALL ASLEEP WHILE SITTING AND TALKING TO SOMEONE: 0
HOW LIKELY ARE YOU TO NOD OFF OR FALL ASLEEP IN A CAR, WHILE STOPPED FOR A FEW MINUTES IN TRAFFIC: 0
HOW LIKELY ARE YOU TO NOD OFF OR FALL ASLEEP WHEN YOU ARE A PASSENGER IN A CAR FOR AN HOUR WITHOUT A BREAK: 1
HOW LIKELY ARE YOU TO NOD OFF OR FALL ASLEEP WHILE SITTING AND READING: 1
HOW LIKELY ARE YOU TO NOD OFF OR FALL ASLEEP WHILE SITTING INACTIVE IN A PUBLIC PLACE: 1
HOW LIKELY ARE YOU TO NOD OFF OR FALL ASLEEP WHILE WATCHING TV: 2
HOW LIKELY ARE YOU TO NOD OFF OR FALL ASLEEP WHILE SITTING QUIETLY AFTER LUNCH WITHOUT ALCOHOL: 1
HOW LIKELY ARE YOU TO NOD OFF OR FALL ASLEEP WHILE LYING DOWN TO REST IN THE AFTERNOON WHEN CIRCUMSTANCES PERMIT: 2

## 2023-08-29 NOTE — ASSESSMENT & PLAN NOTE
Patient still has occasional breakthrough symptoms but overall this is fairly well controlled on gabapentin 600 mg nightly, refilled today. Labs done last visit showed normal ferritin and vitamin D magnesium.   Will monitor

## 2023-08-29 NOTE — PATIENT INSTRUCTIONS
It was a pleasure meeting you today. Here are some items that we discussed:    1. We will order a new type of therapy call Adaptive Servo Ventilation (ASV) to treat your sleep apnea. Please call us if you are having trouble adjusting. [place]    Address: [address], [place], [address] [place] [address]  Phone: [phone] Option #1  Fax: [phone]     2. Ok to continue gabapentin 600mg nightly for restless legs I sent a refill into [facility] for 300 mg tablets take 2 about 30 minutes to an hour before your legs start to bother you typically at night.     Rommel Oliveros MD  [phone]

## 2023-08-29 NOTE — ASSESSMENT & PLAN NOTE
Patient has complex obstructive sleep apnea, BiPAP ST download today still shows poor control, he does appear to be better controlled on ASV based on titration and we will order EPAP 7 cm H2O pressure support 5-10 with a rate of auto, and we will follow-up in 3 months for a face-to-face compliance visit. Study reviewed in detail and all questions answered. He does appear to be doing better with a nasal wisp type mask he has 1 from several years ago and I noted this for future supply reference.

## 2023-10-18 RX ORDER — DOFETILIDE 0.12 MG/1
CAPSULE ORAL
Qty: 180 CAPSULE | Refills: 3 | Status: SHIPPED | OUTPATIENT
Start: 2023-10-18

## 2023-11-08 NOTE — TELEPHONE ENCOUNTER
Requested Prescriptions     Signed Prescriptions Disp Refills    apixaban (ELIQUIS) 2.5 MG TABS tablet 180 tablet 1     Sig: Take 1 tablet by mouth 2 times daily TAKE 1 TABLET TWICE A DAY     Authorizing Provider: Yumi Taylor

## 2023-11-10 ENCOUNTER — HOSPITAL ENCOUNTER (INPATIENT)
Age: 88
LOS: 6 days | Discharge: NURSING, REHAB OR LONG-TERM CARE | DRG: 481 | End: 2023-11-16
Attending: EMERGENCY MEDICINE | Admitting: INTERNAL MEDICINE
Payer: MEDICARE

## 2023-11-10 ENCOUNTER — APPOINTMENT (OUTPATIENT)
Dept: CT IMAGING | Age: 88
DRG: 481 | End: 2023-11-10
Payer: MEDICARE

## 2023-11-10 ENCOUNTER — APPOINTMENT (OUTPATIENT)
Dept: GENERAL RADIOLOGY | Age: 88
DRG: 481 | End: 2023-11-10
Payer: MEDICARE

## 2023-11-10 DIAGNOSIS — D32.9 MENINGIOMA (HCC): ICD-10-CM

## 2023-11-10 DIAGNOSIS — I48.91 ATRIAL FIBRILLATION, UNSPECIFIED TYPE (HCC): ICD-10-CM

## 2023-11-10 DIAGNOSIS — I48.4 ATYPICAL ATRIAL FLUTTER (HCC): ICD-10-CM

## 2023-11-10 DIAGNOSIS — S72.142A CLOSED DISPLACED INTERTROCHANTERIC FRACTURE OF LEFT FEMUR, INITIAL ENCOUNTER (HCC): Primary | ICD-10-CM

## 2023-11-10 PROBLEM — N18.30 CKD (CHRONIC KIDNEY DISEASE) STAGE 3, GFR 30-59 ML/MIN (HCC): Chronic | Status: ACTIVE | Noted: 2017-07-09

## 2023-11-10 PROBLEM — N18.30 CKD (CHRONIC KIDNEY DISEASE) STAGE 3, GFR 30-59 ML/MIN (HCC): Status: ACTIVE | Noted: 2017-07-09

## 2023-11-10 PROBLEM — N39.44 NOCTURNAL ENURESIS: Chronic | Status: ACTIVE | Noted: 2023-05-09

## 2023-11-10 PROBLEM — R65.10 SIRS (SYSTEMIC INFLAMMATORY RESPONSE SYNDROME) (HCC): Status: ACTIVE | Noted: 2023-11-10

## 2023-11-10 PROBLEM — N18.32 STAGE 3B CHRONIC KIDNEY DISEASE (HCC): Chronic | Status: ACTIVE | Noted: 2017-07-09

## 2023-11-10 PROBLEM — D68.69 SECONDARY HYPERCOAGULABLE STATE (HCC): Chronic | Status: ACTIVE | Noted: 2023-04-20

## 2023-11-10 LAB
ANION GAP BLD CALC-SCNC: 9.4 MMOL/L
ANION GAP SERPL CALC-SCNC: 8 MMOL/L (ref 2–11)
BASOPHILS # BLD: 0 K/UL (ref 0–0.2)
BASOPHILS NFR BLD: 0 % (ref 0–2)
BUN BLD-MCNC: 29 MG/DL (ref 8–26)
BUN SERPL-MCNC: 25 MG/DL (ref 8–23)
CALCIUM SERPL-MCNC: 8.8 MG/DL (ref 8.3–10.4)
CHLORIDE BLD-SCNC: 105 MMOL/L (ref 98–107)
CHLORIDE SERPL-SCNC: 108 MMOL/L (ref 101–110)
CK SERPL-CCNC: 91 U/L (ref 21–215)
CO2 BLD-SCNC: 25.6 MMOL/L (ref 21–32)
CO2 SERPL-SCNC: 24 MMOL/L (ref 21–32)
CREAT BLD-MCNC: 1.09 MG/DL (ref 0.8–1.5)
CREAT SERPL-MCNC: 1.5 MG/DL (ref 0.8–1.5)
DIFFERENTIAL METHOD BLD: ABNORMAL
EOSINOPHIL # BLD: 0.1 K/UL (ref 0–0.8)
EOSINOPHIL NFR BLD: 1 % (ref 0.5–7.8)
ERYTHROCYTE [DISTWIDTH] IN BLOOD BY AUTOMATED COUNT: 15.2 % (ref 11.9–14.6)
GLUCOSE BLD-MCNC: 115 MG/DL (ref 65–100)
GLUCOSE SERPL-MCNC: 111 MG/DL (ref 65–100)
HCT VFR BLD AUTO: 38.7 % (ref 41.1–50.3)
HGB BLD-MCNC: 12.5 G/DL (ref 13.6–17.2)
IMM GRANULOCYTES # BLD AUTO: 0.1 K/UL (ref 0–0.5)
IMM GRANULOCYTES NFR BLD AUTO: 1 % (ref 0–5)
LYMPHOCYTES # BLD: 1 K/UL (ref 0.5–4.6)
LYMPHOCYTES NFR BLD: 8 % (ref 13–44)
MCH RBC QN AUTO: 29.4 PG (ref 26.1–32.9)
MCHC RBC AUTO-ENTMCNC: 32.3 G/DL (ref 31.4–35)
MCV RBC AUTO: 91.1 FL (ref 82–102)
MONOCYTES # BLD: 1.3 K/UL (ref 0.1–1.3)
MONOCYTES NFR BLD: 10 % (ref 4–12)
NEUTS SEG # BLD: 9.8 K/UL (ref 1.7–8.2)
NEUTS SEG NFR BLD: 80 % (ref 43–78)
NRBC # BLD: 0 K/UL (ref 0–0.2)
PLATELET # BLD AUTO: 274 K/UL (ref 150–450)
PMV BLD AUTO: 9.4 FL (ref 9.4–12.3)
POTASSIUM BLD-SCNC: 4.7 MMOL/L (ref 3.5–5.1)
POTASSIUM SERPL-SCNC: 4.1 MMOL/L (ref 3.5–5.1)
RBC # BLD AUTO: 4.25 M/UL (ref 4.23–5.6)
SODIUM BLD-SCNC: 140 MMOL/L (ref 136–145)
SODIUM SERPL-SCNC: 140 MMOL/L (ref 133–143)
WBC # BLD AUTO: 12.2 K/UL (ref 4.3–11.1)

## 2023-11-10 PROCEDURE — 80048 BASIC METABOLIC PNL TOTAL CA: CPT

## 2023-11-10 PROCEDURE — 73502 X-RAY EXAM HIP UNI 2-3 VIEWS: CPT

## 2023-11-10 PROCEDURE — 1100000000 HC RM PRIVATE

## 2023-11-10 PROCEDURE — 82550 ASSAY OF CK (CPK): CPT

## 2023-11-10 PROCEDURE — 80047 BASIC METABLC PNL IONIZED CA: CPT

## 2023-11-10 PROCEDURE — 6360000002 HC RX W HCPCS: Performed by: EMERGENCY MEDICINE

## 2023-11-10 PROCEDURE — 96374 THER/PROPH/DIAG INJ IV PUSH: CPT

## 2023-11-10 PROCEDURE — 99285 EMERGENCY DEPT VISIT HI MDM: CPT

## 2023-11-10 PROCEDURE — 72125 CT NECK SPINE W/O DYE: CPT

## 2023-11-10 PROCEDURE — 71045 X-RAY EXAM CHEST 1 VIEW: CPT

## 2023-11-10 PROCEDURE — 73552 X-RAY EXAM OF FEMUR 2/>: CPT

## 2023-11-10 PROCEDURE — 85025 COMPLETE CBC W/AUTO DIFF WBC: CPT

## 2023-11-10 PROCEDURE — 96375 TX/PRO/DX INJ NEW DRUG ADDON: CPT

## 2023-11-10 PROCEDURE — 70450 CT HEAD/BRAIN W/O DYE: CPT

## 2023-11-10 PROCEDURE — 6370000000 HC RX 637 (ALT 250 FOR IP): Performed by: INTERNAL MEDICINE

## 2023-11-10 PROCEDURE — 2500000003 HC RX 250 WO HCPCS: Performed by: EMERGENCY MEDICINE

## 2023-11-10 RX ORDER — LANOLIN ALCOHOL/MO/W.PET/CERES
6 CREAM (GRAM) TOPICAL NIGHTLY PRN
Status: DISCONTINUED | OUTPATIENT
Start: 2023-11-10 | End: 2023-11-16 | Stop reason: HOSPADM

## 2023-11-10 RX ORDER — GABAPENTIN 300 MG/1
600 CAPSULE ORAL NIGHTLY
Status: DISCONTINUED | OUTPATIENT
Start: 2023-11-10 | End: 2023-11-12

## 2023-11-10 RX ORDER — BISACODYL 10 MG
10 SUPPOSITORY, RECTAL RECTAL DAILY PRN
Status: DISCONTINUED | OUTPATIENT
Start: 2023-11-10 | End: 2023-11-16 | Stop reason: HOSPADM

## 2023-11-10 RX ORDER — ONDANSETRON 2 MG/ML
4 INJECTION INTRAMUSCULAR; INTRAVENOUS
Status: COMPLETED | OUTPATIENT
Start: 2023-11-10 | End: 2023-11-10

## 2023-11-10 RX ORDER — POTASSIUM CHLORIDE 20 MEQ/1
40 TABLET, EXTENDED RELEASE ORAL PRN
Status: DISCONTINUED | OUTPATIENT
Start: 2023-11-10 | End: 2023-11-16 | Stop reason: HOSPADM

## 2023-11-10 RX ORDER — METOPROLOL SUCCINATE 25 MG/1
25 TABLET, EXTENDED RELEASE ORAL DAILY
Status: DISCONTINUED | OUTPATIENT
Start: 2023-11-10 | End: 2023-11-13

## 2023-11-10 RX ORDER — ONDANSETRON 2 MG/ML
4 INJECTION INTRAMUSCULAR; INTRAVENOUS EVERY 6 HOURS PRN
Status: DISCONTINUED | OUTPATIENT
Start: 2023-11-10 | End: 2023-11-10

## 2023-11-10 RX ORDER — POLYETHYLENE GLYCOL 3350 17 G/17G
17 POWDER, FOR SOLUTION ORAL DAILY PRN
Status: DISCONTINUED | OUTPATIENT
Start: 2023-11-10 | End: 2023-11-16 | Stop reason: HOSPADM

## 2023-11-10 RX ORDER — SODIUM CHLORIDE 9 MG/ML
INJECTION, SOLUTION INTRAVENOUS PRN
Status: DISCONTINUED | OUTPATIENT
Start: 2023-11-10 | End: 2023-11-16 | Stop reason: HOSPADM

## 2023-11-10 RX ORDER — TROSPIUM CHLORIDE 20 MG/1
20 TABLET, FILM COATED ORAL NIGHTLY
Status: DISCONTINUED | OUTPATIENT
Start: 2023-11-10 | End: 2023-11-16 | Stop reason: HOSPADM

## 2023-11-10 RX ORDER — SODIUM CHLORIDE 0.9 % (FLUSH) 0.9 %
5-40 SYRINGE (ML) INJECTION EVERY 12 HOURS SCHEDULED
Status: DISCONTINUED | OUTPATIENT
Start: 2023-11-10 | End: 2023-11-16 | Stop reason: HOSPADM

## 2023-11-10 RX ORDER — DOFETILIDE 0.12 MG/1
125 CAPSULE ORAL 2 TIMES DAILY
Status: DISCONTINUED | OUTPATIENT
Start: 2023-11-10 | End: 2023-11-13

## 2023-11-10 RX ORDER — MORPHINE SULFATE 4 MG/ML
4 INJECTION, SOLUTION INTRAMUSCULAR; INTRAVENOUS
Status: COMPLETED | OUTPATIENT
Start: 2023-11-10 | End: 2023-11-10

## 2023-11-10 RX ORDER — CLONIDINE HYDROCHLORIDE 0.1 MG/1
0.1 TABLET ORAL EVERY 4 HOURS PRN
Status: DISCONTINUED | OUTPATIENT
Start: 2023-11-10 | End: 2023-11-16 | Stop reason: HOSPADM

## 2023-11-10 RX ORDER — MAGNESIUM HYDROXIDE/ALUMINUM HYDROXICE/SIMETHICONE 120; 1200; 1200 MG/30ML; MG/30ML; MG/30ML
30 SUSPENSION ORAL EVERY 6 HOURS PRN
Status: DISCONTINUED | OUTPATIENT
Start: 2023-11-10 | End: 2023-11-16 | Stop reason: HOSPADM

## 2023-11-10 RX ORDER — ENEMA 19; 7 G/133ML; G/133ML
1 ENEMA RECTAL AS NEEDED
Status: DISCONTINUED | OUTPATIENT
Start: 2023-11-10 | End: 2023-11-16 | Stop reason: HOSPADM

## 2023-11-10 RX ORDER — METAXALONE 800 MG/1
800 TABLET ORAL EVERY 8 HOURS PRN
Status: DISCONTINUED | OUTPATIENT
Start: 2023-11-10 | End: 2023-11-16 | Stop reason: HOSPADM

## 2023-11-10 RX ORDER — GUAIFENESIN/DEXTROMETHORPHAN 100-10MG/5
10 SYRUP ORAL EVERY 4 HOURS PRN
Status: DISCONTINUED | OUTPATIENT
Start: 2023-11-10 | End: 2023-11-16 | Stop reason: HOSPADM

## 2023-11-10 RX ORDER — ACETAMINOPHEN 500 MG
1000 TABLET ORAL 3 TIMES DAILY
Status: DISCONTINUED | OUTPATIENT
Start: 2023-11-10 | End: 2023-11-16 | Stop reason: HOSPADM

## 2023-11-10 RX ORDER — OXYCODONE HYDROCHLORIDE 5 MG/1
5 TABLET ORAL EVERY 4 HOURS PRN
Status: DISCONTINUED | OUTPATIENT
Start: 2023-11-10 | End: 2023-11-16 | Stop reason: HOSPADM

## 2023-11-10 RX ORDER — MORPHINE SULFATE 4 MG/ML
4 INJECTION, SOLUTION INTRAMUSCULAR; INTRAVENOUS
Status: DISCONTINUED | OUTPATIENT
Start: 2023-11-10 | End: 2023-11-16 | Stop reason: HOSPADM

## 2023-11-10 RX ORDER — ATORVASTATIN CALCIUM 10 MG/1
10 TABLET, FILM COATED ORAL DAILY
Status: DISCONTINUED | OUTPATIENT
Start: 2023-11-11 | End: 2023-11-16 | Stop reason: HOSPADM

## 2023-11-10 RX ORDER — OXYCODONE HYDROCHLORIDE 5 MG/1
10 TABLET ORAL EVERY 4 HOURS PRN
Status: DISCONTINUED | OUTPATIENT
Start: 2023-11-10 | End: 2023-11-16 | Stop reason: HOSPADM

## 2023-11-10 RX ORDER — POTASSIUM CHLORIDE 7.45 MG/ML
10 INJECTION INTRAVENOUS PRN
Status: DISCONTINUED | OUTPATIENT
Start: 2023-11-10 | End: 2023-11-16 | Stop reason: HOSPADM

## 2023-11-10 RX ORDER — MAGNESIUM SULFATE IN WATER 40 MG/ML
2000 INJECTION, SOLUTION INTRAVENOUS PRN
Status: DISCONTINUED | OUTPATIENT
Start: 2023-11-10 | End: 2023-11-16 | Stop reason: HOSPADM

## 2023-11-10 RX ORDER — DILTIAZEM HYDROCHLORIDE 5 MG/ML
10 INJECTION INTRAVENOUS
Status: COMPLETED | OUTPATIENT
Start: 2023-11-10 | End: 2023-11-10

## 2023-11-10 RX ORDER — FAMOTIDINE 20 MG/1
10 TABLET, FILM COATED ORAL 2 TIMES DAILY PRN
Status: DISCONTINUED | OUTPATIENT
Start: 2023-11-10 | End: 2023-11-16 | Stop reason: HOSPADM

## 2023-11-10 RX ORDER — SODIUM CHLORIDE, SODIUM LACTATE, POTASSIUM CHLORIDE, CALCIUM CHLORIDE 600; 310; 30; 20 MG/100ML; MG/100ML; MG/100ML; MG/100ML
INJECTION, SOLUTION INTRAVENOUS CONTINUOUS
Status: DISCONTINUED | OUTPATIENT
Start: 2023-11-10 | End: 2023-11-14

## 2023-11-10 RX ORDER — SODIUM CHLORIDE 0.9 % (FLUSH) 0.9 %
5-40 SYRINGE (ML) INJECTION PRN
Status: DISCONTINUED | OUTPATIENT
Start: 2023-11-10 | End: 2023-11-16 | Stop reason: HOSPADM

## 2023-11-10 RX ORDER — ENOXAPARIN SODIUM 100 MG/ML
40 INJECTION SUBCUTANEOUS EVERY 24 HOURS
Status: DISCONTINUED | OUTPATIENT
Start: 2023-11-10 | End: 2023-11-13

## 2023-11-10 RX ORDER — ONDANSETRON 4 MG/1
4 TABLET, ORALLY DISINTEGRATING ORAL EVERY 8 HOURS PRN
Status: DISCONTINUED | OUTPATIENT
Start: 2023-11-10 | End: 2023-11-10

## 2023-11-10 RX ORDER — MORPHINE SULFATE 4 MG/ML
2 INJECTION, SOLUTION INTRAMUSCULAR; INTRAVENOUS
Status: DISCONTINUED | OUTPATIENT
Start: 2023-11-10 | End: 2023-11-16 | Stop reason: HOSPADM

## 2023-11-10 RX ORDER — TAMSULOSIN HYDROCHLORIDE 0.4 MG/1
0.4 CAPSULE ORAL DAILY
Status: DISCONTINUED | OUTPATIENT
Start: 2023-11-11 | End: 2023-11-16 | Stop reason: HOSPADM

## 2023-11-10 RX ORDER — OXYCODONE HYDROCHLORIDE 5 MG/1
5 TABLET ORAL EVERY 4 HOURS PRN
Status: DISCONTINUED | OUTPATIENT
Start: 2023-11-10 | End: 2023-11-10

## 2023-11-10 RX ORDER — DOFETILIDE 0.12 MG/1
125 CAPSULE ORAL
Status: DISCONTINUED | OUTPATIENT
Start: 2023-11-10 | End: 2023-11-10

## 2023-11-10 RX ORDER — METOPROLOL TARTRATE 50 MG/1
50 TABLET, FILM COATED ORAL EVERY 6 HOURS PRN
Status: DISCONTINUED | OUTPATIENT
Start: 2023-11-10 | End: 2023-11-14

## 2023-11-10 RX ADMIN — DOFETILIDE 125 MCG: 0.12 CAPSULE ORAL at 22:34

## 2023-11-10 RX ADMIN — DILTIAZEM HYDROCHLORIDE 10 MG: 5 INJECTION, SOLUTION INTRAVENOUS at 21:49

## 2023-11-10 RX ADMIN — MORPHINE SULFATE 4 MG: 4 INJECTION INTRAVENOUS at 21:36

## 2023-11-10 RX ADMIN — ONDANSETRON 4 MG: 2 INJECTION INTRAMUSCULAR; INTRAVENOUS at 21:36

## 2023-11-10 ASSESSMENT — LIFESTYLE VARIABLES
HOW MANY STANDARD DRINKS CONTAINING ALCOHOL DO YOU HAVE ON A TYPICAL DAY: PATIENT DOES NOT DRINK
HOW OFTEN DO YOU HAVE A DRINK CONTAINING ALCOHOL: NEVER

## 2023-11-10 ASSESSMENT — ENCOUNTER SYMPTOMS
DIARRHEA: 0
NAUSEA: 0
SHORTNESS OF BREATH: 0
BACK PAIN: 0
COUGH: 0
ABDOMINAL PAIN: 0
VOMITING: 0

## 2023-11-10 ASSESSMENT — PAIN DESCRIPTION - ORIENTATION: ORIENTATION: LEFT

## 2023-11-10 ASSESSMENT — PAIN DESCRIPTION - PAIN TYPE: TYPE: ACUTE PAIN

## 2023-11-10 ASSESSMENT — PAIN SCALES - GENERAL
PAINLEVEL_OUTOF10: 4
PAINLEVEL_OUTOF10: 4
PAINLEVEL_OUTOF10: 6

## 2023-11-10 ASSESSMENT — PAIN DESCRIPTION - DESCRIPTORS
DESCRIPTORS: ACHING
DESCRIPTORS: ACHING

## 2023-11-10 ASSESSMENT — PAIN - FUNCTIONAL ASSESSMENT: PAIN_FUNCTIONAL_ASSESSMENT: 0-10

## 2023-11-10 ASSESSMENT — PAIN DESCRIPTION - FREQUENCY: FREQUENCY: CONTINUOUS

## 2023-11-10 ASSESSMENT — PAIN DESCRIPTION - LOCATION
LOCATION: HIP
LOCATION: HIP

## 2023-11-10 ASSESSMENT — PAIN DESCRIPTION - ONSET: ONSET: PROGRESSIVE

## 2023-11-11 ENCOUNTER — ANESTHESIA EVENT (OUTPATIENT)
Dept: SURGERY | Age: 88
End: 2023-11-11
Payer: MEDICARE

## 2023-11-11 ENCOUNTER — APPOINTMENT (OUTPATIENT)
Dept: GENERAL RADIOLOGY | Age: 88
DRG: 481 | End: 2023-11-11
Payer: MEDICARE

## 2023-11-11 ENCOUNTER — ANESTHESIA (OUTPATIENT)
Dept: SURGERY | Age: 88
End: 2023-11-11
Payer: MEDICARE

## 2023-11-11 LAB
25(OH)D3 SERPL-MCNC: 24.6 NG/ML (ref 30–100)
ANION GAP SERPL CALC-SCNC: 6 MMOL/L (ref 2–11)
BASOPHILS # BLD: 0 K/UL (ref 0–0.2)
BASOPHILS # BLD: 0 K/UL (ref 0–0.2)
BASOPHILS NFR BLD: 0 % (ref 0–2)
BASOPHILS NFR BLD: 0 % (ref 0–2)
BUN SERPL-MCNC: 25 MG/DL (ref 8–23)
CALCIUM SERPL-MCNC: 8.4 MG/DL (ref 8.3–10.4)
CHLORIDE SERPL-SCNC: 108 MMOL/L (ref 101–110)
CO2 SERPL-SCNC: 24 MMOL/L (ref 21–32)
CREAT SERPL-MCNC: 1.46 MG/DL (ref 0.8–1.5)
DIFFERENTIAL METHOD BLD: ABNORMAL
DIFFERENTIAL METHOD BLD: ABNORMAL
EKG ATRIAL RATE: 131 BPM
EKG DIAGNOSIS: NORMAL
EKG DIAGNOSIS: NORMAL
EKG P AXIS: 107 DEGREES
EKG P-R INTERVAL: 254 MS
EKG Q-T INTERVAL: 386 MS
EKG Q-T INTERVAL: 428 MS
EKG QRS DURATION: 82 MS
EKG QRS DURATION: 84 MS
EKG QTC CALCULATION (BAZETT): 445 MS
EKG QTC CALCULATION (BAZETT): 479 MS
EKG R AXIS: 79 DEGREES
EKG R AXIS: 80 DEGREES
EKG T AXIS: 258 DEGREES
EKG T AXIS: 262 DEGREES
EKG VENTRICULAR RATE: 65 BPM
EKG VENTRICULAR RATE: 93 BPM
EOSINOPHIL # BLD: 0.1 K/UL (ref 0–0.8)
EOSINOPHIL # BLD: 0.3 K/UL (ref 0–0.8)
EOSINOPHIL NFR BLD: 1 % (ref 0.5–7.8)
EOSINOPHIL NFR BLD: 2 % (ref 0.5–7.8)
ERYTHROCYTE [DISTWIDTH] IN BLOOD BY AUTOMATED COUNT: 15 % (ref 11.9–14.6)
ERYTHROCYTE [DISTWIDTH] IN BLOOD BY AUTOMATED COUNT: 15.1 % (ref 11.9–14.6)
GLUCOSE SERPL-MCNC: 182 MG/DL (ref 65–100)
HCT VFR BLD AUTO: 34.7 % (ref 41.1–50.3)
HCT VFR BLD AUTO: 39.4 % (ref 41.1–50.3)
HGB BLD-MCNC: 11 G/DL (ref 13.6–17.2)
HGB BLD-MCNC: 12.8 G/DL (ref 13.6–17.2)
IMM GRANULOCYTES # BLD AUTO: 0.1 K/UL (ref 0–0.5)
IMM GRANULOCYTES # BLD AUTO: 0.1 K/UL (ref 0–0.5)
IMM GRANULOCYTES NFR BLD AUTO: 1 % (ref 0–5)
IMM GRANULOCYTES NFR BLD AUTO: 1 % (ref 0–5)
LACTATE SERPL-SCNC: 1.5 MMOL/L (ref 0.4–2)
LACTATE SERPL-SCNC: 1.9 MMOL/L (ref 0.4–2)
LYMPHOCYTES # BLD: 0.7 K/UL (ref 0.5–4.6)
LYMPHOCYTES # BLD: 0.8 K/UL (ref 0.5–4.6)
LYMPHOCYTES NFR BLD: 5 % (ref 13–44)
LYMPHOCYTES NFR BLD: 6 % (ref 13–44)
MCH RBC QN AUTO: 29.2 PG (ref 26.1–32.9)
MCH RBC QN AUTO: 29.4 PG (ref 26.1–32.9)
MCHC RBC AUTO-ENTMCNC: 31.7 G/DL (ref 31.4–35)
MCHC RBC AUTO-ENTMCNC: 32.5 G/DL (ref 31.4–35)
MCV RBC AUTO: 90.4 FL (ref 82–102)
MCV RBC AUTO: 92 FL (ref 82–102)
MONOCYTES # BLD: 1.4 K/UL (ref 0.1–1.3)
MONOCYTES # BLD: 1.7 K/UL (ref 0.1–1.3)
MONOCYTES NFR BLD: 11 % (ref 4–12)
MONOCYTES NFR BLD: 11 % (ref 4–12)
NEUTS SEG # BLD: 10.6 K/UL (ref 1.7–8.2)
NEUTS SEG # BLD: 12.6 K/UL (ref 1.7–8.2)
NEUTS SEG NFR BLD: 80 % (ref 43–78)
NEUTS SEG NFR BLD: 83 % (ref 43–78)
NRBC # BLD: 0 K/UL (ref 0–0.2)
NRBC # BLD: 0 K/UL (ref 0–0.2)
PLATELET # BLD AUTO: 234 K/UL (ref 150–450)
PLATELET # BLD AUTO: 260 K/UL (ref 150–450)
PMV BLD AUTO: 9 FL (ref 9.4–12.3)
PMV BLD AUTO: 9.6 FL (ref 9.4–12.3)
POTASSIUM SERPL-SCNC: 4.8 MMOL/L (ref 3.5–5.1)
RBC # BLD AUTO: 3.77 M/UL (ref 4.23–5.6)
RBC # BLD AUTO: 4.36 M/UL (ref 4.23–5.6)
SODIUM SERPL-SCNC: 138 MMOL/L (ref 133–143)
WBC # BLD AUTO: 13.2 K/UL (ref 4.3–11.1)
WBC # BLD AUTO: 15.3 K/UL (ref 4.3–11.1)

## 2023-11-11 PROCEDURE — 2720000010 HC SURG SUPPLY STERILE: Performed by: ORTHOPAEDIC SURGERY

## 2023-11-11 PROCEDURE — 87040 BLOOD CULTURE FOR BACTERIA: CPT

## 2023-11-11 PROCEDURE — 6370000000 HC RX 637 (ALT 250 FOR IP): Performed by: ORTHOPAEDIC SURGERY

## 2023-11-11 PROCEDURE — 83605 ASSAY OF LACTIC ACID: CPT

## 2023-11-11 PROCEDURE — 97112 NEUROMUSCULAR REEDUCATION: CPT

## 2023-11-11 PROCEDURE — 97166 OT EVAL MOD COMPLEX 45 MIN: CPT

## 2023-11-11 PROCEDURE — 1100000000 HC RM PRIVATE

## 2023-11-11 PROCEDURE — 6360000002 HC RX W HCPCS

## 2023-11-11 PROCEDURE — 3700000001 HC ADD 15 MINUTES (ANESTHESIA): Performed by: ORTHOPAEDIC SURGERY

## 2023-11-11 PROCEDURE — 2500000003 HC RX 250 WO HCPCS: Performed by: INTERNAL MEDICINE

## 2023-11-11 PROCEDURE — 6360000002 HC RX W HCPCS: Performed by: ORTHOPAEDIC SURGERY

## 2023-11-11 PROCEDURE — 97535 SELF CARE MNGMENT TRAINING: CPT

## 2023-11-11 PROCEDURE — 36415 COLL VENOUS BLD VENIPUNCTURE: CPT

## 2023-11-11 PROCEDURE — 2580000003 HC RX 258: Performed by: ANESTHESIOLOGY

## 2023-11-11 PROCEDURE — C1769 GUIDE WIRE: HCPCS | Performed by: ORTHOPAEDIC SURGERY

## 2023-11-11 PROCEDURE — 3600000004 HC SURGERY LEVEL 4 BASE: Performed by: ORTHOPAEDIC SURGERY

## 2023-11-11 PROCEDURE — C1713 ANCHOR/SCREW BN/BN,TIS/BN: HCPCS | Performed by: ORTHOPAEDIC SURGERY

## 2023-11-11 PROCEDURE — 80048 BASIC METABOLIC PNL TOTAL CA: CPT

## 2023-11-11 PROCEDURE — 2580000003 HC RX 258: Performed by: ORTHOPAEDIC SURGERY

## 2023-11-11 PROCEDURE — 97530 THERAPEUTIC ACTIVITIES: CPT

## 2023-11-11 PROCEDURE — 2500000003 HC RX 250 WO HCPCS

## 2023-11-11 PROCEDURE — 2580000003 HC RX 258: Performed by: INTERNAL MEDICINE

## 2023-11-11 PROCEDURE — 97161 PT EVAL LOW COMPLEX 20 MIN: CPT

## 2023-11-11 PROCEDURE — 2709999900 HC NON-CHARGEABLE SUPPLY: Performed by: ORTHOPAEDIC SURGERY

## 2023-11-11 PROCEDURE — 73502 X-RAY EXAM HIP UNI 2-3 VIEWS: CPT

## 2023-11-11 PROCEDURE — 85025 COMPLETE CBC W/AUTO DIFF WBC: CPT

## 2023-11-11 PROCEDURE — 3600000014 HC SURGERY LEVEL 4 ADDTL 15MIN: Performed by: ORTHOPAEDIC SURGERY

## 2023-11-11 PROCEDURE — 82306 VITAMIN D 25 HYDROXY: CPT

## 2023-11-11 PROCEDURE — 7100000000 HC PACU RECOVERY - FIRST 15 MIN: Performed by: ORTHOPAEDIC SURGERY

## 2023-11-11 PROCEDURE — 3700000000 HC ANESTHESIA ATTENDED CARE: Performed by: ORTHOPAEDIC SURGERY

## 2023-11-11 PROCEDURE — 2580000003 HC RX 258

## 2023-11-11 PROCEDURE — 7100000001 HC PACU RECOVERY - ADDTL 15 MIN: Performed by: ORTHOPAEDIC SURGERY

## 2023-11-11 PROCEDURE — 6370000000 HC RX 637 (ALT 250 FOR IP): Performed by: INTERNAL MEDICINE

## 2023-11-11 DEVICE — LOCKING SCREW
Type: IMPLANTABLE DEVICE | Site: HIP | Status: FUNCTIONAL
Brand: T2 ALPHA

## 2023-11-11 DEVICE — LAG SCREW
Type: IMPLANTABLE DEVICE | Site: HIP | Status: FUNCTIONAL
Brand: GAMMA

## 2023-11-11 RX ORDER — PROPOFOL 10 MG/ML
INJECTION, EMULSION INTRAVENOUS PRN
Status: DISCONTINUED | OUTPATIENT
Start: 2023-11-11 | End: 2023-11-11 | Stop reason: SDUPTHER

## 2023-11-11 RX ORDER — EPHEDRINE SULFATE/0.9% NACL/PF 50 MG/5 ML
SYRINGE (ML) INTRAVENOUS PRN
Status: DISCONTINUED | OUTPATIENT
Start: 2023-11-11 | End: 2023-11-11 | Stop reason: SDUPTHER

## 2023-11-11 RX ORDER — HYDROMORPHONE HYDROCHLORIDE 2 MG/ML
0.5 INJECTION, SOLUTION INTRAMUSCULAR; INTRAVENOUS; SUBCUTANEOUS EVERY 5 MIN PRN
Status: DISCONTINUED | OUTPATIENT
Start: 2023-11-11 | End: 2023-11-11 | Stop reason: HOSPADM

## 2023-11-11 RX ORDER — SODIUM CHLORIDE 0.9 % (FLUSH) 0.9 %
5-40 SYRINGE (ML) INJECTION PRN
Status: DISCONTINUED | OUTPATIENT
Start: 2023-11-11 | End: 2023-11-16 | Stop reason: HOSPADM

## 2023-11-11 RX ORDER — SODIUM CHLORIDE, SODIUM LACTATE, POTASSIUM CHLORIDE, CALCIUM CHLORIDE 600; 310; 30; 20 MG/100ML; MG/100ML; MG/100ML; MG/100ML
INJECTION, SOLUTION INTRAVENOUS CONTINUOUS
Status: DISCONTINUED | OUTPATIENT
Start: 2023-11-11 | End: 2023-11-11 | Stop reason: HOSPADM

## 2023-11-11 RX ORDER — OXYCODONE HYDROCHLORIDE 5 MG/1
10 TABLET ORAL PRN
Status: DISCONTINUED | OUTPATIENT
Start: 2023-11-11 | End: 2023-11-11 | Stop reason: HOSPADM

## 2023-11-11 RX ORDER — DEXAMETHASONE SODIUM PHOSPHATE 4 MG/ML
INJECTION, SOLUTION INTRA-ARTICULAR; INTRALESIONAL; INTRAMUSCULAR; INTRAVENOUS; SOFT TISSUE PRN
Status: DISCONTINUED | OUTPATIENT
Start: 2023-11-11 | End: 2023-11-11 | Stop reason: SDUPTHER

## 2023-11-11 RX ORDER — ASPIRIN 325 MG
325 TABLET, DELAYED RELEASE (ENTERIC COATED) ORAL DAILY
Status: DISCONTINUED | OUTPATIENT
Start: 2023-11-12 | End: 2023-11-14

## 2023-11-11 RX ORDER — SODIUM CHLORIDE 0.9 % (FLUSH) 0.9 %
5-40 SYRINGE (ML) INJECTION EVERY 12 HOURS SCHEDULED
Status: DISCONTINUED | OUTPATIENT
Start: 2023-11-11 | End: 2023-11-16 | Stop reason: HOSPADM

## 2023-11-11 RX ORDER — FENTANYL CITRATE 50 UG/ML
INJECTION, SOLUTION INTRAMUSCULAR; INTRAVENOUS PRN
Status: DISCONTINUED | OUTPATIENT
Start: 2023-11-11 | End: 2023-11-11 | Stop reason: SDUPTHER

## 2023-11-11 RX ORDER — SODIUM CHLORIDE 9 MG/ML
INJECTION, SOLUTION INTRAVENOUS PRN
Status: DISCONTINUED | OUTPATIENT
Start: 2023-11-11 | End: 2023-11-11 | Stop reason: HOSPADM

## 2023-11-11 RX ORDER — SODIUM CHLORIDE 0.9 % (FLUSH) 0.9 %
5-40 SYRINGE (ML) INJECTION EVERY 12 HOURS SCHEDULED
Status: DISCONTINUED | OUTPATIENT
Start: 2023-11-11 | End: 2023-11-11 | Stop reason: HOSPADM

## 2023-11-11 RX ORDER — ACETAMINOPHEN 500 MG
1000 TABLET ORAL ONCE
Status: DISCONTINUED | OUTPATIENT
Start: 2023-11-11 | End: 2023-11-11 | Stop reason: HOSPADM

## 2023-11-11 RX ORDER — ONDANSETRON 2 MG/ML
INJECTION INTRAMUSCULAR; INTRAVENOUS PRN
Status: DISCONTINUED | OUTPATIENT
Start: 2023-11-11 | End: 2023-11-11 | Stop reason: SDUPTHER

## 2023-11-11 RX ORDER — ONDANSETRON 4 MG/1
4 TABLET, ORALLY DISINTEGRATING ORAL EVERY 8 HOURS PRN
Status: DISCONTINUED | OUTPATIENT
Start: 2023-11-11 | End: 2023-11-16 | Stop reason: HOSPADM

## 2023-11-11 RX ORDER — ONDANSETRON 2 MG/ML
4 INJECTION INTRAMUSCULAR; INTRAVENOUS EVERY 6 HOURS PRN
Status: DISCONTINUED | OUTPATIENT
Start: 2023-11-11 | End: 2023-11-16 | Stop reason: HOSPADM

## 2023-11-11 RX ORDER — SODIUM CHLORIDE 9 MG/ML
INJECTION, SOLUTION INTRAVENOUS PRN
Status: DISCONTINUED | OUTPATIENT
Start: 2023-11-11 | End: 2023-11-16 | Stop reason: HOSPADM

## 2023-11-11 RX ORDER — MIDAZOLAM HYDROCHLORIDE 2 MG/2ML
2 INJECTION, SOLUTION INTRAMUSCULAR; INTRAVENOUS
Status: DISCONTINUED | OUTPATIENT
Start: 2023-11-11 | End: 2023-11-11 | Stop reason: HOSPADM

## 2023-11-11 RX ORDER — HYDROMORPHONE HYDROCHLORIDE 2 MG/ML
0.25 INJECTION, SOLUTION INTRAMUSCULAR; INTRAVENOUS; SUBCUTANEOUS EVERY 5 MIN PRN
Status: DISCONTINUED | OUTPATIENT
Start: 2023-11-11 | End: 2023-11-11 | Stop reason: HOSPADM

## 2023-11-11 RX ORDER — PROCHLORPERAZINE EDISYLATE 5 MG/ML
5 INJECTION INTRAMUSCULAR; INTRAVENOUS
Status: DISCONTINUED | OUTPATIENT
Start: 2023-11-11 | End: 2023-11-11 | Stop reason: HOSPADM

## 2023-11-11 RX ORDER — OXYCODONE HYDROCHLORIDE 5 MG/1
5 TABLET ORAL PRN
Status: DISCONTINUED | OUTPATIENT
Start: 2023-11-11 | End: 2023-11-11 | Stop reason: HOSPADM

## 2023-11-11 RX ORDER — ONDANSETRON 2 MG/ML
4 INJECTION INTRAMUSCULAR; INTRAVENOUS
Status: DISCONTINUED | OUTPATIENT
Start: 2023-11-11 | End: 2023-11-11 | Stop reason: HOSPADM

## 2023-11-11 RX ORDER — DIPHENHYDRAMINE HYDROCHLORIDE 50 MG/ML
12.5 INJECTION INTRAMUSCULAR; INTRAVENOUS
Status: DISCONTINUED | OUTPATIENT
Start: 2023-11-11 | End: 2023-11-11 | Stop reason: HOSPADM

## 2023-11-11 RX ORDER — ETOMIDATE 2 MG/ML
INJECTION INTRAVENOUS PRN
Status: DISCONTINUED | OUTPATIENT
Start: 2023-11-11 | End: 2023-11-11 | Stop reason: SDUPTHER

## 2023-11-11 RX ORDER — LIDOCAINE HYDROCHLORIDE 10 MG/ML
1 INJECTION, SOLUTION INFILTRATION; PERINEURAL
Status: DISCONTINUED | OUTPATIENT
Start: 2023-11-11 | End: 2023-11-11 | Stop reason: HOSPADM

## 2023-11-11 RX ORDER — LIDOCAINE HYDROCHLORIDE 20 MG/ML
INJECTION, SOLUTION EPIDURAL; INFILTRATION; INTRACAUDAL; PERINEURAL PRN
Status: DISCONTINUED | OUTPATIENT
Start: 2023-11-11 | End: 2023-11-11 | Stop reason: SDUPTHER

## 2023-11-11 RX ORDER — SODIUM CHLORIDE 0.9 % (FLUSH) 0.9 %
5-40 SYRINGE (ML) INJECTION PRN
Status: DISCONTINUED | OUTPATIENT
Start: 2023-11-11 | End: 2023-11-11 | Stop reason: HOSPADM

## 2023-11-11 RX ADMIN — DOFETILIDE 125 MCG: 0.12 CAPSULE ORAL at 21:51

## 2023-11-11 RX ADMIN — FENTANYL CITRATE 25 MCG: 50 INJECTION, SOLUTION INTRAMUSCULAR; INTRAVENOUS at 11:03

## 2023-11-11 RX ADMIN — PHENYLEPHRINE HYDROCHLORIDE 200 MCG: 10 INJECTION INTRAVENOUS at 10:36

## 2023-11-11 RX ADMIN — ENOXAPARIN SODIUM 40 MG: 100 INJECTION SUBCUTANEOUS at 21:58

## 2023-11-11 RX ADMIN — ACETAMINOPHEN 1000 MG: 500 TABLET, FILM COATED ORAL at 07:22

## 2023-11-11 RX ADMIN — LIDOCAINE HYDROCHLORIDE 100 MG: 20 INJECTION, SOLUTION EPIDURAL; INFILTRATION; INTRACAUDAL; PERINEURAL at 10:36

## 2023-11-11 RX ADMIN — SODIUM CHLORIDE, PRESERVATIVE FREE 5 ML: 5 INJECTION INTRAVENOUS at 21:12

## 2023-11-11 RX ADMIN — DOFETILIDE 125 MCG: 0.12 CAPSULE ORAL at 07:27

## 2023-11-11 RX ADMIN — SODIUM CHLORIDE, SODIUM LACTATE, POTASSIUM CHLORIDE, AND CALCIUM CHLORIDE: 600; 310; 30; 20 INJECTION, SOLUTION INTRAVENOUS at 10:32

## 2023-11-11 RX ADMIN — CEFAZOLIN SODIUM 2000 MG: 100 INJECTION, POWDER, LYOPHILIZED, FOR SOLUTION INTRAVENOUS at 20:55

## 2023-11-11 RX ADMIN — ETOMIDATE 10 MG: 2 INJECTION INTRAVENOUS at 10:36

## 2023-11-11 RX ADMIN — ONDANSETRON 4 MG: 2 INJECTION INTRAMUSCULAR; INTRAVENOUS at 11:03

## 2023-11-11 RX ADMIN — Medication 10 MG: at 10:49

## 2023-11-11 RX ADMIN — FENTANYL CITRATE 25 MCG: 50 INJECTION, SOLUTION INTRAMUSCULAR; INTRAVENOUS at 11:05

## 2023-11-11 RX ADMIN — SODIUM CHLORIDE, SODIUM LACTATE, POTASSIUM CHLORIDE, AND CALCIUM CHLORIDE: 600; 310; 30; 20 INJECTION, SOLUTION INTRAVENOUS at 08:49

## 2023-11-11 RX ADMIN — Medication 2000 MG: at 10:43

## 2023-11-11 RX ADMIN — TUBERCULIN PURIFIED PROTEIN DERIVATIVE 5 UNITS: 5 INJECTION, SOLUTION INTRADERMAL at 00:40

## 2023-11-11 RX ADMIN — SODIUM CHLORIDE, PRESERVATIVE FREE 10 ML: 5 INJECTION INTRAVENOUS at 00:35

## 2023-11-11 RX ADMIN — PHENYLEPHRINE HYDROCHLORIDE 200 MCG: 10 INJECTION INTRAVENOUS at 10:54

## 2023-11-11 RX ADMIN — PROPOFOL 30 MG: 10 INJECTION, EMULSION INTRAVENOUS at 10:36

## 2023-11-11 RX ADMIN — DEXAMETHASONE SODIUM PHOSPHATE 4 MG: 4 INJECTION, SOLUTION INTRAMUSCULAR; INTRAVENOUS at 10:43

## 2023-11-11 RX ADMIN — ACETAMINOPHEN 1000 MG: 500 TABLET, FILM COATED ORAL at 14:05

## 2023-11-11 RX ADMIN — SODIUM CHLORIDE, POTASSIUM CHLORIDE, SODIUM LACTATE AND CALCIUM CHLORIDE: 600; 310; 30; 20 INJECTION, SOLUTION INTRAVENOUS at 17:18

## 2023-11-11 RX ADMIN — ACETAMINOPHEN 1000 MG: 500 TABLET, FILM COATED ORAL at 20:50

## 2023-11-11 RX ADMIN — METOPROLOL SUCCINATE 25 MG: 25 TABLET, EXTENDED RELEASE ORAL at 07:22

## 2023-11-11 RX ADMIN — TAMSULOSIN HYDROCHLORIDE 0.4 MG: 0.4 CAPSULE ORAL at 07:22

## 2023-11-11 RX ADMIN — Medication 10 MG: at 11:12

## 2023-11-11 RX ADMIN — CEFAZOLIN SODIUM 2000 MG: 100 INJECTION, POWDER, LYOPHILIZED, FOR SOLUTION INTRAVENOUS at 00:36

## 2023-11-11 RX ADMIN — SODIUM CHLORIDE, POTASSIUM CHLORIDE, SODIUM LACTATE AND CALCIUM CHLORIDE: 600; 310; 30; 20 INJECTION, SOLUTION INTRAVENOUS at 00:34

## 2023-11-11 RX ADMIN — Medication 10 MG: at 10:53

## 2023-11-11 RX ADMIN — ATORVASTATIN CALCIUM 10 MG: 10 TABLET, FILM COATED ORAL at 14:06

## 2023-11-11 ASSESSMENT — PAIN - FUNCTIONAL ASSESSMENT: PAIN_FUNCTIONAL_ASSESSMENT: 0-10

## 2023-11-11 NOTE — ANESTHESIA PRE PROCEDURE
Vascular:   + PVD, aortic or cerebral (mild/mod carotid stenosis), . Other Findings:           Anesthesia Plan      general     ASA 3 - emergent       Induction: intravenous. MIPS: Postoperative opioids intended and Prophylactic antiemetics administered. Anesthetic plan and risks discussed with patient and child/children.                         Hedy Perez MD   11/11/2023

## 2023-11-11 NOTE — H&P
Hospitalist History and Physical   Admit Date:  11/10/2023  7:20 PM   Name:  Calli Anton   Age:  80 y.o. Sex:  male  :  1929   MRN:  [id number]   [facility]    Presenting/Chief Complaint: Fall and Hip Pain     Reason(s) for Admission: Closed comminuted intertrochanteric fracture of left femur, initial encounter ([address]) [S72.215A]     History of Present Illness:   Calli Anton is a 80 y.o. male with medical history of BPH, afib on eliquis, CKD 3b, who presented with fall. Lives at [facility] living and normally does very well. Levester Flicker around 230-3pm after tripping on something. Landed on L hip. Had significant pain and was unable to stand. Stayed on floor for about 2 hours before using his cell phone to call his son. He was brought to ER. XR confirmed L hip fracture. No other complaints. No CP, SOB, cough. He feels well and pain is controlled after IV morphine but slightly confused from this so history provided by son. Assessment & Plan:       Closed comminuted intertrochanteric fracture of left femur, initial encounter ([address])  -Admit to ortho unit, inpatient  -Ortho consulted  -Pain control with scheduled tylenol, PRN IV morphine and PO oxycodone  -daily CBC, BMP  -IVF      Atrial fibrillation with rapid ventricular response (HCC)  -likely pain stress and dehydration. Has not had any PO intake since before fall  -start IVF  -did not get tikosyn yet this evening; resume  -cont home Toprol  -PRN metoprolol PO also.  -holding home eliquis      SIRS  -well appearing, in good spirits. Doubt sepsis. Likely reactive to fracture  -recheck CBC in AM  -control afib      Abnormal CXR  -no resp symptoms. Looks chronic per radiology.   monitor      Calcified meningioma  -nothing to do except monitor      Benign prostatic hyperplasia with urinary retention  -cont flomax      Dyslipidemia  -cont home statin      Stage 3b chronic kidney disease ([address])  -baseline Cr

## 2023-11-11 NOTE — OP NOTE
Operative Report    Patient: Michelle De La Torre MRN: [id number]  SSN: [id number]    YOB: 1929  Age: 80 y.o. Sex: male       Date of Surgery: November 11, 2023     History:  Michelle De La Torre is a 80 y.o. male who fell and injured his left hip. He was seen in the emergency room and found to have a left intertrochanteric proximal femur fracture. I did have a chance to talk with him and his family regarding operative fixation and what this would involve. The plan is to proceed with intramedullary nail fixation of this. I talked to the patient and/or their representative and explained the exact nature the procedure. I also went through a detailed list of the material risks associated with  the procedure which included risk of bleeding, infection, injury to nearby structures, worsening the situation, as well as the risks associate with anesthesia and finally death. Also talked with him regarding the benefits and alternatives to the procedure. Preoperative Diagnosis: Closed displaced left intertrochanteric femur fracture closed    Postoperative Diagnosis:   Closed displaced left intertrochanteric proximal femur fracture      Surgeon(s) and Role:     * Darylene Moeller, MD - Primary    Anesthesia: General     Procedure: Open treatment of left intertrochanteric proximal femur fracture with intramedullary nail fixation    Procedure in Detail: After successful  induction of general anesthetic the left lower extremity was placed in gentle boot traction on a fracture table and we made sure we could adequately visualize the  proximal femur and that an adequate closed reduction could be obtained.   I then prepped and draped the left hip and thigh area and made a small incision just proximal to the area the greater trochanter and placed the cannulated awl into the tip the greater trochanter on both the AP and lateral projection and once it was in appropriate position placed a guidewire down the

## 2023-11-11 NOTE — ED NOTES
TRANSFER - OUT REPORT:    Verbal report given to Abran Castellanos on William Paule  being transferred to [phone] for routine progression of patient care       Report consisted of patient's Situation, Background, Assessment and   Recommendations(SBAR). Information from the following report(s) ED SBAR was reviewed with the receiving nurse. [place] Fall Assessment:    Presents to emergency department  because of falls (Syncope, seizure, or loss of consciousness): Yes  Age > 79: Yes  Altered Mental Status, Intoxication with alcohol or substance confusion (Disorientation, impaired judgment, poor safety awaremess, or inability to follow instructions): No  Impaired Mobility: Ambulates or transfers with assistive devices or assistance; Unable to ambulate or transer.: Yes  Nursing Judgement: Yes          Lines:   Peripheral IV 11/10/23 Right Antecubital (Active)        Opportunity for questions and clarification was provided.       Patient transported with:  Registered Nurse          Merari Das RN  11/10/23 21

## 2023-11-11 NOTE — INTERVAL H&P NOTE
Update History & Physical    The Patient's History and Physical of 11/10/2023 was reviewed with the patient and I examined the patient. There was no change. The surgical site was confirmed by the patient and me. Plan:  The risk, benefits, expected outcome, and alternative to the recommended procedure have been discussed with the patient. Patient understands and wants to proceed with open treatment of the left proximal femur fracture with intramedullary nail fixation.     Electronically signed by Delta Tarango MD on 11/11/2023 at 6:46 AM

## 2023-11-11 NOTE — ED TRIAGE NOTES
Arrived via EMS from [facility]- fell in kitchen tripped over feet, hit head + blood thinner, c/o pain left hip. A&Ox4.

## 2023-11-12 LAB
ANION GAP SERPL CALC-SCNC: 7 MMOL/L (ref 2–11)
BASOPHILS # BLD: 0 K/UL (ref 0–0.2)
BASOPHILS NFR BLD: 0 % (ref 0–2)
BUN SERPL-MCNC: 26 MG/DL (ref 8–23)
CALCIUM SERPL-MCNC: 8.4 MG/DL (ref 8.3–10.4)
CHLORIDE SERPL-SCNC: 108 MMOL/L (ref 101–110)
CO2 SERPL-SCNC: 23 MMOL/L (ref 21–32)
CREAT SERPL-MCNC: 1.6 MG/DL (ref 0.8–1.5)
DIFFERENTIAL METHOD BLD: ABNORMAL
EKG ATRIAL RATE: 107 BPM
EKG DIAGNOSIS: NORMAL
EKG Q-T INTERVAL: 446 MS
EKG QRS DURATION: 78 MS
EKG QTC CALCULATION (BAZETT): 481 MS
EKG R AXIS: 66 DEGREES
EKG T AXIS: 264 DEGREES
EKG VENTRICULAR RATE: 70 BPM
EOSINOPHIL # BLD: 0.1 K/UL (ref 0–0.8)
EOSINOPHIL NFR BLD: 1 % (ref 0.5–7.8)
ERYTHROCYTE [DISTWIDTH] IN BLOOD BY AUTOMATED COUNT: 15.2 % (ref 11.9–14.6)
GLUCOSE SERPL-MCNC: 170 MG/DL (ref 65–100)
HCT VFR BLD AUTO: 31.3 % (ref 41.1–50.3)
HGB BLD-MCNC: 10.2 G/DL (ref 13.6–17.2)
IMM GRANULOCYTES # BLD AUTO: 0.1 K/UL (ref 0–0.5)
IMM GRANULOCYTES NFR BLD AUTO: 1 % (ref 0–5)
LACTATE SERPL-SCNC: 1.6 MMOL/L (ref 0.4–2)
LYMPHOCYTES # BLD: 0.5 K/UL (ref 0.5–4.6)
LYMPHOCYTES NFR BLD: 4 % (ref 13–44)
MAGNESIUM SERPL-MCNC: 2 MG/DL (ref 1.8–2.4)
MCH RBC QN AUTO: 30 PG (ref 26.1–32.9)
MCHC RBC AUTO-ENTMCNC: 32.6 G/DL (ref 31.4–35)
MCV RBC AUTO: 92.1 FL (ref 82–102)
MM INDURATION, POC: 0 MM (ref 0–5)
MONOCYTES # BLD: 1.1 K/UL (ref 0.1–1.3)
MONOCYTES NFR BLD: 9 % (ref 4–12)
NEUTS SEG # BLD: 10.6 K/UL (ref 1.7–8.2)
NEUTS SEG NFR BLD: 85 % (ref 43–78)
NRBC # BLD: 0 K/UL (ref 0–0.2)
PLATELET # BLD AUTO: 190 K/UL (ref 150–450)
PMV BLD AUTO: 9.8 FL (ref 9.4–12.3)
POTASSIUM SERPL-SCNC: 4.9 MMOL/L (ref 3.5–5.1)
PPD, POC: NEGATIVE
RBC # BLD AUTO: 3.4 M/UL (ref 4.23–5.6)
SODIUM SERPL-SCNC: 138 MMOL/L (ref 133–143)
WBC # BLD AUTO: 12.4 K/UL (ref 4.3–11.1)

## 2023-11-12 PROCEDURE — 83605 ASSAY OF LACTIC ACID: CPT

## 2023-11-12 PROCEDURE — 6370000000 HC RX 637 (ALT 250 FOR IP): Performed by: ORTHOPAEDIC SURGERY

## 2023-11-12 PROCEDURE — 2580000003 HC RX 258: Performed by: ORTHOPAEDIC SURGERY

## 2023-11-12 PROCEDURE — 99222 1ST HOSP IP/OBS MODERATE 55: CPT | Performed by: INTERNAL MEDICINE

## 2023-11-12 PROCEDURE — 80048 BASIC METABOLIC PNL TOTAL CA: CPT

## 2023-11-12 PROCEDURE — 93005 ELECTROCARDIOGRAM TRACING: CPT | Performed by: FAMILY MEDICINE

## 2023-11-12 PROCEDURE — 1100000003 HC PRIVATE W/ TELEMETRY

## 2023-11-12 PROCEDURE — 36415 COLL VENOUS BLD VENIPUNCTURE: CPT

## 2023-11-12 PROCEDURE — 97530 THERAPEUTIC ACTIVITIES: CPT

## 2023-11-12 PROCEDURE — 84132 ASSAY OF SERUM POTASSIUM: CPT

## 2023-11-12 PROCEDURE — 6360000002 HC RX W HCPCS: Performed by: ORTHOPAEDIC SURGERY

## 2023-11-12 PROCEDURE — 85025 COMPLETE CBC W/AUTO DIFF WBC: CPT

## 2023-11-12 PROCEDURE — 83735 ASSAY OF MAGNESIUM: CPT

## 2023-11-12 RX ADMIN — ASPIRIN 325 MG: 325 TABLET, COATED ORAL at 10:57

## 2023-11-12 RX ADMIN — ACETAMINOPHEN 1000 MG: 500 TABLET, FILM COATED ORAL at 15:53

## 2023-11-12 RX ADMIN — CEFAZOLIN SODIUM 2000 MG: 100 INJECTION, POWDER, LYOPHILIZED, FOR SOLUTION INTRAVENOUS at 03:45

## 2023-11-12 RX ADMIN — ENOXAPARIN SODIUM 40 MG: 100 INJECTION SUBCUTANEOUS at 20:33

## 2023-11-12 RX ADMIN — TROSPIUM CHLORIDE 20 MG: 20 TABLET, FILM COATED ORAL at 20:31

## 2023-11-12 RX ADMIN — ATORVASTATIN CALCIUM 10 MG: 10 TABLET, FILM COATED ORAL at 10:57

## 2023-11-12 RX ADMIN — SODIUM CHLORIDE, PRESERVATIVE FREE 5 ML: 5 INJECTION INTRAVENOUS at 10:57

## 2023-11-12 RX ADMIN — SODIUM CHLORIDE, POTASSIUM CHLORIDE, SODIUM LACTATE AND CALCIUM CHLORIDE: 600; 310; 30; 20 INJECTION, SOLUTION INTRAVENOUS at 07:14

## 2023-11-12 RX ADMIN — SODIUM CHLORIDE, PRESERVATIVE FREE 5 ML: 5 INJECTION INTRAVENOUS at 10:58

## 2023-11-12 RX ADMIN — TAMSULOSIN HYDROCHLORIDE 0.4 MG: 0.4 CAPSULE ORAL at 10:57

## 2023-11-12 RX ADMIN — ACETAMINOPHEN 1000 MG: 500 TABLET, FILM COATED ORAL at 10:57

## 2023-11-12 RX ADMIN — ACETAMINOPHEN 1000 MG: 500 TABLET, FILM COATED ORAL at 20:31

## 2023-11-12 ASSESSMENT — PAIN SCALES - GENERAL: PAINLEVEL_OUTOF10: 2

## 2023-11-12 NOTE — CARE COORDINATION
CM met with patient to discuss discharge plan and needs. Patient alert/orient. Patient verified demographic. Patient states he lives at [facility] alone. Patient states he has been independent with his ADL's and do has DME's (cane) in the home. States his son takes him to his appointments. CM made patient aware that therapy has recommended STR. Patient was agreeable and requested a referral be made to [facility] for [place]. Referral completed. CM will continue to follow and remain available for any needs, concerns or questions that may arise. 11/11/23 9233   Service Assessment   Patient Orientation Alert and Oriented;Person;Place;Situation;Self   Cognition Alert   History Provided By Patient;Medical Record   Primary [address] is: Legal Next of Kin   PCP Verified by CM Yes   Last Visit to PCP Within last 3 months   Prior Functional Level Independent in ADLs/IADLs   Current Functional Level Assistance with the following:;Bathing;Dressing; Mobility   Can patient return to prior living arrangement Yes   Ability to make needs known: Good   Family able to assist with home care needs: Yes   Would you like for me to discuss the discharge plan with any other family members/significant others, and if so, who?  Yes   Financial Resources Medicare   Community Resources Assisted Living   Social/Functional History   Lives With Alone   Type of Home Assisted living   Home Access Level entry   Bathroom Shower/Tub Walk-in shower   Bathroom Toilet Standard   Bathroom Equipment Grab bars in [address]  Help From Family   ADL Assistance Needs assistance   Toileting Needs assistance   [facility] Needs assistance   Homemaking Responsibilities Yes   Ambulation Assistance Needs assistance   Transfer Assistance Needs assistance   Active  No   Patient's  Info son   Discharge

## 2023-11-13 ENCOUNTER — APPOINTMENT (OUTPATIENT)
Dept: NON INVASIVE DIAGNOSTICS | Age: 88
DRG: 481 | End: 2023-11-13
Payer: MEDICARE

## 2023-11-13 LAB
ANION GAP SERPL CALC-SCNC: 6 MMOL/L (ref 2–11)
APPEARANCE UR: CLEAR
BACTERIA URNS QL MICRO: NEGATIVE /HPF
BASOPHILS # BLD: 0 K/UL (ref 0–0.2)
BASOPHILS NFR BLD: 0 % (ref 0–2)
BILIRUB UR QL: NEGATIVE
BUN SERPL-MCNC: 32 MG/DL (ref 8–23)
CALCIUM SERPL-MCNC: 8.6 MG/DL (ref 8.3–10.4)
CASTS URNS QL MICRO: ABNORMAL /LPF
CHLORIDE SERPL-SCNC: 106 MMOL/L (ref 101–110)
CO2 SERPL-SCNC: 25 MMOL/L (ref 21–32)
COLOR UR: ABNORMAL
CREAT SERPL-MCNC: 1.5 MG/DL (ref 0.8–1.5)
DIFFERENTIAL METHOD BLD: ABNORMAL
ECHO AO ROOT DIAM: 3.5 CM
ECHO AO ROOT INDEX: 2 CM/M2
ECHO AV AREA PEAK VELOCITY: 2.4 CM2
ECHO AV AREA VTI: 2.5 CM2
ECHO AV AREA/BSA PEAK VELOCITY: 1.4 CM2/M2
ECHO AV AREA/BSA VTI: 1.4 CM2/M2
ECHO AV MEAN GRADIENT: 7 MMHG
ECHO AV MEAN VELOCITY: 1.2 M/S
ECHO AV PEAK GRADIENT: 12 MMHG
ECHO AV PEAK VELOCITY: 1.7 M/S
ECHO AV VELOCITY RATIO: 0.65
ECHO AV VTI: 28.5 CM
ECHO BSA: 1.73 M2
ECHO EST RA PRESSURE: 5 MMHG
ECHO LA AREA 4C: 14.6 CM2
ECHO LA DIAMETER INDEX: 2 CM/M2
ECHO LA DIAMETER: 3.5 CM
ECHO LA MAJOR AXIS: 4.9 CM
ECHO LA TO AORTIC ROOT RATIO: 1
ECHO LA VOL MOD A4C: 36 ML (ref 18–58)
ECHO LA VOLUME INDEX MOD A4C: 21 ML/M2 (ref 16–34)
ECHO LV FRACTIONAL SHORTENING: 20 % (ref 28–44)
ECHO LV INTERNAL DIMENSION DIASTOLE INDEX: 2.29 CM/M2
ECHO LV INTERNAL DIMENSION DIASTOLIC: 4 CM (ref 4.2–5.9)
ECHO LV INTERNAL DIMENSION SYSTOLIC INDEX: 1.83 CM/M2
ECHO LV INTERNAL DIMENSION SYSTOLIC: 3.2 CM
ECHO LV IVSD: 1.5 CM (ref 0.6–1)
ECHO LV MASS 2D: 232.7 G (ref 88–224)
ECHO LV MASS INDEX 2D: 133 G/M2 (ref 49–115)
ECHO LV POSTERIOR WALL DIASTOLIC: 1.5 CM (ref 0.6–1)
ECHO LV RELATIVE WALL THICKNESS RATIO: 0.75
ECHO LVOT AREA: 3.8 CM2
ECHO LVOT AV VTI INDEX: 0.65
ECHO LVOT DIAM: 2.2 CM
ECHO LVOT MEAN GRADIENT: 2 MMHG
ECHO LVOT PEAK GRADIENT: 5 MMHG
ECHO LVOT PEAK VELOCITY: 1.1 M/S
ECHO LVOT STROKE VOLUME INDEX: 40.2 ML/M2
ECHO LVOT SV: 70.3 ML
ECHO LVOT VTI: 18.5 CM
ECHO MV AREA VTI: 3.4 CM2
ECHO MV E DECELERATION TIME (DT): 200 MS
ECHO MV E VELOCITY: 0.94 M/S
ECHO MV LVOT VTI INDEX: 1.11
ECHO MV MAX VELOCITY: 1.1 M/S
ECHO MV MEAN GRADIENT: 2 MMHG
ECHO MV MEAN VELOCITY: 0.6 M/S
ECHO MV PEAK GRADIENT: 5 MMHG
ECHO MV VTI: 20.6 CM
ECHO PV MAX VELOCITY: 0.8 M/S
ECHO PV PEAK GRADIENT: 2 MMHG
ECHO RIGHT VENTRICULAR SYSTOLIC PRESSURE (RVSP): 56 MMHG
ECHO RV INTERNAL DIMENSION: 2.1 CM
ECHO TV REGURGITANT MAX VELOCITY: 3.58 M/S
ECHO TV REGURGITANT PEAK GRADIENT: 51 MMHG
EKG ATRIAL RATE: 107 BPM
EKG DIAGNOSIS: NORMAL
EKG Q-T INTERVAL: 446 MS
EKG QRS DURATION: 78 MS
EKG QTC CALCULATION (BAZETT): 481 MS
EKG R AXIS: 66 DEGREES
EKG T AXIS: 264 DEGREES
EKG VENTRICULAR RATE: 70 BPM
EOSINOPHIL # BLD: 0.7 K/UL (ref 0–0.8)
EOSINOPHIL NFR BLD: 7 % (ref 0.5–7.8)
EPI CELLS #/AREA URNS HPF: ABNORMAL /HPF
ERYTHROCYTE [DISTWIDTH] IN BLOOD BY AUTOMATED COUNT: 15.5 % (ref 11.9–14.6)
GLUCOSE SERPL-MCNC: 136 MG/DL (ref 65–100)
GLUCOSE UR STRIP.AUTO-MCNC: 100 MG/DL
HCT VFR BLD AUTO: 32.6 % (ref 41.1–50.3)
HGB BLD-MCNC: 10.2 G/DL (ref 13.6–17.2)
HGB UR QL STRIP: NEGATIVE
IMM GRANULOCYTES # BLD AUTO: 0.1 K/UL (ref 0–0.5)
IMM GRANULOCYTES NFR BLD AUTO: 1 % (ref 0–5)
KETONES UR QL STRIP.AUTO: NEGATIVE MG/DL
LEUKOCYTE ESTERASE UR QL STRIP.AUTO: ABNORMAL
LYMPHOCYTES # BLD: 0.9 K/UL (ref 0.5–4.6)
LYMPHOCYTES NFR BLD: 8 % (ref 13–44)
MAGNESIUM SERPL-MCNC: 1.9 MG/DL (ref 1.8–2.4)
MAGNESIUM SERPL-MCNC: 2.1 MG/DL (ref 1.8–2.4)
MCH RBC QN AUTO: 29.2 PG (ref 26.1–32.9)
MCHC RBC AUTO-ENTMCNC: 31.3 G/DL (ref 31.4–35)
MCV RBC AUTO: 93.4 FL (ref 82–102)
MM INDURATION, POC: 0 MM (ref 0–5)
MONOCYTES # BLD: 1.3 K/UL (ref 0.1–1.3)
MONOCYTES NFR BLD: 12 % (ref 4–12)
MUCOUS THREADS URNS QL MICRO: 0 /LPF
NEUTS SEG # BLD: 7.8 K/UL (ref 1.7–8.2)
NEUTS SEG NFR BLD: 72 % (ref 43–78)
NITRITE UR QL STRIP.AUTO: NEGATIVE
NRBC # BLD: 0 K/UL (ref 0–0.2)
PH UR STRIP: 5.5 (ref 5–9)
PLATELET # BLD AUTO: 205 K/UL (ref 150–450)
PMV BLD AUTO: 10.5 FL (ref 9.4–12.3)
POTASSIUM SERPL-SCNC: 3.8 MMOL/L (ref 3.5–5.1)
POTASSIUM SERPL-SCNC: 4.2 MMOL/L (ref 3.5–5.1)
PPD, POC: NEGATIVE
PROT UR STRIP-MCNC: ABNORMAL MG/DL
RBC # BLD AUTO: 3.49 M/UL (ref 4.23–5.6)
RBC #/AREA URNS HPF: ABNORMAL /HPF
SARS-COV-2 RDRP RESP QL NAA+PROBE: NOT DETECTED
SODIUM SERPL-SCNC: 137 MMOL/L (ref 133–143)
SOURCE: NORMAL
SP GR UR REFRACTOMETRY: 1.01 (ref 1–1.02)
URINE CULTURE IF INDICATED: ABNORMAL
UROBILINOGEN UR QL STRIP.AUTO: 0.2 EU/DL (ref 0.2–1)
WBC # BLD AUTO: 10.9 K/UL (ref 4.3–11.1)
WBC URNS QL MICRO: ABNORMAL /HPF

## 2023-11-13 PROCEDURE — 80048 BASIC METABOLIC PNL TOTAL CA: CPT

## 2023-11-13 PROCEDURE — 93306 TTE W/DOPPLER COMPLETE: CPT | Performed by: INTERNAL MEDICINE

## 2023-11-13 PROCEDURE — 93306 TTE W/DOPPLER COMPLETE: CPT

## 2023-11-13 PROCEDURE — 2580000003 HC RX 258: Performed by: ORTHOPAEDIC SURGERY

## 2023-11-13 PROCEDURE — 97535 SELF CARE MNGMENT TRAINING: CPT

## 2023-11-13 PROCEDURE — 1100000003 HC PRIVATE W/ TELEMETRY

## 2023-11-13 PROCEDURE — 2580000003 HC RX 258: Performed by: INTERNAL MEDICINE

## 2023-11-13 PROCEDURE — 6360000002 HC RX W HCPCS: Performed by: INTERNAL MEDICINE

## 2023-11-13 PROCEDURE — 6370000000 HC RX 637 (ALT 250 FOR IP): Performed by: ORTHOPAEDIC SURGERY

## 2023-11-13 PROCEDURE — 85025 COMPLETE CBC W/AUTO DIFF WBC: CPT

## 2023-11-13 PROCEDURE — 87635 SARS-COV-2 COVID-19 AMP PRB: CPT

## 2023-11-13 PROCEDURE — 6360000004 HC RX CONTRAST MEDICATION: Performed by: INTERNAL MEDICINE

## 2023-11-13 PROCEDURE — 83735 ASSAY OF MAGNESIUM: CPT

## 2023-11-13 PROCEDURE — 97530 THERAPEUTIC ACTIVITIES: CPT

## 2023-11-13 PROCEDURE — 99232 SBSQ HOSP IP/OBS MODERATE 35: CPT | Performed by: INTERNAL MEDICINE

## 2023-11-13 PROCEDURE — 81001 URINALYSIS AUTO W/SCOPE: CPT

## 2023-11-13 PROCEDURE — 87086 URINE CULTURE/COLONY COUNT: CPT

## 2023-11-13 PROCEDURE — 93010 ELECTROCARDIOGRAM REPORT: CPT | Performed by: INTERNAL MEDICINE

## 2023-11-13 PROCEDURE — 36415 COLL VENOUS BLD VENIPUNCTURE: CPT

## 2023-11-13 PROCEDURE — 97112 NEUROMUSCULAR REEDUCATION: CPT

## 2023-11-13 RX ORDER — ENOXAPARIN SODIUM 100 MG/ML
30 INJECTION SUBCUTANEOUS EVERY 24 HOURS
Status: DISCONTINUED | OUTPATIENT
Start: 2023-11-13 | End: 2023-11-14 | Stop reason: SDUPTHER

## 2023-11-13 RX ADMIN — ACETAMINOPHEN 1000 MG: 500 TABLET, FILM COATED ORAL at 09:09

## 2023-11-13 RX ADMIN — ENOXAPARIN SODIUM 30 MG: 100 INJECTION SUBCUTANEOUS at 21:25

## 2023-11-13 RX ADMIN — TROSPIUM CHLORIDE 20 MG: 20 TABLET, FILM COATED ORAL at 21:25

## 2023-11-13 RX ADMIN — ATORVASTATIN CALCIUM 10 MG: 10 TABLET, FILM COATED ORAL at 09:09

## 2023-11-13 RX ADMIN — SODIUM CHLORIDE, POTASSIUM CHLORIDE, SODIUM LACTATE AND CALCIUM CHLORIDE: 600; 310; 30; 20 INJECTION, SOLUTION INTRAVENOUS at 09:13

## 2023-11-13 RX ADMIN — ACETAMINOPHEN 1000 MG: 500 TABLET, FILM COATED ORAL at 16:36

## 2023-11-13 RX ADMIN — TAMSULOSIN HYDROCHLORIDE 0.4 MG: 0.4 CAPSULE ORAL at 09:09

## 2023-11-13 RX ADMIN — SODIUM CHLORIDE, PRESERVATIVE FREE 0.45 ML: 5 INJECTION INTRAVENOUS at 11:11

## 2023-11-13 RX ADMIN — ASPIRIN 325 MG: 325 TABLET, COATED ORAL at 09:09

## 2023-11-13 RX ADMIN — CLONIDINE HYDROCHLORIDE 0.1 MG: 0.1 TABLET ORAL at 01:39

## 2023-11-13 RX ADMIN — ACETAMINOPHEN 1000 MG: 500 TABLET, FILM COATED ORAL at 21:25

## 2023-11-13 RX ADMIN — SODIUM CHLORIDE, PRESERVATIVE FREE 10 ML: 5 INJECTION INTRAVENOUS at 09:09

## 2023-11-13 RX ADMIN — SODIUM CHLORIDE, POTASSIUM CHLORIDE, SODIUM LACTATE AND CALCIUM CHLORIDE: 600; 310; 30; 20 INJECTION, SOLUTION INTRAVENOUS at 12:26

## 2023-11-13 ASSESSMENT — PAIN SCALES - GENERAL
PAINLEVEL_OUTOF10: 0
PAINLEVEL_OUTOF10: 0

## 2023-11-13 NOTE — CARE COORDINATION
Pt has been accepted for STR to [facility] [address] but bed will not be available until tomorrow. CM updated pt/son at the bedside. CM will continue to follow to facilitate pt's transfer to rehab at [place].

## 2023-11-14 ENCOUNTER — APPOINTMENT (OUTPATIENT)
Dept: GENERAL RADIOLOGY | Age: 88
DRG: 481 | End: 2023-11-14
Payer: MEDICARE

## 2023-11-14 LAB
ANION GAP SERPL CALC-SCNC: 7 MMOL/L (ref 2–11)
BUN SERPL-MCNC: 28 MG/DL (ref 8–23)
CALCIUM SERPL-MCNC: 8.4 MG/DL (ref 8.3–10.4)
CHLORIDE SERPL-SCNC: 109 MMOL/L (ref 101–110)
CO2 SERPL-SCNC: 24 MMOL/L (ref 21–32)
CREAT SERPL-MCNC: 1.4 MG/DL (ref 0.8–1.5)
GLUCOSE BLD STRIP.AUTO-MCNC: 135 MG/DL (ref 65–100)
GLUCOSE SERPL-MCNC: 142 MG/DL (ref 65–100)
MAGNESIUM SERPL-MCNC: 2.2 MG/DL (ref 1.8–2.4)
POTASSIUM SERPL-SCNC: 4.5 MMOL/L (ref 3.5–5.1)
SERVICE CMNT-IMP: ABNORMAL
SODIUM SERPL-SCNC: 140 MMOL/L (ref 133–143)

## 2023-11-14 PROCEDURE — 6360000002 HC RX W HCPCS: Performed by: NURSE PRACTITIONER

## 2023-11-14 PROCEDURE — 2500000003 HC RX 250 WO HCPCS: Performed by: NURSE PRACTITIONER

## 2023-11-14 PROCEDURE — 6370000000 HC RX 637 (ALT 250 FOR IP): Performed by: ORTHOPAEDIC SURGERY

## 2023-11-14 PROCEDURE — 82962 GLUCOSE BLOOD TEST: CPT

## 2023-11-14 PROCEDURE — 80048 BASIC METABOLIC PNL TOTAL CA: CPT

## 2023-11-14 PROCEDURE — 0QS706Z REPOSITION LEFT UPPER FEMUR WITH INTRAMEDULLARY INTERNAL FIXATION DEVICE, OPEN APPROACH: ICD-10-PCS | Performed by: ORTHOPAEDIC SURGERY

## 2023-11-14 PROCEDURE — 36415 COLL VENOUS BLD VENIPUNCTURE: CPT

## 2023-11-14 PROCEDURE — 6370000000 HC RX 637 (ALT 250 FOR IP): Performed by: FAMILY MEDICINE

## 2023-11-14 PROCEDURE — 2580000003 HC RX 258: Performed by: ORTHOPAEDIC SURGERY

## 2023-11-14 PROCEDURE — 2580000003 HC RX 258: Performed by: INTERNAL MEDICINE

## 2023-11-14 PROCEDURE — 94660 CPAP INITIATION&MGMT: CPT

## 2023-11-14 PROCEDURE — 6370000000 HC RX 637 (ALT 250 FOR IP): Performed by: NURSE PRACTITIONER

## 2023-11-14 PROCEDURE — 83735 ASSAY OF MAGNESIUM: CPT

## 2023-11-14 PROCEDURE — 71045 X-RAY EXAM CHEST 1 VIEW: CPT

## 2023-11-14 PROCEDURE — 2140000000 HC CCU INTERMEDIATE R&B

## 2023-11-14 RX ORDER — METOPROLOL TARTRATE 5 MG/5ML
5 INJECTION INTRAVENOUS ONCE
Status: COMPLETED | OUTPATIENT
Start: 2023-11-14 | End: 2023-11-14

## 2023-11-14 RX ORDER — METOPROLOL TARTRATE 5 MG/5ML
5 INJECTION INTRAVENOUS EVERY 6 HOURS PRN
Status: DISCONTINUED | OUTPATIENT
Start: 2023-11-14 | End: 2023-11-16 | Stop reason: HOSPADM

## 2023-11-14 RX ORDER — METOPROLOL TARTRATE 50 MG/1
50 TABLET, FILM COATED ORAL EVERY 6 HOURS
Status: DISCONTINUED | OUTPATIENT
Start: 2023-11-14 | End: 2023-11-16 | Stop reason: HOSPADM

## 2023-11-14 RX ORDER — FUROSEMIDE 10 MG/ML
40 INJECTION INTRAMUSCULAR; INTRAVENOUS ONCE
Status: COMPLETED | OUTPATIENT
Start: 2023-11-14 | End: 2023-11-14

## 2023-11-14 RX ADMIN — METOPROLOL TARTRATE 5 MG: 1 INJECTION, SOLUTION INTRAVENOUS at 05:47

## 2023-11-14 RX ADMIN — SODIUM CHLORIDE, PRESERVATIVE FREE 10 ML: 5 INJECTION INTRAVENOUS at 20:01

## 2023-11-14 RX ADMIN — METOPROLOL TARTRATE 50 MG: 50 TABLET ORAL at 11:31

## 2023-11-14 RX ADMIN — FUROSEMIDE 40 MG: 10 INJECTION, SOLUTION INTRAMUSCULAR; INTRAVENOUS at 13:53

## 2023-11-14 RX ADMIN — METOPROLOL TARTRATE 25 MG: 25 TABLET, FILM COATED ORAL at 06:22

## 2023-11-14 RX ADMIN — TROSPIUM CHLORIDE 20 MG: 20 TABLET, FILM COATED ORAL at 20:01

## 2023-11-14 RX ADMIN — ACETAMINOPHEN 1000 MG: 500 TABLET, FILM COATED ORAL at 08:01

## 2023-11-14 RX ADMIN — SODIUM CHLORIDE, PRESERVATIVE FREE 10 ML: 5 INJECTION INTRAVENOUS at 08:02

## 2023-11-14 RX ADMIN — APIXABAN 2.5 MG: 2.5 TABLET, FILM COATED ORAL at 20:01

## 2023-11-14 RX ADMIN — ACETAMINOPHEN 1000 MG: 500 TABLET, FILM COATED ORAL at 20:01

## 2023-11-14 RX ADMIN — SODIUM CHLORIDE, POTASSIUM CHLORIDE, SODIUM LACTATE AND CALCIUM CHLORIDE: 600; 310; 30; 20 INJECTION, SOLUTION INTRAVENOUS at 06:25

## 2023-11-14 RX ADMIN — METOPROLOL TARTRATE 5 MG: 5 INJECTION INTRAVENOUS at 17:07

## 2023-11-14 RX ADMIN — METOPROLOL TARTRATE 5 MG: 5 INJECTION INTRAVENOUS at 13:00

## 2023-11-14 RX ADMIN — ACETAMINOPHEN 1000 MG: 500 TABLET, FILM COATED ORAL at 15:11

## 2023-11-14 RX ADMIN — ATORVASTATIN CALCIUM 10 MG: 10 TABLET, FILM COATED ORAL at 08:00

## 2023-11-14 RX ADMIN — METOPROLOL TARTRATE 50 MG: 50 TABLET ORAL at 18:05

## 2023-11-14 RX ADMIN — ASPIRIN 325 MG: 325 TABLET, COATED ORAL at 08:01

## 2023-11-14 RX ADMIN — TAMSULOSIN HYDROCHLORIDE 0.4 MG: 0.4 CAPSULE ORAL at 08:00

## 2023-11-14 RX ADMIN — METOPROLOL TARTRATE 5 MG: 5 INJECTION INTRAVENOUS at 10:31

## 2023-11-14 ASSESSMENT — ENCOUNTER SYMPTOMS
WHEEZING: 0
SHORTNESS OF BREATH: 0
EYES NEGATIVE: 1
GASTROINTESTINAL NEGATIVE: 1
ALLERGIC/IMMUNOLOGIC NEGATIVE: 1
COLOR CHANGE: 0

## 2023-11-14 ASSESSMENT — PAIN SCALES - GENERAL
PAINLEVEL_OUTOF10: 0
PAINLEVEL_OUTOF10: 1
PAINLEVEL_OUTOF10: 0

## 2023-11-14 ASSESSMENT — PAIN DESCRIPTION - DESCRIPTORS: DESCRIPTORS: SORE

## 2023-11-14 ASSESSMENT — PAIN DESCRIPTION - PAIN TYPE: TYPE: ACUTE PAIN;SURGICAL PAIN

## 2023-11-14 ASSESSMENT — PAIN DESCRIPTION - ORIENTATION: ORIENTATION: LEFT

## 2023-11-14 ASSESSMENT — PAIN DESCRIPTION - ONSET: ONSET: GRADUAL

## 2023-11-14 ASSESSMENT — PAIN - FUNCTIONAL ASSESSMENT: PAIN_FUNCTIONAL_ASSESSMENT: PREVENTS OR INTERFERES SOME ACTIVE ACTIVITIES AND ADLS

## 2023-11-14 ASSESSMENT — PAIN DESCRIPTION - LOCATION: LOCATION: HIP;INCISION

## 2023-11-14 ASSESSMENT — PAIN DESCRIPTION - FREQUENCY: FREQUENCY: INTERMITTENT

## 2023-11-14 NOTE — MANAGEMENT PLAN
Notified by primary RN that patient is in AFIB w RVR, sustaining rates 130-140. Patient is being followed by EP service. Tikosyn and BB therapy was stopped due to pauses/bradycardia.  Systolic BP 423B    Plan:  -- give 5mg IV lopressor now followed by 25mg oral lopressor Q6.   -- further recommendations to be made during rounds this AM.     Ezra Carlton, ROSETTE - CNP  5:50 AM

## 2023-11-14 NOTE — CARE COORDINATION
Pt not medically ready for dc to rehab today. Facility notified and bed will be available tomorrow if he is cleared for dc. CM following.

## 2023-11-15 LAB
ANION GAP SERPL CALC-SCNC: 11 MMOL/L (ref 2–11)
BACTERIA SPEC CULT: NORMAL
BASOPHILS # BLD: 0 K/UL (ref 0–0.2)
BASOPHILS NFR BLD: 0 % (ref 0–2)
BUN SERPL-MCNC: 31 MG/DL (ref 8–23)
CALCIUM SERPL-MCNC: 8.4 MG/DL (ref 8.3–10.4)
CHLORIDE SERPL-SCNC: 105 MMOL/L (ref 101–110)
CO2 SERPL-SCNC: 23 MMOL/L (ref 21–32)
CREAT SERPL-MCNC: 1.6 MG/DL (ref 0.8–1.5)
DIFFERENTIAL METHOD BLD: ABNORMAL
EOSINOPHIL # BLD: 0.7 K/UL (ref 0–0.8)
EOSINOPHIL NFR BLD: 7 % (ref 0.5–7.8)
ERYTHROCYTE [DISTWIDTH] IN BLOOD BY AUTOMATED COUNT: 15.4 % (ref 11.9–14.6)
GLUCOSE SERPL-MCNC: 138 MG/DL (ref 65–100)
HCT VFR BLD AUTO: 29.6 % (ref 41.1–50.3)
HGB BLD-MCNC: 9.5 G/DL (ref 13.6–17.2)
IMM GRANULOCYTES # BLD AUTO: 0.1 K/UL (ref 0–0.5)
IMM GRANULOCYTES NFR BLD AUTO: 1 % (ref 0–5)
LYMPHOCYTES # BLD: 1 K/UL (ref 0.5–4.6)
LYMPHOCYTES NFR BLD: 11 % (ref 13–44)
MCH RBC QN AUTO: 29 PG (ref 26.1–32.9)
MCHC RBC AUTO-ENTMCNC: 32.1 G/DL (ref 31.4–35)
MCV RBC AUTO: 90.2 FL (ref 82–102)
MONOCYTES # BLD: 1.3 K/UL (ref 0.1–1.3)
MONOCYTES NFR BLD: 14 % (ref 4–12)
NEUTS SEG # BLD: 6.3 K/UL (ref 1.7–8.2)
NEUTS SEG NFR BLD: 67 % (ref 43–78)
NRBC # BLD: 0.02 K/UL (ref 0–0.2)
PLATELET # BLD AUTO: 220 K/UL (ref 150–450)
PMV BLD AUTO: 9.6 FL (ref 9.4–12.3)
POTASSIUM SERPL-SCNC: 4.2 MMOL/L (ref 3.5–5.1)
RBC # BLD AUTO: 3.28 M/UL (ref 4.23–5.6)
SERVICE CMNT-IMP: NORMAL
SODIUM SERPL-SCNC: 139 MMOL/L (ref 133–143)
WBC # BLD AUTO: 9.4 K/UL (ref 4.3–11.1)

## 2023-11-15 PROCEDURE — 2140000000 HC CCU INTERMEDIATE R&B

## 2023-11-15 PROCEDURE — 6370000000 HC RX 637 (ALT 250 FOR IP): Performed by: FAMILY MEDICINE

## 2023-11-15 PROCEDURE — 6370000000 HC RX 637 (ALT 250 FOR IP): Performed by: NURSE PRACTITIONER

## 2023-11-15 PROCEDURE — 85025 COMPLETE CBC W/AUTO DIFF WBC: CPT

## 2023-11-15 PROCEDURE — 80048 BASIC METABOLIC PNL TOTAL CA: CPT

## 2023-11-15 PROCEDURE — 97530 THERAPEUTIC ACTIVITIES: CPT

## 2023-11-15 PROCEDURE — 2580000003 HC RX 258: Performed by: ORTHOPAEDIC SURGERY

## 2023-11-15 PROCEDURE — 36415 COLL VENOUS BLD VENIPUNCTURE: CPT

## 2023-11-15 PROCEDURE — 97110 THERAPEUTIC EXERCISES: CPT

## 2023-11-15 PROCEDURE — 6370000000 HC RX 637 (ALT 250 FOR IP): Performed by: ORTHOPAEDIC SURGERY

## 2023-11-15 RX ADMIN — APIXABAN 2.5 MG: 2.5 TABLET, FILM COATED ORAL at 20:16

## 2023-11-15 RX ADMIN — SODIUM CHLORIDE, PRESERVATIVE FREE 5 ML: 5 INJECTION INTRAVENOUS at 20:17

## 2023-11-15 RX ADMIN — SODIUM CHLORIDE, PRESERVATIVE FREE 10 ML: 5 INJECTION INTRAVENOUS at 07:49

## 2023-11-15 RX ADMIN — METOPROLOL TARTRATE 50 MG: 50 TABLET ORAL at 17:39

## 2023-11-15 RX ADMIN — METOPROLOL TARTRATE 50 MG: 50 TABLET ORAL at 11:59

## 2023-11-15 RX ADMIN — ACETAMINOPHEN 1000 MG: 500 TABLET, FILM COATED ORAL at 07:47

## 2023-11-15 RX ADMIN — ACETAMINOPHEN 1000 MG: 500 TABLET, FILM COATED ORAL at 20:15

## 2023-11-15 RX ADMIN — APIXABAN 2.5 MG: 2.5 TABLET, FILM COATED ORAL at 07:46

## 2023-11-15 RX ADMIN — METOPROLOL TARTRATE 50 MG: 50 TABLET ORAL at 05:19

## 2023-11-15 RX ADMIN — TAMSULOSIN HYDROCHLORIDE 0.4 MG: 0.4 CAPSULE ORAL at 07:46

## 2023-11-15 RX ADMIN — METOPROLOL TARTRATE 50 MG: 50 TABLET ORAL at 00:00

## 2023-11-15 RX ADMIN — ATORVASTATIN CALCIUM 10 MG: 10 TABLET, FILM COATED ORAL at 07:47

## 2023-11-15 RX ADMIN — SODIUM CHLORIDE, PRESERVATIVE FREE 5 ML: 5 INJECTION INTRAVENOUS at 20:16

## 2023-11-15 RX ADMIN — TROSPIUM CHLORIDE 20 MG: 20 TABLET, FILM COATED ORAL at 20:15

## 2023-11-15 ASSESSMENT — PAIN SCALES - GENERAL: PAINLEVEL_OUTOF10: 0

## 2023-11-15 NOTE — FLOWSHEET NOTE
11/14/23 1258 11/14/23 1259 11/14/23 1302   Vital Signs   Pulse  --   --  (!) 154   Respirations  --   --   --    BP (!) 131/113 (!) 143/128 (!) 150/120   MAP (Calculated) 119 133 130   BP Location Right upper arm Left upper arm Left upper arm   BP Method Automatic Automatic Automatic   Patient Position Supine Supine Supine      11/14/23 1306 11/14/23 1310 11/14/23 1316   Vital Signs   Pulse (!) 134 (!) 145 (!) 141   Respirations  --   --  (!) 33   BP (!) 159/128 (!) 128/115 (!) 147/112   MAP (Calculated) 138 119 124   BP Location Left upper arm Left upper arm Left upper arm   BP Method Automatic Automatic Automatic   Patient Position Supine Supine Supine      11/14/23 1321 11/14/23 1332 11/14/23 1337   Vital Signs   Pulse (!) 152 (!) 109 (!) 128   Respirations  --   --   --    BP (!) 143/117 (!) 144/109 (!) 146/116   MAP (Calculated) 126 121 126   BP Location Left upper arm Left upper arm Left upper arm   BP Method Automatic Automatic Automatic   Patient Position Supine Supine Supine      11/14/23 1345 11/14/23 1350 11/14/23 1354   Vital Signs   Pulse (!) 143 (!) 102 (!) 135   Respirations  --   --   --    BP (!) 135/108 (!) 139/109 (!) 146/123   MAP (Calculated) 117 119 131   BP Location  --  Left upper arm Left upper arm   BP Method  --  Automatic Automatic   Patient Position  --  Supine Supine      11/14/23 1400   Vital Signs   Pulse (!) 136   Respirations  --    BP (!) 143/122   MAP (Calculated) 129   BP Location  --    BP Method  --    Patient Position  --      On assessment pt. Was flushed with c/o feeling of chest fullness and pressure. See above vital sign. Auscultated apical pulse noted a 8 second pause in heart rate. Per telemetry heart rate sustaining between 120-150's. [place] notified and assessed pt. Cardio and Hospitalist made aware. Pt. Tu Chapin remained unstable. Rapid Response was called @ 1350.

## 2023-11-16 VITALS
OXYGEN SATURATION: 95 % | HEART RATE: 76 BPM | WEIGHT: 139 LBS | DIASTOLIC BLOOD PRESSURE: 84 MMHG | SYSTOLIC BLOOD PRESSURE: 141 MMHG | BODY MASS INDEX: 21.07 KG/M2 | HEIGHT: 68 IN | RESPIRATION RATE: 16 BRPM | TEMPERATURE: 97.5 F

## 2023-11-16 LAB
ANION GAP SERPL CALC-SCNC: 7 MMOL/L (ref 2–11)
BASOPHILS # BLD: 0 K/UL (ref 0–0.2)
BASOPHILS NFR BLD: 0 % (ref 0–2)
BUN SERPL-MCNC: 34 MG/DL (ref 8–23)
CALCIUM SERPL-MCNC: 8.6 MG/DL (ref 8.3–10.4)
CHLORIDE SERPL-SCNC: 106 MMOL/L (ref 101–110)
CO2 SERPL-SCNC: 23 MMOL/L (ref 21–32)
CREAT SERPL-MCNC: 1.6 MG/DL (ref 0.8–1.5)
DIFFERENTIAL METHOD BLD: ABNORMAL
EOSINOPHIL # BLD: 0.4 K/UL (ref 0–0.8)
EOSINOPHIL NFR BLD: 4 % (ref 0.5–7.8)
ERYTHROCYTE [DISTWIDTH] IN BLOOD BY AUTOMATED COUNT: 15.1 % (ref 11.9–14.6)
GLUCOSE SERPL-MCNC: 188 MG/DL (ref 65–100)
HCT VFR BLD AUTO: 32.1 % (ref 41.1–50.3)
HGB BLD-MCNC: 10.3 G/DL (ref 13.6–17.2)
IMM GRANULOCYTES # BLD AUTO: 0.1 K/UL (ref 0–0.5)
IMM GRANULOCYTES NFR BLD AUTO: 1 % (ref 0–5)
LYMPHOCYTES # BLD: 1 K/UL (ref 0.5–4.6)
LYMPHOCYTES NFR BLD: 11 % (ref 13–44)
MCH RBC QN AUTO: 29.2 PG (ref 26.1–32.9)
MCHC RBC AUTO-ENTMCNC: 32.1 G/DL (ref 31.4–35)
MCV RBC AUTO: 90.9 FL (ref 82–102)
MONOCYTES # BLD: 1.3 K/UL (ref 0.1–1.3)
MONOCYTES NFR BLD: 13 % (ref 4–12)
NEUTS SEG # BLD: 6.9 K/UL (ref 1.7–8.2)
NEUTS SEG NFR BLD: 71 % (ref 43–78)
NRBC # BLD: 0 K/UL (ref 0–0.2)
PHOSPHATE SERPL-MCNC: 3.5 MG/DL (ref 2.3–3.7)
PLATELET # BLD AUTO: 266 K/UL (ref 150–450)
PMV BLD AUTO: 9.5 FL (ref 9.4–12.3)
POTASSIUM SERPL-SCNC: 4 MMOL/L (ref 3.5–5.1)
RBC # BLD AUTO: 3.53 M/UL (ref 4.23–5.6)
SARS-COV-2 RDRP RESP QL NAA+PROBE: NOT DETECTED
SODIUM SERPL-SCNC: 136 MMOL/L (ref 133–143)
SOURCE: NORMAL
WBC # BLD AUTO: 9.7 K/UL (ref 4.3–11.1)

## 2023-11-16 PROCEDURE — 94660 CPAP INITIATION&MGMT: CPT

## 2023-11-16 PROCEDURE — 80048 BASIC METABOLIC PNL TOTAL CA: CPT

## 2023-11-16 PROCEDURE — 6370000000 HC RX 637 (ALT 250 FOR IP): Performed by: ORTHOPAEDIC SURGERY

## 2023-11-16 PROCEDURE — 6370000000 HC RX 637 (ALT 250 FOR IP): Performed by: NURSE PRACTITIONER

## 2023-11-16 PROCEDURE — 97530 THERAPEUTIC ACTIVITIES: CPT

## 2023-11-16 PROCEDURE — 97535 SELF CARE MNGMENT TRAINING: CPT

## 2023-11-16 PROCEDURE — 36415 COLL VENOUS BLD VENIPUNCTURE: CPT

## 2023-11-16 PROCEDURE — 87635 SARS-COV-2 COVID-19 AMP PRB: CPT

## 2023-11-16 PROCEDURE — 85025 COMPLETE CBC W/AUTO DIFF WBC: CPT

## 2023-11-16 PROCEDURE — 97112 NEUROMUSCULAR REEDUCATION: CPT

## 2023-11-16 PROCEDURE — 6370000000 HC RX 637 (ALT 250 FOR IP): Performed by: FAMILY MEDICINE

## 2023-11-16 PROCEDURE — 84100 ASSAY OF PHOSPHORUS: CPT

## 2023-11-16 PROCEDURE — 2580000003 HC RX 258: Performed by: ORTHOPAEDIC SURGERY

## 2023-11-16 RX ORDER — OXYCODONE HYDROCHLORIDE 5 MG/1
5 TABLET ORAL EVERY 4 HOURS PRN
Qty: 12 TABLET | Refills: 0 | Status: SHIPPED | OUTPATIENT
Start: 2023-11-16 | End: 2023-11-16 | Stop reason: SDUPTHER

## 2023-11-16 RX ORDER — METOPROLOL TARTRATE 50 MG/1
50 TABLET, FILM COATED ORAL EVERY 6 HOURS
Qty: 60 TABLET | Refills: 0 | Status: SHIPPED | OUTPATIENT
Start: 2023-11-16 | End: 2023-12-16

## 2023-11-16 RX ORDER — OXYCODONE HYDROCHLORIDE 5 MG/1
5 TABLET ORAL EVERY 4 HOURS PRN
Qty: 12 TABLET | Refills: 0 | Status: SHIPPED | OUTPATIENT
Start: 2023-11-16 | End: 2023-11-19

## 2023-11-16 RX ADMIN — ATORVASTATIN CALCIUM 10 MG: 10 TABLET, FILM COATED ORAL at 07:43

## 2023-11-16 RX ADMIN — APIXABAN 2.5 MG: 2.5 TABLET, FILM COATED ORAL at 07:43

## 2023-11-16 RX ADMIN — TAMSULOSIN HYDROCHLORIDE 0.4 MG: 0.4 CAPSULE ORAL at 07:43

## 2023-11-16 RX ADMIN — ACETAMINOPHEN 1000 MG: 500 TABLET, FILM COATED ORAL at 07:42

## 2023-11-16 RX ADMIN — METOPROLOL TARTRATE 50 MG: 50 TABLET ORAL at 05:59

## 2023-11-16 RX ADMIN — METOPROLOL TARTRATE 50 MG: 50 TABLET ORAL at 12:48

## 2023-11-16 RX ADMIN — METOPROLOL TARTRATE 50 MG: 50 TABLET ORAL at 00:13

## 2023-11-16 RX ADMIN — SODIUM CHLORIDE, PRESERVATIVE FREE 10 ML: 5 INJECTION INTRAVENOUS at 07:43

## 2023-11-16 NOTE — PLAN OF CARE
Problem: Discharge Planning  Goal: Discharge to home or other facility with appropriate resources  11/12/2023 0332 by Veronica Sprague RN  Outcome: Progressing  Flowsheets (Taken 11/11/2023 2000)  Discharge to home or other facility with appropriate resources: Identify barriers to discharge with patient and caregiver  11/11/2023 1430 by Ivett Coates RN  Outcome: Progressing     Problem: Pain  Goal: Verbalizes/displays adequate comfort level or baseline comfort level  11/12/2023 0332 by Veronica Sprague RN  Outcome: Progressing  11/11/2023 1430 by Ivett Coates RN  Outcome: Progressing     Problem: Skin/Tissue Integrity  Goal: Absence of new skin breakdown  Description: 1. Monitor for areas of redness and/or skin breakdown  2. Assess vascular access sites hourly  3. Every 4-6 hours minimum:  Change oxygen saturation probe site  4. Every 4-6 hours:  If on nasal continuous positive airway pressure, respiratory therapy assess nares and determine need for appliance change or resting period.   11/12/2023 0332 by Veronica Sprague RN  Outcome: Progressing  11/11/2023 1430 by Ivett Coates RN  Outcome: Progressing     Problem: ABCDS Injury Assessment  Goal: Absence of physical injury  11/12/2023 0332 by Veronica Sprague RN  Outcome: Progressing  Flowsheets (Taken 11/11/2023 2000)  Absence of Physical Injury: Implement safety measures based on patient assessment  11/11/2023 1430 by Ivtet Coates RN  Outcome: Progressing     Problem: Safety - Adult  Goal: Free from fall injury  11/12/2023 0332 by Veronica Sprague RN  Outcome: Progressing  Flowsheets (Taken 11/11/2023 2000)  Free From Fall Injury: Instruct family/caregiver on patient safety  11/11/2023 1430 by Ivett Coates RN  Outcome: Progressing  Flowsheets (Taken 11/11/2023 1429)  Free From Fall Injury: Instruct family/caregiver on patient safety     Problem: Chronic Conditions and Co-morbidities  Goal: Patient's chronic conditions and co-morbidity symptoms are
Problem: Discharge Planning  Goal: Discharge to home or other facility with appropriate resources  11/12/2023 2230 by Jaycee Kim RN  Outcome: Progressing  11/12/2023 1949 by Jaycee Kim RN  Outcome: Progressing     Problem: Pain  Goal: Verbalizes/displays adequate comfort level or baseline comfort level  11/12/2023 2230 by Jaycee Kim RN  Outcome: Progressing  11/12/2023 1949 by Jaycee Kim RN  Outcome: Progressing     Problem: Skin/Tissue Integrity  Goal: Absence of new skin breakdown  Description: 1. Monitor for areas of redness and/or skin breakdown  2. Assess vascular access sites hourly  3. Every 4-6 hours minimum:  Change oxygen saturation probe site  4. Every 4-6 hours:  If on nasal continuous positive airway pressure, respiratory therapy assess nares and determine need for appliance change or resting period.   11/12/2023 2230 by Jaycee Kim RN  Outcome: Progressing  11/12/2023 1949 by Jaycee Kim RN  Outcome: Progressing     Problem: ABCDS Injury Assessment  Goal: Absence of physical injury  11/12/2023 2230 by Jaycee Kim RN  Outcome: Progressing  11/12/2023 1949 by Jaycee Kim RN  Outcome: Progressing     Problem: Safety - Adult  Goal: Free from fall injury  11/12/2023 2230 by Jaycee Kim RN  Outcome: Progressing  11/12/2023 1949 by Jaycee Kim RN  Outcome: Progressing     Problem: Chronic Conditions and Co-morbidities  Goal: Patient's chronic conditions and co-morbidity symptoms are monitored and maintained or improved  11/12/2023 2230 by Jaycee Kim RN  Outcome: Progressing  11/12/2023 1949 by Jaycee Kim RN  Outcome: Progressing
Problem: Discharge Planning  Goal: Discharge to home or other facility with appropriate resources  11/13/2023 1048 by Michael Salas RN  Outcome: Progressing  Flowsheets (Taken 11/13/2023 9880)  Discharge to home or other facility with appropriate resources:   Arrange for needed discharge resources and transportation as appropriate   Identify barriers to discharge with patient and caregiver   Identify discharge learning needs (meds, wound care, etc)  11/12/2023 2230 by Montse Bowers RN  Outcome: Progressing     Problem: Pain  Goal: Verbalizes/displays adequate comfort level or baseline comfort level  11/13/2023 1048 by Michael Salas RN  Outcome: Progressing  11/12/2023 2230 by Montse Bowers RN  Outcome: Progressing     Problem: Skin/Tissue Integrity  Goal: Absence of new skin breakdown  Description: 1. Monitor for areas of redness and/or skin breakdown  2. Assess vascular access sites hourly  3. Every 4-6 hours minimum:  Change oxygen saturation probe site  4. Every 4-6 hours:  If on nasal continuous positive airway pressure, respiratory therapy assess nares and determine need for appliance change or resting period.   11/13/2023 1048 by Michael Salas RN  Outcome: Progressing  11/12/2023 2230 by Montse Bowers RN  Outcome: Progressing     Problem: ABCDS Injury Assessment  Goal: Absence of physical injury  11/13/2023 1048 by Michael Salas RN  Outcome: Progressing  Flowsheets (Taken 11/13/2023 0733)  Absence of Physical Injury: Implement safety measures based on patient assessment  11/12/2023 2230 by Montse Bowers RN  Outcome: Progressing     Problem: Safety - Adult  Goal: Free from fall injury  11/13/2023 1048 by Michael Salas RN  Outcome: Progressing  Flowsheets (Taken 11/13/2023 0733)  Free From Fall Injury:   Instruct family/caregiver on patient safety   Based on caregiver fall risk screen, instruct family/caregiver to ask for assistance with transferring infant
Problem: Discharge Planning  Goal: Discharge to home or other facility with appropriate resources  11/14/2023 0910 by Carly Rivera RN  Outcome: Progressing  Flowsheets (Taken 11/14/2023 0011)  Discharge to home or other facility with appropriate resources:   Identify barriers to discharge with patient and caregiver   Arrange for needed discharge resources and transportation as appropriate   Identify discharge learning needs (meds, wound care, etc)  11/13/2023 2031 by Maria D Schumacher RN  Outcome: Progressing  11/13/2023 2031 by Maria D Schumacher RN  Outcome: Progressing     Problem: Pain  Goal: Verbalizes/displays adequate comfort level or baseline comfort level  11/14/2023 0910 by Carly Rivera RN  Outcome: Progressing  11/13/2023 2031 by Maria D Schumacher RN  Outcome: Progressing  11/13/2023 2031 by Maria D Schumacher RN  Outcome: Progressing     Problem: Skin/Tissue Integrity  Goal: Absence of new skin breakdown  Description: 1. Monitor for areas of redness and/or skin breakdown  2. Assess vascular access sites hourly  3. Every 4-6 hours minimum:  Change oxygen saturation probe site  4. Every 4-6 hours:  If on nasal continuous positive airway pressure, respiratory therapy assess nares and determine need for appliance change or resting period.   11/14/2023 0910 by Carly Rviera RN  Outcome: Progressing  11/13/2023 2031 by Maria D Schumacher RN  Outcome: Progressing  11/13/2023 2031 by Maria D Schumacher RN  Outcome: Progressing     Problem: ABCDS Injury Assessment  Goal: Absence of physical injury  11/14/2023 0910 by Carly Rivera RN  Outcome: Progressing  Flowsheets (Taken 11/14/2023 0733)  Absence of Physical Injury: Implement safety measures based on patient assessment  11/13/2023 2031 by Maria D Schumacher RN  Outcome: Progressing  11/13/2023 2031 by Maria D Schumacher RN  Outcome: Progressing     Problem: Safety - Adult  Goal: Free from fall
Problem: Discharge Planning  Goal: Discharge to home or other facility with appropriate resources  11/15/2023 2335 by Oscar Ramírez RN  Outcome: Progressing  11/15/2023 1117 by Anthony Chambers RN  Outcome: Progressing  Flowsheets (Taken 11/15/2023 6697)  Discharge to home or other facility with appropriate resources:   Identify barriers to discharge with patient and caregiver   Arrange for needed discharge resources and transportation as appropriate   Identify discharge learning needs (meds, wound care, etc)   Refer to discharge planning if patient needs post-hospital services based on physician order or complex needs related to functional status, cognitive ability or social support system     Problem: Pain  Goal: Verbalizes/displays adequate comfort level or baseline comfort level  11/15/2023 2335 by Oscar Ramírez RN  Outcome: Progressing  11/15/2023 1117 by Anthony Chambers RN  Outcome: Progressing     Problem: Skin/Tissue Integrity  Goal: Absence of new skin breakdown  Description: 1. Monitor for areas of redness and/or skin breakdown  2. Assess vascular access sites hourly  3. Every 4-6 hours minimum:  Change oxygen saturation probe site  4. Every 4-6 hours:  If on nasal continuous positive airway pressure, respiratory therapy assess nares and determine need for appliance change or resting period.   11/15/2023 2335 by Oscar Ramírez RN  Outcome: Progressing  11/15/2023 1117 by Anthony Chambers RN  Outcome: Progressing     Problem: ABCDS Injury Assessment  Goal: Absence of physical injury  11/15/2023 1117 by Anthony Chambers RN  Outcome: Progressing  Flowsheets (Taken 11/15/2023 0751)  Absence of Physical Injury: Implement safety measures based on patient assessment     Problem: Safety - Adult  Goal: Free from fall injury  11/15/2023 2335 by Oscar Ramírez RN  Outcome: Progressing  11/15/2023 1117 by Anthony Chambers RN  Outcome: Progressing  Flowsheets (Taken 11/15/2023 0751)  Free From Fall
Problem: Discharge Planning  Goal: Discharge to home or other facility with appropriate resources  11/16/2023 1120 by Radha Hirsch RN  Outcome: Completed  Flowsheets (Taken 11/16/2023 0746 by Estella Daniels RN)  Discharge to home or other facility with appropriate resources:   Identify barriers to discharge with patient and caregiver   Arrange for needed discharge resources and transportation as appropriate   Identify discharge learning needs (meds, wound care, etc)   Refer to discharge planning if patient needs post-hospital services based on physician order or complex needs related to functional status, cognitive ability or social support system  11/15/2023 2335 by Earlene Bates RN  Outcome: Progressing     Problem: Pain  Goal: Verbalizes/displays adequate comfort level or baseline comfort level  11/16/2023 1120 by Radha Hirsch RN  Outcome: Completed  11/15/2023 2335 by Earlene Bates RN  Outcome: Progressing     Problem: Skin/Tissue Integrity  Goal: Absence of new skin breakdown  Description: 1. Monitor for areas of redness and/or skin breakdown  2. Assess vascular access sites hourly  3. Every 4-6 hours minimum:  Change oxygen saturation probe site  4. Every 4-6 hours:  If on nasal continuous positive airway pressure, respiratory therapy assess nares and determine need for appliance change or resting period.   11/16/2023 1120 by Radha Hirsch RN  Outcome: Completed  11/15/2023 2335 by Earlene Bates RN  Outcome: Progressing     Problem: ABCDS Injury Assessment  Goal: Absence of physical injury  Outcome: Completed  Flowsheets (Taken 11/16/2023 0746 by Estella Daniels RN)  Absence of Physical Injury: Implement safety measures based on patient assessment     Problem: Safety - Adult  Goal: Free from fall injury  11/16/2023 1120 by Radha Hirsch RN  Outcome: Completed  Flowsheets (Taken 11/16/2023 0746 by Estella Daniels RN)  Free From Fall Injury: Instruct family/caregiver on patient
Problem: Discharge Planning  Goal: Discharge to home or other facility with appropriate resources  Outcome: Progressing  Flowsheets (Taken 11/11/2023 0017)  Discharge to home or other facility with appropriate resources: Identify barriers to discharge with patient and caregiver     Problem: Pain  Goal: Verbalizes/displays adequate comfort level or baseline comfort level  Outcome: Progressing     Problem: Skin/Tissue Integrity  Goal: Absence of new skin breakdown  Description: 1. Monitor for areas of redness and/or skin breakdown  2. Assess vascular access sites hourly  3. Every 4-6 hours minimum:  Change oxygen saturation probe site  4. Every 4-6 hours:  If on nasal continuous positive airway pressure, respiratory therapy assess nares and determine need for appliance change or resting period.   Outcome: Progressing     Problem: ABCDS Injury Assessment  Goal: Absence of physical injury  Outcome: Progressing     Problem: Safety - Adult  Goal: Free from fall injury  Outcome: Progressing
Problem: Discharge Planning  Goal: Discharge to home or other facility with appropriate resources  Outcome: Progressing  Flowsheets (Taken 11/11/2023 0017)  Discharge to home or other facility with appropriate resources: Identify barriers to discharge with patient and caregiver     Problem: Pain  Goal: Verbalizes/displays adequate comfort level or baseline comfort level  Outcome: Progressing     Problem: Skin/Tissue Integrity  Goal: Absence of new skin breakdown  Description: 1. Monitor for areas of redness and/or skin breakdown  2. Assess vascular access sites hourly  3. Every 4-6 hours minimum:  Change oxygen saturation probe site  4. Every 4-6 hours:  If on nasal continuous positive airway pressure, respiratory therapy assess nares and determine need for appliance change or resting period.   Outcome: Progressing     Problem: ABCDS Injury Assessment  Goal: Absence of physical injury  Outcome: Progressing     Problem: Safety - Adult  Goal: Free from fall injury  Outcome: Progressing
injury  11/13/2023 2031 by Delia Guevara RN  Outcome: Progressing  11/13/2023 2031 by Min Tay RN  Outcome: Progressing  11/13/2023 1048 by Beltran Queen RN  Outcome: Progressing  Flowsheets (Taken 11/13/2023 4197)  Free From Fall Injury:   Instruct family/caregiver on patient safety   Based on caregiver fall risk screen, instruct family/caregiver to ask for assistance with transferring infant if caregiver noted to have fall risk factors     Problem: Chronic Conditions and Co-morbidities  Goal: Patient's chronic conditions and co-morbidity symptoms are monitored and maintained or improved  11/13/2023 2031 by Min Tay RN  Outcome: Progressing  11/13/2023 2031 by Min Tay RN  Outcome: Progressing  11/13/2023 1048 by Beltran Queen RN  Outcome: Progressing  Flowsheets (Taken 11/13/2023 0733)  Care Plan - Patient's Chronic Conditions and Co-Morbidity Symptoms are Monitored and Maintained or Improved:   Monitor and assess patient's chronic conditions and comorbid symptoms for stability, deterioration, or improvement   Collaborate with multidisciplinary team to address chronic and comorbid conditions and prevent exacerbation or deterioration
stability, deterioration, or improvement   Collaborate with multidisciplinary team to address chronic and comorbid conditions and prevent exacerbation or deterioration   Update acute care plan with appropriate goals if chronic or comorbid symptoms are exacerbated and prevent overall improvement and discharge

## 2023-11-16 NOTE — DISCHARGE SUMMARY
SPECIAL REQUESTS  Reflexed from [id number]       Culture NO GROWTH 2 DAYS       COVID-19, Rapid [[id number]] Collected: 11/13/23 0169    Order Status: Completed Specimen: Nasopharyngeal Updated: 11/13/23 0942     Source NASAL        SARS-CoV-2, Rapid Not detected        Comment:    The specimen is NEGATIVE for SARS-CoV-2, the novel coronavirus associated with COVID-19. A negative result does not rule out COVID-19. This test has been authorized by the FDA under an Emergency Use Authorization (EUA) for use by authorized laboratories.       Fact sheet for Healthcare Providers: [employer]  Fact sheet for Patients: [employer]     Methodology: Isothermal Nucleic Acid Amplification         Culture, Blood 2 [[id number]] Collected: 11/11/23 0024    Order Status: Completed Specimen: Blood Updated: 11/15/23 0747     Special Requests --        LEFT  FOREARM       Culture NO GROWTH 4 DAYS       Culture, Blood 1 [[id number]] Collected: 11/11/23 0020    Order Status: Completed Specimen: Blood Updated: 11/15/23 0747     Special Requests --        RIGHT  Antecubital       Culture NO GROWTH 4 DAYS               All Labs from Last 24 Hrs:  Recent Results (from the past 24 hour(s))   CBC with Auto Differential    Collection Time: 11/16/23  4:32 AM   Result Value Ref Range    WBC 9.7 4.3 - 11.1 K/uL    RBC 3.53 (L) 4.23 - 5.6 M/uL    Hemoglobin 10.3 (L) 13.6 - 17.2 g/dL    Hematocrit 32.1 (L) 41.1 - 50.3 %    MCV 90.9 82 - 102 FL    MCH 29.2 26.1 - 32.9 PG    MCHC 32.1 31.4 - 35.0 g/dL    RDW 15.1 (H) 11.9 - 14.6 %    Platelets 727 841 - 603 K/uL    MPV 9.5 9.4 - 12.3 FL    nRBC 0.00 0.0 - 0.2 K/uL    Differential Type AUTOMATED      Neutrophils % 71 43 - 78 %    Lymphocytes % 11 (L) 13 - 44 %    Monocytes % 13 (H) 4.0 - 12.0 %    Eosinophils % 4 0.5 - 7.8 %    Basophils % 0 0.0 - 2.0 %    Immature Granulocytes 1 0.0 - 5.0 %    Neutrophils Absolute 6.9 1.7 - 8.2 K/UL

## 2023-11-16 NOTE — CARE COORDINATION
Discharge order is in. Pt is discharging today in stable condition and transferring to [facility] [place], [place] [address]. Call for report: [phone]. Arsenio Ga arranged for 1400. No other discharge needs identified. Tx goals met. 11/16/23 [address] Discharge   Transition of Care Consult (CM Consult) Discharge Planning;SNF  ([place], [place] [address])   [address] Discharge [address] (SNF)   [address] Provided? No   Mode of Transport at Discharge [address] Time of Discharge 1400   Confirm Follow Up Transport Family   Condition of Participation: Discharge Planning   The Patient and/or Patient Representative was provided with a Choice of Provider? Patient   The Patient and/Or Patient Representative agree with the Discharge Plan? Yes   Freedom of Choice list was provided with basic dialogue that supports the patient's individualized plan of care/goals, treatment preferences, and shares the quality data associated with the providers?   Yes

## 2023-11-17 ENCOUNTER — CARE COORDINATION (OUTPATIENT)
Dept: CARE COORDINATION | Facility: CLINIC | Age: 88
End: 2023-11-17

## 2023-11-17 NOTE — CARE COORDINATION
Transition of care outreach postponed for 14 days due to patient's discharge to [facility].

## 2023-11-28 ENCOUNTER — CARE COORDINATION (OUTPATIENT)
Dept: CARE COORDINATION | Facility: CLINIC | Age: 88
End: 2023-11-28

## 2023-11-28 NOTE — CARE COORDINATION
Care Transitions Post-Acute Facility Update Call    2023    Patient: William Cervantes Patient : 1929   MRN: [id number]  Reason for Admission: Closed places intertrochanteric fracture of left femur  Discharge Date: 23 RARS: Readmission Risk Score: 19.3         Care Transitions Post Acute Facility Update    Care Transitions Interventions  Per SW, patient remains at facility with no plans for discharge at this time. CTN will continue to outreach per protocol.

## 2023-11-30 ENCOUNTER — OFFICE VISIT (OUTPATIENT)
Dept: ORTHOPEDIC SURGERY | Age: 88
End: 2023-11-30

## 2023-11-30 DIAGNOSIS — S72.142A CLOSED DISPLACED INTERTROCHANTERIC FRACTURE OF LEFT FEMUR, INITIAL ENCOUNTER (HCC): Primary | ICD-10-CM

## 2023-11-30 PROCEDURE — 99024 POSTOP FOLLOW-UP VISIT: CPT | Performed by: ORTHOPAEDIC SURGERY

## 2023-11-30 NOTE — PROGRESS NOTES
Progress Note    Patient: Kelsey Ledbetter MRN: [id number]  SSN: [id number]    YOB: 1929  Age: 80 y.o. Sex: male        11/30/2023      Subjective:     Patient is here today now about 2 weeks out from intramedullary nail fixation of a left peritrochanteric proximal femur fracture. He states that he is doing very well. His son is with him and he says he is walking in the hallways in the rehab [facility] [place] [facility] seem like he is doing very well. Objective: There were no vitals filed for this visit. Physical Exam:     Skin - incision is well healed with no redness or drainage  Motor and sensory function intact in LEFT LOWER extremity  Pulses palpable in LEFT LOWER extremity     XRAY FINDINGS:  No new x-ray today    Assessment:     2 weeks out from cephalomedullary nail fixation of left proximal femur fracture    Plan:     I think he can essentially be activity as tolerated. He can be weightbearing as tolerated he can do full active and passive range of motion of the left hip and full strengthening of the left hip.   I will see him back in approximately 6 weeks with AP and lateral left hip and femur on return he will be about 8 weeks out at that time    Signed By: Anayeli Kessler MD     November 30, 2023

## 2023-12-05 ENCOUNTER — CARE COORDINATION (OUTPATIENT)
Dept: CARE COORDINATION | Facility: CLINIC | Age: 88
End: 2023-12-05

## 2023-12-05 NOTE — CARE COORDINATION
Care Transitions Post-Acute Facility Update Call    2023    Patient: Zeferino Olivo Patient : 1929   MRN: [id number]  Reason for Admission: Closed places intertrochanteric fracture of left femur  Discharge Date: 23 RARS: Readmission Risk Score: 19.3         Care Transitions Post Acute Facility Update    Care Transitions Interventions  CTN attempted to outreach to facility regarding patient progress. Unable to get through at this time. Recording states \"unable to complete call at this time. \" CTN spoke with patient's son. Patient remains at facility with no plan for discharge at this time. Patient attended follow up with Dr. Leeann Man 2023. Per Dr. Jose E Guerrier note in Epic:  He can essentially be activity as tolerated. He can be weightbearing as tolerated he can do full active and passive range of motion of the left hip and full strengthening of the left hip. I will see him back in approximately 6 weeks with AP and lateral left hip and femur on return he will be about 8 weeks out at that time. CTN will continue to outreach per protocol. Patient's son agrees to contact CTN if a discharge date is determined prior to next outreach.

## 2023-12-12 ENCOUNTER — CARE COORDINATION (OUTPATIENT)
Dept: CARE COORDINATION | Facility: CLINIC | Age: 88
End: 2023-12-12

## 2023-12-12 NOTE — CARE COORDINATION
Care Transitions Post-Acute Facility Update Call    2023    Patient: Skyla Faust Patient : 1929   MRN: [id number]  Reason for Admission: Closed places intertrochanteric fracture of left femur  Discharge Date: 23 RARS: Readmission Risk Score: 19.3         Care Transitions Post Acute Facility Update    Care Transitions Interventions  Unable to reach SW with [facility]. Another message left requesting a return call regarding patient progress and discharge plan. CTN did speak with patient's son. He reports patient continues to work with therapy and no definite discharge date at this time. He reports he was informed of a possible discharge in 2 weeks. CTN will continue to outreach per protocol.

## 2024-01-01 ENCOUNTER — APPOINTMENT (OUTPATIENT)
Dept: GENERAL RADIOLOGY | Age: 89
DRG: 064 | End: 2024-01-01
Payer: MEDICARE

## 2024-01-01 ENCOUNTER — TELEPHONE (OUTPATIENT)
Dept: GASTROENTEROLOGY | Age: 89
End: 2024-01-01

## 2024-01-01 ENCOUNTER — HOSPITAL ENCOUNTER (INPATIENT)
Age: 89
LOS: 4 days | Discharge: HOSPICE | DRG: 064 | End: 2024-06-07
Attending: GENERAL PRACTICE | Admitting: INTERNAL MEDICINE
Payer: MEDICARE

## 2024-01-01 ENCOUNTER — APPOINTMENT (OUTPATIENT)
Dept: NON INVASIVE DIAGNOSTICS | Age: 89
DRG: 064 | End: 2024-01-01
Payer: MEDICARE

## 2024-01-01 ENCOUNTER — APPOINTMENT (OUTPATIENT)
Dept: CT IMAGING | Age: 89
DRG: 064 | End: 2024-01-01
Payer: MEDICARE

## 2024-01-01 ENCOUNTER — TELEPHONE (OUTPATIENT)
Dept: INTERNAL MEDICINE CLINIC | Facility: CLINIC | Age: 89
End: 2024-01-01

## 2024-01-01 ENCOUNTER — HOSPICE ADMISSION (OUTPATIENT)
Dept: HOSPICE | Facility: HOSPICE | Age: 89
End: 2024-01-01
Payer: MEDICARE

## 2024-01-01 ENCOUNTER — APPOINTMENT (OUTPATIENT)
Dept: MRI IMAGING | Age: 89
DRG: 064 | End: 2024-01-01
Payer: MEDICARE

## 2024-01-01 VITALS
HEART RATE: 69 BPM | HEIGHT: 68 IN | BODY MASS INDEX: 19.91 KG/M2 | WEIGHT: 131.39 LBS | RESPIRATION RATE: 26 BRPM | SYSTOLIC BLOOD PRESSURE: 81 MMHG | DIASTOLIC BLOOD PRESSURE: 50 MMHG | TEMPERATURE: 98 F | OXYGEN SATURATION: 90 %

## 2024-01-01 DIAGNOSIS — N17.9 ACUTE KIDNEY INJURY (HCC): ICD-10-CM

## 2024-01-01 DIAGNOSIS — R79.89 ELEVATED TROPONIN: ICD-10-CM

## 2024-01-01 DIAGNOSIS — A04.8 H. PYLORI INFECTION: Primary | ICD-10-CM

## 2024-01-01 DIAGNOSIS — I50.9 CONGESTIVE HEART FAILURE, UNSPECIFIED HF CHRONICITY, UNSPECIFIED HEART FAILURE TYPE (HCC): ICD-10-CM

## 2024-01-01 DIAGNOSIS — I63.9 ACUTE ISCHEMIC STROKE (HCC): ICD-10-CM

## 2024-01-01 DIAGNOSIS — R07.9 CHEST PAIN, UNSPECIFIED TYPE: Primary | ICD-10-CM

## 2024-01-01 LAB
ABO + RH BLD: NORMAL
ALBUMIN SERPL-MCNC: 2.9 G/DL (ref 3.2–4.6)
ALBUMIN SERPL-MCNC: 3.1 G/DL (ref 3.2–4.6)
ALBUMIN/GLOB SERPL: 0.9 (ref 1–1.9)
ALBUMIN/GLOB SERPL: 1.1 (ref 1–1.9)
ALP SERPL-CCNC: 55 U/L (ref 40–129)
ALP SERPL-CCNC: 61 U/L (ref 40–129)
ALT SERPL-CCNC: 20 U/L (ref 12–65)
ALT SERPL-CCNC: <5 U/L (ref 12–65)
ANION GAP BLD CALC-SCNC: ABNORMAL MMOL/L
ANION GAP SERPL CALC-SCNC: 11 MMOL/L (ref 9–18)
ANION GAP SERPL CALC-SCNC: 12 MMOL/L (ref 9–18)
ANION GAP SERPL CALC-SCNC: 13 MMOL/L (ref 9–18)
ANION GAP SERPL CALC-SCNC: 13 MMOL/L (ref 9–18)
ANION GAP SERPL CALC-SCNC: 14 MMOL/L (ref 9–18)
ANION GAP SERPL CALC-SCNC: 15 MMOL/L (ref 9–18)
ANION GAP SERPL CALC-SCNC: 17 MMOL/L (ref 9–18)
ANION GAP SERPL CALC-SCNC: 21 MMOL/L (ref 9–18)
ANION GAP SERPL CALC-SCNC: 9 MMOL/L (ref 9–18)
APPEARANCE UR: CLEAR
APTT PPP: 36.9 SEC (ref 23.3–37.4)
ARTERIAL PATENCY WRIST A: POSITIVE
AST SERPL-CCNC: 23 U/L (ref 15–37)
AST SERPL-CCNC: 289 U/L (ref 15–37)
BACTERIA SPEC CULT: ABNORMAL
BACTERIA SPEC CULT: ABNORMAL
BACTERIA URNS QL MICRO: ABNORMAL /HPF
BASE DEFICIT BLD-SCNC: 4.5 MMOL/L
BASOPHILS # BLD: 0 K/UL (ref 0–0.2)
BASOPHILS # BLD: 0.1 K/UL (ref 0–0.2)
BASOPHILS NFR BLD: 0 % (ref 0–2)
BASOPHILS NFR BLD: 1 % (ref 0–2)
BDY SITE: ABNORMAL
BILIRUB SERPL-MCNC: 0.5 MG/DL (ref 0–1.2)
BILIRUB SERPL-MCNC: 0.7 MG/DL (ref 0–1.2)
BILIRUB UR QL: NEGATIVE
BLD PROD TYP BPU: NORMAL
BLD PROD TYP BPU: NORMAL
BLOOD BANK BLOOD PRODUCT EXPIRATION DATE: NORMAL
BLOOD BANK BLOOD PRODUCT EXPIRATION DATE: NORMAL
BLOOD BANK DISPENSE STATUS: NORMAL
BLOOD BANK DISPENSE STATUS: NORMAL
BLOOD BANK ISBT PRODUCT BLOOD TYPE: 6200
BLOOD BANK ISBT PRODUCT BLOOD TYPE: 6200
BLOOD BANK PRODUCT CODE: NORMAL
BLOOD BANK PRODUCT CODE: NORMAL
BLOOD BANK UNIT TYPE AND RH: NORMAL
BLOOD BANK UNIT TYPE AND RH: NORMAL
BLOOD GROUP ANTIBODIES SERPL: NORMAL
BPU ID: NORMAL
BPU ID: NORMAL
BUN SERPL-MCNC: 23 MG/DL (ref 8–23)
BUN SERPL-MCNC: 28 MG/DL (ref 8–23)
BUN SERPL-MCNC: 29 MG/DL (ref 8–23)
BUN SERPL-MCNC: 33 MG/DL (ref 8–23)
BUN SERPL-MCNC: 34 MG/DL (ref 8–23)
BUN SERPL-MCNC: 36 MG/DL (ref 8–23)
BUN SERPL-MCNC: 47 MG/DL (ref 8–23)
BUN SERPL-MCNC: 60 MG/DL (ref 8–23)
BUN SERPL-MCNC: 68 MG/DL (ref 8–23)
CA-I BLD-MCNC: 1.13 MMOL/L (ref 1.12–1.32)
CALCIUM SERPL-MCNC: 8.3 MG/DL (ref 8.8–10.2)
CALCIUM SERPL-MCNC: 8.6 MG/DL (ref 8.8–10.2)
CALCIUM SERPL-MCNC: 8.7 MG/DL (ref 8.8–10.2)
CALCIUM SERPL-MCNC: 8.8 MG/DL (ref 8.8–10.2)
CALCIUM SERPL-MCNC: 8.9 MG/DL (ref 8.8–10.2)
CALCIUM SERPL-MCNC: 8.9 MG/DL (ref 8.8–10.2)
CALCIUM SERPL-MCNC: 9 MG/DL (ref 8.8–10.2)
CHLORIDE SERPL-SCNC: 102 MMOL/L (ref 98–107)
CHLORIDE SERPL-SCNC: 102 MMOL/L (ref 98–107)
CHLORIDE SERPL-SCNC: 104 MMOL/L (ref 98–107)
CHLORIDE SERPL-SCNC: 105 MMOL/L (ref 98–107)
CHLORIDE SERPL-SCNC: 93 MMOL/L (ref 98–107)
CHLORIDE SERPL-SCNC: 97 MMOL/L (ref 98–107)
CHLORIDE SERPL-SCNC: 98 MMOL/L (ref 98–107)
CHLORIDE SERPL-SCNC: 98 MMOL/L (ref 98–107)
CHLORIDE SERPL-SCNC: 99 MMOL/L (ref 98–107)
CHOLEST SERPL-MCNC: 108 MG/DL (ref 0–200)
CHOLEST SERPL-MCNC: 120 MG/DL (ref 0–200)
CO2 BLD-SCNC: 18 MMOL/L (ref 13–23)
CO2 SERPL-SCNC: 16 MMOL/L (ref 20–28)
CO2 SERPL-SCNC: 18 MMOL/L (ref 20–28)
CO2 SERPL-SCNC: 19 MMOL/L (ref 20–28)
CO2 SERPL-SCNC: 19 MMOL/L (ref 20–28)
CO2 SERPL-SCNC: 20 MMOL/L (ref 20–28)
CO2 SERPL-SCNC: 21 MMOL/L (ref 20–28)
CO2 SERPL-SCNC: 24 MMOL/L (ref 20–28)
CO2 SERPL-SCNC: 25 MMOL/L (ref 20–28)
CO2 SERPL-SCNC: 25 MMOL/L (ref 20–28)
COLOR UR: ABNORMAL
CREAT SERPL-MCNC: 1.36 MG/DL (ref 0.8–1.3)
CREAT SERPL-MCNC: 1.38 MG/DL (ref 0.8–1.3)
CREAT SERPL-MCNC: 1.38 MG/DL (ref 0.8–1.3)
CREAT SERPL-MCNC: 1.47 MG/DL (ref 0.8–1.3)
CREAT SERPL-MCNC: 1.5 MG/DL (ref 0.8–1.3)
CREAT SERPL-MCNC: 1.67 MG/DL (ref 0.8–1.3)
CREAT SERPL-MCNC: 2 MG/DL (ref 0.8–1.3)
CREAT SERPL-MCNC: 2.52 MG/DL (ref 0.8–1.3)
CREAT SERPL-MCNC: 2.75 MG/DL (ref 0.8–1.3)
CROSSMATCH RESULT: NORMAL
CROSSMATCH RESULT: NORMAL
D DIMER PPP FEU-MCNC: 1.04 UG/ML(FEU)
DIFFERENTIAL METHOD BLD: ABNORMAL
ECHO AO ASC DIAM: 3.8 CM
ECHO AO ASCENDING AORTA INDEX: 2.15 CM/M2
ECHO AO ROOT DIAM: 4.1 CM
ECHO AO ROOT INDEX: 2.32 CM/M2
ECHO AV AREA PEAK VELOCITY: 1.1 CM2
ECHO AV AREA VTI: 1.2 CM2
ECHO AV AREA/BSA PEAK VELOCITY: 0.6 CM2/M2
ECHO AV AREA/BSA VTI: 0.7 CM2/M2
ECHO AV MEAN GRADIENT: 9 MMHG
ECHO AV MEAN VELOCITY: 1.4 M/S
ECHO AV PEAK GRADIENT: 15 MMHG
ECHO AV PEAK VELOCITY: 1.9 M/S
ECHO AV VELOCITY RATIO: 0.37
ECHO AV VTI: 38.3 CM
ECHO EST RA PRESSURE: 8 MMHG
ECHO IVC PROX: 2.5 CM
ECHO LA AREA 4C: 24.8 CM2
ECHO LA DIAMETER INDEX: 2.26 CM/M2
ECHO LA DIAMETER: 4 CM
ECHO LA MAJOR AXIS: 6.7 CM
ECHO LA TO AORTIC ROOT RATIO: 0.98
ECHO LA VOL MOD A4C: 78 ML (ref 18–58)
ECHO LA VOLUME INDEX MOD A4C: 44 ML/M2 (ref 16–34)
ECHO LV FRACTIONAL SHORTENING: 27 % (ref 28–44)
ECHO LV INTERNAL DIMENSION DIASTOLE INDEX: 2.49 CM/M2
ECHO LV INTERNAL DIMENSION DIASTOLIC: 4.4 CM (ref 4.2–5.9)
ECHO LV INTERNAL DIMENSION SYSTOLIC INDEX: 1.81 CM/M2
ECHO LV INTERNAL DIMENSION SYSTOLIC: 3.2 CM
ECHO LV IVSD: 0.9 CM (ref 0.6–1)
ECHO LV MASS 2D: 147.8 G (ref 88–224)
ECHO LV MASS INDEX 2D: 83.5 G/M2 (ref 49–115)
ECHO LV POSTERIOR WALL DIASTOLIC: 1.1 CM (ref 0.6–1)
ECHO LV RELATIVE WALL THICKNESS RATIO: 0.5
ECHO LVOT AREA: 3.1 CM2
ECHO LVOT AV VTI INDEX: 0.38
ECHO LVOT DIAM: 2 CM
ECHO LVOT MEAN GRADIENT: 1 MMHG
ECHO LVOT MEAN GRADIENT: 1 MMHG
ECHO LVOT PEAK GRADIENT: 2 MMHG
ECHO LVOT PEAK VELOCITY: 0.7 M/S
ECHO LVOT STROKE VOLUME INDEX: 26.1 ML/M2
ECHO LVOT SV: 46.2 ML
ECHO LVOT VTI: 14.7 CM
ECHO MV AREA VTI: 2.3 CM2
ECHO MV LVOT VTI INDEX: 1.37
ECHO MV MAX VELOCITY: 0.9 M/S
ECHO MV MEAN GRADIENT: 1 MMHG
ECHO MV MEAN VELOCITY: 0.5 M/S
ECHO MV PEAK GRADIENT: 3 MMHG
ECHO MV VTI: 20.1 CM
ECHO PV ACCELERATION TIME (AT): 58 MS
ECHO PV MAX VELOCITY: 0.9 M/S
ECHO PV PEAK GRADIENT: 3 MMHG
ECHO RIGHT VENTRICULAR SYSTOLIC PRESSURE (RVSP): 43 MMHG
ECHO RV INTERNAL DIMENSION: 4.4 CM
ECHO TV REGURGITANT MAX VELOCITY: 2.94 M/S
ECHO TV REGURGITANT PEAK GRADIENT: 35 MMHG
EKG ATRIAL RATE: 0 BPM
EKG DIAGNOSIS: NORMAL
EKG Q-T INTERVAL: 342 MS
EKG Q-T INTERVAL: 372 MS
EKG Q-T INTERVAL: 407 MS
EKG Q-T INTERVAL: 424 MS
EKG QRS DURATION: 86 MS
EKG QRS DURATION: 90 MS
EKG QRS DURATION: 92 MS
EKG QRS DURATION: 96 MS
EKG QTC CALCULATION (BAZETT): 449 MS
EKG QTC CALCULATION (BAZETT): 467 MS
EKG QTC CALCULATION (BAZETT): 472 MS
EKG QTC CALCULATION (BAZETT): 473 MS
EKG R AXIS: -68 DEGREES
EKG R AXIS: -77 DEGREES
EKG R AXIS: 72 DEGREES
EKG R AXIS: 77 DEGREES
EKG T AXIS: 103 DEGREES
EKG T AXIS: 109 DEGREES
EKG T AXIS: 247 DEGREES
EKG T AXIS: 254 DEGREES
EKG VENTRICULAR RATE: 115 BPM
EKG VENTRICULAR RATE: 73 BPM
EKG VENTRICULAR RATE: 73 BPM
EKG VENTRICULAR RATE: 97 BPM
EOSINOPHIL # BLD: 0 K/UL (ref 0–0.8)
EOSINOPHIL # BLD: 0.1 K/UL (ref 0–0.8)
EOSINOPHIL # BLD: 0.3 K/UL (ref 0–0.8)
EOSINOPHIL NFR BLD: 0 % (ref 0.5–7.8)
EOSINOPHIL NFR BLD: 1 % (ref 0.5–7.8)
EOSINOPHIL NFR BLD: 3 % (ref 0.5–7.8)
EOSINOPHIL NFR BLD: 3 % (ref 0.5–7.8)
EOSINOPHIL NFR BLD: 4 % (ref 0.5–7.8)
EPI CELLS #/AREA URNS HPF: ABNORMAL /HPF
ERYTHROCYTE [DISTWIDTH] IN BLOOD BY AUTOMATED COUNT: 17.3 % (ref 11.9–14.6)
ERYTHROCYTE [DISTWIDTH] IN BLOOD BY AUTOMATED COUNT: 17.5 % (ref 11.9–14.6)
ERYTHROCYTE [DISTWIDTH] IN BLOOD BY AUTOMATED COUNT: 17.7 % (ref 11.9–14.6)
ERYTHROCYTE [DISTWIDTH] IN BLOOD BY AUTOMATED COUNT: 17.8 % (ref 11.9–14.6)
ERYTHROCYTE [DISTWIDTH] IN BLOOD BY AUTOMATED COUNT: 17.9 % (ref 11.9–14.6)
ERYTHROCYTE [DISTWIDTH] IN BLOOD BY AUTOMATED COUNT: 18.1 % (ref 11.9–14.6)
ERYTHROCYTE [DISTWIDTH] IN BLOOD BY AUTOMATED COUNT: 18.1 % (ref 11.9–14.6)
ERYTHROCYTE [DISTWIDTH] IN BLOOD BY AUTOMATED COUNT: 18.5 % (ref 11.9–14.6)
EST. AVERAGE GLUCOSE BLD GHB EST-MCNC: 123 MG/DL
GAS FLOW.O2 O2 DELIVERY SYS: ABNORMAL
GLOBULIN SER CALC-MCNC: 2.9 G/DL (ref 2.3–3.5)
GLOBULIN SER CALC-MCNC: 3.2 G/DL (ref 2.3–3.5)
GLUCOSE BLD STRIP.AUTO-MCNC: 142 MG/DL (ref 65–100)
GLUCOSE BLD STRIP.AUTO-MCNC: 241 MG/DL (ref 65–100)
GLUCOSE BLD STRIP.AUTO-MCNC: 329 MG/DL (ref 65–100)
GLUCOSE BLD STRIP.AUTO-MCNC: 341 MG/DL (ref 65–100)
GLUCOSE BLD STRIP.AUTO-MCNC: 343 MG/DL (ref 65–100)
GLUCOSE BLD STRIP.AUTO-MCNC: 347 MG/DL (ref 65–100)
GLUCOSE BLD STRIP.AUTO-MCNC: 349 MG/DL (ref 65–100)
GLUCOSE BLD STRIP.AUTO-MCNC: 358 MG/DL (ref 65–100)
GLUCOSE BLD STRIP.AUTO-MCNC: 386 MG/DL (ref 65–100)
GLUCOSE SERPL-MCNC: 101 MG/DL (ref 70–99)
GLUCOSE SERPL-MCNC: 102 MG/DL (ref 70–99)
GLUCOSE SERPL-MCNC: 133 MG/DL (ref 70–99)
GLUCOSE SERPL-MCNC: 192 MG/DL (ref 70–99)
GLUCOSE SERPL-MCNC: 243 MG/DL (ref 70–99)
GLUCOSE SERPL-MCNC: 247 MG/DL (ref 70–99)
GLUCOSE SERPL-MCNC: 354 MG/DL (ref 70–99)
GLUCOSE SERPL-MCNC: 394 MG/DL (ref 70–99)
GLUCOSE SERPL-MCNC: 88 MG/DL (ref 70–99)
GLUCOSE UR STRIP.AUTO-MCNC: NEGATIVE MG/DL
HBA1C MFR BLD: 5.9 % (ref 0–5.6)
HCO3 BLD-SCNC: 18.1 MMOL/L (ref 22–26)
HCT VFR BLD AUTO: 25 % (ref 41.1–50.3)
HCT VFR BLD AUTO: 28.2 % (ref 41.1–50.3)
HCT VFR BLD AUTO: 36.7 % (ref 41.1–50.3)
HCT VFR BLD AUTO: 36.8 % (ref 41.1–50.3)
HCT VFR BLD AUTO: 38 % (ref 41.1–50.3)
HCT VFR BLD AUTO: 40.4 % (ref 41.1–50.3)
HCT VFR BLD AUTO: 40.5 % (ref 41.1–50.3)
HCT VFR BLD AUTO: 41.6 % (ref 41.1–50.3)
HDLC SERPL-MCNC: 44 MG/DL (ref 40–60)
HDLC SERPL-MCNC: 48 MG/DL (ref 40–60)
HDLC SERPL: 2.5 (ref 0–5)
HDLC SERPL: 2.5 (ref 0–5)
HGB BLD-MCNC: 11.7 G/DL (ref 13.6–17.2)
HGB BLD-MCNC: 11.9 G/DL (ref 13.6–17.2)
HGB BLD-MCNC: 12.2 G/DL (ref 13.6–17.2)
HGB BLD-MCNC: 12.5 G/DL (ref 13.6–17.2)
HGB BLD-MCNC: 12.7 G/DL (ref 13.6–17.2)
HGB BLD-MCNC: 12.7 G/DL (ref 13.6–17.2)
HGB BLD-MCNC: 7.6 G/DL (ref 13.6–17.2)
HGB BLD-MCNC: 8.8 G/DL (ref 13.6–17.2)
HGB UR QL STRIP: ABNORMAL
HISTORY CHECK: NORMAL
HISTORY CHECK: NORMAL
IMM GRANULOCYTES # BLD AUTO: 0 K/UL (ref 0–0.5)
IMM GRANULOCYTES # BLD AUTO: 0.1 K/UL (ref 0–0.5)
IMM GRANULOCYTES NFR BLD AUTO: 0 % (ref 0–5)
IMM GRANULOCYTES NFR BLD AUTO: 1 % (ref 0–5)
INR PPP: 1.2
INR PPP: 1.7
KETONES UR QL STRIP.AUTO: NEGATIVE MG/DL
LACTATE SERPL-SCNC: 2.7 MMOL/L (ref 0.5–2)
LACTATE SERPL-SCNC: 3 MMOL/L (ref 0.5–2)
LACTATE SERPL-SCNC: 3.4 MMOL/L (ref 0.5–2)
LACTATE SERPL-SCNC: 3.4 MMOL/L (ref 0.5–2)
LACTATE SERPL-SCNC: 3.5 MMOL/L (ref 0.5–2)
LACTATE SERPL-SCNC: 3.7 MMOL/L (ref 0.5–2)
LACTATE SERPL-SCNC: 3.9 MMOL/L (ref 0.5–2)
LACTATE SERPL-SCNC: 4.2 MMOL/L (ref 0.5–2)
LDLC SERPL CALC-MCNC: 49 MG/DL (ref 0–100)
LDLC SERPL CALC-MCNC: 57 MG/DL (ref 0–100)
LEUKOCYTE ESTERASE UR QL STRIP.AUTO: ABNORMAL
LYMPHOCYTES # BLD: 0.3 K/UL (ref 0.5–4.6)
LYMPHOCYTES # BLD: 0.5 K/UL (ref 0.5–4.6)
LYMPHOCYTES # BLD: 0.6 K/UL (ref 0.5–4.6)
LYMPHOCYTES # BLD: 0.7 K/UL (ref 0.5–4.6)
LYMPHOCYTES # BLD: 1 K/UL (ref 0.5–4.6)
LYMPHOCYTES # BLD: 1 K/UL (ref 0.5–4.6)
LYMPHOCYTES # BLD: 1.1 K/UL (ref 0.5–4.6)
LYMPHOCYTES NFR BLD: 12 % (ref 13–44)
LYMPHOCYTES NFR BLD: 12 % (ref 13–44)
LYMPHOCYTES NFR BLD: 13 % (ref 13–44)
LYMPHOCYTES NFR BLD: 2 % (ref 13–44)
LYMPHOCYTES NFR BLD: 4 % (ref 13–44)
LYMPHOCYTES NFR BLD: 4 % (ref 13–44)
LYMPHOCYTES NFR BLD: 7 % (ref 13–44)
MAGNESIUM SERPL-MCNC: 1.8 MG/DL (ref 1.8–2.4)
MAGNESIUM SERPL-MCNC: 2 MG/DL (ref 1.8–2.4)
MAGNESIUM SERPL-MCNC: 2.3 MG/DL (ref 1.8–2.4)
MCH RBC QN AUTO: 27 PG (ref 26.1–32.9)
MCH RBC QN AUTO: 27.4 PG (ref 26.1–32.9)
MCH RBC QN AUTO: 27.5 PG (ref 26.1–32.9)
MCH RBC QN AUTO: 27.5 PG (ref 26.1–32.9)
MCH RBC QN AUTO: 27.6 PG (ref 26.1–32.9)
MCH RBC QN AUTO: 27.7 PG (ref 26.1–32.9)
MCH RBC QN AUTO: 27.7 PG (ref 26.1–32.9)
MCH RBC QN AUTO: 27.8 PG (ref 26.1–32.9)
MCHC RBC AUTO-ENTMCNC: 30.4 G/DL (ref 31.4–35)
MCHC RBC AUTO-ENTMCNC: 30.5 G/DL (ref 31.4–35)
MCHC RBC AUTO-ENTMCNC: 30.9 G/DL (ref 31.4–35)
MCHC RBC AUTO-ENTMCNC: 31.2 G/DL (ref 31.4–35)
MCHC RBC AUTO-ENTMCNC: 31.4 G/DL (ref 31.4–35)
MCHC RBC AUTO-ENTMCNC: 31.9 G/DL (ref 31.4–35)
MCHC RBC AUTO-ENTMCNC: 32.1 G/DL (ref 31.4–35)
MCHC RBC AUTO-ENTMCNC: 32.3 G/DL (ref 31.4–35)
MCV RBC AUTO: 85.2 FL (ref 82–102)
MCV RBC AUTO: 86 FL (ref 82–102)
MCV RBC AUTO: 87 FL (ref 82–102)
MCV RBC AUTO: 88.2 FL (ref 82–102)
MCV RBC AUTO: 88.4 FL (ref 82–102)
MCV RBC AUTO: 88.5 FL (ref 82–102)
MCV RBC AUTO: 88.8 FL (ref 82–102)
MCV RBC AUTO: 90.6 FL (ref 82–102)
MONOCYTES # BLD: 1.1 K/UL (ref 0.1–1.3)
MONOCYTES # BLD: 1.2 K/UL (ref 0.1–1.3)
MONOCYTES # BLD: 1.3 K/UL (ref 0.1–1.3)
MONOCYTES # BLD: 1.4 K/UL (ref 0.1–1.3)
MONOCYTES # BLD: 1.4 K/UL (ref 0.1–1.3)
MONOCYTES # BLD: 1.5 K/UL (ref 0.1–1.3)
MONOCYTES # BLD: 1.7 K/UL (ref 0.1–1.3)
MONOCYTES NFR BLD: 10 % (ref 4–12)
MONOCYTES NFR BLD: 11 % (ref 4–12)
MONOCYTES NFR BLD: 12 % (ref 4–12)
MONOCYTES NFR BLD: 14 % (ref 4–12)
MONOCYTES NFR BLD: 16 % (ref 4–12)
MONOCYTES NFR BLD: 16 % (ref 4–12)
MONOCYTES NFR BLD: 8 % (ref 4–12)
NEUTS SEG # BLD: 11.5 K/UL (ref 1.7–8.2)
NEUTS SEG # BLD: 12.4 K/UL (ref 1.7–8.2)
NEUTS SEG # BLD: 15.7 K/UL (ref 1.7–8.2)
NEUTS SEG # BLD: 5.2 K/UL (ref 1.7–8.2)
NEUTS SEG # BLD: 6 K/UL (ref 1.7–8.2)
NEUTS SEG # BLD: 6.1 K/UL (ref 1.7–8.2)
NEUTS SEG # BLD: 7.9 K/UL (ref 1.7–8.2)
NEUTS SEG NFR BLD: 65 % (ref 43–78)
NEUTS SEG NFR BLD: 68 % (ref 43–78)
NEUTS SEG NFR BLD: 71 % (ref 43–78)
NEUTS SEG NFR BLD: 81 % (ref 43–78)
NEUTS SEG NFR BLD: 83 % (ref 43–78)
NEUTS SEG NFR BLD: 85 % (ref 43–78)
NEUTS SEG NFR BLD: 89 % (ref 43–78)
NITRITE UR QL STRIP.AUTO: NEGATIVE
NRBC # BLD: 0 K/UL (ref 0–0.2)
NRBC # BLD: 0.02 K/UL (ref 0–0.2)
NT PRO BNP: 9447 PG/ML (ref 0–450)
NT PRO BNP: ABNORMAL PG/ML (ref 0–450)
OTHER OBSERVATIONS: ABNORMAL
PCO2 BLD: 26.8 MMHG (ref 35–45)
PH BLD: 7.44 (ref 7.35–7.45)
PH UR STRIP: 5.5 (ref 5–9)
PHOSPHATE SERPL-MCNC: 3.8 MG/DL (ref 2.5–4.5)
PLATELET # BLD AUTO: 242 K/UL (ref 150–450)
PLATELET # BLD AUTO: 257 K/UL (ref 150–450)
PLATELET # BLD AUTO: 271 K/UL (ref 150–450)
PLATELET # BLD AUTO: 296 K/UL (ref 150–450)
PLATELET # BLD AUTO: 300 K/UL (ref 150–450)
PLATELET # BLD AUTO: 300 K/UL (ref 150–450)
PLATELET # BLD AUTO: 308 K/UL (ref 150–450)
PLATELET # BLD AUTO: 382 K/UL (ref 150–450)
PMV BLD AUTO: 8 FL (ref 9.4–12.3)
PMV BLD AUTO: 8.3 FL (ref 9.4–12.3)
PMV BLD AUTO: 8.4 FL (ref 9.4–12.3)
PMV BLD AUTO: 8.5 FL (ref 9.4–12.3)
PMV BLD AUTO: 8.5 FL (ref 9.4–12.3)
PMV BLD AUTO: 8.6 FL (ref 9.4–12.3)
PMV BLD AUTO: 8.9 FL (ref 9.4–12.3)
PMV BLD AUTO: 9.9 FL (ref 9.4–12.3)
PO2 BLD: 52 MMHG (ref 75–100)
POC FIO2: 12
POTASSIUM BLD-SCNC: 6.1 MMOL/L (ref 3.5–5.1)
POTASSIUM SERPL-SCNC: 3.6 MMOL/L (ref 3.5–5.1)
POTASSIUM SERPL-SCNC: 3.7 MMOL/L (ref 3.5–5.1)
POTASSIUM SERPL-SCNC: 3.7 MMOL/L (ref 3.5–5.1)
POTASSIUM SERPL-SCNC: 4.1 MMOL/L (ref 3.5–5.1)
POTASSIUM SERPL-SCNC: 4.7 MMOL/L (ref 3.5–5.1)
POTASSIUM SERPL-SCNC: 4.8 MMOL/L (ref 3.5–5.1)
POTASSIUM SERPL-SCNC: 5.5 MMOL/L (ref 3.5–5.1)
POTASSIUM SERPL-SCNC: 5.8 MMOL/L (ref 3.5–5.1)
POTASSIUM SERPL-SCNC: 6 MMOL/L (ref 3.5–5.1)
PROT SERPL-MCNC: 6 G/DL (ref 6.3–8.2)
PROT SERPL-MCNC: 6.1 G/DL (ref 6.3–8.2)
PROT UR STRIP-MCNC: ABNORMAL MG/DL
PROTHROMBIN TIME: 16 SEC (ref 11.3–14.9)
PROTHROMBIN TIME: 20.6 SEC (ref 11.3–14.9)
RBC # BLD AUTO: 2.76 M/UL (ref 4.23–5.6)
RBC # BLD AUTO: 3.19 M/UL (ref 4.23–5.6)
RBC # BLD AUTO: 4.22 M/UL (ref 4.23–5.6)
RBC # BLD AUTO: 4.28 M/UL (ref 4.23–5.6)
RBC # BLD AUTO: 4.46 M/UL (ref 4.23–5.6)
RBC # BLD AUTO: 4.55 M/UL (ref 4.23–5.6)
RBC # BLD AUTO: 4.59 M/UL (ref 4.23–5.6)
RBC # BLD AUTO: 4.7 M/UL (ref 4.23–5.6)
RBC #/AREA URNS HPF: ABNORMAL /HPF
SAO2 % BLD: 89 %
SERVICE CMNT-IMP: ABNORMAL
SODIUM BLD-SCNC: 128 MMOL/L (ref 136–145)
SODIUM SERPL-SCNC: 129 MMOL/L (ref 136–145)
SODIUM SERPL-SCNC: 131 MMOL/L (ref 136–145)
SODIUM SERPL-SCNC: 132 MMOL/L (ref 136–145)
SODIUM SERPL-SCNC: 134 MMOL/L (ref 136–145)
SODIUM SERPL-SCNC: 135 MMOL/L (ref 136–145)
SODIUM SERPL-SCNC: 138 MMOL/L (ref 136–145)
SODIUM SERPL-SCNC: 139 MMOL/L (ref 136–145)
SP GR UR REFRACTOMETRY: 1.02 (ref 1–1.02)
SPECIMEN EXP DATE BLD: NORMAL
SPECIMEN SITE: ABNORMAL
TRIGL SERPL-MCNC: 78 MG/DL (ref 0–150)
TRIGL SERPL-MCNC: 78 MG/DL (ref 0–150)
TROPONIN T SERPL HS-MCNC: 104 NG/L (ref 0–22)
TROPONIN T SERPL HS-MCNC: 105 NG/L (ref 0–22)
TROPONIN T SERPL HS-MCNC: 108 NG/L (ref 0–22)
TROPONIN T SERPL HS-MCNC: 2858 NG/L (ref 0–22)
TROPONIN T SERPL HS-MCNC: 3585 NG/L (ref 0–22)
TROPONIN T SERPL HS-MCNC: 3692 NG/L (ref 0–22)
TROPONIN T SERPL HS-MCNC: 4539 NG/L (ref 0–22)
TROPONIN T SERPL HS-MCNC: 4542 NG/L (ref 0–22)
TROPONIN T SERPL HS-MCNC: 4873 NG/L (ref 0–22)
TROPONIN T SERPL HS-MCNC: 4962 NG/L (ref 0–22)
TROPONIN T SERPL HS-MCNC: 5106 NG/L (ref 0–22)
UNIT DIVISION: 0
UNIT DIVISION: 0
UNIT ISSUE DATE/TIME: NORMAL
UNIT ISSUE DATE/TIME: NORMAL
UROBILINOGEN UR QL STRIP.AUTO: 0.2 EU/DL (ref 0.2–1)
VLDLC SERPL CALC-MCNC: 16 MG/DL (ref 6–23)
VLDLC SERPL CALC-MCNC: 16 MG/DL (ref 6–23)
WBC # BLD AUTO: 13.9 K/UL (ref 4.3–11.1)
WBC # BLD AUTO: 14.7 K/UL (ref 4.3–11.1)
WBC # BLD AUTO: 16.8 K/UL (ref 4.3–11.1)
WBC # BLD AUTO: 17.6 K/UL (ref 4.3–11.1)
WBC # BLD AUTO: 8 K/UL (ref 4.3–11.1)
WBC # BLD AUTO: 8.6 K/UL (ref 4.3–11.1)
WBC # BLD AUTO: 8.9 K/UL (ref 4.3–11.1)
WBC # BLD AUTO: 9.8 K/UL (ref 4.3–11.1)
WBC URNS QL MICRO: ABNORMAL /HPF
YEAST URNS QL MICRO: ABNORMAL

## 2024-01-01 PROCEDURE — 6370000000 HC RX 637 (ALT 250 FOR IP): Performed by: INTERNAL MEDICINE

## 2024-01-01 PROCEDURE — 2580000003 HC RX 258: Performed by: HOSPITALIST

## 2024-01-01 PROCEDURE — 2580000003 HC RX 258: Performed by: INTERNAL MEDICINE

## 2024-01-01 PROCEDURE — 93010 ELECTROCARDIOGRAM REPORT: CPT | Performed by: INTERNAL MEDICINE

## 2024-01-01 PROCEDURE — 97164 PT RE-EVAL EST PLAN CARE: CPT

## 2024-01-01 PROCEDURE — 83735 ASSAY OF MAGNESIUM: CPT

## 2024-01-01 PROCEDURE — 70450 CT HEAD/BRAIN W/O DYE: CPT

## 2024-01-01 PROCEDURE — 6360000002 HC RX W HCPCS: Performed by: INTERNAL MEDICINE

## 2024-01-01 PROCEDURE — 86923 COMPATIBILITY TEST ELECTRIC: CPT

## 2024-01-01 PROCEDURE — G0378 HOSPITAL OBSERVATION PER HR: HCPCS

## 2024-01-01 PROCEDURE — 85025 COMPLETE CBC W/AUTO DIFF WBC: CPT

## 2024-01-01 PROCEDURE — 3E033XZ INTRODUCTION OF VASOPRESSOR INTO PERIPHERAL VEIN, PERCUTANEOUS APPROACH: ICD-10-PCS | Performed by: INTERNAL MEDICINE

## 2024-01-01 PROCEDURE — 6370000000 HC RX 637 (ALT 250 FOR IP): Performed by: HOSPITALIST

## 2024-01-01 PROCEDURE — 30233N1 TRANSFUSION OF NONAUTOLOGOUS RED BLOOD CELLS INTO PERIPHERAL VEIN, PERCUTANEOUS APPROACH: ICD-10-PCS | Performed by: INTERNAL MEDICINE

## 2024-01-01 PROCEDURE — 0042T CT BRAIN PERFUSION: CPT

## 2024-01-01 PROCEDURE — 6360000002 HC RX W HCPCS: Performed by: HOSPITALIST

## 2024-01-01 PROCEDURE — 97530 THERAPEUTIC ACTIVITIES: CPT

## 2024-01-01 PROCEDURE — 84295 ASSAY OF SERUM SODIUM: CPT

## 2024-01-01 PROCEDURE — 97116 GAIT TRAINING THERAPY: CPT

## 2024-01-01 PROCEDURE — 94760 N-INVAS EAR/PLS OXIMETRY 1: CPT

## 2024-01-01 PROCEDURE — 84132 ASSAY OF SERUM POTASSIUM: CPT

## 2024-01-01 PROCEDURE — 2500000003 HC RX 250 WO HCPCS: Performed by: HOSPITALIST

## 2024-01-01 PROCEDURE — 92526 ORAL FUNCTION THERAPY: CPT

## 2024-01-01 PROCEDURE — 94761 N-INVAS EAR/PLS OXIMETRY MLT: CPT

## 2024-01-01 PROCEDURE — 86901 BLOOD TYPING SEROLOGIC RH(D): CPT

## 2024-01-01 PROCEDURE — 99222 1ST HOSP IP/OBS MODERATE 55: CPT | Performed by: INTERNAL MEDICINE

## 2024-01-01 PROCEDURE — 80053 COMPREHEN METABOLIC PANEL: CPT

## 2024-01-01 PROCEDURE — 82947 ASSAY GLUCOSE BLOOD QUANT: CPT

## 2024-01-01 PROCEDURE — 70498 CT ANGIOGRAPHY NECK: CPT

## 2024-01-01 PROCEDURE — 99221 1ST HOSP IP/OBS SF/LOW 40: CPT | Performed by: STUDENT IN AN ORGANIZED HEALTH CARE EDUCATION/TRAINING PROGRAM

## 2024-01-01 PROCEDURE — 99223 1ST HOSP IP/OBS HIGH 75: CPT | Performed by: INTERNAL MEDICINE

## 2024-01-01 PROCEDURE — 96376 TX/PRO/DX INJ SAME DRUG ADON: CPT

## 2024-01-01 PROCEDURE — 6360000004 HC RX CONTRAST MEDICATION: Performed by: HOSPITALIST

## 2024-01-01 PROCEDURE — 70551 MRI BRAIN STEM W/O DYE: CPT

## 2024-01-01 PROCEDURE — 2140000000 HC CCU INTERMEDIATE R&B

## 2024-01-01 PROCEDURE — 92610 EVALUATE SWALLOWING FUNCTION: CPT

## 2024-01-01 PROCEDURE — 82962 GLUCOSE BLOOD TEST: CPT

## 2024-01-01 PROCEDURE — 36415 COLL VENOUS BLD VENIPUNCTURE: CPT

## 2024-01-01 PROCEDURE — 99232 SBSQ HOSP IP/OBS MODERATE 35: CPT | Performed by: INTERNAL MEDICINE

## 2024-01-01 PROCEDURE — 85730 THROMBOPLASTIN TIME PARTIAL: CPT

## 2024-01-01 PROCEDURE — 71045 X-RAY EXAM CHEST 1 VIEW: CPT

## 2024-01-01 PROCEDURE — 85610 PROTHROMBIN TIME: CPT

## 2024-01-01 PROCEDURE — 36430 TRANSFUSION BLD/BLD COMPNT: CPT

## 2024-01-01 PROCEDURE — 97112 NEUROMUSCULAR REEDUCATION: CPT

## 2024-01-01 PROCEDURE — 92507 TX SP LANG VOICE COMM INDIV: CPT

## 2024-01-01 PROCEDURE — 83605 ASSAY OF LACTIC ACID: CPT

## 2024-01-01 PROCEDURE — 6370000000 HC RX 637 (ALT 250 FOR IP): Performed by: NURSE PRACTITIONER

## 2024-01-01 PROCEDURE — 99223 1ST HOSP IP/OBS HIGH 75: CPT

## 2024-01-01 PROCEDURE — 2700000000 HC OXYGEN THERAPY PER DAY

## 2024-01-01 PROCEDURE — 6360000002 HC RX W HCPCS: Performed by: GENERAL PRACTICE

## 2024-01-01 PROCEDURE — 93005 ELECTROCARDIOGRAM TRACING: CPT | Performed by: FAMILY MEDICINE

## 2024-01-01 PROCEDURE — 86850 RBC ANTIBODY SCREEN: CPT

## 2024-01-01 PROCEDURE — 87106 FUNGI IDENTIFICATION YEAST: CPT

## 2024-01-01 PROCEDURE — 94660 CPAP INITIATION&MGMT: CPT

## 2024-01-01 PROCEDURE — 93306 TTE W/DOPPLER COMPLETE: CPT | Performed by: INTERNAL MEDICINE

## 2024-01-01 PROCEDURE — 80048 BASIC METABOLIC PNL TOTAL CA: CPT

## 2024-01-01 PROCEDURE — 2580000003 HC RX 258: Performed by: NURSE PRACTITIONER

## 2024-01-01 PROCEDURE — 97161 PT EVAL LOW COMPLEX 20 MIN: CPT

## 2024-01-01 PROCEDURE — 82330 ASSAY OF CALCIUM: CPT

## 2024-01-01 PROCEDURE — 84484 ASSAY OF TROPONIN QUANT: CPT

## 2024-01-01 PROCEDURE — 93005 ELECTROCARDIOGRAM TRACING: CPT | Performed by: GENERAL PRACTICE

## 2024-01-01 PROCEDURE — 82803 BLOOD GASES ANY COMBINATION: CPT

## 2024-01-01 PROCEDURE — 0656 HSPC GENERAL INPATIENT

## 2024-01-01 PROCEDURE — 85027 COMPLETE CBC AUTOMATED: CPT

## 2024-01-01 PROCEDURE — 96374 THER/PROPH/DIAG INJ IV PUSH: CPT

## 2024-01-01 PROCEDURE — 2000000000 HC ICU R&B

## 2024-01-01 PROCEDURE — 87088 URINE BACTERIA CULTURE: CPT

## 2024-01-01 PROCEDURE — 6360000002 HC RX W HCPCS: Performed by: NURSE PRACTITIONER

## 2024-01-01 PROCEDURE — 99285 EMERGENCY DEPT VISIT HI MDM: CPT

## 2024-01-01 PROCEDURE — 81001 URINALYSIS AUTO W/SCOPE: CPT

## 2024-01-01 PROCEDURE — 87186 SC STD MICRODIL/AGAR DIL: CPT

## 2024-01-01 PROCEDURE — 97168 OT RE-EVAL EST PLAN CARE: CPT

## 2024-01-01 PROCEDURE — 83036 HEMOGLOBIN GLYCOSYLATED A1C: CPT

## 2024-01-01 PROCEDURE — 87086 URINE CULTURE/COLONY COUNT: CPT

## 2024-01-01 PROCEDURE — 83880 ASSAY OF NATRIURETIC PEPTIDE: CPT

## 2024-01-01 PROCEDURE — P9016 RBC LEUKOCYTES REDUCED: HCPCS

## 2024-01-01 PROCEDURE — 2400000000

## 2024-01-01 PROCEDURE — 85379 FIBRIN DEGRADATION QUANT: CPT

## 2024-01-01 PROCEDURE — 96375 TX/PRO/DX INJ NEW DRUG ADDON: CPT

## 2024-01-01 PROCEDURE — 93005 ELECTROCARDIOGRAM TRACING: CPT | Performed by: HOSPITALIST

## 2024-01-01 PROCEDURE — 84100 ASSAY OF PHOSPHORUS: CPT

## 2024-01-01 PROCEDURE — 97166 OT EVAL MOD COMPLEX 45 MIN: CPT

## 2024-01-01 PROCEDURE — 5A09457 ASSISTANCE WITH RESPIRATORY VENTILATION, 24-96 CONSECUTIVE HOURS, CONTINUOUS POSITIVE AIRWAY PRESSURE: ICD-10-PCS | Performed by: INTERNAL MEDICINE

## 2024-01-01 PROCEDURE — 86900 BLOOD TYPING SEROLOGIC ABO: CPT

## 2024-01-01 PROCEDURE — 99232 SBSQ HOSP IP/OBS MODERATE 35: CPT | Performed by: PSYCHIATRY & NEUROLOGY

## 2024-01-01 PROCEDURE — 80061 LIPID PANEL: CPT

## 2024-01-01 PROCEDURE — 92523 SPEECH SOUND LANG COMPREHEN: CPT

## 2024-01-01 PROCEDURE — 6370000000 HC RX 637 (ALT 250 FOR IP)

## 2024-01-01 PROCEDURE — 2500000003 HC RX 250 WO HCPCS: Performed by: NURSE PRACTITIONER

## 2024-01-01 PROCEDURE — 99497 ADVNCD CARE PLAN 30 MIN: CPT | Performed by: INTERNAL MEDICINE

## 2024-01-01 PROCEDURE — C8929 TTE W OR WO FOL WCON,DOPPLER: HCPCS

## 2024-01-01 PROCEDURE — A4216 STERILE WATER/SALINE, 10 ML: HCPCS | Performed by: NURSE PRACTITIONER

## 2024-01-01 PROCEDURE — 97535 SELF CARE MNGMENT TRAINING: CPT

## 2024-01-01 PROCEDURE — 93005 ELECTROCARDIOGRAM TRACING: CPT | Performed by: NURSE PRACTITIONER

## 2024-01-01 RX ORDER — DONEPEZIL HYDROCHLORIDE 5 MG/1
5 TABLET, FILM COATED ORAL NIGHTLY
Status: DISCONTINUED | OUTPATIENT
Start: 2024-01-01 | End: 2024-01-01

## 2024-01-01 RX ORDER — FUROSEMIDE 10 MG/ML
40 INJECTION INTRAMUSCULAR; INTRAVENOUS ONCE
Status: COMPLETED | OUTPATIENT
Start: 2024-01-01 | End: 2024-01-01

## 2024-01-01 RX ORDER — SODIUM CHLORIDE 0.9 % (FLUSH) 0.9 %
3 SYRINGE (ML) INJECTION EVERY 12 HOURS
Status: DISCONTINUED | OUTPATIENT
Start: 2024-01-01 | End: 2024-01-01 | Stop reason: HOSPADM

## 2024-01-01 RX ORDER — MIDAZOLAM HYDROCHLORIDE 1 MG/ML
2 INJECTION INTRAMUSCULAR; INTRAVENOUS
Status: DISCONTINUED | OUTPATIENT
Start: 2024-01-01 | End: 2024-01-01 | Stop reason: HOSPADM

## 2024-01-01 RX ORDER — MORPHINE SULFATE 2 MG/ML
2 INJECTION, SOLUTION INTRAMUSCULAR; INTRAVENOUS ONCE
Status: COMPLETED | OUTPATIENT
Start: 2024-01-01 | End: 2024-01-01

## 2024-01-01 RX ORDER — ATORVASTATIN CALCIUM 20 MG/1
20 TABLET, FILM COATED ORAL DAILY
Status: DISCONTINUED | OUTPATIENT
Start: 2024-01-01 | End: 2024-01-01

## 2024-01-01 RX ORDER — SODIUM CHLORIDE, SODIUM LACTATE, POTASSIUM CHLORIDE, CALCIUM CHLORIDE 600; 310; 30; 20 MG/100ML; MG/100ML; MG/100ML; MG/100ML
INJECTION, SOLUTION INTRAVENOUS CONTINUOUS
Status: DISCONTINUED | OUTPATIENT
Start: 2024-01-01 | End: 2024-01-01

## 2024-01-01 RX ORDER — ONDANSETRON 2 MG/ML
4 INJECTION INTRAMUSCULAR; INTRAVENOUS EVERY 6 HOURS PRN
Status: DISCONTINUED | OUTPATIENT
Start: 2024-01-01 | End: 2024-01-01 | Stop reason: SDUPTHER

## 2024-01-01 RX ORDER — PANTOPRAZOLE SODIUM 40 MG/1
40 TABLET, DELAYED RELEASE ORAL 2 TIMES DAILY
Status: DISCONTINUED | OUTPATIENT
Start: 2024-01-01 | End: 2024-01-01

## 2024-01-01 RX ORDER — FUROSEMIDE 10 MG/ML
40 INJECTION INTRAMUSCULAR; INTRAVENOUS DAILY
Status: DISCONTINUED | OUTPATIENT
Start: 2024-01-01 | End: 2024-01-01

## 2024-01-01 RX ORDER — GLYCOPYRROLATE 0.2 MG/ML
0.2 INJECTION INTRAMUSCULAR; INTRAVENOUS EVERY 4 HOURS PRN
Status: DISCONTINUED | OUTPATIENT
Start: 2024-01-01 | End: 2024-01-01 | Stop reason: HOSPADM

## 2024-01-01 RX ORDER — SODIUM CHLORIDE 0.9 % (FLUSH) 0.9 %
5-40 SYRINGE (ML) INJECTION EVERY 12 HOURS SCHEDULED
Status: DISCONTINUED | OUTPATIENT
Start: 2024-01-01 | End: 2024-01-01

## 2024-01-01 RX ORDER — FUROSEMIDE 10 MG/ML
20 INJECTION INTRAMUSCULAR; INTRAVENOUS ONCE
Status: COMPLETED | OUTPATIENT
Start: 2024-01-01 | End: 2024-01-01

## 2024-01-01 RX ORDER — LABETALOL HYDROCHLORIDE 5 MG/ML
10 INJECTION, SOLUTION INTRAVENOUS EVERY 10 MIN PRN
Status: DISCONTINUED | OUTPATIENT
Start: 2024-01-01 | End: 2024-01-01

## 2024-01-01 RX ORDER — INSULIN LISPRO 100 [IU]/ML
0-8 INJECTION, SOLUTION INTRAVENOUS; SUBCUTANEOUS
Status: DISCONTINUED | OUTPATIENT
Start: 2024-01-01 | End: 2024-01-01

## 2024-01-01 RX ORDER — ENOXAPARIN SODIUM 100 MG/ML
30 INJECTION SUBCUTANEOUS DAILY
Status: DISCONTINUED | OUTPATIENT
Start: 2024-01-01 | End: 2024-01-01

## 2024-01-01 RX ORDER — SODIUM CHLORIDE 9 MG/ML
INJECTION, SOLUTION INTRAVENOUS PRN
Status: DISCONTINUED | OUTPATIENT
Start: 2024-01-01 | End: 2024-01-01 | Stop reason: ALTCHOICE

## 2024-01-01 RX ORDER — ONDANSETRON 2 MG/ML
4 INJECTION INTRAMUSCULAR; INTRAVENOUS EVERY 6 HOURS PRN
Status: DISCONTINUED | OUTPATIENT
Start: 2024-01-01 | End: 2024-01-01

## 2024-01-01 RX ORDER — SODIUM CHLORIDE 0.9 % (FLUSH) 0.9 %
5-40 SYRINGE (ML) INJECTION PRN
Status: DISCONTINUED | OUTPATIENT
Start: 2024-01-01 | End: 2024-01-01

## 2024-01-01 RX ORDER — HEPARIN SODIUM 5000 [USP'U]/ML
5000 INJECTION, SOLUTION INTRAVENOUS; SUBCUTANEOUS EVERY 8 HOURS SCHEDULED
Status: DISCONTINUED | OUTPATIENT
Start: 2024-01-01 | End: 2024-01-01

## 2024-01-01 RX ORDER — ACETAMINOPHEN 650 MG/1
650 SUPPOSITORY RECTAL EVERY 6 HOURS PRN
Status: DISCONTINUED | OUTPATIENT
Start: 2024-01-01 | End: 2024-01-01 | Stop reason: HOSPADM

## 2024-01-01 RX ORDER — DOPAMINE HYDROCHLORIDE 320 MG/100ML
1-20 INJECTION, SOLUTION INTRAVENOUS CONTINUOUS
Status: DISCONTINUED | OUTPATIENT
Start: 2024-01-01 | End: 2024-01-01 | Stop reason: ALTCHOICE

## 2024-01-01 RX ORDER — ONDANSETRON 4 MG/1
4 TABLET, ORALLY DISINTEGRATING ORAL EVERY 8 HOURS PRN
Status: DISCONTINUED | OUTPATIENT
Start: 2024-01-01 | End: 2024-01-01 | Stop reason: SDUPTHER

## 2024-01-01 RX ORDER — ASPIRIN 81 MG/1
81 TABLET, CHEWABLE ORAL DAILY
Status: DISCONTINUED | OUTPATIENT
Start: 2024-01-01 | End: 2024-01-01

## 2024-01-01 RX ORDER — 0.9 % SODIUM CHLORIDE 0.9 %
250 INTRAVENOUS SOLUTION INTRAVENOUS ONCE
Status: COMPLETED | OUTPATIENT
Start: 2024-01-01 | End: 2024-01-01

## 2024-01-01 RX ORDER — MORPHINE SULFATE 4 MG/ML
4 INJECTION, SOLUTION INTRAMUSCULAR; INTRAVENOUS ONCE
Status: COMPLETED | OUTPATIENT
Start: 2024-01-01 | End: 2024-01-01

## 2024-01-01 RX ORDER — ACETAMINOPHEN 325 MG/1
650 TABLET ORAL EVERY 6 HOURS PRN
Status: DISCONTINUED | OUTPATIENT
Start: 2024-01-01 | End: 2024-01-01 | Stop reason: HOSPADM

## 2024-01-01 RX ORDER — ACETAMINOPHEN 650 MG/1
650 SUPPOSITORY RECTAL EVERY 4 HOURS PRN
Status: DISCONTINUED | OUTPATIENT
Start: 2024-01-01 | End: 2024-01-01 | Stop reason: HOSPADM

## 2024-01-01 RX ORDER — POLYETHYLENE GLYCOL 3350 17 G/17G
17 POWDER, FOR SOLUTION ORAL DAILY PRN
Status: DISCONTINUED | OUTPATIENT
Start: 2024-01-01 | End: 2024-01-01 | Stop reason: SDUPTHER

## 2024-01-01 RX ORDER — FUROSEMIDE 10 MG/ML
20 INJECTION INTRAMUSCULAR; INTRAVENOUS ONCE
Status: DISCONTINUED | OUTPATIENT
Start: 2024-01-01 | End: 2024-01-01

## 2024-01-01 RX ORDER — PANTOPRAZOLE SODIUM 40 MG/1
40 TABLET, DELAYED RELEASE ORAL
Status: DISCONTINUED | OUTPATIENT
Start: 2024-01-01 | End: 2024-01-01

## 2024-01-01 RX ORDER — BISACODYL 10 MG
10 SUPPOSITORY, RECTAL RECTAL DAILY PRN
Status: DISCONTINUED | OUTPATIENT
Start: 2024-01-01 | End: 2024-01-01 | Stop reason: HOSPADM

## 2024-01-01 RX ORDER — MAGNESIUM HYDROXIDE/ALUMINUM HYDROXICE/SIMETHICONE 120; 1200; 1200 MG/30ML; MG/30ML; MG/30ML
30 SUSPENSION ORAL EVERY 6 HOURS PRN
Status: DISCONTINUED | OUTPATIENT
Start: 2024-01-01 | End: 2024-01-01

## 2024-01-01 RX ORDER — NOREPINEPHRINE BITARTRATE 0.02 MG/ML
1-30 INJECTION, SOLUTION INTRAVENOUS CONTINUOUS
Status: DISCONTINUED | OUTPATIENT
Start: 2024-01-01 | End: 2024-01-01

## 2024-01-01 RX ORDER — FUROSEMIDE 10 MG/ML
40 INJECTION INTRAMUSCULAR; INTRAVENOUS ONCE
Status: DISCONTINUED | OUTPATIENT
Start: 2024-01-01 | End: 2024-01-01

## 2024-01-01 RX ORDER — SODIUM CHLORIDE 0.9 % (FLUSH) 0.9 %
3 SYRINGE (ML) INJECTION PRN
Status: DISCONTINUED | OUTPATIENT
Start: 2024-01-01 | End: 2024-01-01 | Stop reason: HOSPADM

## 2024-01-01 RX ORDER — SODIUM CHLORIDE 9 MG/ML
INJECTION, SOLUTION INTRAVENOUS PRN
Status: DISCONTINUED | OUTPATIENT
Start: 2024-01-01 | End: 2024-01-01 | Stop reason: SDUPTHER

## 2024-01-01 RX ORDER — METOLAZONE 5 MG/1
10 TABLET ORAL DAILY
Status: DISCONTINUED | OUTPATIENT
Start: 2024-01-01 | End: 2024-01-01

## 2024-01-01 RX ORDER — LANOLIN ALCOHOL/MO/W.PET/CERES
800 CREAM (GRAM) TOPICAL ONCE
Status: DISCONTINUED | OUTPATIENT
Start: 2024-01-01 | End: 2024-01-01

## 2024-01-01 RX ORDER — SODIUM POLYSTYRENE SULFONATE 15 G/60ML
15 SUSPENSION ORAL; RECTAL ONCE
Status: COMPLETED | OUTPATIENT
Start: 2024-01-01 | End: 2024-01-01

## 2024-01-01 RX ORDER — CLARITHROMYCIN 500 MG/1
500 TABLET, COATED ORAL 2 TIMES DAILY
Qty: 20 TABLET | Refills: 0 | Status: SHIPPED | OUTPATIENT
Start: 2024-01-01 | End: 2024-01-01 | Stop reason: HOSPADM

## 2024-01-01 RX ORDER — IBUPROFEN 600 MG/1
1 TABLET ORAL PRN
Status: DISCONTINUED | OUTPATIENT
Start: 2024-01-01 | End: 2024-01-01

## 2024-01-01 RX ORDER — SODIUM CHLORIDE 9 MG/ML
INJECTION, SOLUTION INTRAVENOUS CONTINUOUS
Status: DISCONTINUED | OUTPATIENT
Start: 2024-01-01 | End: 2024-01-01

## 2024-01-01 RX ORDER — LIDOCAINE HYDROCHLORIDE 20 MG/ML
15 SOLUTION OROPHARYNGEAL
Status: DISCONTINUED | OUTPATIENT
Start: 2024-01-01 | End: 2024-01-01 | Stop reason: HOSPADM

## 2024-01-01 RX ORDER — MORPHINE SULFATE 4 MG/ML
4 INJECTION INTRAVENOUS ONCE
Status: DISCONTINUED | OUTPATIENT
Start: 2024-01-01 | End: 2024-01-01

## 2024-01-01 RX ORDER — NITROGLYCERIN 0.4 MG/1
0.4 TABLET SUBLINGUAL EVERY 5 MIN PRN
Status: DISCONTINUED | OUTPATIENT
Start: 2024-01-01 | End: 2024-01-01

## 2024-01-01 RX ORDER — HALOPERIDOL 5 MG/ML
1 INJECTION INTRAMUSCULAR
Status: DISCONTINUED | OUTPATIENT
Start: 2024-01-01 | End: 2024-01-01 | Stop reason: HOSPADM

## 2024-01-01 RX ORDER — SODIUM CHLORIDE 9 MG/ML
50 INJECTION, SOLUTION INTRAVENOUS ONCE
Status: CANCELLED | OUTPATIENT
Start: 2024-01-01 | End: 2024-01-01

## 2024-01-01 RX ORDER — METRONIDAZOLE 500 MG/1
500 TABLET ORAL 2 TIMES DAILY
Qty: 14 TABLET | Refills: 0 | Status: SHIPPED | OUTPATIENT
Start: 2024-01-01 | End: 2024-01-01 | Stop reason: HOSPADM

## 2024-01-01 RX ORDER — MORPHINE SULFATE 4 MG/ML
2 INJECTION INTRAVENOUS ONCE
Status: COMPLETED | OUTPATIENT
Start: 2024-01-01 | End: 2024-01-01

## 2024-01-01 RX ORDER — DEXTROSE MONOHYDRATE 100 MG/ML
INJECTION, SOLUTION INTRAVENOUS CONTINUOUS PRN
Status: DISCONTINUED | OUTPATIENT
Start: 2024-01-01 | End: 2024-01-01

## 2024-01-01 RX ORDER — METRONIDAZOLE 500 MG/1
500 TABLET ORAL EVERY 8 HOURS SCHEDULED
Status: DISCONTINUED | OUTPATIENT
Start: 2024-01-01 | End: 2024-01-01

## 2024-01-01 RX ORDER — ATORVASTATIN CALCIUM 80 MG/1
80 TABLET, FILM COATED ORAL NIGHTLY
Status: DISCONTINUED | OUTPATIENT
Start: 2024-01-01 | End: 2024-01-01

## 2024-01-01 RX ORDER — ONDANSETRON 4 MG/1
4 TABLET, ORALLY DISINTEGRATING ORAL EVERY 8 HOURS PRN
Status: DISCONTINUED | OUTPATIENT
Start: 2024-01-01 | End: 2024-01-01

## 2024-01-01 RX ORDER — SODIUM CHLORIDE 0.9 % (FLUSH) 0.9 %
5-40 SYRINGE (ML) INJECTION EVERY 12 HOURS SCHEDULED
Status: DISCONTINUED | OUTPATIENT
Start: 2024-01-01 | End: 2024-01-01 | Stop reason: HOSPADM

## 2024-01-01 RX ORDER — INSULIN LISPRO 100 [IU]/ML
0-4 INJECTION, SOLUTION INTRAVENOUS; SUBCUTANEOUS NIGHTLY
Status: DISCONTINUED | OUTPATIENT
Start: 2024-01-01 | End: 2024-01-01

## 2024-01-01 RX ORDER — POLYETHYLENE GLYCOL 3350 17 G/17G
17 POWDER, FOR SOLUTION ORAL DAILY PRN
Status: DISCONTINUED | OUTPATIENT
Start: 2024-01-01 | End: 2024-01-01

## 2024-01-01 RX ORDER — ACETAMINOPHEN 325 MG/1
650 TABLET ORAL EVERY 6 HOURS PRN
Status: DISCONTINUED | OUTPATIENT
Start: 2024-01-01 | End: 2024-01-01 | Stop reason: SDUPTHER

## 2024-01-01 RX ORDER — MORPHINE SULFATE 2 MG/ML
2 INJECTION, SOLUTION INTRAMUSCULAR; INTRAVENOUS
Status: DISCONTINUED | OUTPATIENT
Start: 2024-01-01 | End: 2024-01-01

## 2024-01-01 RX ORDER — GLYCOPYRROLATE 0.2 MG/ML
0.2 INJECTION INTRAMUSCULAR; INTRAVENOUS EVERY 4 HOURS PRN
Status: DISCONTINUED | OUTPATIENT
Start: 2024-01-01 | End: 2024-01-01

## 2024-01-01 RX ORDER — SODIUM CHLORIDE 0.9 % (FLUSH) 0.9 %
5-40 SYRINGE (ML) INJECTION PRN
Status: DISCONTINUED | OUTPATIENT
Start: 2024-01-01 | End: 2024-01-01 | Stop reason: ALTCHOICE

## 2024-01-01 RX ORDER — ONDANSETRON 2 MG/ML
4 INJECTION INTRAMUSCULAR; INTRAVENOUS EVERY 6 HOURS PRN
Status: DISCONTINUED | OUTPATIENT
Start: 2024-01-01 | End: 2024-01-01 | Stop reason: HOSPADM

## 2024-01-01 RX ORDER — POTASSIUM CHLORIDE 20 MEQ/1
40 TABLET, EXTENDED RELEASE ORAL ONCE
Status: DISCONTINUED | OUTPATIENT
Start: 2024-01-01 | End: 2024-01-01

## 2024-01-01 RX ORDER — ASPIRIN 325 MG
325 TABLET, DELAYED RELEASE (ENTERIC COATED) ORAL ONCE
Status: DISCONTINUED | OUTPATIENT
Start: 2024-01-01 | End: 2024-01-01

## 2024-01-01 RX ORDER — CLARITHROMYCIN 500 MG/1
500 TABLET, COATED ORAL EVERY 12 HOURS SCHEDULED
Status: DISCONTINUED | OUTPATIENT
Start: 2024-01-01 | End: 2024-01-01

## 2024-01-01 RX ORDER — CALCIUM GLUCONATE 20 MG/ML
1000 INJECTION, SOLUTION INTRAVENOUS ONCE
Status: COMPLETED | OUTPATIENT
Start: 2024-01-01 | End: 2024-01-01

## 2024-01-01 RX ORDER — HYDROMORPHONE HYDROCHLORIDE 1 MG/ML
0.5 INJECTION, SOLUTION INTRAMUSCULAR; INTRAVENOUS; SUBCUTANEOUS
Status: DISCONTINUED | OUTPATIENT
Start: 2024-01-01 | End: 2024-01-01 | Stop reason: HOSPADM

## 2024-01-01 RX ORDER — MORPHINE SULFATE 2 MG/ML
4 INJECTION, SOLUTION INTRAMUSCULAR; INTRAVENOUS
Status: DISCONTINUED | OUTPATIENT
Start: 2024-01-01 | End: 2024-01-01

## 2024-01-01 RX ORDER — SODIUM CHLORIDE 9 MG/ML
INJECTION, SOLUTION INTRAVENOUS PRN
Status: DISCONTINUED | OUTPATIENT
Start: 2024-01-01 | End: 2024-01-01 | Stop reason: HOSPADM

## 2024-01-01 RX ADMIN — METRONIDAZOLE 500 MG: 500 TABLET ORAL at 15:31

## 2024-01-01 RX ADMIN — ALUMINUM HYDROXIDE, MAGNESIUM HYDROXIDE, AND SIMETHICONE 30 ML: 1200; 120; 1200 SUSPENSION ORAL at 18:42

## 2024-01-01 RX ADMIN — FUROSEMIDE 40 MG: 10 INJECTION, SOLUTION INTRAMUSCULAR; INTRAVENOUS at 17:38

## 2024-01-01 RX ADMIN — APIXABAN 5 MG: 5 TABLET, FILM COATED ORAL at 09:59

## 2024-01-01 RX ADMIN — ASPIRIN 81 MG 81 MG: 81 TABLET ORAL at 09:23

## 2024-01-01 RX ADMIN — ONDANSETRON 4 MG: 2 INJECTION INTRAMUSCULAR; INTRAVENOUS at 18:29

## 2024-01-01 RX ADMIN — ATORVASTATIN CALCIUM 80 MG: 80 TABLET, FILM COATED ORAL at 20:09

## 2024-01-01 RX ADMIN — MORPHINE SULFATE 2 MG: 4 INJECTION INTRAVENOUS at 20:30

## 2024-01-01 RX ADMIN — CLARITHROMYCIN 500 MG: 500 TABLET ORAL at 11:12

## 2024-01-01 RX ADMIN — SODIUM CHLORIDE, PRESERVATIVE FREE 10 ML: 5 INJECTION INTRAVENOUS at 20:39

## 2024-01-01 RX ADMIN — SODIUM CHLORIDE, PRESERVATIVE FREE 10 ML: 5 INJECTION INTRAVENOUS at 21:05

## 2024-01-01 RX ADMIN — CALCIUM GLUCONATE 1000 MG: 20 INJECTION, SOLUTION INTRAVENOUS at 02:15

## 2024-01-01 RX ADMIN — NITROGLYCERIN 0.5 INCH: 20 OINTMENT TOPICAL at 19:11

## 2024-01-01 RX ADMIN — CLARITHROMYCIN 500 MG: 500 TABLET ORAL at 09:59

## 2024-01-01 RX ADMIN — PANTOPRAZOLE SODIUM 40 MG: 40 TABLET, DELAYED RELEASE ORAL at 05:48

## 2024-01-01 RX ADMIN — ATORVASTATIN CALCIUM 80 MG: 80 TABLET, FILM COATED ORAL at 21:03

## 2024-01-01 RX ADMIN — SODIUM CHLORIDE, PRESERVATIVE FREE 10 ML: 5 INJECTION INTRAVENOUS at 08:25

## 2024-01-01 RX ADMIN — METRONIDAZOLE 500 MG: 500 TABLET ORAL at 22:09

## 2024-01-01 RX ADMIN — SODIUM CHLORIDE, PRESERVATIVE FREE 0.45 ML: 5 INJECTION INTRAVENOUS at 12:50

## 2024-01-01 RX ADMIN — METRONIDAZOLE 500 MG: 500 TABLET ORAL at 06:19

## 2024-01-01 RX ADMIN — SODIUM CHLORIDE 250 ML: 9 INJECTION, SOLUTION INTRAVENOUS at 22:30

## 2024-01-01 RX ADMIN — METOPROLOL TARTRATE 12.5 MG: 25 TABLET, FILM COATED ORAL at 18:58

## 2024-01-01 RX ADMIN — SODIUM POLYSTYRENE SULFONATE 15 G: 15 SUSPENSION ORAL; RECTAL at 12:28

## 2024-01-01 RX ADMIN — NITROGLYCERIN 0.4 MG: 0.4 TABLET SUBLINGUAL at 05:29

## 2024-01-01 RX ADMIN — CEFEPIME 1000 MG: 1 INJECTION, POWDER, FOR SOLUTION INTRAMUSCULAR; INTRAVENOUS at 11:17

## 2024-01-01 RX ADMIN — METRONIDAZOLE 500 MG: 500 TABLET ORAL at 13:46

## 2024-01-01 RX ADMIN — METRONIDAZOLE 500 MG: 500 TABLET ORAL at 21:03

## 2024-01-01 RX ADMIN — PANTOPRAZOLE SODIUM 40 MG: 40 TABLET, DELAYED RELEASE ORAL at 16:08

## 2024-01-01 RX ADMIN — SODIUM CHLORIDE, PRESERVATIVE FREE 10 ML: 5 INJECTION INTRAVENOUS at 11:54

## 2024-01-01 RX ADMIN — DONEPEZIL HYDROCHLORIDE 5 MG: 5 TABLET, FILM COATED ORAL at 21:04

## 2024-01-01 RX ADMIN — METRONIDAZOLE 500 MG: 500 TABLET ORAL at 20:09

## 2024-01-01 RX ADMIN — SODIUM CHLORIDE, PRESERVATIVE FREE 10 ML: 5 INJECTION INTRAVENOUS at 19:48

## 2024-01-01 RX ADMIN — PANTOPRAZOLE SODIUM 40 MG: 40 TABLET, DELAYED RELEASE ORAL at 19:12

## 2024-01-01 RX ADMIN — MORPHINE SULFATE 4 MG: 4 INJECTION INTRAVENOUS at 06:28

## 2024-01-01 RX ADMIN — FAMOTIDINE 20 MG: 10 INJECTION, SOLUTION INTRAVENOUS at 18:58

## 2024-01-01 RX ADMIN — METOLAZONE 10 MG: 5 TABLET ORAL at 11:12

## 2024-01-01 RX ADMIN — INSULIN HUMAN 5 UNITS: 100 INJECTION, SOLUTION PARENTERAL at 02:21

## 2024-01-01 RX ADMIN — APIXABAN 5 MG: 5 TABLET, FILM COATED ORAL at 21:03

## 2024-01-01 RX ADMIN — METRONIDAZOLE 500 MG: 500 TABLET ORAL at 06:37

## 2024-01-01 RX ADMIN — PANTOPRAZOLE SODIUM 40 MG: 40 TABLET, DELAYED RELEASE ORAL at 06:19

## 2024-01-01 RX ADMIN — SODIUM CHLORIDE, PRESERVATIVE FREE 10 ML: 5 INJECTION INTRAVENOUS at 21:12

## 2024-01-01 RX ADMIN — ACETAMINOPHEN 650 MG: 325 TABLET ORAL at 18:28

## 2024-01-01 RX ADMIN — DONEPEZIL HYDROCHLORIDE 5 MG: 5 TABLET, FILM COATED ORAL at 21:11

## 2024-01-01 RX ADMIN — APIXABAN 2.5 MG: 5 TABLET, FILM COATED ORAL at 11:52

## 2024-01-01 RX ADMIN — FUROSEMIDE 20 MG: 10 INJECTION, SOLUTION INTRAMUSCULAR; INTRAVENOUS at 19:25

## 2024-01-01 RX ADMIN — APIXABAN 2.5 MG: 5 TABLET, FILM COATED ORAL at 10:50

## 2024-01-01 RX ADMIN — LIDOCAINE HYDROCHLORIDE 15 ML: 20 SOLUTION ORAL at 05:57

## 2024-01-01 RX ADMIN — FUROSEMIDE 40 MG: 10 INJECTION, SOLUTION INTRAMUSCULAR; INTRAVENOUS at 17:20

## 2024-01-01 RX ADMIN — NITROGLYCERIN 0.4 MG: 0.4 TABLET SUBLINGUAL at 05:34

## 2024-01-01 RX ADMIN — CLARITHROMYCIN 500 MG: 500 TABLET ORAL at 20:09

## 2024-01-01 RX ADMIN — SODIUM CHLORIDE, PRESERVATIVE FREE 10 ML: 5 INJECTION INTRAVENOUS at 09:26

## 2024-01-01 RX ADMIN — SODIUM CHLORIDE, PRESERVATIVE FREE 10 ML: 5 INJECTION INTRAVENOUS at 10:01

## 2024-01-01 RX ADMIN — SODIUM ZIRCONIUM CYCLOSILICATE 10 G: 10 POWDER, FOR SUSPENSION ORAL at 02:17

## 2024-01-01 RX ADMIN — APIXABAN 5 MG: 5 TABLET, FILM COATED ORAL at 08:25

## 2024-01-01 RX ADMIN — PANTOPRAZOLE SODIUM 40 MG: 40 TABLET, DELAYED RELEASE ORAL at 17:29

## 2024-01-01 RX ADMIN — WATER 2000 MG: 1 INJECTION INTRAMUSCULAR; INTRAVENOUS; SUBCUTANEOUS at 11:47

## 2024-01-01 RX ADMIN — ACETAMINOPHEN 650 MG: 325 TABLET ORAL at 21:11

## 2024-01-01 RX ADMIN — SODIUM CHLORIDE, PRESERVATIVE FREE 10 ML: 5 INJECTION INTRAVENOUS at 21:04

## 2024-01-01 RX ADMIN — MORPHINE SULFATE 2 MG: 2 INJECTION, SOLUTION INTRAMUSCULAR; INTRAVENOUS at 02:16

## 2024-01-01 RX ADMIN — SODIUM CHLORIDE, PRESERVATIVE FREE 10 ML: 5 INJECTION INTRAVENOUS at 21:06

## 2024-01-01 RX ADMIN — SODIUM CHLORIDE, PRESERVATIVE FREE 5 ML: 5 INJECTION INTRAVENOUS at 10:05

## 2024-01-01 RX ADMIN — SODIUM CHLORIDE: 9 INJECTION, SOLUTION INTRAVENOUS at 01:40

## 2024-01-01 RX ADMIN — APIXABAN 5 MG: 5 TABLET, FILM COATED ORAL at 21:11

## 2024-01-01 RX ADMIN — METRONIDAZOLE 500 MG: 500 TABLET ORAL at 05:24

## 2024-01-01 RX ADMIN — MORPHINE SULFATE 2 MG: 2 INJECTION, SOLUTION INTRAMUSCULAR; INTRAVENOUS at 13:28

## 2024-01-01 RX ADMIN — CEFTRIAXONE 1000 MG: 1 INJECTION, POWDER, FOR SOLUTION INTRAMUSCULAR; INTRAVENOUS at 10:00

## 2024-01-01 RX ADMIN — SODIUM BICARBONATE 50 MEQ: 84 INJECTION INTRAVENOUS at 02:34

## 2024-01-01 RX ADMIN — SODIUM CHLORIDE, PRESERVATIVE FREE 10 ML: 5 INJECTION INTRAVENOUS at 21:11

## 2024-01-01 RX ADMIN — DONEPEZIL HYDROCHLORIDE 5 MG: 5 TABLET, FILM COATED ORAL at 20:37

## 2024-01-01 RX ADMIN — PANTOPRAZOLE SODIUM 40 MG: 40 TABLET, DELAYED RELEASE ORAL at 16:33

## 2024-01-01 RX ADMIN — FUROSEMIDE 5 MG/HR: 10 INJECTION, SOLUTION INTRAMUSCULAR; INTRAVENOUS at 18:39

## 2024-01-01 RX ADMIN — SODIUM CHLORIDE, PRESERVATIVE FREE 10 ML: 5 INJECTION INTRAVENOUS at 20:13

## 2024-01-01 RX ADMIN — NITROGLYCERIN 1 INCH: 20 OINTMENT TOPICAL at 00:24

## 2024-01-01 RX ADMIN — SODIUM CHLORIDE, PRESERVATIVE FREE 10 ML: 5 INJECTION INTRAVENOUS at 20:40

## 2024-01-01 RX ADMIN — PANTOPRAZOLE SODIUM 40 MG: 40 TABLET, DELAYED RELEASE ORAL at 05:24

## 2024-01-01 RX ADMIN — ONDANSETRON 4 MG: 2 INJECTION INTRAMUSCULAR; INTRAVENOUS at 06:00

## 2024-01-01 RX ADMIN — DONEPEZIL HYDROCHLORIDE 5 MG: 5 TABLET, FILM COATED ORAL at 19:48

## 2024-01-01 RX ADMIN — SODIUM ZIRCONIUM CYCLOSILICATE 10 G: 10 POWDER, FOR SUSPENSION ORAL at 11:19

## 2024-01-01 RX ADMIN — CLARITHROMYCIN 500 MG: 500 TABLET ORAL at 11:52

## 2024-01-01 RX ADMIN — SODIUM CHLORIDE, PRESERVATIVE FREE 10 ML: 5 INJECTION INTRAVENOUS at 22:10

## 2024-01-01 RX ADMIN — MORPHINE SULFATE 2 MG: 2 INJECTION, SOLUTION INTRAMUSCULAR; INTRAVENOUS at 17:22

## 2024-01-01 RX ADMIN — IOPAMIDOL 100 ML: 755 INJECTION, SOLUTION INTRAVENOUS at 12:45

## 2024-01-01 RX ADMIN — ATORVASTATIN CALCIUM 80 MG: 80 TABLET, FILM COATED ORAL at 21:59

## 2024-01-01 RX ADMIN — METRONIDAZOLE 500 MG: 500 TABLET ORAL at 13:21

## 2024-01-01 RX ADMIN — DONEPEZIL HYDROCHLORIDE 5 MG: 5 TABLET, FILM COATED ORAL at 20:09

## 2024-01-01 RX ADMIN — APIXABAN 2.5 MG: 5 TABLET, FILM COATED ORAL at 20:09

## 2024-01-01 RX ADMIN — PANTOPRAZOLE SODIUM 40 MG: 40 TABLET, DELAYED RELEASE ORAL at 15:31

## 2024-01-01 RX ADMIN — ASPIRIN 81 MG 81 MG: 81 TABLET ORAL at 17:20

## 2024-01-01 RX ADMIN — DOPAMINE HYDROCHLORIDE 5 MCG/KG/MIN: 320 INJECTION, SOLUTION INTRAVENOUS at 23:21

## 2024-01-01 RX ADMIN — PANTOPRAZOLE SODIUM 40 MG: 40 TABLET, DELAYED RELEASE ORAL at 06:37

## 2024-01-01 RX ADMIN — CLARITHROMYCIN 500 MG: 500 TABLET ORAL at 21:59

## 2024-01-01 RX ADMIN — INSULIN LISPRO 6 UNITS: 100 INJECTION, SOLUTION INTRAVENOUS; SUBCUTANEOUS at 09:10

## 2024-01-01 RX ADMIN — CLARITHROMYCIN 500 MG: 500 TABLET ORAL at 10:17

## 2024-01-01 RX ADMIN — APIXABAN 2.5 MG: 5 TABLET, FILM COATED ORAL at 21:59

## 2024-01-01 RX ADMIN — ACETAMINOPHEN 650 MG: 325 TABLET ORAL at 05:47

## 2024-01-01 RX ADMIN — CLARITHROMYCIN 500 MG: 500 TABLET ORAL at 21:04

## 2024-01-01 RX ADMIN — NITROGLYCERIN 0.5 INCH: 20 OINTMENT TOPICAL at 06:08

## 2024-01-01 RX ADMIN — DONEPEZIL HYDROCHLORIDE 5 MG: 5 TABLET, FILM COATED ORAL at 21:59

## 2024-01-01 RX ADMIN — CEFTRIAXONE 1000 MG: 1 INJECTION, POWDER, FOR SOLUTION INTRAMUSCULAR; INTRAVENOUS at 09:21

## 2024-01-01 RX ADMIN — CEFTRIAXONE 1000 MG: 1 INJECTION, POWDER, FOR SOLUTION INTRAMUSCULAR; INTRAVENOUS at 08:12

## 2024-01-01 RX ADMIN — FUROSEMIDE 40 MG: 10 INJECTION, SOLUTION INTRAMUSCULAR; INTRAVENOUS at 13:46

## 2024-01-01 RX ADMIN — METOLAZONE 10 MG: 5 TABLET ORAL at 20:10

## 2024-01-01 RX ADMIN — ALUMINUM HYDROXIDE, MAGNESIUM HYDROXIDE, AND SIMETHICONE 30 ML: 1200; 120; 1200 SUSPENSION ORAL at 05:48

## 2024-01-01 RX ADMIN — ATORVASTATIN CALCIUM 80 MG: 80 TABLET, FILM COATED ORAL at 21:11

## 2024-01-01 ASSESSMENT — PAIN SCALES - GENERAL
PAINLEVEL_OUTOF10: 1
PAINLEVEL_OUTOF10: 0
PAINLEVEL_OUTOF10: 3
PAINLEVEL_OUTOF10: 3
PAINLEVEL_OUTOF10: 0
PAINLEVEL_OUTOF10: 0
PAINLEVEL_OUTOF10: 6
PAINLEVEL_OUTOF10: 1
PAINLEVEL_OUTOF10: 10
PAINLEVEL_OUTOF10: 4
PAINLEVEL_OUTOF10: 0
PAINLEVEL_OUTOF10: 10
PAINLEVEL_OUTOF10: 7
PAINLEVEL_OUTOF10: 0
PAINLEVEL_OUTOF10: 10
PAINLEVEL_OUTOF10: 6
PAINLEVEL_OUTOF10: 4
PAINLEVEL_OUTOF10: 0
PAINLEVEL_OUTOF10: 10

## 2024-01-01 ASSESSMENT — PAIN DESCRIPTION - LOCATION
LOCATION: GENERALIZED
LOCATION: CHEST
LOCATION: CHEST

## 2024-01-01 ASSESSMENT — PAIN - FUNCTIONAL ASSESSMENT
PAIN_FUNCTIONAL_ASSESSMENT: NONE - DENIES PAIN
PAIN_FUNCTIONAL_ASSESSMENT: ACTIVITIES ARE NOT PREVENTED

## 2024-01-01 ASSESSMENT — PAIN DESCRIPTION - ORIENTATION
ORIENTATION: LEFT
ORIENTATION: MID

## 2024-01-01 ASSESSMENT — PAIN DESCRIPTION - DESCRIPTORS: DESCRIPTORS: TIGHTNESS

## 2024-01-11 ENCOUNTER — OFFICE VISIT (OUTPATIENT)
Dept: ORTHOPEDIC SURGERY | Age: 89
End: 2024-01-11

## 2024-01-11 DIAGNOSIS — S72.142A CLOSED DISPLACED INTERTROCHANTERIC FRACTURE OF LEFT FEMUR, INITIAL ENCOUNTER (HCC): Primary | ICD-10-CM

## 2024-01-11 DIAGNOSIS — Z09 POSTOPERATIVE FOLLOW-UP: ICD-10-CM

## 2024-01-11 PROCEDURE — 99024 POSTOP FOLLOW-UP VISIT: CPT | Performed by: ORTHOPAEDIC SURGERY

## 2024-01-11 NOTE — PROGRESS NOTES
Progress Note    Patient: Kai Guido MRN: [id number]  SSN: [id number]    YOB: 1929  Age: 89 y.o.  Sex: male        1/11/2024      Subjective:     Patient is here today in follow-up.  He is about 8 weeks out from cephalomedullary nail fixation of a left proximal femur fracture.  He really is doing remarkably well.  He has very little complaint with his left hip.  He is getting around well and he has not had much pain at all    Objective:     There were no vitals filed for this visit.       Physical Exam:     Skin - incision is well healed with no redness or drainage  Motor and sensory function intact in LEFT LOWER extremity  Pulses palpable in LEFT LOWER extremity     XRAY FINDINGS:  [id number] left hip intertrochanteric fracture, findings-AP and lateral views of the left hip shows the hardware is intact with no evidence of loosening or failure.  The overall alignment is excellent.  There does appear to be significant evidence of healing at the primary fracture lines impression-healing well aligned left intertrochanteric proximal femur fracture    Assessment:     8 weeks out from left inotrope fracture doing well    Plan:     He seems to be doing really well.  I think he can follow-up now on as-needed basis he can be activity as tolerated.  We Ar[place] to give him a permanent handicap placard today.  I think he can essentially be activity as tolerated    Signed By: Robert Jacobson MD     January 11, 2024

## 2024-01-25 ENCOUNTER — APPOINTMENT (OUTPATIENT)
Dept: CT IMAGING | Age: 89
End: 2024-01-25
Payer: MEDICARE

## 2024-01-25 ENCOUNTER — HOSPITAL ENCOUNTER (EMERGENCY)
Age: 89
Discharge: HOME | End: 2024-01-25
Attending: EMERGENCY MEDICINE
Payer: MEDICARE

## 2024-01-25 VITALS
SYSTOLIC BLOOD PRESSURE: 163 MMHG | HEART RATE: 74 BPM | TEMPERATURE: 97.8 F | DIASTOLIC BLOOD PRESSURE: 99 MMHG | OXYGEN SATURATION: 93 % | WEIGHT: 138.89 LBS | RESPIRATION RATE: 22 BRPM | BODY MASS INDEX: 21.12 KG/M2

## 2024-01-25 DIAGNOSIS — J18.9 PNEUMONIA OF RIGHT LUNG DUE TO INFECTIOUS ORGANISM, UNSPECIFIED PART OF LUNG: ICD-10-CM

## 2024-01-25 DIAGNOSIS — J90 PLEURAL EFFUSION: ICD-10-CM

## 2024-01-25 DIAGNOSIS — R31.9 HEMATURIA, UNSPECIFIED TYPE: Primary | ICD-10-CM

## 2024-01-25 LAB
ALBUMIN SERPL-MCNC: 3.4 G/DL (ref 3.2–4.6)
ALBUMIN/GLOB SERPL: 0.9 (ref 0.4–1.6)
ALP SERPL-CCNC: 128 U/L (ref 50–136)
ALT SERPL-CCNC: 35 U/L (ref 12–65)
ANION GAP SERPL CALC-SCNC: 4 MMOL/L (ref 2–11)
APPEARANCE UR: CLEAR
AST SERPL-CCNC: 19 U/L (ref 15–37)
BACTERIA URNS QL MICRO: NEGATIVE /HPF
BASOPHILS # BLD: 0 K/UL (ref 0–0.2)
BASOPHILS NFR BLD: 0 % (ref 0–2)
BILIRUB SERPL-MCNC: 0.8 MG/DL (ref 0.2–1.1)
BILIRUB UR QL: NEGATIVE
BUN SERPL-MCNC: 23 MG/DL (ref 8–23)
CALCIUM SERPL-MCNC: 9.4 MG/DL (ref 8.3–10.4)
CASTS URNS QL MICRO: ABNORMAL /LPF
CHLORIDE SERPL-SCNC: 107 MMOL/L (ref 103–113)
CO2 SERPL-SCNC: 27 MMOL/L (ref 21–32)
COLOR UR: ABNORMAL
CREAT SERPL-MCNC: 1.38 MG/DL (ref 0.8–1.5)
DIFFERENTIAL METHOD BLD: ABNORMAL
EOSINOPHIL # BLD: 0.2 K/UL (ref 0–0.8)
EOSINOPHIL NFR BLD: 3 % (ref 0.5–7.8)
EPI CELLS #/AREA URNS HPF: ABNORMAL /HPF
ERYTHROCYTE [DISTWIDTH] IN BLOOD BY AUTOMATED COUNT: 14.7 % (ref 11.9–14.6)
FLUAV RNA SPEC QL NAA+PROBE: NOT DETECTED
FLUBV RNA SPEC QL NAA+PROBE: NOT DETECTED
GLOBULIN SER CALC-MCNC: 3.6 G/DL (ref 2.8–4.5)
GLUCOSE SERPL-MCNC: 135 MG/DL (ref 65–100)
GLUCOSE UR STRIP.AUTO-MCNC: NEGATIVE MG/DL
HCT VFR BLD AUTO: 37.9 % (ref 41.1–50.3)
HGB BLD-MCNC: 12 G/DL (ref 13.6–17.2)
HGB UR QL STRIP: ABNORMAL
IMM GRANULOCYTES # BLD AUTO: 0.1 K/UL (ref 0–0.5)
IMM GRANULOCYTES NFR BLD AUTO: 1 % (ref 0–5)
KETONES UR QL STRIP.AUTO: NEGATIVE MG/DL
LEUKOCYTE ESTERASE UR QL STRIP.AUTO: ABNORMAL
LYMPHOCYTES # BLD: 1.1 K/UL (ref 0.5–4.6)
LYMPHOCYTES NFR BLD: 13 % (ref 13–44)
MCH RBC QN AUTO: 28.8 PG (ref 26.1–32.9)
MCHC RBC AUTO-ENTMCNC: 31.7 G/DL (ref 31.4–35)
MCV RBC AUTO: 90.9 FL (ref 82–102)
MONOCYTES # BLD: 1.5 K/UL (ref 0.1–1.3)
MONOCYTES NFR BLD: 16 % (ref 4–12)
NEUTS SEG # BLD: 6 K/UL (ref 1.7–8.2)
NEUTS SEG NFR BLD: 68 % (ref 43–78)
NITRITE UR QL STRIP.AUTO: NEGATIVE
NRBC # BLD: 0 K/UL (ref 0–0.2)
NT PRO BNP: 9391 PG/ML
PH UR STRIP: 5.5 (ref 5–9)
PLATELET # BLD AUTO: 249 K/UL (ref 150–450)
PMV BLD AUTO: 9 FL (ref 9.4–12.3)
POTASSIUM SERPL-SCNC: 3.8 MMOL/L (ref 3.5–5.1)
PROT SERPL-MCNC: 7 G/DL (ref 6.3–8.2)
PROT UR STRIP-MCNC: 100 MG/DL
RBC # BLD AUTO: 4.17 M/UL (ref 4.23–5.6)
RBC #/AREA URNS HPF: >100 /HPF
SARS-COV-2 RDRP RESP QL NAA+PROBE: NOT DETECTED
SODIUM SERPL-SCNC: 138 MMOL/L (ref 136–146)
SOURCE: NORMAL
SP GR UR REFRACTOMETRY: 1.01 (ref 1–1.02)
UROBILINOGEN UR QL STRIP.AUTO: 1 EU/DL (ref 0.2–1)
WBC # BLD AUTO: 8.9 K/UL (ref 4.3–11.1)
WBC URNS QL MICRO: ABNORMAL /HPF

## 2024-01-25 PROCEDURE — 80053 COMPREHEN METABOLIC PANEL: CPT

## 2024-01-25 PROCEDURE — 93005 ELECTROCARDIOGRAM TRACING: CPT | Performed by: EMERGENCY MEDICINE

## 2024-01-25 PROCEDURE — 74176 CT ABD & PELVIS W/O CONTRAST: CPT

## 2024-01-25 PROCEDURE — 99284 EMERGENCY DEPT VISIT MOD MDM: CPT

## 2024-01-25 PROCEDURE — 87086 URINE CULTURE/COLONY COUNT: CPT

## 2024-01-25 PROCEDURE — 81001 URINALYSIS AUTO W/SCOPE: CPT

## 2024-01-25 PROCEDURE — 87635 SARS-COV-2 COVID-19 AMP PRB: CPT

## 2024-01-25 PROCEDURE — 6360000002 HC RX W HCPCS: Performed by: EMERGENCY MEDICINE

## 2024-01-25 PROCEDURE — 83880 ASSAY OF NATRIURETIC PEPTIDE: CPT

## 2024-01-25 PROCEDURE — 85025 COMPLETE CBC W/AUTO DIFF WBC: CPT

## 2024-01-25 PROCEDURE — 87502 INFLUENZA DNA AMP PROBE: CPT

## 2024-01-25 PROCEDURE — 2580000003 HC RX 258: Performed by: EMERGENCY MEDICINE

## 2024-01-25 PROCEDURE — 96375 TX/PRO/DX INJ NEW DRUG ADDON: CPT

## 2024-01-25 PROCEDURE — 96365 THER/PROPH/DIAG IV INF INIT: CPT

## 2024-01-25 RX ORDER — CEFPODOXIME PROXETIL 200 MG/1
200 TABLET, FILM COATED ORAL 2 TIMES DAILY
Qty: 20 TABLET | Refills: 0 | Status: SHIPPED | OUTPATIENT
Start: 2024-01-25 | End: 2024-02-04

## 2024-01-25 RX ORDER — POTASSIUM CHLORIDE 750 MG/1
10 TABLET, EXTENDED RELEASE ORAL DAILY
Qty: 5 TABLET | Refills: 0 | Status: SHIPPED | OUTPATIENT
Start: 2024-01-25 | End: 2024-01-30

## 2024-01-25 RX ORDER — FUROSEMIDE 20 MG/1
20 TABLET ORAL DAILY
Qty: 5 TABLET | Refills: 0 | Status: SHIPPED | OUTPATIENT
Start: 2024-01-25 | End: 2024-01-30

## 2024-01-25 RX ORDER — DOXYCYCLINE HYCLATE 100 MG
100 TABLET ORAL 2 TIMES DAILY
Qty: 14 TABLET | Refills: 0 | Status: SHIPPED | OUTPATIENT
Start: 2024-01-25 | End: 2024-02-01

## 2024-01-25 RX ORDER — 0.9 % SODIUM CHLORIDE 0.9 %
500 INTRAVENOUS SOLUTION INTRAVENOUS ONCE
Status: COMPLETED | OUTPATIENT
Start: 2024-01-25 | End: 2024-01-25

## 2024-01-25 RX ORDER — FUROSEMIDE 10 MG/ML
20 INJECTION INTRAMUSCULAR; INTRAVENOUS ONCE
Status: COMPLETED | OUTPATIENT
Start: 2024-01-25 | End: 2024-01-25

## 2024-01-25 RX ADMIN — SODIUM CHLORIDE 500 ML: 9 INJECTION, SOLUTION INTRAVENOUS at 16:52

## 2024-01-25 RX ADMIN — FUROSEMIDE 20 MG: 10 INJECTION, SOLUTION INTRAMUSCULAR; INTRAVENOUS at 18:52

## 2024-01-25 RX ADMIN — CEFTRIAXONE SODIUM 2000 MG: 2 INJECTION, POWDER, FOR SOLUTION INTRAMUSCULAR; INTRAVENOUS at 16:51

## 2024-01-25 ASSESSMENT — ENCOUNTER SYMPTOMS
ABDOMINAL DISTENTION: 0
SHORTNESS OF BREATH: 0
NAUSEA: 0
ABDOMINAL PAIN: 0
COUGH: 0
BACK PAIN: 0
VOMITING: 0
BLOOD IN STOOL: 0
DIARRHEA: 0

## 2024-01-25 NOTE — ED PROVIDER NOTES
Emergency Department Provider Note       PCP: Sheldon Schafer DO   Age: 89 y.o.   Sex: male     DISPOSITION Decision To Discharge 01/25/2024 07:46:27 PM       ICD-10-CM    1. Hematuria, unspecified type  R31.9 [facility]      2. Pleural effusion  J90       3. Pneumonia of right lung due to infectious organism, unspecified part of lung  J18.9           Medical Decision Making     Complexity of Problems Addressed:  1 or more chronic illnesses with a severe exacerbation or progression.  1 or more acute illnesses that pose a threat to life or bodily function.     Data Reviewed and Analyzed:   I independently ordered and reviewed each unique test.  I reviewed external records: previous lab results from outside ED.  I reviewed external records: previous imaging study including radiologist interpretation.   The patients assessment required an independent historian: Historical information gathered from both patient as well as his son present at bedside..  The reason they were needed is important historical information not provided by the patient.      Echo reviewed from 11/13/2023. Normal left ventricular systolic function with a visually estimated EF of 55 - 60%. Left ventricle size is normal. Moderately increased wall thickness. Normal wall motion. Indeterminate diastolic function due to atrial fibrillation.      I interpreted the CT Scan CT renal stone protocol with evidence of thickened bladder wall with small pockets of air consistent with bladder infection.  Additionally bilateral pleural effusions right midlung with findings consistent with indolent pneumonia.  Agree with radiology interpretation.    Discussion of management or test interpretation.  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  States he first noticed bright red blood

## 2024-01-25 NOTE — DISCHARGE INSTRUCTIONS
Take antibiotics as directed for possible UTI as well as for coverage of underlying pneumonia.  Schedule close follow-up with primary care physician.  Schedule close follow-up with urology, pulmonary.  Please return to ED if symptoms worsen or progress in any way.

## 2024-01-26 ENCOUNTER — TELEPHONE (OUTPATIENT)
Dept: UROLOGY | Age: 89
End: 2024-01-26

## 2024-01-26 LAB
EKG ATRIAL RATE: 76 BPM
EKG DIAGNOSIS: NORMAL
EKG P AXIS: -1 DEGREES
EKG P-R INTERVAL: 289 MS
EKG Q-T INTERVAL: 419 MS
EKG QRS DURATION: 114 MS
EKG QTC CALCULATION (BAZETT): 468 MS
EKG R AXIS: 89 DEGREES
EKG T AXIS: 244 DEGREES
EKG VENTRICULAR RATE: 75 BPM

## 2024-01-26 PROCEDURE — 93010 ELECTROCARDIOGRAM REPORT: CPT | Performed by: INTERNAL MEDICINE

## 2024-01-26 NOTE — ED NOTES
I have reviewed discharge instructions with the patient.  The patient verbalized understanding.    Patient left ED via Discharge Method: ambulatory to Home with FAMILY    Opportunity for questions and clarification provided.       Patient given 4 scripts.         To continue your aftercare when you leave the hospital, you may receive an automated call from our care team to check in on how you are doing.  This is a free service and part of our promise to provide the best care and service to meet your aftercare needs.” If you have questions, or wish to unsubscribe from this service please call [phone].  Thank you for Choosing our [facility] Emergency Department.

## 2024-01-26 NOTE — TELEPHONE ENCOUNTER
Patient seen in ER with hematuria and UTI, will need follow up with NP early next week. Thanks   Kyra can you see this pt early next week

## 2024-01-28 LAB
BACTERIA SPEC CULT: NORMAL
BACTERIA SPEC CULT: NORMAL
SERVICE CMNT-IMP: NORMAL

## 2024-01-29 ENCOUNTER — APPOINTMENT (OUTPATIENT)
Dept: CT IMAGING | Age: 89
DRG: 056 | End: 2024-01-29
Payer: MEDICARE

## 2024-01-29 ENCOUNTER — APPOINTMENT (OUTPATIENT)
Dept: GENERAL RADIOLOGY | Age: 89
DRG: 056 | End: 2024-01-29
Payer: MEDICARE

## 2024-01-29 ENCOUNTER — HOSPITAL ENCOUNTER (INPATIENT)
Age: 89
LOS: 7 days | Discharge: NURSING, REHAB OR LONG-TERM CARE | DRG: 056 | End: 2024-02-07
Attending: EMERGENCY MEDICINE | Admitting: INTERNAL MEDICINE
Payer: MEDICARE

## 2024-01-29 DIAGNOSIS — R41.0 DELIRIUM: ICD-10-CM

## 2024-01-29 DIAGNOSIS — S00.93XA CONTUSION OF HEAD, UNSPECIFIED PART OF HEAD, INITIAL ENCOUNTER: ICD-10-CM
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
## 2024-01-29 DIAGNOSIS — R41.82 ALTERED MENTAL STATUS, UNSPECIFIED ALTERED MENTAL STATUS TYPE: ICD-10-CM

## 2024-01-29 PROBLEM — G93.41 ACUTE METABOLIC ENCEPHALOPATHY: Status: ACTIVE | Noted: 2024-01-29

## 2024-01-29 LAB
AMMONIA PLAS-SCNC: 13 UMOL/L (ref 11–32)
ANION GAP SERPL CALC-SCNC: 6 MMOL/L (ref 2–11)
APPEARANCE UR: CLEAR
BACTERIA URNS QL MICRO: NEGATIVE /HPF
BASOPHILS # BLD: 0 K/UL (ref 0–0.2)
BASOPHILS NFR BLD: 0 % (ref 0–2)
BILIRUB UR QL: NEGATIVE
BUN SERPL-MCNC: 20 MG/DL (ref 8–23)
CALCIUM SERPL-MCNC: 9.3 MG/DL (ref 8.3–10.4)
CASTS URNS QL MICRO: ABNORMAL /LPF
CHLORIDE SERPL-SCNC: 103 MMOL/L (ref 103–113)
CO2 SERPL-SCNC: 28 MMOL/L (ref 21–32)
COLOR UR: ABNORMAL
CREAT SERPL-MCNC: 1.3 MG/DL (ref 0.8–1.5)
DIFFERENTIAL METHOD BLD: ABNORMAL
EOSINOPHIL # BLD: 0.1 K/UL (ref 0–0.8)
EOSINOPHIL NFR BLD: 1 % (ref 0.5–7.8)
EPI CELLS #/AREA URNS HPF: ABNORMAL /HPF
ERYTHROCYTE [DISTWIDTH] IN BLOOD BY AUTOMATED COUNT: 14.6 % (ref 11.9–14.6)
FOLATE SERPL-MCNC: 12 NG/ML (ref 3.1–17.5)
GLUCOSE SERPL-MCNC: 127 MG/DL (ref 65–100)
GLUCOSE UR STRIP.AUTO-MCNC: NEGATIVE MG/DL
HCT VFR BLD AUTO: 37.9 % (ref 41.1–50.3)
HGB BLD-MCNC: 11.9 G/DL (ref 13.6–17.2)
HGB UR QL STRIP: NEGATIVE
IMM GRANULOCYTES # BLD AUTO: 0 K/UL (ref 0–0.5)
IMM GRANULOCYTES NFR BLD AUTO: 1 % (ref 0–5)
INR PPP: 1.4
KETONES UR QL STRIP.AUTO: NEGATIVE MG/DL
LACTATE SERPL-SCNC: 1.4 MMOL/L (ref 0.4–2)
LEUKOCYTE ESTERASE UR QL STRIP.AUTO: NEGATIVE
LYMPHOCYTES # BLD: 1 K/UL (ref 0.5–4.6)
LYMPHOCYTES NFR BLD: 12 % (ref 13–44)
MAGNESIUM SERPL-MCNC: 2.5 MG/DL (ref 1.8–2.4)
MCH RBC QN AUTO: 28.5 PG (ref 26.1–32.9)
MCHC RBC AUTO-ENTMCNC: 31.4 G/DL (ref 31.4–35)
MCV RBC AUTO: 90.7 FL (ref 82–102)
MONOCYTES # BLD: 1.3 K/UL (ref 0.1–1.3)
MONOCYTES NFR BLD: 15 % (ref 4–12)
NEUTS SEG # BLD: 6.2 K/UL (ref 1.7–8.2)
NEUTS SEG NFR BLD: 71 % (ref 43–78)
NITRITE UR QL STRIP.AUTO: NEGATIVE
NRBC # BLD: 0 K/UL (ref 0–0.2)
PH UR STRIP: 6.5 (ref 5–9)
PLATELET # BLD AUTO: 289 K/UL (ref 150–450)
PMV BLD AUTO: 9.2 FL (ref 9.4–12.3)
POTASSIUM SERPL-SCNC: 4 MMOL/L (ref 3.5–5.1)
PROCALCITONIN SERPL-MCNC: <0.05 NG/ML (ref 0–0.49)
PROT UR STRIP-MCNC: 30 MG/DL
PROTHROMBIN TIME: 18.1 SEC (ref 11.3–14.9)
RBC # BLD AUTO: 4.18 M/UL (ref 4.23–5.6)
RBC #/AREA URNS HPF: ABNORMAL /HPF
SODIUM SERPL-SCNC: 137 MMOL/L (ref 136–146)
SP GR UR REFRACTOMETRY: 1.01 (ref 1–1.02)
TSH W FREE THYROID IF ABNORMAL: 1.94 UIU/ML (ref 0.36–3.74)
UROBILINOGEN UR QL STRIP.AUTO: 0.2 EU/DL (ref 0.2–1)
VIT B12 SERPL-MCNC: 595 PG/ML (ref 193–986)
WBC # BLD AUTO: 8.7 K/UL (ref 4.3–11.1)
WBC URNS QL MICRO: ABNORMAL /HPF

## 2024-01-29 PROCEDURE — 84145 PROCALCITONIN (PCT): CPT

## 2024-01-29 PROCEDURE — 2580000003 HC RX 258: Performed by: FAMILY MEDICINE

## 2024-01-29 PROCEDURE — 81001 URINALYSIS AUTO W/SCOPE: CPT

## 2024-01-29 PROCEDURE — 71045 X-RAY EXAM CHEST 1 VIEW: CPT

## 2024-01-29 PROCEDURE — G0378 HOSPITAL OBSERVATION PER HR: HCPCS

## 2024-01-29 PROCEDURE — 80048 BASIC METABOLIC PNL TOTAL CA: CPT

## 2024-01-29 PROCEDURE — 6370000000 HC RX 637 (ALT 250 FOR IP): Performed by: FAMILY MEDICINE

## 2024-01-29 PROCEDURE — 85610 PROTHROMBIN TIME: CPT

## 2024-01-29 PROCEDURE — 83605 ASSAY OF LACTIC ACID: CPT

## 2024-01-29 PROCEDURE — 72125 CT NECK SPINE W/O DYE: CPT

## 2024-01-29 PROCEDURE — 82746 ASSAY OF FOLIC ACID SERUM: CPT

## 2024-01-29 PROCEDURE — 84443 ASSAY THYROID STIM HORMONE: CPT

## 2024-01-29 PROCEDURE — 6360000002 HC RX W HCPCS: Performed by: FAMILY MEDICINE

## 2024-01-29 PROCEDURE — 36415 COLL VENOUS BLD VENIPUNCTURE: CPT

## 2024-01-29 PROCEDURE — 96374 THER/PROPH/DIAG INJ IV PUSH: CPT

## 2024-01-29 PROCEDURE — 96375 TX/PRO/DX INJ NEW DRUG ADDON: CPT

## 2024-01-29 PROCEDURE — 87040 BLOOD CULTURE FOR BACTERIA: CPT

## 2024-01-29 PROCEDURE — 82140 ASSAY OF AMMONIA: CPT

## 2024-01-29 PROCEDURE — 99285 EMERGENCY DEPT VISIT HI MDM: CPT

## 2024-01-29 PROCEDURE — A4216 STERILE WATER/SALINE, 10 ML: HCPCS | Performed by: FAMILY MEDICINE

## 2024-01-29 PROCEDURE — 94760 N-INVAS EAR/PLS OXIMETRY 1: CPT

## 2024-01-29 PROCEDURE — 70450 CT HEAD/BRAIN W/O DYE: CPT

## 2024-01-29 PROCEDURE — 93005 ELECTROCARDIOGRAM TRACING: CPT | Performed by: FAMILY MEDICINE

## 2024-01-29 PROCEDURE — 6360000002 HC RX W HCPCS: Performed by: EMERGENCY MEDICINE

## 2024-01-29 PROCEDURE — 85025 COMPLETE CBC W/AUTO DIFF WBC: CPT

## 2024-01-29 PROCEDURE — 94640 AIRWAY INHALATION TREATMENT: CPT

## 2024-01-29 PROCEDURE — 87086 URINE CULTURE/COLONY COUNT: CPT

## 2024-01-29 PROCEDURE — 0202U NFCT DS 22 TRGT SARS-COV-2: CPT

## 2024-01-29 PROCEDURE — 83735 ASSAY OF MAGNESIUM: CPT

## 2024-01-29 PROCEDURE — 82607 VITAMIN B-12: CPT

## 2024-01-29 RX ORDER — SODIUM CHLORIDE 0.9 % (FLUSH) 0.9 %
5-40 SYRINGE (ML) INJECTION EVERY 12 HOURS SCHEDULED
Status: DISCONTINUED | OUTPATIENT
Start: 2024-01-29 | End: 2024-02-07 | Stop reason: HOSPADM

## 2024-01-29 RX ORDER — HYDRALAZINE HYDROCHLORIDE 20 MG/ML
10 INJECTION INTRAMUSCULAR; INTRAVENOUS ONCE
Status: COMPLETED | OUTPATIENT
Start: 2024-01-29 | End: 2024-01-29

## 2024-01-29 RX ORDER — POTASSIUM CHLORIDE 20 MEQ/1
40 TABLET, EXTENDED RELEASE ORAL PRN
Status: DISCONTINUED | OUTPATIENT
Start: 2024-01-29 | End: 2024-02-07 | Stop reason: HOSPADM

## 2024-01-29 RX ORDER — SODIUM CHLORIDE 0.9 % (FLUSH) 0.9 %
5-40 SYRINGE (ML) INJECTION PRN
Status: DISCONTINUED | OUTPATIENT
Start: 2024-01-29 | End: 2024-02-07 | Stop reason: HOSPADM

## 2024-01-29 RX ORDER — ONDANSETRON 2 MG/ML
4 INJECTION INTRAMUSCULAR; INTRAVENOUS EVERY 6 HOURS PRN
Status: DISCONTINUED | OUTPATIENT
Start: 2024-01-29 | End: 2024-02-07 | Stop reason: HOSPADM

## 2024-01-29 RX ORDER — ONDANSETRON 4 MG/1
4 TABLET, ORALLY DISINTEGRATING ORAL EVERY 8 HOURS PRN
Status: DISCONTINUED | OUTPATIENT
Start: 2024-01-29 | End: 2024-02-07 | Stop reason: HOSPADM

## 2024-01-29 RX ORDER — POTASSIUM CHLORIDE 7.45 MG/ML
10 INJECTION INTRAVENOUS PRN
Status: DISCONTINUED | OUTPATIENT
Start: 2024-01-29 | End: 2024-02-07 | Stop reason: HOSPADM

## 2024-01-29 RX ORDER — TAMSULOSIN HYDROCHLORIDE 0.4 MG/1
0.4 CAPSULE ORAL DAILY
Status: DISCONTINUED | OUTPATIENT
Start: 2024-01-30 | End: 2024-02-07 | Stop reason: HOSPADM

## 2024-01-29 RX ORDER — ATORVASTATIN CALCIUM 10 MG/1
10 TABLET, FILM COATED ORAL NIGHTLY
Status: DISCONTINUED | OUTPATIENT
Start: 2024-01-29 | End: 2024-02-07 | Stop reason: HOSPADM

## 2024-01-29 RX ORDER — METOPROLOL TARTRATE 50 MG/1
50 TABLET, FILM COATED ORAL EVERY 6 HOURS
Status: DISCONTINUED | OUTPATIENT
Start: 2024-01-29 | End: 2024-02-06

## 2024-01-29 RX ORDER — IPRATROPIUM BROMIDE AND ALBUTEROL SULFATE 2.5; .5 MG/3ML; MG/3ML
1 SOLUTION RESPIRATORY (INHALATION)
Status: DISCONTINUED | OUTPATIENT
Start: 2024-01-29 | End: 2024-01-30

## 2024-01-29 RX ORDER — POLYETHYLENE GLYCOL 3350 17 G/17G
17 POWDER, FOR SOLUTION ORAL DAILY PRN
Status: DISCONTINUED | OUTPATIENT
Start: 2024-01-29 | End: 2024-02-07 | Stop reason: HOSPADM

## 2024-01-29 RX ORDER — FUROSEMIDE 10 MG/ML
20 INJECTION INTRAMUSCULAR; INTRAVENOUS ONCE
Status: COMPLETED | OUTPATIENT
Start: 2024-01-30 | End: 2024-01-29

## 2024-01-29 RX ORDER — SODIUM CHLORIDE 9 MG/ML
INJECTION, SOLUTION INTRAVENOUS PRN
Status: DISCONTINUED | OUTPATIENT
Start: 2024-01-29 | End: 2024-02-07 | Stop reason: HOSPADM

## 2024-01-29 RX ORDER — ACETAMINOPHEN 650 MG/1
650 SUPPOSITORY RECTAL EVERY 6 HOURS PRN
Status: DISCONTINUED | OUTPATIENT
Start: 2024-01-29 | End: 2024-02-07 | Stop reason: HOSPADM

## 2024-01-29 RX ORDER — ACETAMINOPHEN 325 MG/1
650 TABLET ORAL EVERY 6 HOURS PRN
Status: DISCONTINUED | OUTPATIENT
Start: 2024-01-29 | End: 2024-02-07 | Stop reason: HOSPADM

## 2024-01-29 RX ORDER — MAGNESIUM SULFATE IN WATER 40 MG/ML
2000 INJECTION, SOLUTION INTRAVENOUS PRN
Status: DISCONTINUED | OUTPATIENT
Start: 2024-01-29 | End: 2024-02-07 | Stop reason: HOSPADM

## 2024-01-29 RX ORDER — TROSPIUM CHLORIDE 20 MG/1
20 TABLET, FILM COATED ORAL NIGHTLY
Status: DISCONTINUED | OUTPATIENT
Start: 2024-01-29 | End: 2024-02-07 | Stop reason: HOSPADM

## 2024-01-29 RX ORDER — HYDRALAZINE HYDROCHLORIDE 20 MG/ML
10 INJECTION INTRAMUSCULAR; INTRAVENOUS EVERY 6 HOURS PRN
Status: DISCONTINUED | OUTPATIENT
Start: 2024-01-29 | End: 2024-02-07 | Stop reason: HOSPADM

## 2024-01-29 RX ADMIN — FUROSEMIDE 20 MG: 10 INJECTION, SOLUTION INTRAMUSCULAR; INTRAVENOUS at 23:45

## 2024-01-29 RX ADMIN — SODIUM CHLORIDE 0.5 MG: 9 INJECTION INTRAMUSCULAR; INTRAVENOUS; SUBCUTANEOUS at 15:17

## 2024-01-29 RX ADMIN — IPRATROPIUM BROMIDE AND ALBUTEROL SULFATE 1 DOSE: 2.5; .5 SOLUTION RESPIRATORY (INHALATION) at 20:17

## 2024-01-29 RX ADMIN — HYDRALAZINE HYDROCHLORIDE 10 MG: 20 INJECTION, SOLUTION INTRAMUSCULAR; INTRAVENOUS at 14:22

## 2024-01-29 ASSESSMENT — LIFESTYLE VARIABLES
HOW OFTEN DO YOU HAVE A DRINK CONTAINING ALCOHOL: NEVER
HOW MANY STANDARD DRINKS CONTAINING ALCOHOL DO YOU HAVE ON A TYPICAL DAY: PATIENT DOES NOT DRINK

## 2024-01-29 ASSESSMENT — PAIN - FUNCTIONAL ASSESSMENT: PAIN_FUNCTIONAL_ASSESSMENT: NONE - DENIES PAIN

## 2024-01-29 NOTE — H&P
sedation over fentanyl and benzodiazepines/GABAa agonists.   For dangerous behavior/aggression to self or others can consider Zyprexa or Seroquel if benefits outweigh risk  For persistent insomnia can use melatonin four hours prior to bedtime or Seroquel 25 mg at bedtime       PT/OT evals and PPD ordered?  Therapy and PPD  Diet: No diet orders on file  VTE prophylaxis: Already on anticoagulation  Code status: Prior      Non-peripheral Lines and Tubes (if present):             Hospital Problems:  Active Problems:    * No active hospital problems. *  Resolved Problems:    * No resolved hospital problems. *      Past History:     Past Medical History:   Diagnosis Date    A-fib (HCC)     Arrhythmia     PALPITATION    Arthritis     BPH (benign prostatic hyperplasia)     CAD (coronary artery disease)     STENTS HEART     Chronic kidney disease     HAS ONLY 1 KIDNEY    Hypercholesterolemia     Hypertension     ANABELLE on CPAP     Stroke (HCC)     TIA secondary to holding of eliquis        Past Surgical History:   Procedure Laterality Date    APPENDECTOMY      exploratory     COLONOSCOPY      COLONOSCOPY      LEG SURGERY Left 2023    FEMUR IM NAIL WIL INSERTION ANTEGRADE, Gamma Nail performed by Robert Jacobson MD at 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     HI UNLISTED PROCEDURE CARDIAC SURGERY      stent to circ    PTCA W/ STENT, EA ADD      UROLOGICAL SURGERY      Kidney removed         Social History     Tobacco Use    Smoking status: Former     Current packs/day: 0.00     Types: Cigarettes     Quit date: 1967     Years since quittin.1    Smokeless tobacco: Never   Substance Use Topics    Alcohol use: Yes      Social History     Substance and Sexual Activity   Drug Use No       Family History   Problem Relation Age of Onset    Diabetes Brother     Diabetes Sister     Diabetes Mother     Heart Disease Mother     Heart Attack Mother 81        MI    Kidney

## 2024-01-29 NOTE — ED PROVIDER NOTES
Emergency Department Provider Note       PCP: Sheldon Schafer DO   Age: 89 y.o.   Sex: male     DISPOSITION Decision To Admit 01/29/2024 02:07:57 PM       ICD-10-CM    1. Fall, initial encounter  W19.XXXA       2. Altered mental status, unspecified altered mental status type  R41.82           Medical Decision Making     Complexity of Problems Addressed:  1 or more chronic illnesses with a severe exacerbation or progression.  1 or more acute illnesses that pose a threat to life or bodily function.   1 or more chronic illnesses that pose a threat to life or bodily function.    Data Reviewed and Analyzed:  I independently ordered and reviewed each unique test.  I reviewed external records: ED visit note from an outside group.  I reviewed external records: provider visit note from PCP.  I reviewed external records: provider visit note from outside specialist.  I reviewed external records: previous lab results from outside ED.  I reviewed external records: previous imaging study including radiologist interpretation.   The patients assessment required an independent historian: EMS and son give history.  The reason they were needed is  an altered level of consciousness.    I independently interpreted the cardiac monitor rhythm strip normal sinus rhythm.  I interpreted the X-rays no fracture or dislocation.  I interpreted the CT Scan no acute intracranial bleed.  EKG interpretation,  cardiologist: Sinus rhythm, rate of 72, occasional PVC, no gross ST segment changes noted    Discussion of management or test interpretation.  Given age will obtain CT scan of head as well as cervical spine.  He has bruising to his right elbow has good range of motion and no signs any significant traumatic injuries.  Will check screening labs and urinalysis.  Continue to monitor symptoms.    2:08 PM EST  Labs and CT are fairly stable.  Patient appears restless here and picking at things.  Son voices concern as this is a new

## 2024-01-29 NOTE — ED TRIAGE NOTES
Coming from [place] at [facility]. Pt fell last night, but does not remember. Staff states no LOC, did hit head. Takes eliquis. Bruise next to right eye. Lethargic with EMS

## 2024-01-29 NOTE — ED NOTES
TRANSFER - OUT REPORT:    Verbal report given to HEMANTH Vernon on Kai Guido  being transferred to [facility] for routine progression of patient care       Report consisted of patient's Situation, Background, Assessment and   Recommendations(SBAR).     Information from the following report(s) ED SBAR was reviewed with the receiving nurse.    Lines:   Peripheral IV 01/29/24 Right Antecubital (Active)        Opportunity for questions and clarification was provided.      Patient transported with:  Stephen Barry RN  01/29/24 1304

## 2024-01-30 ENCOUNTER — APPOINTMENT (OUTPATIENT)
Dept: MRI IMAGING | Age: 89
DRG: 056 | End: 2024-01-30
Payer: MEDICARE

## 2024-01-30 PROBLEM — G47.33 OSA (OBSTRUCTIVE SLEEP APNEA): Chronic | Status: ACTIVE | Noted: 2023-02-06

## 2024-01-30 PROBLEM — I48.0 PAROXYSMAL ATRIAL FIBRILLATION (HCC): Chronic | Status: ACTIVE | Noted: 2017-07-09

## 2024-01-30 LAB
ANION GAP SERPL CALC-SCNC: 6 MMOL/L (ref 2–11)
B PERT DNA SPEC QL NAA+PROBE: NOT DETECTED
BASOPHILS # BLD: 0 K/UL (ref 0–0.2)
BASOPHILS NFR BLD: 0 % (ref 0–2)
BORDETELLA PARAPERTUSSIS BY PCR: NOT DETECTED
BUN SERPL-MCNC: 19 MG/DL (ref 8–23)
C PNEUM DNA SPEC QL NAA+PROBE: NOT DETECTED
CALCIUM SERPL-MCNC: 9.2 MG/DL (ref 8.3–10.4)
CHLORIDE SERPL-SCNC: 103 MMOL/L (ref 103–113)
CO2 SERPL-SCNC: 28 MMOL/L (ref 21–32)
CREAT SERPL-MCNC: 1.2 MG/DL (ref 0.8–1.5)
DIFFERENTIAL METHOD BLD: ABNORMAL
EKG ATRIAL RATE: 73 BPM
EKG DIAGNOSIS: NORMAL
EKG Q-T INTERVAL: 414 MS
EKG QRS DURATION: 93 MS
EKG QTC CALCULATION (BAZETT): 454 MS
EKG R AXIS: 77 DEGREES
EKG VENTRICULAR RATE: 72 BPM
EOSINOPHIL # BLD: 0.1 K/UL (ref 0–0.8)
EOSINOPHIL NFR BLD: 1 % (ref 0.5–7.8)
ERYTHROCYTE [DISTWIDTH] IN BLOOD BY AUTOMATED COUNT: 14.5 % (ref 11.9–14.6)
FLUAV SUBTYP SPEC NAA+PROBE: NOT DETECTED
FLUBV RNA SPEC QL NAA+PROBE: NOT DETECTED
GLUCOSE SERPL-MCNC: 96 MG/DL (ref 65–100)
HADV DNA SPEC QL NAA+PROBE: NOT DETECTED
HCOV 229E RNA SPEC QL NAA+PROBE: NOT DETECTED
HCOV HKU1 RNA SPEC QL NAA+PROBE: NOT DETECTED
HCOV NL63 RNA SPEC QL NAA+PROBE: NOT DETECTED
HCOV OC43 RNA SPEC QL NAA+PROBE: NOT DETECTED
HCT VFR BLD AUTO: 36.7 % (ref 41.1–50.3)
HGB BLD-MCNC: 11.8 G/DL (ref 13.6–17.2)
HMPV RNA SPEC QL NAA+PROBE: NOT DETECTED
HPIV1 RNA SPEC QL NAA+PROBE: NOT DETECTED
HPIV2 RNA SPEC QL NAA+PROBE: NOT DETECTED
HPIV3 RNA SPEC QL NAA+PROBE: NOT DETECTED
HPIV4 RNA SPEC QL NAA+PROBE: NOT DETECTED
IMM GRANULOCYTES # BLD AUTO: 0 K/UL (ref 0–0.5)
IMM GRANULOCYTES NFR BLD AUTO: 1 % (ref 0–5)
LYMPHOCYTES # BLD: 0.9 K/UL (ref 0.5–4.6)
LYMPHOCYTES NFR BLD: 11 % (ref 13–44)
M PNEUMO DNA SPEC QL NAA+PROBE: NOT DETECTED
MAGNESIUM SERPL-MCNC: 2.4 MG/DL (ref 1.8–2.4)
MCH RBC QN AUTO: 28.5 PG (ref 26.1–32.9)
MCHC RBC AUTO-ENTMCNC: 32.2 G/DL (ref 31.4–35)
MCV RBC AUTO: 88.6 FL (ref 82–102)
MONOCYTES # BLD: 1.2 K/UL (ref 0.1–1.3)
MONOCYTES NFR BLD: 15 % (ref 4–12)
NEUTS SEG # BLD: 5.6 K/UL (ref 1.7–8.2)
NEUTS SEG NFR BLD: 72 % (ref 43–78)
NRBC # BLD: 0 K/UL (ref 0–0.2)
NT PRO BNP: 9320 PG/ML
PLATELET # BLD AUTO: 275 K/UL (ref 150–450)
PMV BLD AUTO: 9 FL (ref 9.4–12.3)
POTASSIUM SERPL-SCNC: 3.5 MMOL/L (ref 3.5–5.1)
RBC # BLD AUTO: 4.14 M/UL (ref 4.23–5.6)
RSV RNA SPEC QL NAA+PROBE: NOT DETECTED
RV+EV RNA SPEC QL NAA+PROBE: NOT DETECTED
SARS-COV-2 RNA RESP QL NAA+PROBE: NOT DETECTED
SODIUM SERPL-SCNC: 137 MMOL/L (ref 136–146)
WBC # BLD AUTO: 7.8 K/UL (ref 4.3–11.1)

## 2024-01-30 PROCEDURE — 70551 MRI BRAIN STEM W/O DYE: CPT

## 2024-01-30 PROCEDURE — 6370000000 HC RX 637 (ALT 250 FOR IP): Performed by: FAMILY MEDICINE

## 2024-01-30 PROCEDURE — 97530 THERAPEUTIC ACTIVITIES: CPT

## 2024-01-30 PROCEDURE — G0378 HOSPITAL OBSERVATION PER HR: HCPCS

## 2024-01-30 PROCEDURE — 83735 ASSAY OF MAGNESIUM: CPT

## 2024-01-30 PROCEDURE — 80048 BASIC METABOLIC PNL TOTAL CA: CPT

## 2024-01-30 PROCEDURE — 93010 ELECTROCARDIOGRAM REPORT: CPT | Performed by: INTERNAL MEDICINE

## 2024-01-30 PROCEDURE — 83880 ASSAY OF NATRIURETIC PEPTIDE: CPT

## 2024-01-30 PROCEDURE — 97166 OT EVAL MOD COMPLEX 45 MIN: CPT

## 2024-01-30 PROCEDURE — 36415 COLL VENOUS BLD VENIPUNCTURE: CPT

## 2024-01-30 PROCEDURE — 97162 PT EVAL MOD COMPLEX 30 MIN: CPT

## 2024-01-30 PROCEDURE — 2500000003 HC RX 250 WO HCPCS: Performed by: FAMILY MEDICINE

## 2024-01-30 PROCEDURE — 94761 N-INVAS EAR/PLS OXIMETRY MLT: CPT

## 2024-01-30 PROCEDURE — 94640 AIRWAY INHALATION TREATMENT: CPT

## 2024-01-30 PROCEDURE — 97535 SELF CARE MNGMENT TRAINING: CPT

## 2024-01-30 PROCEDURE — 2580000003 HC RX 258: Performed by: FAMILY MEDICINE

## 2024-01-30 PROCEDURE — 85025 COMPLETE CBC W/AUTO DIFF WBC: CPT

## 2024-01-30 RX ORDER — OLANZAPINE 5 MG/1
5 TABLET, ORALLY DISINTEGRATING ORAL 2 TIMES DAILY PRN
Status: DISCONTINUED | OUTPATIENT
Start: 2024-01-30 | End: 2024-02-04

## 2024-01-30 RX ADMIN — TUBERCULIN PURIFIED PROTEIN DERIVATIVE 5 UNITS: 5 INJECTION, SOLUTION INTRADERMAL at 18:35

## 2024-01-30 RX ADMIN — TROSPIUM CHLORIDE 20 MG: 20 TABLET, FILM COATED ORAL at 21:34

## 2024-01-30 RX ADMIN — TAMSULOSIN HYDROCHLORIDE 0.4 MG: 0.4 CAPSULE ORAL at 08:50

## 2024-01-30 RX ADMIN — IPRATROPIUM BROMIDE AND ALBUTEROL SULFATE 1 DOSE: 2.5; .5 SOLUTION RESPIRATORY (INHALATION) at 07:51

## 2024-01-30 RX ADMIN — METOPROLOL TARTRATE 50 MG: 50 TABLET ORAL at 13:35

## 2024-01-30 RX ADMIN — SODIUM CHLORIDE, PRESERVATIVE FREE 10 ML: 5 INJECTION INTRAVENOUS at 21:34

## 2024-01-30 RX ADMIN — ATORVASTATIN CALCIUM 10 MG: 10 TABLET, FILM COATED ORAL at 21:34

## 2024-01-30 RX ADMIN — APIXABAN 2.5 MG: 2.5 TABLET, FILM COATED ORAL at 21:34

## 2024-01-30 RX ADMIN — APIXABAN 2.5 MG: 2.5 TABLET, FILM COATED ORAL at 08:50

## 2024-01-30 RX ADMIN — METOPROLOL TARTRATE 50 MG: 50 TABLET ORAL at 21:36

## 2024-01-30 RX ADMIN — SODIUM CHLORIDE, PRESERVATIVE FREE 10 ML: 5 INJECTION INTRAVENOUS at 08:50

## 2024-01-30 NOTE — CARE COORDINATION
01/30/24 0941   Service Assessment   Patient Orientation Unable to Assess   Cognition Other (see comment)  (unable to assess)   History Provided By Child/Family   Primary Caregiver Other (Comment)  (caregivers)   Accompanied By/Relationship son- suha   Support Systems Home Care Staff;Children;Family Members   Patient's Healthcare Decision Maker is: Legal Next of Kin   PCP Verified by CM Yes   Last Visit to PCP Within last 6 months   Prior Functional Level Assistance with the following:   Current Functional Level Assistance with the following:   Can patient return to prior living arrangement Unknown at present   Ability to make needs known: Other (see comment)  (unable to assess)   Family able to assist with home care needs: Yes   Would you like for me to discuss the discharge plan with any other family members/significant others, and if so, who? Yes  (son)   Financial Resources Medicare;[place]   Community Resources ECF/Home Care   Social/Functional History   Lives With Alone   Type of Home Apartment   Condition of Participation: Discharge Planning   The Plan for Transition of Care is related to the following treatment goals: return home   The Patient and/or Patient Representative was provided with a Choice of Provider? Patient   The Patient and/Or Patient Representative agree with the Discharge Plan? Yes   Freedom of Choice list was provided with basic dialogue that supports the patient's individualized plan of care/goals, treatment preferences, and shares the quality data associated with the providers?  Yes     Assessment completed in room with son. Patient sleeping, son was able to answer all assessment questions. Patient lives in independent living apartment at the [facility]. Patient has had 24/7 caregiver for roughly one week. Caregivers assist with all adls. Patient uses a rolling walker at baseline. Patient is currently getting pt/ot outpatient at the [facility]. HARRIS explained and signed. Demographics

## 2024-01-31 ENCOUNTER — APPOINTMENT (OUTPATIENT)
Dept: CT IMAGING | Age: 89
DRG: 056 | End: 2024-01-31
Payer: MEDICARE

## 2024-01-31 PROBLEM — R41.0 CONFUSION: Status: ACTIVE | Noted: 2024-01-31

## 2024-01-31 LAB
ANION GAP SERPL CALC-SCNC: 6 MMOL/L (ref 2–11)
BACTERIA SPEC CULT: NORMAL
BASOPHILS # BLD: 0 K/UL (ref 0–0.2)
BASOPHILS NFR BLD: 0 % (ref 0–2)
BUN SERPL-MCNC: 25 MG/DL (ref 8–23)
CALCIUM SERPL-MCNC: 9.4 MG/DL (ref 8.3–10.4)
CHLORIDE SERPL-SCNC: 104 MMOL/L (ref 103–113)
CO2 SERPL-SCNC: 26 MMOL/L (ref 21–32)
CREAT SERPL-MCNC: 1.3 MG/DL (ref 0.8–1.5)
DIFFERENTIAL METHOD BLD: ABNORMAL
EOSINOPHIL # BLD: 0.3 K/UL (ref 0–0.8)
EOSINOPHIL NFR BLD: 3 % (ref 0.5–7.8)
ERYTHROCYTE [DISTWIDTH] IN BLOOD BY AUTOMATED COUNT: 14.4 % (ref 11.9–14.6)
GLUCOSE SERPL-MCNC: 116 MG/DL (ref 65–100)
HCT VFR BLD AUTO: 39.4 % (ref 41.1–50.3)
HGB BLD-MCNC: 12.5 G/DL (ref 13.6–17.2)
IMM GRANULOCYTES # BLD AUTO: 0.1 K/UL (ref 0–0.5)
IMM GRANULOCYTES NFR BLD AUTO: 1 % (ref 0–5)
LYMPHOCYTES # BLD: 1.1 K/UL (ref 0.5–4.6)
LYMPHOCYTES NFR BLD: 10 % (ref 13–44)
MCH RBC QN AUTO: 28.6 PG (ref 26.1–32.9)
MCHC RBC AUTO-ENTMCNC: 31.7 G/DL (ref 31.4–35)
MCV RBC AUTO: 90.2 FL (ref 82–102)
MM INDURATION, POC: 0 MM (ref 0–5)
MONOCYTES # BLD: 1.5 K/UL (ref 0.1–1.3)
MONOCYTES NFR BLD: 14 % (ref 4–12)
NEUTS SEG # BLD: 7.6 K/UL (ref 1.7–8.2)
NEUTS SEG NFR BLD: 72 % (ref 43–78)
NRBC # BLD: 0 K/UL (ref 0–0.2)
PLATELET # BLD AUTO: 314 K/UL (ref 150–450)
PMV BLD AUTO: 9.2 FL (ref 9.4–12.3)
POTASSIUM SERPL-SCNC: 3.9 MMOL/L (ref 3.5–5.1)
PPD, POC: NEGATIVE
RBC # BLD AUTO: 4.37 M/UL (ref 4.23–5.6)
SERVICE CMNT-IMP: NORMAL
SODIUM SERPL-SCNC: 136 MMOL/L (ref 136–146)
WBC # BLD AUTO: 10.6 K/UL (ref 4.3–11.1)

## 2024-01-31 PROCEDURE — 6360000002 HC RX W HCPCS: Performed by: FAMILY MEDICINE

## 2024-01-31 PROCEDURE — 6370000000 HC RX 637 (ALT 250 FOR IP): Performed by: FAMILY MEDICINE

## 2024-01-31 PROCEDURE — 51798 US URINE CAPACITY MEASURE: CPT

## 2024-01-31 PROCEDURE — 51701 INSERT BLADDER CATHETER: CPT

## 2024-01-31 PROCEDURE — 80048 BASIC METABOLIC PNL TOTAL CA: CPT

## 2024-01-31 PROCEDURE — 36415 COLL VENOUS BLD VENIPUNCTURE: CPT

## 2024-01-31 PROCEDURE — 1100000003 HC PRIVATE W/ TELEMETRY

## 2024-01-31 PROCEDURE — 85025 COMPLETE CBC W/AUTO DIFF WBC: CPT

## 2024-01-31 PROCEDURE — 1100000000 HC RM PRIVATE

## 2024-01-31 PROCEDURE — 2580000003 HC RX 258: Performed by: FAMILY MEDICINE

## 2024-01-31 PROCEDURE — 70450 CT HEAD/BRAIN W/O DYE: CPT

## 2024-01-31 RX ADMIN — ATORVASTATIN CALCIUM 10 MG: 10 TABLET, FILM COATED ORAL at 22:06

## 2024-01-31 RX ADMIN — APIXABAN 2.5 MG: 2.5 TABLET, FILM COATED ORAL at 22:06

## 2024-01-31 RX ADMIN — METOPROLOL TARTRATE 50 MG: 50 TABLET ORAL at 06:00

## 2024-01-31 RX ADMIN — APIXABAN 2.5 MG: 2.5 TABLET, FILM COATED ORAL at 08:46

## 2024-01-31 RX ADMIN — METOPROLOL TARTRATE 50 MG: 50 TABLET ORAL at 12:37

## 2024-01-31 RX ADMIN — HYDRALAZINE HYDROCHLORIDE 10 MG: 20 INJECTION, SOLUTION INTRAMUSCULAR; INTRAVENOUS at 01:47

## 2024-01-31 RX ADMIN — METOPROLOL TARTRATE 50 MG: 50 TABLET ORAL at 17:55

## 2024-01-31 RX ADMIN — TAMSULOSIN HYDROCHLORIDE 0.4 MG: 0.4 CAPSULE ORAL at 08:46

## 2024-01-31 RX ADMIN — SODIUM CHLORIDE, PRESERVATIVE FREE 10 ML: 5 INJECTION INTRAVENOUS at 08:46

## 2024-01-31 RX ADMIN — TROSPIUM CHLORIDE 20 MG: 20 TABLET, FILM COATED ORAL at 22:06

## 2024-01-31 RX ADMIN — SODIUM CHLORIDE, PRESERVATIVE FREE 10 ML: 5 INJECTION INTRAVENOUS at 22:07

## 2024-01-31 NOTE — CARE COORDINATION
Therapy recommending short term rehab. CM spoke with son via phone. He is agreeable to plan and would like a referrals s to [facility].referral sent. discharge pending bed offer.    Babatunde YOUSIF, ACM  [place]

## 2024-02-01 ENCOUNTER — APPOINTMENT (OUTPATIENT)
Dept: GENERAL RADIOLOGY | Age: 89
DRG: 056 | End: 2024-02-01
Payer: MEDICARE

## 2024-02-01 PROBLEM — R33.9 URINARY RETENTION: Status: ACTIVE | Noted: 2024-02-01
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
## 2024-02-01 LAB
ANION GAP SERPL CALC-SCNC: 2 MMOL/L (ref 2–11)
BASOPHILS # BLD: 0 K/UL (ref 0–0.2)
BASOPHILS NFR BLD: 0 % (ref 0–2)
BUN SERPL-MCNC: 29 MG/DL (ref 8–23)
CALCIUM SERPL-MCNC: 9.6 MG/DL (ref 8.3–10.4)
CHLORIDE SERPL-SCNC: 102 MMOL/L (ref 103–113)
CO2 SERPL-SCNC: 33 MMOL/L (ref 21–32)
CREAT SERPL-MCNC: 1.3 MG/DL (ref 0.8–1.5)
DIFFERENTIAL METHOD BLD: ABNORMAL
EOSINOPHIL # BLD: 0.2 K/UL (ref 0–0.8)
EOSINOPHIL NFR BLD: 2 % (ref 0.5–7.8)
ERYTHROCYTE [DISTWIDTH] IN BLOOD BY AUTOMATED COUNT: 14.5 % (ref 11.9–14.6)
GLUCOSE SERPL-MCNC: 182 MG/DL (ref 65–100)
HCT VFR BLD AUTO: 40.8 % (ref 41.1–50.3)
HGB BLD-MCNC: 13.1 G/DL (ref 13.6–17.2)
IMM GRANULOCYTES # BLD AUTO: 0 K/UL (ref 0–0.5)
IMM GRANULOCYTES NFR BLD AUTO: 0 % (ref 0–5)
LYMPHOCYTES # BLD: 0.9 K/UL (ref 0.5–4.6)
LYMPHOCYTES NFR BLD: 8 % (ref 13–44)
MCH RBC QN AUTO: 28.5 PG (ref 26.1–32.9)
MCHC RBC AUTO-ENTMCNC: 32.1 G/DL (ref 31.4–35)
MCV RBC AUTO: 88.9 FL (ref 82–102)
MM INDURATION, POC: 0 MM (ref 0–5)
MONOCYTES # BLD: 1.5 K/UL (ref 0.1–1.3)
MONOCYTES NFR BLD: 13 % (ref 4–12)
NEUTS SEG # BLD: 8.9 K/UL (ref 1.7–8.2)
NEUTS SEG NFR BLD: 77 % (ref 43–78)
NRBC # BLD: 0 K/UL (ref 0–0.2)
PLATELET # BLD AUTO: 330 K/UL (ref 150–450)
PMV BLD AUTO: 9 FL (ref 9.4–12.3)
POTASSIUM SERPL-SCNC: 3.7 MMOL/L (ref 3.5–5.1)
PPD, POC: NEGATIVE
RBC # BLD AUTO: 4.59 M/UL (ref 4.23–5.6)
SODIUM SERPL-SCNC: 137 MMOL/L (ref 136–146)
WBC # BLD AUTO: 11.5 K/UL (ref 4.3–11.1)

## 2024-02-01 PROCEDURE — 99222 1ST HOSP IP/OBS MODERATE 55: CPT | Performed by: UROLOGY

## 2024-02-01 PROCEDURE — 97112 NEUROMUSCULAR REEDUCATION: CPT

## 2024-02-01 PROCEDURE — 51798 US URINE CAPACITY MEASURE: CPT

## 2024-02-01 PROCEDURE — 1100000000 HC RM PRIVATE

## 2024-02-01 PROCEDURE — 85025 COMPLETE CBC W/AUTO DIFF WBC: CPT

## 2024-02-01 PROCEDURE — 73130 X-RAY EXAM OF HAND: CPT

## 2024-02-01 PROCEDURE — 51701 INSERT BLADDER CATHETER: CPT

## 2024-02-01 PROCEDURE — 97535 SELF CARE MNGMENT TRAINING: CPT

## 2024-02-01 PROCEDURE — 6370000000 HC RX 637 (ALT 250 FOR IP): Performed by: FAMILY MEDICINE

## 2024-02-01 PROCEDURE — 99222 1ST HOSP IP/OBS MODERATE 55: CPT | Performed by: PSYCHIATRY & NEUROLOGY

## 2024-02-01 PROCEDURE — 6370000000 HC RX 637 (ALT 250 FOR IP): Performed by: PSYCHIATRY & NEUROLOGY

## 2024-02-01 PROCEDURE — 80048 BASIC METABOLIC PNL TOTAL CA: CPT

## 2024-02-01 PROCEDURE — 36415 COLL VENOUS BLD VENIPUNCTURE: CPT

## 2024-02-01 PROCEDURE — 87086 URINE CULTURE/COLONY COUNT: CPT

## 2024-02-01 PROCEDURE — 97530 THERAPEUTIC ACTIVITIES: CPT

## 2024-02-01 RX ORDER — DONEPEZIL HYDROCHLORIDE 5 MG/1
5 TABLET, FILM COATED ORAL NIGHTLY
Status: DISCONTINUED | OUTPATIENT
Start: 2024-02-01 | End: 2024-02-07 | Stop reason: HOSPADM

## 2024-02-01 RX ADMIN — APIXABAN 2.5 MG: 2.5 TABLET, FILM COATED ORAL at 09:13

## 2024-02-01 RX ADMIN — ATORVASTATIN CALCIUM 10 MG: 10 TABLET, FILM COATED ORAL at 22:22

## 2024-02-01 RX ADMIN — METOPROLOL TARTRATE 50 MG: 50 TABLET ORAL at 01:15

## 2024-02-01 RX ADMIN — TAMSULOSIN HYDROCHLORIDE 0.4 MG: 0.4 CAPSULE ORAL at 09:13

## 2024-02-01 RX ADMIN — APIXABAN 2.5 MG: 2.5 TABLET, FILM COATED ORAL at 22:22

## 2024-02-01 RX ADMIN — TROSPIUM CHLORIDE 20 MG: 20 TABLET, FILM COATED ORAL at 22:22

## 2024-02-01 RX ADMIN — DONEPEZIL HYDROCHLORIDE 5 MG: 5 TABLET, FILM COATED ORAL at 22:22

## 2024-02-01 RX ADMIN — METOPROLOL TARTRATE 50 MG: 50 TABLET ORAL at 05:56

## 2024-02-02 PROBLEM — S62.607A CLOSED FRACTURE OF PHALANX OF LEFT LITTLE FINGER: Status: ACTIVE | Noted: 2024-02-02

## 2024-02-02 PROCEDURE — 1100000000 HC RM PRIVATE

## 2024-02-02 PROCEDURE — 99231 SBSQ HOSP IP/OBS SF/LOW 25: CPT | Performed by: PHYSICIAN ASSISTANT

## 2024-02-02 PROCEDURE — 99221 1ST HOSP IP/OBS SF/LOW 40: CPT | Performed by: PHYSICIAN ASSISTANT

## 2024-02-02 PROCEDURE — 97530 THERAPEUTIC ACTIVITIES: CPT

## 2024-02-02 PROCEDURE — 6370000000 HC RX 637 (ALT 250 FOR IP): Performed by: FAMILY MEDICINE

## 2024-02-02 PROCEDURE — 97535 SELF CARE MNGMENT TRAINING: CPT

## 2024-02-02 PROCEDURE — 26750 TREAT FINGER FRACTURE EACH: CPT | Performed by: PHYSICIAN ASSISTANT

## 2024-02-02 PROCEDURE — 97116 GAIT TRAINING THERAPY: CPT

## 2024-02-02 PROCEDURE — 6370000000 HC RX 637 (ALT 250 FOR IP): Performed by: PSYCHIATRY & NEUROLOGY

## 2024-02-02 PROCEDURE — 97112 NEUROMUSCULAR REEDUCATION: CPT

## 2024-02-02 RX ADMIN — APIXABAN 2.5 MG: 2.5 TABLET, FILM COATED ORAL at 09:11

## 2024-02-02 RX ADMIN — DONEPEZIL HYDROCHLORIDE 5 MG: 5 TABLET, FILM COATED ORAL at 20:56

## 2024-02-02 RX ADMIN — ATORVASTATIN CALCIUM 10 MG: 10 TABLET, FILM COATED ORAL at 20:56

## 2024-02-02 RX ADMIN — TROSPIUM CHLORIDE 20 MG: 20 TABLET, FILM COATED ORAL at 20:56

## 2024-02-02 RX ADMIN — METOPROLOL TARTRATE 50 MG: 50 TABLET ORAL at 00:36

## 2024-02-02 RX ADMIN — TAMSULOSIN HYDROCHLORIDE 0.4 MG: 0.4 CAPSULE ORAL at 09:11

## 2024-02-02 RX ADMIN — POLYETHYLENE GLYCOL 3350 17 G: 17 POWDER, FOR SOLUTION ORAL at 09:11

## 2024-02-02 RX ADMIN — APIXABAN 2.5 MG: 2.5 TABLET, FILM COATED ORAL at 20:56

## 2024-02-02 ASSESSMENT — PAIN SCALES - WONG BAKER
WONGBAKER_NUMERICALRESPONSE: 0
WONGBAKER_NUMERICALRESPONSE: 0

## 2024-02-02 ASSESSMENT — PAIN SCALES - GENERAL
PAINLEVEL_OUTOF10: 0
PAINLEVEL_OUTOF10: 0

## 2024-02-02 NOTE — CARE COORDINATION
Chart reviewed and patient discussed in IDT rounds this AM. Patient to discharge to [facility] short term rehab once medically ready. Patient has to be sitter free for 48 hours, attending MD aware.    Babatunde WRIGHT, M  [place]

## 2024-02-03 LAB
BACTERIA SPEC CULT: NORMAL
SERVICE CMNT-IMP: NORMAL

## 2024-02-03 PROCEDURE — 1100000000 HC RM PRIVATE

## 2024-02-03 PROCEDURE — 6370000000 HC RX 637 (ALT 250 FOR IP): Performed by: FAMILY MEDICINE

## 2024-02-03 PROCEDURE — 99232 SBSQ HOSP IP/OBS MODERATE 35: CPT | Performed by: PHYSICIAN ASSISTANT

## 2024-02-03 PROCEDURE — 6370000000 HC RX 637 (ALT 250 FOR IP): Performed by: PSYCHIATRY & NEUROLOGY

## 2024-02-03 PROCEDURE — 2580000003 HC RX 258: Performed by: INTERNAL MEDICINE

## 2024-02-03 PROCEDURE — 94760 N-INVAS EAR/PLS OXIMETRY 1: CPT

## 2024-02-03 PROCEDURE — 6360000002 HC RX W HCPCS: Performed by: INTERNAL MEDICINE

## 2024-02-03 PROCEDURE — 6370000000 HC RX 637 (ALT 250 FOR IP): Performed by: INTERNAL MEDICINE

## 2024-02-03 PROCEDURE — 94660 CPAP INITIATION&MGMT: CPT

## 2024-02-03 PROCEDURE — 51798 US URINE CAPACITY MEASURE: CPT

## 2024-02-03 RX ORDER — LIDOCAINE HYDROCHLORIDE 20 MG/ML
JELLY TOPICAL PRN
Status: DISCONTINUED | OUTPATIENT
Start: 2024-02-03 | End: 2024-02-07 | Stop reason: HOSPADM

## 2024-02-03 RX ADMIN — OLANZAPINE 5 MG: 5 TABLET, ORALLY DISINTEGRATING ORAL at 03:15

## 2024-02-03 RX ADMIN — DONEPEZIL HYDROCHLORIDE 5 MG: 5 TABLET, FILM COATED ORAL at 21:44

## 2024-02-03 RX ADMIN — METOPROLOL TARTRATE 50 MG: 50 TABLET ORAL at 05:36

## 2024-02-03 RX ADMIN — TROSPIUM CHLORIDE 20 MG: 20 TABLET, FILM COATED ORAL at 21:44

## 2024-02-03 RX ADMIN — METOPROLOL TARTRATE 50 MG: 50 TABLET ORAL at 11:42

## 2024-02-03 RX ADMIN — METOPROLOL TARTRATE 50 MG: 50 TABLET ORAL at 01:56

## 2024-02-03 RX ADMIN — TAMSULOSIN HYDROCHLORIDE 0.4 MG: 0.4 CAPSULE ORAL at 08:17

## 2024-02-03 RX ADMIN — ATORVASTATIN CALCIUM 10 MG: 10 TABLET, FILM COATED ORAL at 21:44

## 2024-02-03 RX ADMIN — METOPROLOL TARTRATE 50 MG: 50 TABLET ORAL at 18:07

## 2024-02-03 RX ADMIN — LIDOCAINE HYDROCHLORIDE: 20 JELLY TOPICAL at 05:06

## 2024-02-03 ASSESSMENT — PAIN SCALES - PAIN ASSESSMENT IN ADVANCED DEMENTIA (PAINAD)
NEGVOCALIZATION: 0
TOTALSCORE: 0
FACIALEXPRESSION: 0
BREATHING: 0
CONSOLABILITY: 0
BODYLANGUAGE: 0

## 2024-02-03 NOTE — CONSULTS
[facility]  Consultation Note    Patient ID:  Name: Kai Guido  MRN: [id number]  AGE: 89 y.o.  : 1929    Date of Consultation:  2024  Referring Physician:  Hospitalist     Subjective: Pt reports left little finger pain. He is confused about when or how he injured this. His family member in the room says it started two days ago. He has a history of ORIF of the left hip by Dr. Jacobson  in November of last year and he recovered well from that.    For this admission on chart review: . Kai Guido is a 89 y.o. male with medical history of   CKD stage III   ANABELLE   Hypertension   CAD   Paroxysmal atrial fibrillation   Dyslipidemia      who presented with intermittent confusion for weeks, but worsened in 1-2 days.      In 2023, patient had ORIF of the left femur. He was doing well postoperatively.  He was discharged to a rehab facility.      Son states that the patient started to have some confusion..  There were good days and bad days.      On 2024 patient had more confusion with hematuria.  He went to the local emergency room and was treated for UTI.  He received Vantin and doxycycline.  Urine culture at that time showed contamination.      Patient started to be more confused in the night of .  By the next morning patient is more confused.      Patient came to emergency room with initial elevated blood pressure.  CT of the head shows no acute process.  It shows finding of meningioma which has been chronic.   C-spine CT scan is negative.  MRI of the head did not show acute findings.       Past Medical History Includes:   Past Medical History:   Diagnosis Date    A-fib (HCC)     Arrhythmia     PALPITATION    Arthritis     BPH (benign prostatic hyperplasia)     CAD (coronary artery disease)     STENTS HEART 2008    Chronic kidney disease     HAS ONLY 1 KIDNEY    Hypercholesterolemia     Hypertension     ANABELLE (obstructive sleep apnea) 2023    
Admit Date: 1/29/2024    Subjective:     Patient was unable to void overnight, nursing staff unable to straight cath x 2. Patient was eventually able to void bloody urine around 5am. He has voided again spontaneously since then with more clear urine. See nursing progress note.    Objective:     Patient Vitals for the past 8 hrs:   BP Temp Temp src Pulse Resp SpO2   02/03/24 0732 (!) 159/98 -- -- -- -- --   02/03/24 0722 (!) 173/95 97.5 °F (36.4 °C) Axillary 59 18 98 %   02/03/24 0536 (!) 155/97 -- -- 72 -- --   02/03/24 0344 (!) 155/97 97.5 °F (36.4 °C) Oral 57 16 95 %   02/03/24 0215 -- -- -- 65 -- --   02/03/24 0156 122/82 -- -- 65 -- --   02/03/24 0113 122/82 -- -- 62 -- --     No intake/output data recorded.  02/01 1901 - 02/03 0700  In: -   Out: 580 [Urine:580]    Physical Exam:  GENERAL ASSESSMENT: confused, no anxiety, depression or agitation  Chest: Easy work of breathing  CVS exam: RRR  ABDOMEN: not done  Neurological exam reveals alert, oriented, normal speech, no focal findings or movement disorder noted.  FEMALE GENITOURINARY EXAM: not done  MALE GENITAL EXAM: not done        Data Review No results found for this or any previous visit (from the past 24 hour(s)).    XR HAND LEFT (MIN 3 VIEWS)    Result Date: 2/1/2024  Left hand INDICATION: Left fifth digit redness and deformity. COMPARISON:  None TECHNIQUE: Three views of the left hand were obtained. FINDINGS: Fracture involving the base of the fifth proximal phalanx which appears chronic. Acute nondisplaced intra-articular fracture involving the dorsal base of the fifth distal phalanx. Flexion at the fifth PIP joint is noted. Severe narrowing of the first carpometacarpal joint. Mild/moderate diffuse interphalangeal joint space narrowing.     Acute nondisplaced fracture of the fifth distal phalanx. Chronic fracture of the fifth proximal phalanx. Flexion at the fifth PIP joint. An extensor tendon injury cannot be excluded.         Assessment:     Principal 
state (HCC)    Confusion  Resolved Problems:    * No resolved hospital problems. *    89 yr old confused male with plummer currently in place for several elevated bladder scans.     Plan   Remove plummer.  Straight cath Q8 hours if patient does not void.  Aggressive bowel regimen for constipation.  Cont flomax.      Electronically signed by BERLIN Casper on 2/1/24 at 1:46 PM EST  [facility]    Phone: [phone]    Urology Attending Physician Note:     Admit Date: 1/29/2024    Subjective:     Patient is admitted for confusion and found to be having urinary retention / over flow incontinence.  Indwelling plummer kept being pulled on.  Also has significant constipation.  Urology consulted.      Baseline voids normally and follows with me.  PVR 35 cc in June 2023.       Objective:     Patient Vitals for the past 8 hrs:   BP Temp Temp src Pulse Resp SpO2   02/01/24 2001 (!) 141/96 97.5 °F (36.4 °C) Axillary 67 18 97 %   02/01/24 1715 -- -- -- 56 -- --   02/01/24 1534 121/80 97.5 °F (36.4 °C) Axillary 58 18 97 %     No intake/output data recorded.  01/31 0701 - 02/01 1900  In: 280 [P.O.:280]  Out: 430 [Urine:430]    Physical Exam:     BP (!) 141/96   Pulse 67   Temp 97.5 °F (36.4 °C) (Axillary)   Resp 18   Ht 1.727 m (5' 8\")   Wt 62.6 kg (138 lb)   SpO2 97%   BMI 20.98 kg/m²      GENERAL: No acute distress, Confused  CARDIAC: regular rate and rhythm  CHEST AND LUNG: Easy work of breathing, clear to auscultation bilaterally, no cyanosis  ABDOMEN: soft, non tender, non-distended, positive bowel sounds, no organomegaly, no palpable masses, no guarding, no rebound tenderness  SKIN: No rash, no erythema, no lacerations or abrasions, no ecchymosis  NEUROLOGIC: cranial nerves 2-12 grossly intact           Data Review   Recent Results (from the past 24 hour(s))   PLEASE READ & DOCUMENT PPD TEST IN 48 HRS    Collection Time: 02/01/24 12:00 AM   Result Value Ref Range    PPD, 
antihistamines, narcotics, anticholinergics. Preference towards Precedex for sedation over fentanyl and benzodiazepines/GABAa agonists.   For dangerous behavior/aggression to self or others can consider Zyprexa or Seroquel if benefits outweigh risk  For persistent insomnia can use melatonin four hours prior to bedtime or Seroquel 25 mg at bedtime

## 2024-02-04 PROCEDURE — 6370000000 HC RX 637 (ALT 250 FOR IP): Performed by: PSYCHIATRY & NEUROLOGY

## 2024-02-04 PROCEDURE — 1100000000 HC RM PRIVATE

## 2024-02-04 PROCEDURE — 6370000000 HC RX 637 (ALT 250 FOR IP): Performed by: FAMILY MEDICINE

## 2024-02-04 PROCEDURE — 6370000000 HC RX 637 (ALT 250 FOR IP): Performed by: INTERNAL MEDICINE

## 2024-02-04 RX ORDER — AMLODIPINE BESYLATE 5 MG/1
5 TABLET ORAL DAILY
Status: DISCONTINUED | OUTPATIENT
Start: 2024-02-04 | End: 2024-02-07 | Stop reason: HOSPADM

## 2024-02-04 RX ADMIN — TROSPIUM CHLORIDE 20 MG: 20 TABLET, FILM COATED ORAL at 20:58

## 2024-02-04 RX ADMIN — METOPROLOL TARTRATE 50 MG: 50 TABLET ORAL at 17:54

## 2024-02-04 RX ADMIN — DONEPEZIL HYDROCHLORIDE 5 MG: 5 TABLET, FILM COATED ORAL at 20:58

## 2024-02-04 RX ADMIN — TAMSULOSIN HYDROCHLORIDE 0.4 MG: 0.4 CAPSULE ORAL at 09:14

## 2024-02-04 RX ADMIN — METOPROLOL TARTRATE 50 MG: 50 TABLET ORAL at 05:42

## 2024-02-04 RX ADMIN — AMLODIPINE BESYLATE 5 MG: 5 TABLET ORAL at 10:04

## 2024-02-04 ASSESSMENT — PAIN SCALES - WONG BAKER: WONGBAKER_NUMERICALRESPONSE: 0

## 2024-02-04 ASSESSMENT — PAIN SCALES - GENERAL: PAINLEVEL_OUTOF10: 0

## 2024-02-05 PROCEDURE — 6370000000 HC RX 637 (ALT 250 FOR IP): Performed by: FAMILY MEDICINE

## 2024-02-05 PROCEDURE — 6370000000 HC RX 637 (ALT 250 FOR IP): Performed by: PSYCHIATRY & NEUROLOGY

## 2024-02-05 PROCEDURE — 6370000000 HC RX 637 (ALT 250 FOR IP): Performed by: INTERNAL MEDICINE

## 2024-02-05 PROCEDURE — 1100000000 HC RM PRIVATE

## 2024-02-05 RX ADMIN — AMLODIPINE BESYLATE 5 MG: 5 TABLET ORAL at 10:39

## 2024-02-05 RX ADMIN — METOPROLOL TARTRATE 50 MG: 50 TABLET ORAL at 02:20

## 2024-02-05 RX ADMIN — DONEPEZIL HYDROCHLORIDE 5 MG: 5 TABLET, FILM COATED ORAL at 21:20

## 2024-02-05 RX ADMIN — METOPROLOL TARTRATE 25 MG: 50 TABLET ORAL at 05:25

## 2024-02-05 RX ADMIN — TAMSULOSIN HYDROCHLORIDE 0.4 MG: 0.4 CAPSULE ORAL at 10:39

## 2024-02-05 RX ADMIN — TROSPIUM CHLORIDE 20 MG: 20 TABLET, FILM COATED ORAL at 21:20

## 2024-02-05 RX ADMIN — METOPROLOL TARTRATE 50 MG: 50 TABLET ORAL at 23:58

## 2024-02-05 RX ADMIN — METOPROLOL TARTRATE 50 MG: 50 TABLET ORAL at 12:48

## 2024-02-05 ASSESSMENT — PAIN SCALES - PAIN ASSESSMENT IN ADVANCED DEMENTIA (PAINAD)
BODYLANGUAGE: 0
FACIALEXPRESSION: 0
TOTALSCORE: 0
CONSOLABILITY: 0
BREATHING: 0
NEGVOCALIZATION: 0

## 2024-02-05 ASSESSMENT — PAIN SCALES - WONG BAKER: WONGBAKER_NUMERICALRESPONSE: 0

## 2024-02-05 ASSESSMENT — PAIN SCALES - GENERAL: PAINLEVEL_OUTOF10: 0

## 2024-02-05 NOTE — CARE COORDINATION
Chart reviewed and patient discussed in IDT rounds this AM. Discharge plan is [facility] for short term rehab, once medically ready.    Babatunde YOUSIF, ACM  [place]

## 2024-02-06 PROBLEM — R00.1 BRADYCARDIA: Status: ACTIVE | Noted: 2024-02-06

## 2024-02-06 PROCEDURE — 6370000000 HC RX 637 (ALT 250 FOR IP): Performed by: INTERNAL MEDICINE

## 2024-02-06 PROCEDURE — 1100000000 HC RM PRIVATE

## 2024-02-06 PROCEDURE — 6370000000 HC RX 637 (ALT 250 FOR IP): Performed by: PSYCHIATRY & NEUROLOGY

## 2024-02-06 PROCEDURE — 6370000000 HC RX 637 (ALT 250 FOR IP): Performed by: FAMILY MEDICINE

## 2024-02-06 PROCEDURE — 97535 SELF CARE MNGMENT TRAINING: CPT

## 2024-02-06 PROCEDURE — 51798 US URINE CAPACITY MEASURE: CPT

## 2024-02-06 PROCEDURE — 97530 THERAPEUTIC ACTIVITIES: CPT

## 2024-02-06 PROCEDURE — 51701 INSERT BLADDER CATHETER: CPT

## 2024-02-06 RX ADMIN — METOPROLOL TARTRATE 50 MG: 50 TABLET ORAL at 06:19

## 2024-02-06 RX ADMIN — TROSPIUM CHLORIDE 20 MG: 20 TABLET, FILM COATED ORAL at 20:46

## 2024-02-06 RX ADMIN — DONEPEZIL HYDROCHLORIDE 5 MG: 5 TABLET, FILM COATED ORAL at 20:46

## 2024-02-06 RX ADMIN — AMLODIPINE BESYLATE 5 MG: 5 TABLET ORAL at 09:26

## 2024-02-06 RX ADMIN — LIDOCAINE HYDROCHLORIDE: 20 JELLY TOPICAL at 14:20

## 2024-02-06 RX ADMIN — TAMSULOSIN HYDROCHLORIDE 0.4 MG: 0.4 CAPSULE ORAL at 09:26

## 2024-02-06 ASSESSMENT — PAIN SCALES - GENERAL: PAINLEVEL_OUTOF10: 0

## 2024-02-06 NOTE — PLAN OF CARE
Problem: Safety - Medical Restraint  Goal: Remains free of injury from restraints (Restraint for Interference with Medical Device)  Description: INTERVENTIONS:  1. Determine that other, less restrictive measures have been tried or would not be effective before applying the restraint  2. Evaluate the patient's condition at the time of restraint application  3. Inform patient/family regarding the reason for restraint  4. Q2H: Monitor safety, psychosocial status, comfort, nutrition and hydration  2/6/2024 0946 by Alycia Dejesus RN  Outcome: Progressing  2/6/2024 0043 by Jania Tucker RN  Outcome: Progressing     Problem: Discharge Planning  Goal: Discharge to home or other facility with appropriate resources  2/6/2024 0946 by Alycia Dejesus RN  Outcome: Progressing  2/6/2024 0043 by Jania Tucker RN  Outcome: Progressing     Problem: Confusion  Goal: Confusion, delirium, dementia, or psychosis is improved or at baseline  Description: INTERVENTIONS:  1. Assess for possible contributors to thought disturbance, including medications, impaired vision or hearing, underlying metabolic abnormalities, dehydration, psychiatric diagnoses, and notify attending LIP  2. [place] high risk fall precautions, as indicated  3. Provide frequent short contacts to provide reality reorientation, refocusing and direction  4. Decrease environmental stimuli, including noise as appropriate  5. Monitor and intervene to maintain adequate nutrition, hydration, elimination, sleep and activity  6. If unable to ensure safety without constant attention obtain sitter and review sitter guidelines with assigned personnel  7. Initiate Psychosocial CNS and Spiritual Care consult, as indicated  2/6/2024 0946 by Alycia Dejesus RN  Outcome: Progressing  2/6/2024 0043 by Jania Tucker RN  Outcome: Progressing     Problem: Safety - Adult  Goal: Free from fall injury  2/6/2024 0946 by Alycia Dejesus RN  Outcome: Progressing  2/6/2024 0043 by Caitlin 
Absence of new skin breakdown  Description: 1.  Monitor for areas of redness and/or skin breakdown  2.  Assess vascular access sites hourly  3.  Every 4-6 hours minimum:  Change oxygen saturation probe site  4.  Every 4-6 hours:  If on nasal continuous positive airway pressure, respiratory therapy assess nares and determine need for appliance change or resting period.  Outcome: Progressing     Problem: ABCDS Injury Assessment  Goal: Absence of physical injury  Outcome: Progressing

## 2024-02-07 VITALS
HEIGHT: 68 IN | OXYGEN SATURATION: 96 % | DIASTOLIC BLOOD PRESSURE: 77 MMHG | WEIGHT: 138 LBS | SYSTOLIC BLOOD PRESSURE: 117 MMHG | BODY MASS INDEX: 20.92 KG/M2 | RESPIRATION RATE: 18 BRPM | TEMPERATURE: 97.5 F | HEART RATE: 50 BPM

## 2024-02-07 LAB
EKG ATRIAL RATE: 51 BPM
EKG DIAGNOSIS: NORMAL
EKG P AXIS: 79 DEGREES
EKG P-R INTERVAL: 312 MS
EKG Q-T INTERVAL: 474 MS
EKG QRS DURATION: 98 MS
EKG QTC CALCULATION (BAZETT): 436 MS
EKG R AXIS: 64 DEGREES
EKG T AXIS: 265 DEGREES
EKG VENTRICULAR RATE: 51 BPM
SARS-COV-2 RDRP RESP QL NAA+PROBE: NOT DETECTED
SOURCE: NORMAL

## 2024-02-07 PROCEDURE — 6370000000 HC RX 637 (ALT 250 FOR IP): Performed by: FAMILY MEDICINE

## 2024-02-07 PROCEDURE — 6370000000 HC RX 637 (ALT 250 FOR IP): Performed by: INTERNAL MEDICINE

## 2024-02-07 PROCEDURE — 51702 INSERT TEMP BLADDER CATH: CPT

## 2024-02-07 PROCEDURE — 93005 ELECTROCARDIOGRAM TRACING: CPT | Performed by: INTERNAL MEDICINE

## 2024-02-07 PROCEDURE — 51701 INSERT BLADDER CATHETER: CPT

## 2024-02-07 PROCEDURE — 51798 US URINE CAPACITY MEASURE: CPT

## 2024-02-07 PROCEDURE — 97530 THERAPEUTIC ACTIVITIES: CPT

## 2024-02-07 PROCEDURE — 93010 ELECTROCARDIOGRAM REPORT: CPT | Performed by: INTERNAL MEDICINE

## 2024-02-07 PROCEDURE — 87635 SARS-COV-2 COVID-19 AMP PRB: CPT

## 2024-02-07 RX ORDER — AMLODIPINE BESYLATE 5 MG/1
5 TABLET ORAL DAILY
Qty: 30 TABLET | Refills: 1 | Status: SHIPPED | OUTPATIENT
Start: 2024-02-08

## 2024-02-07 RX ORDER — DONEPEZIL HYDROCHLORIDE 5 MG/1
5 TABLET, FILM COATED ORAL NIGHTLY
Qty: 30 TABLET | Refills: 1 | Status: SHIPPED | OUTPATIENT
Start: 2024-02-07

## 2024-02-07 RX ADMIN — METOPROLOL TARTRATE 25 MG: 25 TABLET, FILM COATED ORAL at 00:32

## 2024-02-07 RX ADMIN — AMLODIPINE BESYLATE 5 MG: 5 TABLET ORAL at 08:21

## 2024-02-07 RX ADMIN — LIDOCAINE HYDROCHLORIDE: 20 JELLY TOPICAL at 00:56

## 2024-02-07 RX ADMIN — TAMSULOSIN HYDROCHLORIDE 0.4 MG: 0.4 CAPSULE ORAL at 08:21

## 2024-02-07 RX ADMIN — METOPROLOL TARTRATE 25 MG: 25 TABLET, FILM COATED ORAL at 06:03

## 2024-02-07 NOTE — CARE COORDINATION
Chart reviewed and patient discussed in IDT rounds this AM. Patient discharging to [facility] short term rehab today. Transportation arranged for 1330. CM informed facility patient is coming with a plummer. Son at bedside aware of transport time. IMM explained and signed. Nurse and attending updated with transport time. Packet arranged and placed in soft chart.     Room: 324  Report: [phone]    Babatunde YOUSIF, ACM  [place]

## 2024-02-07 NOTE — PROGRESS NOTES
Hospitalist Progress Note   Admit Date:  2024 11:29 AM   Name:  Kai Guido   Age:  89 y.o.  Sex:  male  :  1929   MRN:  [id number]   Room:  [facility]    Presenting/Chief Complaint: Fall     Reason(s) for Admission: Fall, initial encounter [W19.XXXA]  Altered mental status, unspecified altered mental status type [R41.82]  Contusion of head, unspecified part of head, initial encounter [S00.93XA]  Acute metabolic encephalopathy [G93.41]  Confusion [R41.0]     Hospital Course:   Kai Guido is a 89 y.o. male with medical history of   CKD stage III   ANABELLE   Hypertension   CAD   Paroxysmal atrial fibrillation   Dyslipidemia     who presented with intermittent confusion for weeks, but worsened in 1-2 days.     In 2023, patient had ORIF of the left femur. He was doing well postoperatively.  He was discharged to a rehab facility.     Son states that the patient started to have some confusion..  There were good days and bad days.     On 2024 patient had more confusion with hematuria.  He went to the local emergency room and was treated for UTI.  He received Vantin and doxycycline.  Urine culture at that time showed contamination.     Patient started to be more confused in the night of .  By the next morning patient is more confused.     Patient came to emergency room with initial elevated blood pressure.  CT of the head shows no acute process.  It shows finding of meningioma which has been chronic.   C-spine CT scan is negative.  MRI of the head did not show acute findings.     Subjective & 24hr Events:     24   Patient is seen and examined at bedside.   Son is at bedside.   No fever. No shaking. No chills.   No respiratory distress.     24   Patient is confused still and agitated at times.   Afebrile.   Patient answers simple questions rarely.   No chest pain.   No shortness of breath.      24   Patient is still confused at times.   Left little finger 
       Hospitalist Progress Note   Admit Date:  2024 11:29 AM   Name:  Kai Guido   Age:  89 y.o.  Sex:  male  :  1929   MRN:  [id number]   Room:  [facility]    Presenting/Chief Complaint: Fall     Reason(s) for Admission: Fall, initial encounter [W19.XXXA]  Altered mental status, unspecified altered mental status type [R41.82]  Contusion of head, unspecified part of head, initial encounter [S00.93XA]  Acute metabolic encephalopathy [G93.41]  Confusion [R41.0]     Hospital Course:   Kai Guido is a 89 y.o. male with medical history of   CKD stage III   ANABELLE   Hypertension   CAD   Paroxysmal atrial fibrillation   Dyslipidemia     who presented with intermittent confusion for weeks, but worsened in 1-2 days.     In 2023, patient had ORIF of the left femur. He was doing well postoperatively.  He was discharged to a rehab facility.     Son states that the patient started to have some confusion..  There were good days and bad days.     On 2024 patient had more confusion with hematuria.  He went to the local emergency room and was treated for UTI.  He received Vantin and doxycycline.  Urine culture at that time showed contamination.     Patient started to be more confused in the night of .  By the next morning patient is more confused.     Patient came to emergency room with initial elevated blood pressure.  CT of the head shows no acute process.  It shows finding of meningioma which has been chronic.   C-spine CT scan is negative.  MRI of the head did not show acute findings.       Subjective & 24hr Events:     24   Patient is seen and examined at bedside.   Son is at bedside.   No fever. No shaking. No chills.   No respiratory distress.         Assessment & Plan:     Principal Problem:    Acute metabolic encephalopathy  Plan:   So far, no acute findings are seen from CT and MRI of head.   No acute changes in CBC and CMP.   Avoid medications that have effect on 
       Hospitalist Progress Note   Admit Date:  2024 11:29 AM   Name:  Kai Guido   Age:  89 y.o.  Sex:  male  :  1929   MRN:  [id number]   Room:  [facility]    Presenting/Chief Complaint: Fall     Reason(s) for Admission: Fall, initial encounter [W19.XXXA]  Altered mental status, unspecified altered mental status type [R41.82]  Contusion of head, unspecified part of head, initial encounter [S00.93XA]  Acute metabolic encephalopathy [G93.41]  Confusion [R41.0]     Hospital Course:   Kai Guido is a 89 y.o. male with medical history of   CKD stage III   ANABELLE   Hypertension   CAD   Paroxysmal atrial fibrillation   Dyslipidemia     who presented with intermittent confusion for weeks, but worsened in 1-2 days.     In 2023, patient had ORIF of the left femur. He was doing well postoperatively.  He was discharged to a rehab facility.     Son states that the patient started to have some confusion..  There were good days and bad days.     On 2024 patient had more confusion with hematuria.  He went to the local emergency room and was treated for UTI.  He received Vantin and doxycycline.  Urine culture at that time showed contamination.     Patient started to be more confused in the night of .  By the next morning patient is more confused.     Patient came to emergency room with initial elevated blood pressure.  CT of the head shows no acute process.  It shows finding of meningioma which has been chronic.   C-spine CT scan is negative.  MRI of the head did not show acute findings.       Subjective & 24hr Events:     24   Patient is seen and examined at bedside.   Son is at bedside.   No fever. No shaking. No chills.   No respiratory distress.     24   Patient is confused still and agitated at times.   Afebrile.   Patient answers simple questions rarely.   No chest pain.   No shortness of breath.      Assessment & Plan:     Principal Problem:    Acute metabolic 
       Hospitalist Progress Note   Admit Date:  2024 11:29 AM   Name:  Kai Guido   Age:  89 y.o.  Sex:  male  :  1929   MRN:  [id number]   Room:  [facility]    Presenting/Chief Complaint: Fall     Reason(s) for Admission: Fall, initial encounter [W19.XXXA]  Altered mental status, unspecified altered mental status type [R41.82]  Contusion of head, unspecified part of head, initial encounter [S00.93XA]  Acute metabolic encephalopathy [G93.41]  Confusion [R41.0]     Hospital Course:   Kai Guido is a 89 y.o. male with medical history of   CKD stage III   ANABELLE   Hypertension   CAD   Paroxysmal atrial fibrillation   Dyslipidemia     who presented with intermittent confusion for weeks, but worsened in 1-2 days.     In 2023, patient had ORIF of the left femur. He was doing well postoperatively.  He was discharged to a rehab facility.     Son states that the patient started to have some confusion..  There were good days and bad days.     On 2024 patient had more confusion with hematuria.  He went to the local emergency room and was treated for UTI.  He received Vantin and doxycycline.  Urine culture at that time showed contamination.     Patient started to be more confused in the night of .  By the next morning patient is more confused.     Patient came to emergency room with initial elevated blood pressure.  CT of the head shows no acute process.  It shows finding of meningioma which has been chronic.   C-spine CT scan is negative.  MRI of the head did not show acute findings.     Subjective & 24hr Events:     24   Patient is seen and examined at bedside.   Son is at bedside.   No fever. No shaking. No chills.   No respiratory distress.     24   Patient is confused still and agitated at times.   Afebrile.   Patient answers simple questions rarely.   No chest pain.   No shortness of breath.      24   Patient is still confused at times.   Left little finger 
    Rounds performed throughout shift. Pt denies needs at this time. Bed in low position, locked and call light/personal items within reach. Will report to night shift nurse.      
   02/03/24 1620   NIV Type   $NIV $Daily Charge   Equipment Type Home Resmed   Mode Auto-PAP   Mask Type Under the nose   Mask Size Medium   Assessment   Pulse 56   Respirations 18   SpO2 94 %   Comfort Level Good   Using Accessory Muscles No   Mask Compliance Good   Skin Assessment Clean, dry, & intact   Settings/Measurements   CPAP/EPAP   (autoset)   Mask Leak (lpm)   (good seal)   Patient's Home Machine Yes (type/vendor)  (Resmed)   Electrical Safety Check Performed Yes       
00:42   Patient had orders to in and out cath Q8 hours if no void. Patient has not voided for 8+ hours. Attempted to in and out patient and resistance was noted, pulled back catheter and blood was returned. Notes state hematuria was present on 1/25...   Md notified. Bladderscan shows 288 at this time,  Instructed to monitor bleeding and try to in and out again in 2 hours     0430 update: MD notified again that a second attempt was made to in and out, unsuccessful. Another nurse had the same blood return, awaiting further orders from on call. BladderScan now shows 437cc.      0449 update order for plummer     0520 about to start plummer after urojet administered, patient urinated 220cc dark red urine. Left on trash can at bedside for urology to see. Plummer not placed at this time due to patient urinating.   Residual in bladder is approx 200cc after urinating. Large clots noted when urinated.  Patient also had small hard bowel movement        
4 Eyes Skin Assessment     NAME:  Kai Guido  YOB: 1929  MEDICAL RECORD NUMBER:  [id number]    The patient is being assessed for  Admission    I agree that at least one RN has performed a thorough Head to Toe Skin Assessment on the patient. ALL assessment sites listed below have been assessed.      Areas assessed by both nurses:    Head, Face, Ears, Shoulders, Back, Chest, Arms, Elbows, Hands, Sacrum. Buttock, Coccyx, Ischium, Legs. Feet and Heels, and Under Medical Devices         Does the Patient have a Wound? No noted wound(s)       Cliff Prevention initiated by RN: No  Wound Care Orders initiated by RN: No    Pressure Injury (Stage 3,4, Unstageable, DTI, NWPT, and Complex wounds) if present, place Wound referral order by RN under : No    New Ostomies, if present place, Ostomy referral order under : No     Nurse 1 eSignature: Electronically signed by ANKIT STEELE RN on 1/29/24 at 6:06 PM EST    **SHARE this note so that the co-signing nurse can place an eSignature**    Nurse 2 eSignature: Electronically signed by ANKIT STEELE RN on 1/29/24 at 6:07 PM EST   
ACUTE OCCUPATIONAL THERAPY GOALS:   (Developed with and agreed upon by patient and/or caregiver.)  1. Patient will complete lower body bathing and dressing with minimal assistance and adaptive equipment as needed.   2. Patient will complete toileting with minimal assistance.   3. Patient will tolerate 25 minutes of OT treatment with 2-3 rest breaks to increase activity tolerance for ADLs.   4. Patient will complete functional transfers with SBA and adaptive equipment as needed.   5. Patient will complete functional mobility for household distances with SBA and minimal cues for safety.   6. Patient will tolerate grooming standing sink side with SBA.   Timeframe: 7 visits        OCCUPATIONAL THERAPY: Daily Note PM   OT Visit Days: 2   Time In/Out  OT Charge Capture  Rehab Caseload Tracker  OT Orders    Kai Guido is a 89 y.o. male   PRIMARY DIAGNOSIS: Acute metabolic encephalopathy  Fall, initial encounter [W19.XXXA]  Altered mental status, unspecified altered mental status type [R41.82]  Contusion of head, unspecified part of head, initial encounter [S00.93XA]  Acute metabolic encephalopathy [G93.41]  Confusion [R41.0]       Inpatient: Payor: MEDICARE / Plan: MEDICARE PART A AND B / Product Type: *No Product type* /     ASSESSMENT:     REHAB RECOMMENDATIONS:   Recommendation to date pending progress:  Setting:  Short-term Rehab    Equipment:    To Be Determined     ASSESSMENT:  Mr. Guido presents supine in the bed upon arrival. Pt is fairly somnolent and needed additional time to increase arousal. Pt needed simple cueing and needed moderate assistance for initiating bed mobility and sitting to the edge of the bed. Pt initially would not stand but asked him if he was willing to do therapist a favor and get in the chair and he responded, \"Sure!\" Once in the chair pt worked on some self-feeding with pt able to complete taking a few bites and drinking a few sips of tea/Ensure with CGA. Pt more alert once he 
ACUTE OCCUPATIONAL THERAPY GOALS:   (Developed with and agreed upon by patient and/or caregiver.)  1. Patient will complete lower body bathing and dressing with minimal assistance and adaptive equipment as needed.   2. Patient will complete toileting with minimal assistance.   3. Patient will tolerate 25 minutes of OT treatment with 2-3 rest breaks to increase activity tolerance for ADLs.   4. Patient will complete functional transfers with SBA and adaptive equipment as needed.   5. Patient will complete functional mobility for household distances with SBA and minimal cues for safety.   6. Patient will tolerate grooming standing sink side with SBA.   Timeframe: 7 visits        OCCUPATIONAL THERAPY: Daily Note PM   OT Visit Days: 3   Time In/Out  OT Charge Capture  Rehab Caseload Tracker  OT Orders    Kai Guido is a 89 y.o. male   PRIMARY DIAGNOSIS: Acute metabolic encephalopathy  Fall, initial encounter [W19.XXXA]  Altered mental status, unspecified altered mental status type [R41.82]  Contusion of head, unspecified part of head, initial encounter [S00.93XA]  Acute metabolic encephalopathy [G93.41]  Confusion [R41.0]       Inpatient: Payor: MEDICARE / Plan: MEDICARE PART A AND B / Product Type: *No Product type* /     ASSESSMENT:     REHAB RECOMMENDATIONS:   Recommendation to date pending progress:  Setting:  Short-term Rehab    Equipment:    To Be Determined     ASSESSMENT:  Mr. Guido presents supine in the bed upon arrival and was easily aroused. Pt smiling and very pleasant. Pt very cooperative with today's session and was very agreeable to getting up and moving with therapy. Pt now with L 5th finger distal phalanx fx that was discovered. Pt currently has finger in splint that is buddy taped to his RF. Pt has no major complaint as this time of pain. Pt tolerated functional mobility in the room and hallway with CGA/minimal assistance. Pt later worked on standing sink side for brushing his teeth with 
ACUTE OCCUPATIONAL THERAPY GOALS:   (Developed with and agreed upon by patient and/or caregiver.)  1. Patient will complete lower body bathing and dressing with minimal assistance and adaptive equipment as needed.   2. Patient will complete toileting with minimal assistance.   3. Patient will tolerate 25 minutes of OT treatment with 2-3 rest breaks to increase activity tolerance for ADLs.   4. Patient will complete functional transfers with SBA and adaptive equipment as needed.   5. Patient will complete functional mobility for household distances with SBA and minimal cues for safety.   6. Patient will tolerate grooming standing sink side with SBA.   Timeframe: 7 visits       OCCUPATIONAL THERAPY Initial Assessment, Daily Note, and AM       OT Visit Days: 1  Acknowledge Orders  Time  OT Charge Capture  Rehab Caseload Tracker      Kai Guido is a 89 y.o. male   PRIMARY DIAGNOSIS: Acute metabolic encephalopathy  Fall, initial encounter [W19.XXXA]  Altered mental status, unspecified altered mental status type [R41.82]  Contusion of head, unspecified part of head, initial encounter [S00.93XA]  Acute metabolic encephalopathy [G93.41]       Reason for Referral: Generalized Muscle Weakness (M62.81)  Other lack of cordination (R27.8)  History of falling (Z91.81)  Observation: Payor: MEDICARE / Plan: MEDICARE PART A AND B / Product Type: *No Product type* /     ASSESSMENT:     REHAB RECOMMENDATIONS:   Recommendation to date pending progress:  Setting:  Short-term Rehab    Equipment:    To Be Determined     ASSESSMENT:  Mr. Guido presents to the hospital with acute metabolic encephalopathy, AMS, and falling with a contusion to his head. Pt lives at the [facility] and had 24/7 caregivers with him. Spoke with son later on and he is unhappy with the caregiver situation due to the patient falling unattended with caregiver in another room per his report.  Son has questions regarding other options at the facility and 
ACUTE PHYSICAL THERAPY GOALS:   (Developed with and agreed upon by patient and/or caregiver.)   Mr. Guido will perform supine to sit and sit to supine independently in 7 days.    Mr. Giudo will perform sit to stand and bed to chair with rolling walker with SBA in 7 days.    Mr. Guido will perform gait with rolling walker 200 ft with SBA in 7 days.    Mr. Guido will perform therex to bilateral lower extremities x 25 reps in sitting in 7 days.     PHYSICAL THERAPY Initial Assessment, Daily Note, and PM  (Link to Caseload Tracking: PT Visit Days : 1  Acknowledge Orders  Time In/Out  PT Charge Capture  Rehab Caseload Tracker    Kai Guido is a 89 y.o. male   PRIMARY DIAGNOSIS: Acute metabolic encephalopathy  Fall, initial encounter [W19.XXXA]  Altered mental status, unspecified altered mental status type [R41.82]  Contusion of head, unspecified part of head, initial encounter [S00.93XA]  Acute metabolic encephalopathy [G93.41]       Reason for Referral: Generalized Muscle Weakness (M62.81)  Difficulty in walking, Not elsewhere classified (R26.2)  Observation: Payor: MEDICARE / Plan: MEDICARE PART A AND B / Product Type: *No Product type* /     ASSESSMENT:     REHAB RECOMMENDATIONS:   Recommendation to date pending progress:  Setting:  Short-term Rehab    Equipment:    To Be Determined     ASSESSMENT:  Mr. Guido presents with decreased mobility and decreased gait.  He is rather drowsy.  Needs to be strongly stimulated to wake. Once awake he does well but the minute you stop interacting with him he falls back asleep.  He transfers with min assist.  Gait with rolling walker and min assist 2 x 30 ft with seated rest break.  He is unsafe and needs verbal cues for walker placement and hand placement.  When he turns around he is more off balance and if a hand is not on him he would fall.  Balance better when just going straight but he shuffles and walker gets away from him.  Mr. Guido came from [facility] 
ACUTE PHYSICAL THERAPY GOALS:   (Developed with and agreed upon by patient and/or caregiver.)   Mr. Guido will perform supine to sit and sit to supine independently in 7 days.    Mr. Guido will perform sit to stand and bed to chair with rolling walker with SBA in 7 days.    Mr. Guido will perform gait with rolling walker 200 ft with SBA in 7 days.    Mr. Guido will perform therex to bilateral lower extremities x 25 reps in sitting in 7 days.     PHYSICAL THERAPY: Daily Note AM   (Link to Caseload Tracking: PT Visit Days : 5  Time In/Out PT Charge Capture  Rehab Caseload Tracker  Orders    Kai Guido is a 89 y.o. male   PRIMARY DIAGNOSIS: Acute metabolic encephalopathy  Fall, initial encounter [W19.XXXA]  Altered mental status, unspecified altered mental status type [R41.82]  Contusion of head, unspecified part of head, initial encounter [S00.93XA]  Acute metabolic encephalopathy [G93.41]  Confusion [R41.0]       Inpatient: Payor: MEDICARE / Plan: MEDICARE PART A AND B / Product Type: *No Product type* /     ASSESSMENT:     REHAB RECOMMENDATIONS:   Recommendation to date pending progress:  Setting:  Short-term Rehab    Equipment:    To Be Determined     ASSESSMENT:  Mr. Guido was supine in bed and agreeable to PT. He ambulated into restroom and was able to perform all self care without assist. Dynamic standing balance activities at the sink with CGA/min A. He ambulated 100' with RW and min A/CGA. Pt left sitting up in chair with needs in reach and son present.      SUBJECTIVE:   Mr. Guido states, \"Do I need a key to get into the [facility]?\"     Social/Functional Lives With: Alone  Type of Home: Apartment  OBJECTIVE:     PAIN: VITALS / O2: PRECAUTION / LINES / DRAINS:   Pre Treatment:  none         Post Treatment: none Vitals        Oxygen   RA IV    RESTRICTIONS/PRECAUTIONS:  Restrictions/Precautions  Restrictions/Precautions: Fall Risk  Restrictions/Precautions: Fall Risk     MOBILITY: I Mod I S 
ACUTE PHYSICAL THERAPY GOALS:   (Developed with and agreed upon by patient and/or caregiver.)   Mr. Guido will perform supine to sit and sit to supine independently in 7 days.    Mr. Guido will perform sit to stand and bed to chair with rolling walker with SBA in 7 days.    Mr. Guido will perform gait with rolling walker 200 ft with SBA in 7 days.    Mr. Guido will perform therex to bilateral lower extremities x 25 reps in sitting in 7 days.     PHYSICAL THERAPY: Daily Note PM   (Link to Caseload Tracking: PT Visit Days : 1  Time In/Out PT Charge Capture  Rehab Caseload Tracker  Orders    Kai Guido is a 89 y.o. male   PRIMARY DIAGNOSIS: Acute metabolic encephalopathy  Fall, initial encounter [W19.XXXA]  Altered mental status, unspecified altered mental status type [R41.82]  Contusion of head, unspecified part of head, initial encounter [S00.93XA]  Acute metabolic encephalopathy [G93.41]  Confusion [R41.0]       Inpatient: Payor: MEDICARE / Plan: MEDICARE PART A AND B / Product Type: *No Product type* /     ASSESSMENT:     REHAB RECOMMENDATIONS:   Recommendation to date pending progress:  Setting:  Short-term Rehab    Equipment:    To Be Determined     ASSESSMENT:  Mr. Guido continues to make slow, but steady progress with therapy, now ambulating 100' with RW and min A.  He was more somnolent today, requiring increased time to arouse and transfer to chair.  After some encouragement, pt willing to transfer and ambulate with RW.  He is still oriented to self only.  Will continue to plan for STR.     SUBJECTIVE:   Mr. Guido states, \"I don't need to get up.\"     Social/Functional Lives With: Alone  Type of Home: Apartment  OBJECTIVE:     PAIN: VITALS / O2: PRECAUTION / LINES / DRAINS:   Pre Treatment:   Pain Assessment: None - Denies Pain      Post Treatment: No complaints Vitals        Oxygen    External Catheter and IV    RESTRICTIONS/PRECAUTIONS:  Restrictions/Precautions  Restrictions/Precautions: 
ACUTE PHYSICAL THERAPY GOALS:   (Developed with and agreed upon by patient and/or caregiver.)   Mr. Guido will perform supine to sit and sit to supine independently in 7 days.    Mr. Guido will perform sit to stand and bed to chair with rolling walker with SBA in 7 days.    Mr. Guido will perform gait with rolling walker 200 ft with SBA in 7 days.    Mr. Guido will perform therex to bilateral lower extremities x 25 reps in sitting in 7 days.     PHYSICAL THERAPY: Daily Note PM   (Link to Caseload Tracking: PT Visit Days : 3  Time In/Out PT Charge Capture  Rehab Caseload Tracker  Orders    Kai Guido is a 89 y.o. male   PRIMARY DIAGNOSIS: Acute metabolic encephalopathy  Fall, initial encounter [W19.XXXA]  Altered mental status, unspecified altered mental status type [R41.82]  Contusion of head, unspecified part of head, initial encounter [S00.93XA]  Acute metabolic encephalopathy [G93.41]  Confusion [R41.0]       Inpatient: Payor: MEDICARE / Plan: MEDICARE PART A AND B / Product Type: *No Product type* /     ASSESSMENT:     REHAB RECOMMENDATIONS:   Recommendation to date pending progress:  Setting:  Short-term Rehab    Equipment:    To Be Determined     ASSESSMENT:  Mr. Guido very agreeable to therapy this PM. He was able to increase gait distance, yet required standing rest breaks and cues with turns for safety 60 ft x 3, 70 ft 100' with RW and min A/CGA.  Pt denies pain and activity tolerance is improved. Pt follows commands for activity. Demonstrates some decreased safety awareness, but easily correct when cued. STR recommended at d/c for optimal outcome.     SUBJECTIVE:   Mr. Guido states, \"Sure\" - when asked if he was ready to walk.    Social/Functional Lives With: Alone  Type of Home: Apartment  OBJECTIVE:     PAIN: VITALS / O2: PRECAUTION / LINES / DRAINS:   Pre Treatment:   Pain Assessment: 0-10  Pain Level: 0      Post Treatment: 0/10 Vitals        Oxygen   RA 
Good visit with pt    Visual sitter present  
Orthopedic surgery was consulted d/t closed fracture of left 5th distal phalanx. Pt's finger was splinted with gill tape. Pt was in and out cathed d/t anuria. See flowsheet. D/t pt's anuria and unknown date of last bowel movement, fluids were pushed, prune juice encouraged, miralax administered, and ambulation occurred. All with no success. Soap suds enema was ordered and administered. Pt had large bowel movement shortly after. Pt has had sitter at bedside this shift. Pt continues to be confused and oriented only to self. All known needs met at this time. Bed is currently low, call light was left in reach, and pt was encouraged to ask for assistance. Pt was left resting in bed with no complaints. Will give bedside report to oncoming nurse. Son at bedside and has requested pt be removed from \"all medication that is not necessary.\" Encouraged son to speak with doctor about concerns. Will pass request off to oncoming nurse.     
Patient arrived to floor, son at bedside. Garbled speech, not baseline for patient per son. Sacrum red but blanchable. Lungs sounds, diminished Expiratory wheezes.  Spoke with Dr Lange, will order neb treatment. Patient very anxious. Purewick placed.   1820  Patient very confused at this time, unable to follow commands, to take medications. Tele box requested.  1845  Confirmed tele placement, NSR 70s  
Patient has had uneventful night.   Virtual sitter in place.  Patient has not tried to get out of bed, compliant to meds, but not to pinky brace. Pulled off many times, cannot find brace. Another might have to be placed today. Patient might have thrown in a place unknown.   Patient has had no events without a sitter this shift/has not tried to get out of bed.     Heartrate is 55 this morning, md notified about metoprolol, md ordered that half dose be given based on HR being below 60      
Patient has not had IV for several days due to him pulling it out. Patient not on any continuous fluids or IV medications (scheduled) at this time. Pt is a full code. Puneet rn reports that doctors are \"OK\" with patient not having IV due to him awaiting placement.   Messaged on call to get order for communication for patient not to have IV at this time and to make MD aware.     
Patient in bed, alert and oriented, answering questions approprietly, following commands. IV right, flushed, good blood return, small amount of redness at the IV site. Voiding via male wick, lungs sounds diminished. Patient moving all extremities. No needs at this time.    12:45 PM   Patient had a 12 beat run of SVT, per monitor tech.  1315  Attending aware of SVT, no new orders, ok to continue telemetry   1336  Son aware of SVT episode, Patient able to take medications whole, continues alert and oriented to self.   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  
Patient was found on the floor on hands and knees. Vital signs stable. Physician ordered CT of the head. Son, Theodore, alerted via phone call.    
Please correctly sign MRI screening. Only one signature recorded under guardian contrast. Need signatures for patient or guardian under MRI implants acknowledgement and the RN to also sign stating they went over the form in the RN space. Please call MRI with questions if you need help.    
Pt  bed in lowest position, wheels locked, and call light within reach. Hourly rounds completed. VSS. IV is patent and dressing is clean, dry, and intact. Pt remained lethargic throughout the night. Responding to voice this am.     
Pt  bed in lowest position, wheels locked, and call light within reach. Hourly rounds completed. VSS. IV is patent and dressing is clean, dry, and intact. Pt unable to urinate adequately throughout the night. Pt urinated 100mL on own. PT bladder scanned, MD notified and pt was straight cath. Pt more alert this am.     
Pt had a plummer catheter placed at 1030. Pt tried to removed catheter multiple times. Mitt restraints were ordered but never placed. Urology was consulted and decided to remove plummer based on low amount of urine in bladder scans, in hopes pt will urinate. Restraint order discontinued. Pt's plummer catheter was removed per order around 1320. Straight cath order added every 8 hours if anuria. Pt is to be straight cathed around 2120 if anuria. Urine culture was ordered and taken upon insertion of plummer earlier this shift. See orders for updated medication. Sitter arrived for patient around 1500. Pt has not complained of pain this shift. Xray of left hand was ordered via verbal order d/t patient's son noticing pt's left 5th finger, red and slightly deformed. All known needs met at this time. Bed is currently low, call light was left in reach, and pt was encouraged to ask for assistance. Pt was left resting in bed with no complaints. Will give bedside report to oncoming nurse. Sitter at bedside.    
Pt resting in bed at present time without complaints. Pt bladder scanned, straight cath per MAR. Hourly rounds completed, all needs met this shift. Bed locked and low, call light within reach. Will give report to oncoming day shift nurse.   
Pt resting in bed at present time without complaints. Pt straight cath x1 with 520 cc urine output.Hourly rounds completed, all needs met this shift. Bed locked and low, call light within reach. Will give report to oncoming day shift nurse.   
Pt resting in bed.  VSC in place.  Pinky brace in place currently but takes off multiple times.  Hourly rounds completed.  Bed locked and lowered into lowest position.  Call light and personal items within reach.  Report given to night nurse.  
Pt's plummer catheter was just removed. Pt tolerated well. External catheter placed on patient.  
Report called to Ella Rev.  
Resting in bed. Alert to self only. Pleasantly confused. Pt able to void spontaneously using urinal; UOP noted to be yellow. Hourly rounds completed, all needs met this shift. Bed in L/L with call light in reach. Report to be given to dayshift nurse.     VSC at bedside.       
See previous note regarding patient being found on floor. Pt had virtual  ordered and placed in room earlier this shift long before pt was found on floor d/t pt pulling out IV. New IV was placed on left wrist. Pt's continuous telemetry was renewed per physician order. Pt had pneumatic compression devices added per order. Pt was bladder scanned at 1300 d/t no urine output since straight cath at 0600. See flowsheets for bladder scan volume - no straight cath needed per protocol of >400mL. Pt was taken for CT of head around 1550. Pt was bladder scanned upon return from CT around 1645 and showed 287. Provider notified, ordered straight cath. Straight cathed 130mL of clear yellow urine with red flecks. All known needs met at this time. Bed is currently low, call light was left in reach, and pt was encouraged to ask for assistance. Pt was left resting in bed with no complaints. Will give bedside report to oncoming nurse.    
Sitter lost, pulled to ER.   Patient will not keep pinky brace on, keeps taking off.  Patient just took off when I stepped out of room, and cannot find in sheets. Will continue to look.  No available staff to sit and hold pinky brace on hand.  
balance when not holding with either arm to balance at the sink. Pt assisted up to the recliner at end of session with alarm in place and pillows placed under his legs to keep him from sling forward as much. Pt with good participation and progress towards ADL  goals.        SUBJECTIVE:     Mr. Guido states, \"I need to shave \"     Social/Functional Lives With: Alone  Type of Home: Apartment    OBJECTIVE:     LINES / DRAINS / AIRWAY: External Catheter    RESTRICTIONS/PRECAUTIONS:  Restrictions/Precautions  Restrictions/Precautions: Fall Risk        PAIN: VITALS / O2:   Pre Treatment: 0/10           Post Treatment: same Vitals          Oxygen        MOBILITY: I Mod I S SBA CGA Min Mod Max Total  NT x2 Comments:   Bed Mobility    Rolling [] [] [] [] [] [x] [] [] [] [] [x]    Supine to Sit [] [] [] [] [] [x] [] [] [] [] [x]    Scooting [] [] [] [] [x] [] [] [] [] [] []    Sit to Supine [] [] [] [] [] [x] [] [] [] [] []    Transfers    Sit to Stand [] [] [] [] [x] [x] [] [] [] [] []    Bed to Chair [] [] [] [] [x] [x] [] [] [] [] [] X 1-2   Stand to Sit [] [] [] [] [x] [x] [] [] [] [] []    Tub/Shower [] [] [] [] [] [] [] [] [] [x] []     Toilet [] [] [] [] [] [] [] [] [] [x] []      [] [] [] [] [] [] [] [] [] [] []    I=Independent, Mod I=Modified Independent, S=Supervision/Setup, SBA=Standby Assistance, CGA=Contact Guard Assistance, Min=Minimal Assistance, Mod=Moderate Assistance, Max=Maximal Assistance, Total=Total Assistance, NT=Not Tested    ACTIVITIES OF DAILY LIVING: I Mod I S SBA CGA Min Mod Max Total NT Comments   BASIC ADLs:              Upper Body   Bathing [] [] [] [] [] [] [] [] [] [x]     Lower Body Bathing [] [] [] [] [] [] [] [] [] [x]     Toileting [] [] [] [] [] [] [] [] [] [x]    Upper Body Dressing [] [] [] [] [] [] [] [] [] [x]    Lower Body Dressing [] [] [] [] [] [] [] [] [] [x]    Feeding [] [] [] [] [] [] [] [] [] [x]    Grooming [] [] [] [] [x] [] [] [] [] [] Standing sink side  to brush teeth, 
following: Automated exposure control; adjustment of the mA and/or kV; and/or use of iterative reconstruction technique. Total exam DLP is 1139.88 mGycm COMPARISON: Prior CT head from 1/29/2024 and MRI brain from 1/30/2024 FINDINGS: Redemonstrated is moderate white matter disease within the cerebral hemispheres which is nonspecific but may represent chronic small vessel ischemic change. Redemonstrated is moderate involutional atrophy of the cerebral hemispheres and cerebellum. There is no intracranial hemorrhage, extra-axial collection, mass, mass effect or midline shift. There is good gray-white differentiation. There is no CT evidence of acute large vascular territorial infarct. Ventricles are not enlarged or effaced. Redemonstrated is atherosclerotic calcification in the intracranial vertebral arteries and carotid siphons. There is trace paranasal sinus mucosal thickening. The bony calvarium is intact.     No intracranial hemorrhage, mass effect or midline shift. No CT evidence of acute large vascular territorial infarct.         Assessment:     Principal Problem:    Acute metabolic encephalopathy  Active Problems:    ANABELLE (obstructive sleep apnea)    Benign prostatic hyperplasia with urinary retention    Hypertension    CAD (coronary artery disease)    Stage 3b chronic kidney disease (HCC)    Paroxysmal atrial fibrillation (HCC)    Secondary hypercoagulable state (HCC)    Confusion    Fall    Urinary retention    Closed fracture of phalanx of left little finger  Resolved Problems:    * No resolved hospital problems. *    89 year old male with confusion, constipation. Noted to have urinary retention this admission. Unable to keep plummer d/t pulling on catheter. Doing well with CIC.   Plan:   Cont CIC Q8 hours if patient does not void.  Treat constipation which is likely contributing to retention.  Will arrange for follow up in 2 weeks.  Will sign off, please call with any questions.        BERLIN Casper 
Ambulation on level ground, Sitting balance , and Standing balance to improve functional Activity tolerance, Balance, Coordination, Mobility, and Strength.    TREATMENT GRID:  N/A    AFTER TREATMENT PRECAUTIONS: Alarm Activated, Bed, Bed/Chair Locked, Call light within reach, RN notified, Side rails x3, and Virtual     INTERDISCIPLINARY COLLABORATION:  RN/ PCT, PT/ PTA, and OT/ SOUZA    EDUCATION:      TIME IN/OUT:  Time In: 1540  Time Out: 1607  Minutes: 27    Liana Cespedes, PTA    
Low Fat/Low Chol/High Fiber/ROB  ADULT ORAL NUTRITION SUPPLEMENT; Breakfast, Lunch, Dinner; Standard High Calorie/High Protein Oral Supplement  VTE prophylaxis: SCD  Code status: Full Code      Non-peripheral Lines and Tubes (if present):              Telemetry (if present):  Cardiac/Telemetry Monitor On: No        Hospital Problems:  Principal Problem:    Acute metabolic encephalopathy  Active Problems:    ANABELLE (obstructive sleep apnea)    Benign prostatic hyperplasia with urinary retention    Hypertension    CAD (coronary artery disease)    Stage 3b chronic kidney disease (HCC)    Paroxysmal atrial fibrillation (HCC)    Secondary hypercoagulable state (HCC)    Confusion    Fall    Urinary retention    Closed fracture of phalanx of left little finger  Resolved Problems:    * No resolved hospital problems. *      Objective:   Patient Vitals for the past 24 hrs:   Temp Pulse Resp BP SpO2   02/04/24 0800 97.4 °F (36.3 °C) 59 16 (!) 158/95 97 %   02/04/24 0530 -- 75 -- (!) 153/92 --   02/04/24 0339 97.6 °F (36.4 °C) 60 16 (!) 154/88 95 %   02/03/24 2346 -- 56 -- (!) 154/81 --   02/03/24 2303 -- 56 18 -- 94 %   02/03/24 1939 97.7 °F (36.5 °C) 59 16 (!) 153/87 94 %   02/03/24 1538 -- 57 18 (!) 153/71 96 %         Oxygen Therapy  SpO2: 97 %  Pulse via Oximetry: 66 beats per minute  Pulse Oximeter Device Mode: Intermittent  Pulse Oximeter Device Location: Finger  O2 Device: None (Room air)  Skin Assessment: Clean, dry, & intact    Estimated body mass index is 20.98 kg/m² as calculated from the following:    Height as of this encounter: 1.727 m (5' 8\").    Weight as of this encounter: 62.6 kg (138 lb).    Intake/Output Summary (Last 24 hours) at 2/4/2024 0972  Last data filed at 2/4/2024 0410  Gross per 24 hour   Intake 0 ml   Output 600 ml   Net -600 ml           Physical Exam:   BP (!) 158/95 Comment: reported to RNMichela at bedside  Pulse 59   Temp 97.4 °F (36.3 °C) (Oral)   Resp 16   Ht 1.727 m (5' 8\")   Wt 62.6 
lb).    Intake/Output Summary (Last 24 hours) at 1/30/2024 1149  Last data filed at 1/30/2024 0929  Gross per 24 hour   Intake 240 ml   Output 1700 ml   Net -1460 ml         Physical Exam:     General:    Well nourished.    Head:  Normocephalic, atraumatic  Eyes:  Sclerae appear normal.  Pupils equally round.  CV:   RRR. .  No jugular venous distension.  Lungs:   CTAB.   Symmetric expansion.  Abdomen:   Soft, nontender, nondistended.  Extremities: No cyanosis or clubbing.  No edema  Skin:     No rashes.  Normal coloration.   Warm and dry.    Neuro:  CN II-XII grossly intact.  Moves all extremities.    Psych:  Cannot assess    I have personally reviewed labs and tests:  Recent Labs:  Recent Results (from the past 48 hour(s))   CBC with Auto Differential    Collection Time: 01/29/24  1:13 PM   Result Value Ref Range    WBC 8.7 4.3 - 11.1 K/uL    RBC 4.18 (L) 4.23 - 5.6 M/uL    Hemoglobin 11.9 (L) 13.6 - 17.2 g/dL    Hematocrit 37.9 (L) 41.1 - 50.3 %    MCV 90.7 82 - 102 FL    MCH 28.5 26.1 - 32.9 PG    MCHC 31.4 31.4 - 35.0 g/dL    RDW 14.6 11.9 - 14.6 %    Platelets 289 150 - 450 K/uL    MPV 9.2 (L) 9.4 - 12.3 FL    nRBC 0.00 0.0 - 0.2 K/uL    Differential Type AUTOMATED      Neutrophils % 71 43 - 78 %    Lymphocytes % 12 (L) 13 - 44 %    Monocytes % 15 (H) 4.0 - 12.0 %    Eosinophils % 1 0.5 - 7.8 %    Basophils % 0 0.0 - 2.0 %    Immature Granulocytes 1 0.0 - 5.0 %    Neutrophils Absolute 6.2 1.7 - 8.2 K/UL    Lymphocytes Absolute 1.0 0.5 - 4.6 K/UL    Monocytes Absolute 1.3 0.1 - 1.3 K/UL    Eosinophils Absolute 0.1 0.0 - 0.8 K/UL    Basophils Absolute 0.0 0.0 - 0.2 K/UL    Absolute Immature Granulocyte 0.0 0.0 - 0.5 K/UL   Magnesium    Collection Time: 01/29/24  1:13 PM   Result Value Ref Range    Magnesium 2.5 (H) 1.8 - 2.4 mg/dL   BMP    Collection Time: 01/29/24  1:13 PM   Result Value Ref Range    Sodium 137 136 - 146 mmol/L    Potassium 4.0 3.5 - 5.1 mmol/L    Chloride 103 103 - 113 mmol/L    CO2 28 21 - 32 
respiratory distress.   Mildly pale.   Not icteric.   Head:  Normocephalic, atraumatic  Eyes:  Sclerae appear normal.  Pupils equally round.  ENT:  Nares appear normal.  Moist oral mucosa  Neck:  No restricted ROM.  Trachea midline   CV:   RRR.  No m/r/g.  No jugular venous distension.  Lungs:   Decreased breath sound at bases pf lungs.  No wheezing, rhonchi, or rales.  Symmetric expansion.  Abdomen:   Soft, nontender, nondistended.  Extremities: No cyanosis or clubbing.  No edema. Left little finger with some swelling and redness. No obvious cellulitis.   Skin:     No rashes.  Normal coloration.   Warm and dry.    Neuro:  CN II-XII grossly intact. No localized weakness.     I have personally reviewed labs and tests:  Recent Labs:  No results found for this or any previous visit (from the past 48 hour(s)).      Current Meds:  Current Facility-Administered Medications   Medication Dose Route Frequency    lidocaine (XYLOCAINE) 2 % uro-jet   Topical PRN    donepezil (ARICEPT) tablet 5 mg  5 mg Oral Nightly    LORazepam (ATIVAN) 1 mg in sodium chloride (PF) 0.9 % 10 mL injection  1 mg IntraVENous Q4H PRN    OLANZapine (ZyPREXA) 5 mg in sterile water 1 mL injection  5 mg IntraMUSCular BID PRN    OLANZapine zydis (ZYPREXA) disintegrating tablet 5 mg  5 mg Oral BID PRN    apixaban (ELIQUIS) tablet 2.5 mg  2.5 mg Oral BID    metoprolol tartrate (LOPRESSOR) tablet 50 mg  50 mg Oral Q6H    atorvastatin (LIPITOR) tablet 10 mg  10 mg Oral Nightly    tamsulosin (FLOMAX) capsule 0.4 mg  0.4 mg Oral Daily    trospium (SANCTURA) tablet 20 mg  20 mg Oral Nightly    sodium chloride flush 0.9 % injection 5-40 mL  5-40 mL IntraVENous 2 times per day    sodium chloride flush 0.9 % injection 5-40 mL  5-40 mL IntraVENous PRN    0.9 % sodium chloride infusion   IntraVENous PRN    potassium chloride (KLOR-CON M) extended release tablet 40 mEq  40 mEq Oral PRN    Or    potassium bicarb-citric acid (EFFER-K) effervescent tablet 40 mEq  40 mEq 
Line)  10 mEq IntraVENous PRN    magnesium sulfate 2000 mg in 50 mL IVPB premix  2,000 mg IntraVENous PRN    ondansetron (ZOFRAN-ODT) disintegrating tablet 4 mg  4 mg Oral Q8H PRN    Or    ondansetron (ZOFRAN) injection 4 mg  4 mg IntraVENous Q6H PRN    polyethylene glycol (GLYCOLAX) packet 17 g  17 g Oral Daily PRN    acetaminophen (TYLENOL) tablet 650 mg  650 mg Oral Q6H PRN    Or    acetaminophen (TYLENOL) suppository 650 mg  650 mg Rectal Q6H PRN    hydrALAZINE (APRESOLINE) injection 10 mg  10 mg IntraVENous Q6H PRN       Signed:  Oscar Arvizu MD    Part of this note may have been written by using a voice dictation software.  The note has been proof read but may still contain some grammatical/other typographical errors.

## 2024-02-07 NOTE — DISCHARGE SUMMARY
162 02/22/2023 09:09 AM    LDLCALC 85.8 02/22/2023 09:09 AM    HDL 59 02/22/2023 09:09 AM    CHOLHDLRATIO 2.7 02/22/2023 09:09 AM    TRIG 86 02/22/2023 09:09 AM      Thyroid  Lab Results   Component Value Date/Time    TSHELE 1.94 01/29/2024 06:14 PM    CIH0YBZ 1.710 02/22/2023 09:09 AM        Most Recent UA Lab Results   Component Value Date/Time    COLORU YELLOW/STRAW 01/29/2024 01:13 PM    APPEARANCE CLEAR 01/29/2024 01:13 PM    SPECGRAV 1.011 01/29/2024 01:13 PM    LABPH 6.5 01/29/2024 01:13 PM    PROTEINU 30 01/29/2024 01:13 PM    GLUCOSEU Negative 01/29/2024 01:13 PM    KETUA Negative 01/29/2024 01:13 PM    BILIRUBINUR Negative 01/29/2024 01:13 PM    BLOODU Negative 01/29/2024 01:13 PM    UROBILINOGEN 0.2 01/29/2024 01:13 PM    NITRU Negative 01/29/2024 01:13 PM    LEUKOCYTESUR Negative 01/29/2024 01:13 PM    WBCUA 0-4 01/29/2024 01:13 PM    RBCUA 0-5 01/29/2024 01:13 PM    EPITHUA 0-5 01/29/2024 01:13 PM    BACTERIA Negative 01/29/2024 01:13 PM    LABCAST 0-2 01/29/2024 01:13 PM    MUCUS 0 11/13/2023 11:06 AM        Microbiology:  Results       Procedure Component Value Units Date/Time    COVID-19, Rapid [[id number]]     Order Status: Sent Specimen: Nasopharyngeal Swab     Culture, Urine [[id number]] Collected: 02/01/24 0951    Order Status: Completed Specimen: Urine voided Updated: 02/03/24 0857     Special Requests NO SPECIAL REQUESTS        Culture NO GROWTH 2 DAYS       Respiratory Panel, Molecular, with COVID-19 (Restricted: peds pts or suitable admitted adults) [[id number]] Collected: 01/29/24 2350    Order Status: Completed Specimen: Nasopharyngeal Updated: 01/30/24 0118     Adenovirus by PCR NOT DETECTED        Coronavirus 229E by PCR NOT DETECTED        Coronavirus HKU1 by PCR NOT DETECTED        Coronavirus NL63 by PCR NOT DETECTED        Coronavirus OC43 by PCR NOT DETECTED        SARS-CoV-2, PCR NOT DETECTED        Human Metapneumovirus by PCR NOT DETECTED        Rhinovirus Enterovirus PCR NOT

## 2024-02-08 ENCOUNTER — CARE COORDINATION (OUTPATIENT)
Dept: CARE COORDINATION | Facility: CLINIC | Age: 89
End: 2024-02-08

## 2024-02-15 ENCOUNTER — CARE COORDINATION (OUTPATIENT)
Dept: CARE COORDINATION | Facility: CLINIC | Age: 89
End: 2024-02-15

## 2024-02-15 NOTE — CARE COORDINATION
Care Transitions Post-Acute Facility Update Call    2/15/2024    Patient: Kai Guido Patient : 1929   MRN: [id number]  Reason for Admission: Acute metabolic encephalopathy/Fall  Discharge Date: 24 RARS: Readmission Risk Score: 15.8         Care Transitions Post Acute Facility Update    Care Transitions Interventions  Patient remains at facility with no plan for discharge at this time. Continues to work with therapy. CTN will continue to outreach per protocol.

## 2024-02-22 ENCOUNTER — CARE COORDINATION (OUTPATIENT)
Dept: CARE COORDINATION | Facility: CLINIC | Age: 89
End: 2024-02-22

## 2024-02-22 NOTE — CARE COORDINATION
Care Transitions Post-Acute Facility Update Call    2024    Patient: Kai Guido Patient : 1929   MRN: [id number]  Reason for Admission: Acute metabolic encephalopathy  Discharge Date: 24 RARS: Readmission Risk Score: 15.8         Care Transitions Post Acute Facility Update    Care Transitions Interventions      Post Acute Facility Update:  Spoke with Jake Nolan RN at [facility] [phone].  Patient remains at their facility with no d/c plans at this time.  Will continue outreach per protocol.

## 2024-02-29 ENCOUNTER — CARE COORDINATION (OUTPATIENT)
Dept: CARE COORDINATION | Facility: CLINIC | Age: 89
End: 2024-02-29

## 2024-02-29 NOTE — CARE COORDINATION
Care Transitions Post-Acute Facility Update Call    2024    Patient: Kai Guido Patient : 1929   MRN: [id number]  Reason for Admission: Acute Metabolic Encephalopathy  Discharge Date: 24 RARS: Readmission Risk Score: 15.8         Care Transitions Post Acute Facility Update    Care Transitions Interventions      Post Acute Facility Update:  Spoke with patient who reports he is still admitted to [facility] and has now been diagnosed with Covid which has postponed his therapy.  Will continue outreach per protocol.
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
## 2024-03-07 ENCOUNTER — CARE COORDINATION (OUTPATIENT)
Dept: CARE COORDINATION | Facility: CLINIC | Age: 89
End: 2024-03-07

## 2024-03-07 ENCOUNTER — OFFICE VISIT (OUTPATIENT)
Dept: UROLOGY | Age: 89
End: 2024-03-07
Payer: MEDICARE

## 2024-03-07 DIAGNOSIS — N18.31 STAGE 3A CHRONIC KIDNEY DISEASE (CKD) (HCC): ICD-10-CM

## 2024-03-07 DIAGNOSIS — N40.1 BPH WITH OBSTRUCTION/LOWER URINARY TRACT SYMPTOMS: Primary | ICD-10-CM

## 2024-03-07 DIAGNOSIS — N13.8 BPH WITH OBSTRUCTION/LOWER URINARY TRACT SYMPTOMS: Primary | ICD-10-CM

## 2024-03-07 DIAGNOSIS — N20.0 KIDNEY STONE: ICD-10-CM

## 2024-03-07 PROCEDURE — 1111F DSCHRG MED/CURRENT MED MERGE: CPT | Performed by: NURSE PRACTITIONER

## 2024-03-07 PROCEDURE — 99214 OFFICE O/P EST MOD 30 MIN: CPT | Performed by: NURSE PRACTITIONER

## 2024-03-07 PROCEDURE — 1036F TOBACCO NON-USER: CPT | Performed by: NURSE PRACTITIONER

## 2024-03-07 PROCEDURE — G8420 CALC BMI NORM PARAMETERS: HCPCS | Performed by: NURSE PRACTITIONER

## 2024-03-07 PROCEDURE — G8427 DOCREV CUR MEDS BY ELIG CLIN: HCPCS | Performed by: NURSE PRACTITIONER

## 2024-03-07 PROCEDURE — 1123F ACP DISCUSS/DSCN MKR DOCD: CPT | Performed by: NURSE PRACTITIONER

## 2024-03-07 PROCEDURE — G8484 FLU IMMUNIZE NO ADMIN: HCPCS | Performed by: NURSE PRACTITIONER

## 2024-03-07 ASSESSMENT — ENCOUNTER SYMPTOMS: BACK PAIN: 0

## 2024-03-07 NOTE — CARE COORDINATION
Care Transitions Post-Acute Facility Update Call    3/7/2024    Patient: Kai Guido Patient : 1929   MRN: [id number]  Reason for Admission: Acute Metabolic Encephalopathy  Discharge Date: 24 RARS: Readmission Risk Score: 15.8         Care Transitions Post Acute Facility Update    Care Transitions Interventions      Post Acute Facility Update: Spoke with Rusty Lamb [phone] at [facility].  Patient remains admitted at their facility.  No d/c plans conveyed at this time although mentioned LTC as a possibility.  Will continue to outreach.

## 2024-04-11 ENCOUNTER — NURSE ONLY (OUTPATIENT)
Dept: UROLOGY | Age: 89
End: 2024-04-11
Payer: MEDICARE

## 2024-04-11 DIAGNOSIS — N40.1 BPH WITH OBSTRUCTION/LOWER URINARY TRACT SYMPTOMS: Primary | ICD-10-CM

## 2024-04-11 DIAGNOSIS — R39.15 URINARY URGENCY: ICD-10-CM

## 2024-04-11 DIAGNOSIS — N13.8 BPH WITH OBSTRUCTION/LOWER URINARY TRACT SYMPTOMS: Primary | ICD-10-CM

## 2024-04-11 PROCEDURE — 51702 INSERT TEMP BLADDER CATH: CPT | Performed by: NURSE PRACTITIONER

## 2024-04-11 NOTE — PROGRESS NOTES
Pt came in for catheter change. 16f plummer catheter changed without incident. Patient tolerated procedure well.

## 2024-05-15 ENCOUNTER — TELEPHONE (OUTPATIENT)
Dept: INTERNAL MEDICINE CLINIC | Facility: CLINIC | Age: 89
End: 2024-05-15

## 2024-05-15 NOTE — TELEPHONE ENCOUNTER
Pt was having  dizzyness, sugar levels high     Dr. Schafer advised patient to go to ER or Urgent care    Patient expressed understanding and will go.

## 2024-05-16 ENCOUNTER — NURSE ONLY (OUTPATIENT)
Dept: UROLOGY | Age: 89
End: 2024-05-16
Payer: MEDICARE

## 2024-05-16 DIAGNOSIS — N40.1 BPH WITH OBSTRUCTION/LOWER URINARY TRACT SYMPTOMS: Primary | ICD-10-CM

## 2024-05-16 DIAGNOSIS — N13.8 BPH WITH OBSTRUCTION/LOWER URINARY TRACT SYMPTOMS: Primary | ICD-10-CM

## 2024-05-16 DIAGNOSIS — Z46.6 URINARY CATHETER CHANGE REQUIRED: ICD-10-CM

## 2024-05-16 PROCEDURE — 51702 INSERT TEMP BLADDER CATH: CPT | Performed by: NURSE PRACTITIONER

## 2024-05-16 NOTE — PROGRESS NOTES
Dl (:  1929) is a 80 y.o. male,Established patient, here for evaluation of the following chief complaint(s):  Follow-up (3 mth fu) and Benign Prostatic Hypertrophy         ASSESSMENT/PLAN:  1. Atypical atrial flutter ([address]  2. Current use of long term anticoagulation  Status post AMERICA guided DCCV on 2016  Echo on 2018 technically difficult study but EF of 55 to 65%, moderate concentric LVH, mild/moderate TR, severely dilated LA, no evidence of PFO, moderate MAC  Continue metoprolol succinate, dofetilide, Eliquis (renally dosed) per cardiology    3. Stage 3 chronic kidney disease, unspecified whether stage 3a or 3b CKD ([facility])  Chart review shows baseline creatinine 1.4-1.7 with baseline GFR in the 40s  Likely multifactorial due to age and hypertension. 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 Coronary artery disease involving native coronary artery of native heart without angina pectoris  Left heart cath on 2015 with patent proximal LAD stent, apical LAD  with collaterals, D1 stent patent, diffuse distal small vessel disease  Echo as above  Continue simvastatin, metoprolol succinate, Ranexa, as needed nitroglycerin  Follow-up with cardiology    5. Essential hypertension  Continue metoprolol succinate    6. Dyslipidemia  Continue simvastatin    7. Benign prostatic hyperplasia with urinary frequency  Continue Flomax and Gemtesa per urology    8. Neuropathy  Labs from 3/2/2022 with normal B12, iron saturation and ferritin  Possible underlying diabetic neuropathy given history of elevated A1c  Continue gabapentin    9.  Impaired fasting glucose  A1c 6.9% on 3/9/2022, recheck in office today 6.2  Hold off medications given A1c less than 7%, age and comorbidities  Check fasting blood sugar regularly    - AMB POC HEMOGLOBIN A1C        Return for f/u 4-5 months . Subjective   SUBJECTIVE/OBJECTIVE:  Patient is a 75-year-old  male who presents to the office today for follow-up. Patient still has occasional urinary leakage such as when turning on the faucet or at nighttime. Can awaken 2-3 times a night to void. Does note occasional straining with urination and some weakness in the stream.  Remains on Flomax and is awaiting a prescription for Gemtesa through mail order which was started by his urologist.  No current dysuria or hematuria. Does have chronic mild ankle swelling right greater than left but improved with propping his legs up. Still endorses neuropathy described as burning pain in his feet improved with gabapentin at nighttime if he takes early enough. Tries to maintain adequate hydration. Fasting blood sugar log reviewed with values in the 100s to one third and 30s without hypoglycemic symptoms. No chest pain, shortness of breath, orthopnea or abdominal pain. Has not required use of emergency nitroglycerin. Review of Systems   Respiratory:  Negative for shortness of breath. Denies orthopnea   Cardiovascular:  Positive for leg swelling (Chronic involving both ankles right greater than left). Negative for chest pain. Gastrointestinal:  Negative for abdominal pain. Genitourinary:  Positive for difficulty urinating. Negative for dysuria and hematuria. Neurological:  Negative for syncope. Objective   Physical Exam  Vitals reviewed. Constitutional:       General: He is not in acute distress. Appearance: Normal appearance. He is not ill-appearing, toxic-appearing or diaphoretic. Interventions: He is not intubated. HENT:      Head: Normocephalic and atraumatic. Eyes:      General:         Right eye: No discharge. Left eye: No discharge. Extraocular Movements: Extraocular movements intact. Cardiovascular:      Rate and Rhythm: Normal rate and regular rhythm.       Heart sounds: Murmur no

## 2024-05-20 NOTE — TELEPHONE ENCOUNTER
Pt called in regarding the same symptoms from the call on 5/15/24.   Pt wanted to know if pcp had any avaliablility and relayed to pt pcp is booked up.   Advised to pt it is best to go to an ER/ UC   Pt stated he will go to be seen.

## 2024-05-23 ENCOUNTER — HOSPITAL ENCOUNTER (INPATIENT)
Age: 89
LOS: 2 days | Discharge: HOME | DRG: 378 | End: 2024-05-25
Attending: EMERGENCY MEDICINE | Admitting: INTERNAL MEDICINE
Payer: MEDICARE

## 2024-05-23 DIAGNOSIS — K92.2 ACUTE GI BLEEDING: Primary | ICD-10-CM

## 2024-05-23 DIAGNOSIS — K92.1 MELENA: ICD-10-CM

## 2024-05-23 DIAGNOSIS — N30.00 ACUTE CYSTITIS WITHOUT HEMATURIA: ICD-10-CM

## 2024-05-23 DIAGNOSIS — N17.9 AKI (ACUTE KIDNEY INJURY) (HCC): ICD-10-CM

## 2024-05-23 LAB
ABO + RH BLD: NORMAL
ALBUMIN SERPL-MCNC: 3.6 G/DL (ref 3.2–4.6)
ALBUMIN/GLOB SERPL: 1.4 (ref 1–1.9)
ALP SERPL-CCNC: 57 U/L (ref 40–129)
ALT SERPL-CCNC: 15 U/L (ref 12–65)
ANION GAP SERPL CALC-SCNC: 17 MMOL/L (ref 9–18)
AST SERPL-CCNC: 23 U/L (ref 15–37)
BACTERIA URNS QL MICRO: ABNORMAL /HPF
BASOPHILS # BLD: 0 K/UL (ref 0–0.2)
BASOPHILS NFR BLD: 0 % (ref 0–2)
BILIRUB SERPL-MCNC: 0.6 MG/DL (ref 0–1.2)
BILIRUB UR QL: NEGATIVE
BLOOD GROUP ANTIBODIES SERPL: NORMAL
BUN SERPL-MCNC: 40 MG/DL (ref 8–23)
CALCIUM SERPL-MCNC: 9.1 MG/DL (ref 8.8–10.2)
CASTS URNS QL MICRO: 0 /LPF
CHLORIDE SERPL-SCNC: 103 MMOL/L (ref 98–107)
CO2 SERPL-SCNC: 19 MMOL/L (ref 20–28)
CREAT SERPL-MCNC: 1.83 MG/DL (ref 0.8–1.3)
CRYSTALS URNS QL MICRO: 0 /LPF
DIFFERENTIAL METHOD BLD: ABNORMAL
EOSINOPHIL # BLD: 0.1 K/UL (ref 0–0.8)
EOSINOPHIL NFR BLD: 1 % (ref 0.5–7.8)
EPI CELLS #/AREA URNS HPF: ABNORMAL /HPF
ERYTHROCYTE [DISTWIDTH] IN BLOOD BY AUTOMATED COUNT: 18.1 % (ref 11.9–14.6)
GLOBULIN SER CALC-MCNC: 2.5 G/DL (ref 2.3–3.5)
GLUCOSE BLD STRIP.AUTO-MCNC: 191 MG/DL (ref 65–100)
GLUCOSE SERPL-MCNC: 148 MG/DL (ref 70–99)
GLUCOSE UR QL STRIP.AUTO: NEGATIVE MG/DL
HCT VFR BLD AUTO: 28.1 % (ref 41.1–50.3)
HGB BLD-MCNC: 8.9 G/DL (ref 13.6–17.2)
IMM GRANULOCYTES # BLD AUTO: 0 K/UL (ref 0–0.5)
IMM GRANULOCYTES NFR BLD AUTO: 1 % (ref 0–5)
IRON SATN MFR SERPL: 7 % (ref 20–50)
IRON SERPL-MCNC: 21 UG/DL (ref 35–100)
KETONES UR-MCNC: NEGATIVE MG/DL
LACTATE SERPL-SCNC: 2 MMOL/L (ref 0.5–2)
LEUKOCYTE ESTERASE UR QL STRIP: ABNORMAL
LYMPHOCYTES # BLD: 1 K/UL (ref 0.5–4.6)
LYMPHOCYTES NFR BLD: 12 % (ref 13–44)
MAGNESIUM SERPL-MCNC: 2.9 MG/DL (ref 1.8–2.4)
MCH RBC QN AUTO: 28.3 PG (ref 26.1–32.9)
MCHC RBC AUTO-ENTMCNC: 31.7 G/DL (ref 31.4–35)
MCV RBC AUTO: 89.5 FL (ref 82–102)
MONOCYTES # BLD: 1.1 K/UL (ref 0.1–1.3)
MONOCYTES NFR BLD: 13 % (ref 4–12)
MUCOUS THREADS URNS QL MICRO: 0 /LPF
NEUTS SEG # BLD: 6.2 K/UL (ref 1.7–8.2)
NEUTS SEG NFR BLD: 73 % (ref 43–78)
NITRITE UR QL: NEGATIVE
NRBC # BLD: 0 K/UL (ref 0–0.2)
OTHER OBSERVATIONS: ABNORMAL
PH UR: 6 (ref 5–9)
PLATELET # BLD AUTO: 455 K/UL (ref 150–450)
PMV BLD AUTO: 8.8 FL (ref 9.4–12.3)
POTASSIUM SERPL-SCNC: 4.4 MMOL/L (ref 3.5–5.1)
PROT SERPL-MCNC: 6.1 G/DL (ref 6.3–8.2)
PROT UR QL: 30 MG/DL
RBC # BLD AUTO: 3.14 M/UL (ref 4.23–5.6)
RBC # UR STRIP: ABNORMAL
RBC #/AREA URNS HPF: ABNORMAL /HPF
SERVICE CMNT-IMP: ABNORMAL
SERVICE CMNT-IMP: ABNORMAL
SODIUM SERPL-SCNC: 139 MMOL/L (ref 136–145)
SP GR UR: >1.03 (ref 1–1.02)
SPECIMEN EXP DATE BLD: NORMAL
TIBC SERPL-MCNC: 303 UG/DL (ref 240–450)
TRANSFERRIN SERPL-MCNC: 241 MG/DL (ref 200–360)
TSH W FREE THYROID IF ABNORMAL: 1.56 UIU/ML (ref 0.27–4.2)
UIBC SERPL-MCNC: 282 UG/DL (ref 112–347)
UROBILINOGEN UR QL: 0.2 EU/DL (ref 0.2–1)
WBC # BLD AUTO: 8.5 K/UL (ref 4.3–11.1)
WBC URNS QL MICRO: >100 /HPF

## 2024-05-23 PROCEDURE — 86901 BLOOD TYPING SEROLOGIC RH(D): CPT

## 2024-05-23 PROCEDURE — 2580000003 HC RX 258: Performed by: INTERNAL MEDICINE

## 2024-05-23 PROCEDURE — 86850 RBC ANTIBODY SCREEN: CPT

## 2024-05-23 PROCEDURE — 6370000000 HC RX 637 (ALT 250 FOR IP): Performed by: INTERNAL MEDICINE

## 2024-05-23 PROCEDURE — 84466 ASSAY OF TRANSFERRIN: CPT

## 2024-05-23 PROCEDURE — 82962 GLUCOSE BLOOD TEST: CPT

## 2024-05-23 PROCEDURE — 99285 EMERGENCY DEPT VISIT HI MDM: CPT

## 2024-05-23 PROCEDURE — 80053 COMPREHEN METABOLIC PANEL: CPT

## 2024-05-23 PROCEDURE — 87086 URINE CULTURE/COLONY COUNT: CPT

## 2024-05-23 PROCEDURE — 6360000002 HC RX W HCPCS: Performed by: INTERNAL MEDICINE

## 2024-05-23 PROCEDURE — 93005 ELECTROCARDIOGRAM TRACING: CPT

## 2024-05-23 PROCEDURE — 84443 ASSAY THYROID STIM HORMONE: CPT

## 2024-05-23 PROCEDURE — 2580000003 HC RX 258: Performed by: EMERGENCY MEDICINE

## 2024-05-23 PROCEDURE — 2580000003 HC RX 258

## 2024-05-23 PROCEDURE — 83735 ASSAY OF MAGNESIUM: CPT

## 2024-05-23 PROCEDURE — 81001 URINALYSIS AUTO W/SCOPE: CPT

## 2024-05-23 PROCEDURE — C9113 INJ PANTOPRAZOLE SODIUM, VIA: HCPCS | Performed by: INTERNAL MEDICINE

## 2024-05-23 PROCEDURE — 96374 THER/PROPH/DIAG INJ IV PUSH: CPT

## 2024-05-23 PROCEDURE — 86900 BLOOD TYPING SEROLOGIC ABO: CPT

## 2024-05-23 PROCEDURE — 85025 COMPLETE CBC W/AUTO DIFF WBC: CPT

## 2024-05-23 PROCEDURE — 83605 ASSAY OF LACTIC ACID: CPT

## 2024-05-23 PROCEDURE — 1100000000 HC RM PRIVATE

## 2024-05-23 PROCEDURE — A4216 STERILE WATER/SALINE, 10 ML: HCPCS | Performed by: INTERNAL MEDICINE

## 2024-05-23 PROCEDURE — 6360000002 HC RX W HCPCS: Performed by: EMERGENCY MEDICINE

## 2024-05-23 PROCEDURE — 83540 ASSAY OF IRON: CPT

## 2024-05-23 RX ORDER — SODIUM CHLORIDE 0.9 % (FLUSH) 0.9 %
5-40 SYRINGE (ML) INJECTION PRN
Status: DISCONTINUED | OUTPATIENT
Start: 2024-05-23 | End: 2024-05-25 | Stop reason: HOSPADM

## 2024-05-23 RX ORDER — ATORVASTATIN CALCIUM 10 MG/1
10 TABLET, FILM COATED ORAL DAILY
Status: DISCONTINUED | OUTPATIENT
Start: 2024-05-24 | End: 2024-05-25 | Stop reason: HOSPADM

## 2024-05-23 RX ORDER — 0.9 % SODIUM CHLORIDE 0.9 %
1000 INTRAVENOUS SOLUTION INTRAVENOUS ONCE
Status: COMPLETED | OUTPATIENT
Start: 2024-05-23 | End: 2024-05-23

## 2024-05-23 RX ORDER — SODIUM CHLORIDE, SODIUM LACTATE, POTASSIUM CHLORIDE, CALCIUM CHLORIDE 600; 310; 30; 20 MG/100ML; MG/100ML; MG/100ML; MG/100ML
INJECTION, SOLUTION INTRAVENOUS CONTINUOUS
Status: DISCONTINUED | OUTPATIENT
Start: 2024-05-23 | End: 2024-05-25 | Stop reason: HOSPADM

## 2024-05-23 RX ORDER — SODIUM CHLORIDE 0.9 % (FLUSH) 0.9 %
5-40 SYRINGE (ML) INJECTION EVERY 12 HOURS SCHEDULED
Status: DISCONTINUED | OUTPATIENT
Start: 2024-05-23 | End: 2024-05-25 | Stop reason: HOSPADM

## 2024-05-23 RX ORDER — DONEPEZIL HYDROCHLORIDE 5 MG/1
5 TABLET, FILM COATED ORAL NIGHTLY
Status: DISCONTINUED | OUTPATIENT
Start: 2024-05-23 | End: 2024-05-25 | Stop reason: HOSPADM

## 2024-05-23 RX ORDER — ONDANSETRON 4 MG/1
4 TABLET, ORALLY DISINTEGRATING ORAL EVERY 8 HOURS PRN
Status: DISCONTINUED | OUTPATIENT
Start: 2024-05-23 | End: 2024-05-25 | Stop reason: HOSPADM

## 2024-05-23 RX ORDER — ACETAMINOPHEN 650 MG/1
650 SUPPOSITORY RECTAL EVERY 6 HOURS PRN
Status: DISCONTINUED | OUTPATIENT
Start: 2024-05-23 | End: 2024-05-25 | Stop reason: HOSPADM

## 2024-05-23 RX ORDER — AMLODIPINE BESYLATE 5 MG/1
5 TABLET ORAL DAILY
Status: DISCONTINUED | OUTPATIENT
Start: 2024-05-24 | End: 2024-05-25 | Stop reason: HOSPADM

## 2024-05-23 RX ORDER — ACETAMINOPHEN 325 MG/1
650 TABLET ORAL EVERY 6 HOURS PRN
Status: DISCONTINUED | OUTPATIENT
Start: 2024-05-23 | End: 2024-05-25 | Stop reason: HOSPADM

## 2024-05-23 RX ORDER — NITROGLYCERIN 0.4 MG/1
0.4 TABLET SUBLINGUAL EVERY 5 MIN PRN
Status: DISCONTINUED | OUTPATIENT
Start: 2024-05-23 | End: 2024-05-25 | Stop reason: HOSPADM

## 2024-05-23 RX ORDER — ONDANSETRON 2 MG/ML
4 INJECTION INTRAMUSCULAR; INTRAVENOUS EVERY 6 HOURS PRN
Status: DISCONTINUED | OUTPATIENT
Start: 2024-05-23 | End: 2024-05-25 | Stop reason: HOSPADM

## 2024-05-23 RX ORDER — SODIUM CHLORIDE 9 MG/ML
INJECTION, SOLUTION INTRAVENOUS PRN
Status: DISCONTINUED | OUTPATIENT
Start: 2024-05-23 | End: 2024-05-25 | Stop reason: HOSPADM

## 2024-05-23 RX ADMIN — DONEPEZIL HYDROCHLORIDE 5 MG: 5 TABLET, FILM COATED ORAL at 22:34

## 2024-05-23 RX ADMIN — PANTOPRAZOLE SODIUM 40 MG: 40 INJECTION, POWDER, FOR SOLUTION INTRAVENOUS at 21:09

## 2024-05-23 RX ADMIN — WATER 1000 MG: 1 INJECTION INTRAMUSCULAR; INTRAVENOUS; SUBCUTANEOUS at 20:11

## 2024-05-23 RX ADMIN — SODIUM CHLORIDE, SODIUM LACTATE, POTASSIUM CHLORIDE, AND CALCIUM CHLORIDE: 600; 310; 30; 20 INJECTION, SOLUTION INTRAVENOUS at 22:06

## 2024-05-23 RX ADMIN — SODIUM CHLORIDE, PRESERVATIVE FREE 10 ML: 5 INJECTION INTRAVENOUS at 22:36

## 2024-05-23 RX ADMIN — METOPROLOL TARTRATE 25 MG: 25 TABLET, FILM COATED ORAL at 22:34

## 2024-05-23 RX ADMIN — SODIUM CHLORIDE 1000 ML: 9 INJECTION, SOLUTION INTRAVENOUS at 18:16

## 2024-05-23 ASSESSMENT — PAIN SCALES - GENERAL
PAINLEVEL_OUTOF10: 0
PAINLEVEL_OUTOF10: 0

## 2024-05-23 ASSESSMENT — PAIN - FUNCTIONAL ASSESSMENT: PAIN_FUNCTIONAL_ASSESSMENT: 0-10

## 2024-05-23 NOTE — ED NOTES
Orthostatic Vitals:      5/23/2024     7:15 PM   Orthostatic Vitals   Orthostatic B/P and Pulse? Yes   Blood Pressure Lying 104/68   Pulse Lying 77 PER MINUTE   Blood Pressure Sitting 91/67   Pulse Sitting 75 PER MINUTE   Blood Pressure Standing 103/70   Pulse Standing 78 PER MINUTE

## 2024-05-23 NOTE — ED TRIAGE NOTES
Pt ambulatory with Rolator with c/o weakness, chills, and elevated BGL since January. Pt reports he was told to come to the ER by his PCP.

## 2024-05-24 ENCOUNTER — ANESTHESIA EVENT (OUTPATIENT)
Dept: ENDOSCOPY | Age: 89
End: 2024-05-24
Payer: MEDICARE

## 2024-05-24 ENCOUNTER — ANESTHESIA (OUTPATIENT)
Dept: ENDOSCOPY | Age: 89
End: 2024-05-24
Payer: MEDICARE

## 2024-05-24 PROBLEM — K92.1 MELENA: Status: ACTIVE | Noted: 2024-01-01

## 2024-05-24 PROBLEM — D64.9 ANEMIA: Status: ACTIVE | Noted: 2024-05-23

## 2024-05-24 LAB
ANION GAP SERPL CALC-SCNC: 10 MMOL/L (ref 9–18)
BUN SERPL-MCNC: 33 MG/DL (ref 8–23)
CALCIUM SERPL-MCNC: 8.3 MG/DL (ref 8.8–10.2)
CHLORIDE SERPL-SCNC: 108 MMOL/L (ref 98–107)
CO2 SERPL-SCNC: 23 MMOL/L (ref 20–28)
CREAT SERPL-MCNC: 1.37 MG/DL (ref 0.8–1.3)
ERYTHROCYTE [DISTWIDTH] IN BLOOD BY AUTOMATED COUNT: 18.1 % (ref 11.9–14.6)
GLUCOSE SERPL-MCNC: 98 MG/DL (ref 70–99)
HCT VFR BLD AUTO: 24.3 % (ref 41.1–50.3)
HCT VFR BLD AUTO: 24.3 % (ref 41.1–50.3)
HCT VFR BLD AUTO: 24.9 % (ref 41.1–50.3)
HCT VFR BLD AUTO: 25.1 % (ref 41.1–50.3)
HGB BLD-MCNC: 7.5 G/DL (ref 13.6–17.2)
HGB BLD-MCNC: 7.6 G/DL (ref 13.6–17.2)
HGB BLD-MCNC: 7.8 G/DL (ref 13.6–17.2)
HGB BLD-MCNC: 8 G/DL (ref 13.6–17.2)
LACTATE SERPL-SCNC: 0.8 MMOL/L (ref 0.5–2)
MAGNESIUM SERPL-MCNC: 2.7 MG/DL (ref 1.8–2.4)
MCH RBC QN AUTO: 28.4 PG (ref 26.1–32.9)
MCHC RBC AUTO-ENTMCNC: 31.9 G/DL (ref 31.4–35)
MCV RBC AUTO: 89 FL (ref 82–102)
NRBC # BLD: 0 K/UL (ref 0–0.2)
PLATELET # BLD AUTO: 449 K/UL (ref 150–450)
PMV BLD AUTO: 9.3 FL (ref 9.4–12.3)
POTASSIUM SERPL-SCNC: 4.2 MMOL/L (ref 3.5–5.1)
RBC # BLD AUTO: 2.82 M/UL (ref 4.23–5.6)
SODIUM SERPL-SCNC: 141 MMOL/L (ref 136–145)
WBC # BLD AUTO: 8.3 K/UL (ref 4.3–11.1)

## 2024-05-24 PROCEDURE — 0DB68ZX EXCISION OF STOMACH, VIA NATURAL OR ARTIFICIAL OPENING ENDOSCOPIC, DIAGNOSTIC: ICD-10-PCS | Performed by: STUDENT IN AN ORGANIZED HEALTH CARE EDUCATION/TRAINING PROGRAM

## 2024-05-24 PROCEDURE — A4216 STERILE WATER/SALINE, 10 ML: HCPCS | Performed by: INTERNAL MEDICINE

## 2024-05-24 PROCEDURE — 2709999900 HC NON-CHARGEABLE SUPPLY: Performed by: STUDENT IN AN ORGANIZED HEALTH CARE EDUCATION/TRAINING PROGRAM

## 2024-05-24 PROCEDURE — 3609012400 HC EGD TRANSORAL BIOPSY SINGLE/MULTIPLE: Performed by: STUDENT IN AN ORGANIZED HEALTH CARE EDUCATION/TRAINING PROGRAM

## 2024-05-24 PROCEDURE — 6360000002 HC RX W HCPCS: Performed by: INTERNAL MEDICINE

## 2024-05-24 PROCEDURE — 6370000000 HC RX 637 (ALT 250 FOR IP): Performed by: INTERNAL MEDICINE

## 2024-05-24 PROCEDURE — 97535 SELF CARE MNGMENT TRAINING: CPT

## 2024-05-24 PROCEDURE — 97165 OT EVAL LOW COMPLEX 30 MIN: CPT

## 2024-05-24 PROCEDURE — 83735 ASSAY OF MAGNESIUM: CPT

## 2024-05-24 PROCEDURE — 2500000003 HC RX 250 WO HCPCS

## 2024-05-24 PROCEDURE — 97161 PT EVAL LOW COMPLEX 20 MIN: CPT

## 2024-05-24 PROCEDURE — 36415 COLL VENOUS BLD VENIPUNCTURE: CPT

## 2024-05-24 PROCEDURE — 7100000011 HC PHASE II RECOVERY - ADDTL 15 MIN: Performed by: STUDENT IN AN ORGANIZED HEALTH CARE EDUCATION/TRAINING PROGRAM

## 2024-05-24 PROCEDURE — C9113 INJ PANTOPRAZOLE SODIUM, VIA: HCPCS | Performed by: INTERNAL MEDICINE

## 2024-05-24 PROCEDURE — 2580000003 HC RX 258: Performed by: INTERNAL MEDICINE

## 2024-05-24 PROCEDURE — 88312 SPECIAL STAINS GROUP 1: CPT

## 2024-05-24 PROCEDURE — 85018 HEMOGLOBIN: CPT

## 2024-05-24 PROCEDURE — 2500000003 HC RX 250 WO HCPCS: Performed by: INTERNAL MEDICINE

## 2024-05-24 PROCEDURE — 97530 THERAPEUTIC ACTIVITIES: CPT

## 2024-05-24 PROCEDURE — 3700000000 HC ANESTHESIA ATTENDED CARE: Performed by: STUDENT IN AN ORGANIZED HEALTH CARE EDUCATION/TRAINING PROGRAM

## 2024-05-24 PROCEDURE — 88305 TISSUE EXAM BY PATHOLOGIST: CPT

## 2024-05-24 PROCEDURE — 6360000002 HC RX W HCPCS

## 2024-05-24 PROCEDURE — 1100000000 HC RM PRIVATE

## 2024-05-24 PROCEDURE — 85027 COMPLETE CBC AUTOMATED: CPT

## 2024-05-24 PROCEDURE — 3700000001 HC ADD 15 MINUTES (ANESTHESIA): Performed by: STUDENT IN AN ORGANIZED HEALTH CARE EDUCATION/TRAINING PROGRAM

## 2024-05-24 PROCEDURE — 83605 ASSAY OF LACTIC ACID: CPT

## 2024-05-24 PROCEDURE — 85014 HEMATOCRIT: CPT

## 2024-05-24 PROCEDURE — 80048 BASIC METABOLIC PNL TOTAL CA: CPT

## 2024-05-24 PROCEDURE — 7100000010 HC PHASE II RECOVERY - FIRST 15 MIN: Performed by: STUDENT IN AN ORGANIZED HEALTH CARE EDUCATION/TRAINING PROGRAM

## 2024-05-24 RX ORDER — ETOMIDATE 2 MG/ML
INJECTION INTRAVENOUS PRN
Status: DISCONTINUED | OUTPATIENT
Start: 2024-05-24 | End: 2024-05-24 | Stop reason: SDUPTHER

## 2024-05-24 RX ORDER — LIDOCAINE HYDROCHLORIDE 20 MG/ML
INJECTION, SOLUTION EPIDURAL; INFILTRATION; INTRACAUDAL; PERINEURAL PRN
Status: DISCONTINUED | OUTPATIENT
Start: 2024-05-24 | End: 2024-05-24 | Stop reason: SDUPTHER

## 2024-05-24 RX ORDER — NALOXONE HYDROCHLORIDE 0.4 MG/ML
INJECTION, SOLUTION INTRAMUSCULAR; INTRAVENOUS; SUBCUTANEOUS PRN
Status: DISCONTINUED | OUTPATIENT
Start: 2024-05-24 | End: 2024-05-24 | Stop reason: HOSPADM

## 2024-05-24 RX ORDER — SODIUM CHLORIDE, SODIUM LACTATE, POTASSIUM CHLORIDE, CALCIUM CHLORIDE 600; 310; 30; 20 MG/100ML; MG/100ML; MG/100ML; MG/100ML
INJECTION, SOLUTION INTRAVENOUS CONTINUOUS
Status: DISCONTINUED | OUTPATIENT
Start: 2024-05-24 | End: 2024-05-24 | Stop reason: HOSPADM

## 2024-05-24 RX ORDER — SODIUM CHLORIDE 0.9 % (FLUSH) 0.9 %
5-40 SYRINGE (ML) INJECTION EVERY 12 HOURS SCHEDULED
Status: DISCONTINUED | OUTPATIENT
Start: 2024-05-24 | End: 2024-05-24 | Stop reason: HOSPADM

## 2024-05-24 RX ORDER — PROPOFOL 10 MG/ML
INJECTION, EMULSION INTRAVENOUS PRN
Status: DISCONTINUED | OUTPATIENT
Start: 2024-05-24 | End: 2024-05-24 | Stop reason: SDUPTHER

## 2024-05-24 RX ORDER — LIDOCAINE HYDROCHLORIDE 10 MG/ML
1 INJECTION, SOLUTION INFILTRATION; PERINEURAL
Status: DISCONTINUED | OUTPATIENT
Start: 2024-05-24 | End: 2024-05-24 | Stop reason: HOSPADM

## 2024-05-24 RX ORDER — SODIUM CHLORIDE 0.9 % (FLUSH) 0.9 %
5-40 SYRINGE (ML) INJECTION PRN
Status: DISCONTINUED | OUTPATIENT
Start: 2024-05-24 | End: 2024-05-24 | Stop reason: HOSPADM

## 2024-05-24 RX ORDER — SODIUM CHLORIDE 9 MG/ML
INJECTION, SOLUTION INTRAVENOUS PRN
Status: DISCONTINUED | OUTPATIENT
Start: 2024-05-24 | End: 2024-05-24 | Stop reason: HOSPADM

## 2024-05-24 RX ADMIN — SODIUM CHLORIDE, PRESERVATIVE FREE 10 ML: 5 INJECTION INTRAVENOUS at 09:08

## 2024-05-24 RX ADMIN — METOPROLOL TARTRATE 25 MG: 25 TABLET, FILM COATED ORAL at 09:04

## 2024-05-24 RX ADMIN — PROPOFOL 30 MG: 10 INJECTION, EMULSION INTRAVENOUS at 15:51

## 2024-05-24 RX ADMIN — WATER 1000 MG: 1 INJECTION INTRAMUSCULAR; INTRAVENOUS; SUBCUTANEOUS at 17:21

## 2024-05-24 RX ADMIN — PANTOPRAZOLE SODIUM 40 MG: 40 INJECTION, POWDER, FOR SOLUTION INTRAVENOUS at 09:04

## 2024-05-24 RX ADMIN — SODIUM CHLORIDE, PRESERVATIVE FREE 10 ML: 5 INJECTION INTRAVENOUS at 21:08

## 2024-05-24 RX ADMIN — SODIUM CHLORIDE, SODIUM LACTATE, POTASSIUM CHLORIDE, AND CALCIUM CHLORIDE: 600; 310; 30; 20 INJECTION, SOLUTION INTRAVENOUS at 09:14

## 2024-05-24 RX ADMIN — TUBERCULIN PURIFIED PROTEIN DERIVATIVE 5 UNITS: 5 INJECTION, SOLUTION INTRADERMAL at 17:18

## 2024-05-24 RX ADMIN — ETOMIDATE 10 MG: 2 INJECTION, SOLUTION INTRAVENOUS at 15:51

## 2024-05-24 RX ADMIN — DONEPEZIL HYDROCHLORIDE 5 MG: 5 TABLET, FILM COATED ORAL at 21:06

## 2024-05-24 RX ADMIN — SODIUM CHLORIDE, SODIUM LACTATE, POTASSIUM CHLORIDE, AND CALCIUM CHLORIDE: 600; 310; 30; 20 INJECTION, SOLUTION INTRAVENOUS at 15:45

## 2024-05-24 RX ADMIN — PHENYLEPHRINE HYDROCHLORIDE 100 MCG: 0.1 INJECTION, SOLUTION INTRAVENOUS at 15:51

## 2024-05-24 RX ADMIN — PHENYLEPHRINE HYDROCHLORIDE 100 MCG: 0.1 INJECTION, SOLUTION INTRAVENOUS at 16:05

## 2024-05-24 RX ADMIN — PANTOPRAZOLE SODIUM 40 MG: 40 INJECTION, POWDER, FOR SOLUTION INTRAVENOUS at 21:08

## 2024-05-24 RX ADMIN — ATORVASTATIN CALCIUM 10 MG: 10 TABLET, FILM COATED ORAL at 09:03

## 2024-05-24 RX ADMIN — AMLODIPINE BESYLATE 5 MG: 5 TABLET ORAL at 09:04

## 2024-05-24 RX ADMIN — PHENYLEPHRINE HYDROCHLORIDE 100 MCG: 0.1 INJECTION, SOLUTION INTRAVENOUS at 15:58

## 2024-05-24 RX ADMIN — SODIUM CHLORIDE, SODIUM LACTATE, POTASSIUM CHLORIDE, AND CALCIUM CHLORIDE: 600; 310; 30; 20 INJECTION, SOLUTION INTRAVENOUS at 21:12

## 2024-05-24 RX ADMIN — LIDOCAINE HYDROCHLORIDE 50 MG: 20 INJECTION, SOLUTION EPIDURAL; INFILTRATION; INTRACAUDAL; PERINEURAL at 15:51

## 2024-05-24 RX ADMIN — METOPROLOL TARTRATE 25 MG: 25 TABLET, FILM COATED ORAL at 21:06

## 2024-05-24 NOTE — OP NOTE
Endoscopy Note          Operative Report    Patient: Kai Guido MRN: [id number]      YOB: 1929  Age: 89 y.o.  Sex: male            Indications:  Melena    Preoperative Evaluation: The patient was evaluated prior to the procedure in the GI lab admission area, the patient ASA was recorded .  Consent was obtained from the patient with the risk of perforation bleeding and aspiration.    Anesthesia: VIKTOR-per anesthesia  Complications: None  EBL: Unremarkable  Procedure: The patient was sedated in the left lateral decubitus position. Scope was advance from the mouth to the third portion of the duodenum. The scope was withdrawn to the stomach and a refroflexed view was performed.     Findings:  Normal esophagus.  There was a clean-based gastric ulcer in the cardia, this was biopsied.  Mild gastritis in the antrum.  Biopsies obtained for H. pylori.  Normal duodenum.  No active bleeding.    Postoperative Diagnosis:  Gastric ulcer/gastritis    Recommendations:   IV PPI twice daily while hospitalized.  Long-term needs PPI p.o. twice daily AC for 8 weeks followed by PPI daily indefinitely.  Resume Eliquis when he stops having melena and has stable hemoglobin.  GI will follow peripherally, call back with questions.   Await for biopsy results, you will receive a pathology letter within 2 weeks.     Signed By:  Selina Newberry MD     May 24, 2024

## 2024-05-24 NOTE — CONSULTS
Kai Guido is 89 y.o. y/o male here for initial evaluation.     PMH of HLD, GERD presented with reports of melena, hgb xiang nto 7.5 from 13 in February. He denies taking any NSAIDs, no Goody powders.  He is on Eliquis for atrial fibrillation, last dose was yesterday morning.  On presentation Eliquis has been held.  Denies any prior history of GI bleeding.    Lab Results   Component Value Date    HGB 7.5 (L) 2024    WBC 8.3 2024     2024    MCV 89.0 2024    IRON 21 (L) 2024    FERRITIN 92 2023    TIBC 303 2024    CREATININE 1.37 (H) 2024    ALT 15 2024    AST 23 2024       Past Medical History:   Diagnosis Date    A-fib (HCC)     Arrhythmia     PALPITATION    Arthritis     BPH (benign prostatic hyperplasia)     CAD (coronary artery disease)     STENTS HEART     Chronic kidney disease     HAS ONLY 1 KIDNEY    Hypercholesterolemia     Hypertension     ANABELLE (obstructive sleep apnea) 2023    ANABELLE on CPAP     Stroke (HCC)     TIA secondary to holding of eliquis      Past Surgical History:   Procedure Laterality Date    APPENDECTOMY      exploratory     COLONOSCOPY      COLONOSCOPY      LEG SURGERY Left 2023    FEMUR IM NAIL WIL INSERTION ANTEGRADE, Gamma Nail performed by Robert Jacobson MD at  MAIN OR    OTHER CELL      LEFT KIDNEY REMOVED for malformation; no cancer     IN UNLISTED PROCEDURE CARDIAC SURGERY      stent to circ    PTCA W/ STENT, EA ADD      UROLOGICAL SURGERY      Kidney removed      Family History   Problem Relation Age of Onset    Diabetes Brother     Diabetes Sister     Diabetes Mother     Heart Disease Mother     Heart Attack Mother 81        MI    Kidney Disease Brother         ESRD 2/2 DM     Cancer Brother         liver      Social History     Tobacco Use    Smoking status: Former     Current packs/day: 0.00     Types: Cigarettes     Quit date: 1967     Years since quittin.4    Smokeless

## 2024-05-24 NOTE — ANESTHESIA PRE PROCEDURE
LEG SURGERY Left 2023    FEMUR IM NAIL WIL INSERTION ANTEGRADE, Gamma Nail performed by Robert Jacobson MD at [facility] MA[facility] OR    OTHER CELL      LEFT KIDNEY REMOVED for malformation; no cancer     NV UNLISTED PROCEDURE CARDIAC SURGERY      stent to circ    PTCA W/ STENT, EA ADD      UROLOGICAL SURGERY      Kidney removed        Social History:    Social History     Tobacco Use    Smoking status: Former     Current packs/day: 0.00     Types: Cigarettes     Quit date: 1967     Years since quittin.4    Smokeless tobacco: Never   Substance Use Topics    Alcohol use: Yes                                Counseling given: Not Answered      Vital Signs (Current):   Vitals:    24 0207 24 0604 24 0807 24 1209   BP: 119/74 126/75 100/68 106/65   Pulse: 68 67 69 70   Resp: 16 16 16 16   Temp: 97.5 °F (36.4 °C) 97.4 °F (36.3 °C) 98 °F (36.7 °C) 98.1 °F (36.7 °C)   TempSrc: Oral Oral Oral Oral   SpO2: 98% 99% 100% 99%   Weight:       Height:                                                  BP Readings from Last 3 Encounters:   24 106/65   24 117/77   24 (!) 163/99       NPO Status: Time of last liquid consumption: 1300 (SIPS OF WATER)                        Time of last solid consumption: 1200                        Date of last liquid consumption: 24                        Date of last solid food consumption: 24    BMI:   Wt Readings from Last 3 Encounters:   24 57.2 kg (126 lb)   24 62.6 kg (138 lb)   24 63 kg (138 lb 14.2 oz)     Body mass index is 19.44 kg/m².    CBC:   Lab Results   Component Value Date/Time    WBC 8.3 2024 05:39 AM    RBC 2.82 2024 05:39 AM    HGB 8.0 2024 05:39 AM    HCT 25.1 2024 05:39 AM    MCV 89.0 2024 05:39 AM    RDW 18.1 2024 05:39 AM     2024 05:39 AM       CMP:   Lab Results   Component Value Date/Time     2024 05:39 AM    K 4.2 2024 05:39 AM

## 2024-05-24 NOTE — PROGRESS NOTES
ACUTE OCCUPATIONAL THERAPY GOALS:   (Developed with and agreed upon by patient and/or caregiver.)  1. Patient will perform lower body dressing with MOD I.  2. Patient will perform upper and lower body bathing with MOD I.  3. Patient will perform toilet transfers with MOD I.  4. Patient will participate in 30 + minutes of ADL/ therapeutic exercise/therapeutic activity with min rest breaks to increase activity tolerance for self care.  5. Patient will perform standing grooming tasks x5 mins with MOD I.  6. Patient will perform ADL functional mobility in room with MOD I.    Goals to be achieved in 7 days.      OCCUPATIONAL THERAPY Initial Assessment and Daily Note       OT Visit Days: 1  Acknowledge Orders  Time  OT Charge Capture  Rehab Caseload Tracker      Kai Guido is a 89 y.o. male   PRIMARY DIAGNOSIS: GIB (gastrointestinal bleeding)  GIB (gastrointestinal bleeding) [K92.2]  Acute GI bleeding [K92.2]  CHAITANYA (acute kidney injury) (HCC) [N17.9]  Acute cystitis without hematuria [N30.00]  Procedure(s) (LRB):  ESOPHAGOGASTRODUODENOSCOPY (N/A)  Day of Surgery  Reason for Referral: Generalized Muscle Weakness (M62.81)  Other lack of cordination (R27.8)  Inpatient: Payor: MEDICARE / Plan: MEDICARE PART A AND B / Product Type: *No Product type* /     ASSESSMENT:     REHAB RECOMMENDATIONS:   Recommendation to date pending progress:  Setting:  No further skilled occupational therapy after discharge from hospital    Equipment:    None     ASSESSMENT:  Kai Guido is a 89 y.o. admitted for GIB, scheduled to have an EGD this afternoon. Patient states he lives at [facility] and reports prior level of function as independent with all ADLs, IADLs, and performs functional/community mobility with the use of an assistive device (rollator). Based on OT evaluation completed this date, his current level of function is contact guard assist for functional transfers/mobility and ADL tasks. OT educated patient  on  Independent  Ambulation Assistance: Independent (use rollator)  Transfer Assistance: Independent  Active : No  Occupation: Retired    OBJECTIVE:     LINES / DRAINS / AIRWAY: Field Catheter and IV    RESTRICTIONS/PRECAUTIONS:       PAIN: VITALS / O2:   Pre Treatment: did not report pain          Post Treatment: same        Vitals          Oxygen   Room air          GROSS EVALUATION: INTACT IMPAIRED   (See Comments)   UE AROM [x] []   UE PROM [] []   Strength []  Generalized weakness      Posture / Balance []  Good sitting balance   Fair-fair+ standing balance   Sensation [x]     Coordination [x]       Tone []       Edema []    Activity Tolerance []  Limited due to fatigue      Hand Dominance R [] L []      COGNITION/  PERCEPTION: INTACT IMPAIRED   (See Comments)   Orientation [x]     Vision [x]  Glasses    Hearing [x]  Bilateral hearing aids    Cognition  [x]     Perception [x]       MOBILITY: I Mod I S SBA CGA Min Mod Max Total  NT x2 Comments:   Bed Mobility    Rolling [] [] [] [] [] [] [] [] [] [] []    Supine to Sit [] [] [] [x] [] [] [] [] [] [] []    Scooting [] [] [] [x] [] [] [] [] [] [] []    Sit to Supine [] [] [] [x] [] [] [] [] [] [] []    Transfers    Sit to Stand [] [] [] [] [x] [] [] [] [] [] []    Bed to Chair [] [] [] [] [] [] [] [] [] [] []    Stand to Sit [] [] [] [] [x] [] [] [] [] [] []    Tub/Shower [] [] [] [] [] [] [] [] [] [] []     Toilet [] [] [] [] [x] [] [] [] [] [] []      [] [] [] [] [] [] [] [] [] [] []    I=Independent, Mod I=Modified Independent, S=Supervision/Setup, SBA=Standby Assistance, CGA=Contact Guard Assistance, Min=Minimal Assistance, Mod=Moderate Assistance, Max=Maximal Assistance, Total=Total Assistance, NT=Not Tested    ACTIVITIES OF DAILY LIVING: I Mod I S SBA CGA Min Mod Max Total NT Comments   BASIC ADLs:              Upper Body Bathing  [] [] [] [x] [] [] [] [] [] []     Lower Body Bathing [] [] [] [] [x] [] [] [] [] []     Toileting [] [] [] [x] [x] [] [] [] []

## 2024-05-24 NOTE — CARE COORDINATION
CASE MANAGEMENT ASSESSMENT NOTE    Patient is a 89 year old with gastrointestinal bleeding, Aucte Kidney Injury.      Patient assessment completed at bedside.  Patient presents to assessment alert and oriented, and answers questions appropriately.  He lives at [facility] independent [facility] facility.  Son, Odell Guido is POA and involved.  At baseline, he is independent with transfers.  Has a rollator.  Patient states that he was receiving therapy services at facility.    RNCM confirmed with [facility] liaisonRusty ([phone]) that patient is a resident of their facility and can return.  If patient needs home health therapy, he will need an order sent with him when he returns.  Will order PT/OT to obtain recommendations for patient.  Patient is agreeable to go to [facility] if appropriate.    At this time, anticipate patient to be discharged to the [facility]. PT/OT evals have been ordered and awaiting recommendations.  Case management will continue to follow.  Please notify if there are any changes.           Attending Physician: Uli Hamlin MD  Admit Problem: GIB (gastrointestinal bleeding) [K92.2]  Acute GI bleeding [K92.2]  CHAITANYA (acute kidney injury) (HCC) [N17.9]  Acute cystitis without hematuria [N30.00]  Date/Time of Admission: 5/23/2024  5:42 PM  Problem List:  Patient Active Problem List   Diagnosis    Hypertension    Dyslipidemia    CAD (coronary artery disease)    Elevated serum creatinine    Leukocytosis    Stage 3b chronic kidney disease (HCC)    UTI (urinary tract infection)    Paroxysmal atrial fibrillation (HCC)    Hypotension    Acute encephalopathy    Metabolic encephalopathy    Central sleep apnea    RLS (restless legs syndrome)    Other fatigue    ANABELLE (obstructive sleep apnea)    Benign prostatic hyperplasia with urinary retention    TIA (transient ischemic attack)    Angina pectoris, unspecified    Atherosclerotic heart disease of native coronary artery with unspecified angina pectoris

## 2024-05-24 NOTE — ANESTHESIA POSTPROCEDURE EVALUATION
Department of Anesthesiology  Postprocedure Note    Patient: Kai Guido  MRN: [id number]  YOB: 1929  Date of evaluation: 5/24/2024    Procedure Summary       Date: 05/24/24 Room / Location: [facility] ENDO 01 / [facility] ENDOSCOPY    Anesthesia Start: 1545 Anesthesia Stop: 1606    Procedure: ESOPHAGOGASTRODUODENOSCOPY BIOPSY (Upper GI Region) Diagnosis:       Melena      Anemia      (Melena [K92.1])      (Anemia [D64.9])    Surgeons: Selina Newberry MD Responsible Provider: Cristino Fuchs MD    Anesthesia Type: TIVA ASA Status: 3 - Emergent            Anesthesia Type: No value filed.    Nathaniel Phase I:      Nathaniel Phase II:      Anesthesia Post Evaluation    Patient location during evaluation: PACU  Patient participation: complete - patient participated  Level of consciousness: awake and alert  Airway patency: patent  Nausea & Vomiting: no nausea and no vomiting  Cardiovascular status: hemodynamically stable  Respiratory status: acceptable, nonlabored ventilation and spontaneous ventilation  Hydration status: euvolemic  Comments: BP (!) 108/61   Pulse 79   Temp 98.6 °F (37 °C) (Temporal)   Resp 16   Ht 1.715 m (5' 7.5\")   Wt 57.2 kg (126 lb)   SpO2 97%   BMI 19.44 kg/m²     Multimodal analgesia pain management approach  Pain management: adequate and satisfactory to patient    No notable events documented.

## 2024-05-24 NOTE — PERIOP NOTE
TRANSFER - IN REPORT:    Verbal report received from Chitra rn  (name) on Kai Guido  being received from [address] [place] [address] surg(unit) for routine progression of patient care      Report consisted of patient’s Situation, Background, Assessment and   Recommendations(SBAR).     Information from the following report(s) Nurse Handoff Report was reviewed with the receiving nurse.    Opportunity for questions and clarification was provided.      Assessment completed upon patient’s arrival to unit and care assume

## 2024-05-24 NOTE — H&P
as mentioned in HPI rest are normal    Physical Exam:  137/78 116 16 98.8 94% on room air  General:    Well nourished.    Head:  Normocephalic, atraumatic  Eyes:  Sclerae appear normal.  Pupils equally round.  ENT:  Nares appear normal.  Moist oral mucosa  Neck:  No restricted ROM.  Trachea midline   CV:   RRR.  No m/r/g.  No jugular venous distension.  Lungs:   CTAB.  No wheezing, rhonchi, or rales.  Symmetric expansion.  Abdomen:   Soft, nontender, nondistended.  Extremities: No cyanosis or clubbing.  No edema  Skin:     No rashes.  Normal coloration.   Warm and dry.    Neuro:  CN II-XII grossly intact.  Sensation intact.   Psych:  Normal mood and affect.      Orders Placed This Encounter   Medications    sodium chloride 0.9 % bolus 1,000 mL    cefTRIAXone (ROCEPHIN) 1,000 mg in sterile water 10 mL IV syringe     Order Specific Question:   Antimicrobial Indications     Answer:   Urinary Tract Infection     Order Specific Question:   Antimicrobial Indications     Answer:   Other     Order Specific Question:   Other Abx Indication     Answer:   Sepsis    cefTRIAXone (ROCEPHIN) 1,000 mg in sterile water 10 mL IV syringe     Order Specific Question:   Antimicrobial Indications     Answer:   Urinary Tract Infection     Order Specific Question:   Antimicrobial Indications     Answer:   Other     Order Specific Question:   Other Abx Indication     Answer:   Sepsis     Order Specific Question:   UTI duration of therapy     Answer:   5 days    pantoprazole (PROTONIX) 40 mg in sodium chloride (PF) 0.9 % 10 mL injection       Prior to Admit Medications:  Current Outpatient Medications   Medication Instructions    amLODIPine (NORVASC) 5 mg, Oral, DAILY    apixaban (ELIQUIS) 2.5 mg, Oral, 2 TIMES DAILY, TAKE 1 TABLET TWICE A DAY    blood glucose test strips (FREESTYLE LITE) strip Check fasting blood sugar daily    Blood Glucose-BP Monitor (BLOOD GLUCOSE-WRIST BP MONITOR) ELSA Check fasting blood sugar daily    donepezil  (ARICEPT) 5 mg, Oral, NIGHTLY    Gemtesa 75 mg, Oral, DAILY    Lancets MISC Check fasting blood sugar daily    metoprolol tartrate (LOPRESSOR) 25 mg, Oral, 2 TIMES DAILY    nitroGLYCERIN (NITROLINGUAL) 0.4 MG/SPRAY 0.4 mg spray 1 spray, SubLINGual, EVERY 5 MIN PRN    simvastatin (ZOCOR) 20 mg, Oral, NIGHTLY, TAKE 1 TABLET NIGHTLY    tamsulosin (FLOMAX) 0.4 mg, Oral, DAILY       I have personally reviewed labs and tests:  Recent Results (from the past 24 hour(s))   POCT Glucose    Collection Time: 05/23/24  5:16 PM   Result Value Ref Range    POC Glucose 191 (H) 65 - 100 mg/dL    Performed by: Dylan    EKG 12 Lead    Collection Time: 05/23/24  5:44 PM   Result Value Ref Range    Ventricular Rate 98 BPM    Atrial Rate 107 BPM    QRS Duration 112 ms    Q-T Interval 350 ms    QTc Calculation (Bazett) 446 ms    R Axis 82 degrees    T Axis 261 degrees    Diagnosis       Atrial fibrillation with premature ventricular or aberrantly conducted   complexes  Marked ST abnormality, possible inferior subendocardial injury  Abnormal ECG  When compared with ECG of 07-FEB-2024 09:07,  Atrial fibrillation has replaced Sinus rhythm  Vent. rate has increased BY  47 BPM  ST more depressed Inferior leads  Non-specific change in ST segment in Anterior leads     CBC with Auto Differential    Collection Time: 05/23/24  6:00 PM   Result Value Ref Range    WBC 8.5 4.3 - 11.1 K/uL    RBC 3.14 (L) 4.23 - 5.6 M/uL    Hemoglobin 8.9 (L) 13.6 - 17.2 g/dL    Hematocrit 28.1 (L) 41.1 - 50.3 %    MCV 89.5 82.0 - 102.0 FL    MCH 28.3 26.1 - 32.9 PG    MCHC 31.7 31.4 - 35.0 g/dL    RDW 18.1 (H) 11.9 - 14.6 %    Platelets 455 (H) 150 - 450 K/uL    MPV 8.8 (L) 9.4 - 12.3 FL    nRBC 0.00 0.0 - 0.2 K/uL    Differential Type AUTOMATED      Neutrophils % 73 43 - 78 %    Lymphocytes % 12 (L) 13 - 44 %    Monocytes % 13 (H) 4.0 - 12.0 %    Eosinophils % 1 0.5 - 7.8 %    Basophils % 0 0.0 - 2.0 %    Immature Granulocytes % 1 0.0 - 5.0 %

## 2024-05-24 NOTE — PROGRESS NOTES
Hospitalist Progress Note   Admit Date:  2024  5:42 PM   Name:  Kai Guido   Age:  89 y.o.  Sex:  male  :  1929   MRN:  [id number]   Room:  [facility]/    Presenting/Chief Complaint: Hyperglycemia     Reason(s) for Admission: GIB (gastrointestinal bleeding) [K92.2]  Acute GI bleeding [K92.2]  CHAITANYA (acute kidney injury) (HCC) [N17.9]  Acute cystitis without hematuria [N30.00]     Hospital Course:   Kai Guido is a 89 y.o. male with medical history of hyperlipidemia, GERD, BPH presents to the emergency room with above symptoms at the request of the primary care physician.  Patient had lab work done which showed a significant drop in hemoglobin from February.  Patient is currently on Eliquis for atrial fibrillation does not be Hemoccult positive.  Patient was admitted to medical floor under hospitalist service.      Subjective & 24hr Events:   Patient was seen and evaluated at bedside this morning.  Currently states that he feels slightly better with fluids.  Denies any fevers, chills, shortness of breath, chest pain.  Patient did state that he was having some dark stools overnight.      Assessment & Plan:     GIB (gastrointestinal bleeding)  Symptoms sound consistent with melena.  Currently on Eliquis for A-fib  -Hold Eliquis  -Continue Protonix 40 mg IV twice daily  -Follow-up GI consult -EGD today  -N.p.o.  -Transfuse if hemoglobin less than 7, maintain active type and screen  -Continue monitor hemoglobin every 8 hours  -Continue with IV fluids    Atrial fibrillation  Patient was noted to be on Eliquis.  Currently holding in the presence of GI bleeding.  Heart rate well-controlled.  -Monitor off Eliquis for now, will need to determine cause of bleeding and assess risk prior to restarting  -Continue metoprolol 25 mg twice daily    Hypertension  BP normotensive.  Continue amlodipine and metoprolol    UTI  UA consistent with UTI.  However, patient has an indwelling Field due to issues  Rectal Q6H PRN    pantoprazole (PROTONIX) 40 mg in sodium chloride (PF) 0.9 % 10 mL injection  40 mg IntraVENous 2 times per day    cefTRIAXone (ROCEPHIN) 1,000 mg in sterile water 10 mL IV syringe  1,000 mg IntraVENous Q24H       Signed:  Uli Hamlin MD    Part of this note may have been written by using a voice dictation software.  The note has been proof read but may still contain some grammatical/other typographical errors.

## 2024-05-24 NOTE — ACP (ADVANCE CARE PLANNING)
Advance Care Planning     General Advance Care Planning (ACP) Conversation    Date of Conversation: 5/23/2024  Conducted with: Patient with Decision Making Capacity  Other persons present: None    Healthcare Decision Maker:    Primary Decision Maker: Odell Guido [facility] [phone]  Click here to complete Healthcare Decision Makers including selection of the Healthcare Decision Maker Relationship (ie \"Primary\").  Today we documented Decision Maker(s) consistent with ACP documents on file.    Content/Action Overview:  Has ACP document(s) on file - reflects the patient's care preferences      Length of Voluntary ACP Conversation in minutes:  <16 minutes (Non-Billable)    Farhat Muhammad RN

## 2024-05-24 NOTE — PROGRESS NOTES
Planning for EGD today; pending time. Will complete full consult later this afternoon prior to endoscopy. Please keep NPO for procedure.     BERLIN Arauz

## 2024-05-24 NOTE — PROGRESS NOTES
ACUTE PHYSICAL THERAPY GOALS:   (Developed with and agreed upon by patient and/or caregiver.)  STG:  (1.)Mr. Guido will move from supine to sit and sit to supine  with MODIFIED INDEPENDENCE within 7 treatment day(s).    (2.)Mr. Guido will transfer from bed to chair and chair to bed with INDEPENDENT using the least restrictive device within 7 treatment day(s).    (3.)Mr. Guido will ambulate with MODIFIED INDEPENDENCE for 300 feet with the least restrictive device within 7 treatment day(s).   (4.)Pt. will climb up/down ramp with RW and SBA within 7 days  (5.)Pt. will increase B LE strength 1/2 grade to improve transfers and agit within 7 days      ________________________________________________________________________________________________     PHYSICAL THERAPY Initial Assessment and AM  (Link to Caseload Tracking: PT Visit Days : 1  Acknowledge Orders  Time In/Out  PT Charge Capture  Rehab Caseload Tracker    Kai Guido is a 89 y.o. male   PRIMARY DIAGNOSIS: GIB (gastrointestinal bleeding)  GIB (gastrointestinal bleeding) [K92.2]  Acute GI bleeding [K92.2]  CHAITANYA (acute kidney injury) (HCC) [N17.9]  Acute cystitis without hematuria [N30.00]       Reason for Referral: Generalized Muscle Weakness (M62.81)  Other abnormalities of gait and mobility (R26.89)  Inpatient: Payor: MEDICARE / Plan: MEDICARE PART A AND B / Product Type: *No Product type* /     ASSESSMENT:     REHAB RECOMMENDATIONS:   Recommendation to date pending progress:  Setting:  Home Health Therapy    Equipment:     Has a rollator and home is accessible-resides in home at [place]     ASSESSMENT:  Mr. Guido is admitted with GI Bleed and is experiencing a decline in his premorbid level of function. He would benefit from further PT while here to address these deficits: decreased general strength and endurance, standing balance and functional mobility. He plans on returning home at [place] and  PT recommended. He has a rollator that he uses for       I=Independent, Mod I=Modified Independent, S=Supervision, SBA=Standby Assistance, CGA=Contact Guard Assistance,   Min=Minimal Assistance, Mod=Moderate Assistance, Max=Maximal Assistance, Total=Total Assistance, NT=Not Tested    PLAN:   FREQUENCY AND DURATION: Daily for duration of hospital stay or until stated goals are met, whichever comes first.    THERAPY PROGNOSIS: Good    PROBLEM LIST:   (Skilled intervention is medically necessary to address:)  Decreased ADL/Functional Activities  Decreased Activity Tolerance  Decreased AROM/PROM  Decreased Balance  Decreased Gait Ability  Decreased Strength  Decreased Transfer Abilities INTERVENTIONS PLANNED:   (Benefits and precautions of physical therapy have been discussed with the patient.)  Self Care Training  Therapeutic Activity  Therapeutic Exercise/HEP  Gait Training  Education       TREATMENT:   EVALUATION: LOW COMPLEXITY: (Untimed Charge)  The initial evaluation charge encompasses clinical chart review, objective assessment, interpretation of assessment, and skilled monitoring of the patient's response to treatment in order to develop a plan of care.     TREATMENT:   Therapeutic Activity (10 Minutes): Therapeutic activity included Rolling, Supine to Sit, Sit to Supine, Scooting, Transfer Training, Ambulation on level ground, Standing balance, and LE exs to improve functional Activity tolerance, Balance, Coordination, Mobility, Strength, and ROM.    TREATMENT GRID:   Date:  5/24/24 Date:   Date:     Activity/Exercise Parameters Parameters Parameters   SUPINE: ankle DF/PF 10          Hip AB/AD 10          Heel Slides 10          SLR 10     SITTING: LAQ 10          Marching 10           Encouraged him to perform supine exs throughout day    AFTER TREATMENT PRECAUTIONS: Alarm Activated, Bed, Bed/Chair Locked, Call light within reach, Needs within reach, RN notified, and Side rails x3    INTERDISCIPLINARY COLLABORATION:  RN/ PCT and PT/ PTA    EDUCATION: Education

## 2024-05-24 NOTE — PROGRESS NOTES
..TRANSFER - IN REPORT:    Verbal report received from HEMANTH Narayanan on Kai Guido  being received from PACU for routine progression of patient care      Report consisted of patient's Situation, Background, Assessment and   Recommendations(SBAR).     Information from the following report(s) Surgery Report was reviewed with the receiving nurse.    Opportunity for questions and clarification was provided.

## 2024-05-25 VITALS
HEIGHT: 68 IN | WEIGHT: 126 LBS | SYSTOLIC BLOOD PRESSURE: 111 MMHG | BODY MASS INDEX: 19.1 KG/M2 | OXYGEN SATURATION: 98 % | DIASTOLIC BLOOD PRESSURE: 66 MMHG | RESPIRATION RATE: 16 BRPM | HEART RATE: 77 BPM | TEMPERATURE: 97.5 F

## 2024-05-25 LAB
ANION GAP SERPL CALC-SCNC: 10 MMOL/L (ref 9–18)
BACTERIA SPEC CULT: NORMAL
BACTERIA SPEC CULT: NORMAL
BUN SERPL-MCNC: 22 MG/DL (ref 8–23)
CALCIUM SERPL-MCNC: 8.1 MG/DL (ref 8.8–10.2)
CHLORIDE SERPL-SCNC: 106 MMOL/L (ref 98–107)
CO2 SERPL-SCNC: 21 MMOL/L (ref 20–28)
CREAT SERPL-MCNC: 1.19 MG/DL (ref 0.8–1.3)
EKG ATRIAL RATE: 107 BPM
EKG DIAGNOSIS: NORMAL
EKG Q-T INTERVAL: 350 MS
EKG QRS DURATION: 112 MS
EKG QTC CALCULATION (BAZETT): 446 MS
EKG R AXIS: 82 DEGREES
EKG T AXIS: 261 DEGREES
EKG VENTRICULAR RATE: 98 BPM
ERYTHROCYTE [DISTWIDTH] IN BLOOD BY AUTOMATED COUNT: 17.8 % (ref 11.9–14.6)
GLUCOSE SERPL-MCNC: 81 MG/DL (ref 70–99)
HCT VFR BLD AUTO: 24.8 % (ref 41.1–50.3)
HGB BLD-MCNC: 7.6 G/DL (ref 13.6–17.2)
MCH RBC QN AUTO: 27.9 PG (ref 26.1–32.9)
MCHC RBC AUTO-ENTMCNC: 30.6 G/DL (ref 31.4–35)
MCV RBC AUTO: 91.2 FL (ref 82–102)
NRBC # BLD: 0 K/UL (ref 0–0.2)
PLATELET # BLD AUTO: 387 K/UL (ref 150–450)
PMV BLD AUTO: 8.2 FL (ref 9.4–12.3)
POTASSIUM SERPL-SCNC: 4 MMOL/L (ref 3.5–5.1)
RBC # BLD AUTO: 2.72 M/UL (ref 4.23–5.6)
SERVICE CMNT-IMP: NORMAL
SODIUM SERPL-SCNC: 137 MMOL/L (ref 136–145)
WBC # BLD AUTO: 8.3 K/UL (ref 4.3–11.1)

## 2024-05-25 PROCEDURE — A4216 STERILE WATER/SALINE, 10 ML: HCPCS | Performed by: INTERNAL MEDICINE

## 2024-05-25 PROCEDURE — 80048 BASIC METABOLIC PNL TOTAL CA: CPT

## 2024-05-25 PROCEDURE — 85027 COMPLETE CBC AUTOMATED: CPT

## 2024-05-25 PROCEDURE — 6360000002 HC RX W HCPCS: Performed by: INTERNAL MEDICINE

## 2024-05-25 PROCEDURE — 2580000003 HC RX 258: Performed by: INTERNAL MEDICINE

## 2024-05-25 PROCEDURE — 97530 THERAPEUTIC ACTIVITIES: CPT

## 2024-05-25 PROCEDURE — 36415 COLL VENOUS BLD VENIPUNCTURE: CPT

## 2024-05-25 PROCEDURE — C9113 INJ PANTOPRAZOLE SODIUM, VIA: HCPCS | Performed by: INTERNAL MEDICINE

## 2024-05-25 PROCEDURE — 6370000000 HC RX 637 (ALT 250 FOR IP): Performed by: INTERNAL MEDICINE

## 2024-05-25 PROCEDURE — 93010 ELECTROCARDIOGRAM REPORT: CPT | Performed by: INTERNAL MEDICINE

## 2024-05-25 RX ORDER — PANTOPRAZOLE SODIUM 40 MG/1
40 TABLET, DELAYED RELEASE ORAL 2 TIMES DAILY
Qty: 60 TABLET | Refills: 1 | Status: ON HOLD | OUTPATIENT
Start: 2024-05-25 | End: 2024-07-24

## 2024-05-25 RX ADMIN — PANTOPRAZOLE SODIUM 40 MG: 40 INJECTION, POWDER, FOR SOLUTION INTRAVENOUS at 08:41

## 2024-05-25 RX ADMIN — AMLODIPINE BESYLATE 5 MG: 5 TABLET ORAL at 08:39

## 2024-05-25 RX ADMIN — SODIUM CHLORIDE, SODIUM LACTATE, POTASSIUM CHLORIDE, AND CALCIUM CHLORIDE: 600; 310; 30; 20 INJECTION, SOLUTION INTRAVENOUS at 05:34

## 2024-05-25 RX ADMIN — SODIUM CHLORIDE, PRESERVATIVE FREE 10 ML: 5 INJECTION INTRAVENOUS at 08:51

## 2024-05-25 RX ADMIN — METOPROLOL TARTRATE 25 MG: 25 TABLET, FILM COATED ORAL at 08:39

## 2024-05-25 RX ADMIN — ATORVASTATIN CALCIUM 10 MG: 10 TABLET, FILM COATED ORAL at 08:39

## 2024-05-25 NOTE — DISCHARGE SUMMARY
Hospitalist Discharge Summary   Admit Date:  2024  5:42 PM   DC Note date: 2024  Name:  Kai Guido   Age:  89 y.o.  Sex:  male  :  1929   MRN:  [id number]   Room:  [facility]  PCP:  Sheldon Schafer DO    Presenting Complaint: Hyperglycemia     Initial Admission Diagnosis: GIB (gastrointestinal bleeding) [K92.2]  Acute GI bleeding [K92.2]  CHAITANYA (acute kidney injury) (HCC) [N17.9]  Acute cystitis without hematuria [N30.00]     Problem List for this Hospitalization (present on admission):    Principal Problem:    GIB (gastrointestinal bleeding)  Active Problems:    RLS (restless legs syndrome)    ANABELLE (obstructive sleep apnea)    Benign prostatic hyperplasia with urinary retention    TIA (transient ischemic attack)    Hypertension    Dyslipidemia    CAD (coronary artery disease)    Stage 3b chronic kidney disease (HCC)    Paroxysmal atrial fibrillation (HCC)    Melena  Resolved Problems:    * No resolved hospital problems. *      Hospital Course:  Kai Guido is a 89 y.o. male with medical history of hyperlipidemia, GERD, BPH presents to the emergency room with above symptoms at the request of the primary care physician.  Patient had lab work done which showed a significant drop in hemoglobin from February.  Patient is currently on Eliquis for atrial fibrillation does not be Hemoccult positive.  Patient was admitted to medical floor under hospitalist service.     Patient's Eliquis was held.  Patient had his hemoglobin trended and it did trend downwards after fluid resuscitation.  GI was consulted and they recommended performing EGD.  EGD was completed on  which showed a clean-based ulcer without any active bleeding.  Biopsies were taken and will be followed up as outpatient.  GI recommended placing patient on PPI twice daily for 8 weeks and then daily thereafter.  Patient's stool also started to lighten up and his hemoglobins remained stable above 7.    I had a long discussion with

## 2024-05-25 NOTE — PROGRESS NOTES
ACUTE PHYSICAL THERAPY GOALS:   (Developed with and agreed upon by patient and/or caregiver.)  STG:  (1.)Mr. Guido will move from supine to sit and sit to supine  with MODIFIED INDEPENDENCE within 7 treatment day(s).    (2.)Mr. Guido will transfer from bed to chair and chair to bed with INDEPENDENT using the least restrictive device within 7 treatment day(s).    (3.)Mr. Guido will ambulate with MODIFIED INDEPENDENCE for 300 feet with the least restrictive device within 7 treatment day(s).   (4.)Pt. will climb up/down ramp with RW and SBA within 7 days  (5.)Pt. will increase B LE strength 1/2 grade to improve transfers and agit within 7 days      ________________________________________________________________________________________________     PHYSICAL THERAPY Daily Note and AM  (Link to Caseload Tracking: PT Visit Days : 2  Acknowledge Orders  Time In/Out  PT Charge Capture  Rehab Caseload Tracker    Kai Guido is a 89 y.o. male   PRIMARY DIAGNOSIS: GIB (gastrointestinal bleeding)  GIB (gastrointestinal bleeding) [K92.2]  Acute GI bleeding [K92.2]  CHAITANYA (acute kidney injury) (HCC) [N17.9]  Acute cystitis without hematuria [N30.00]  Procedure(s) (LRB):  ESOPHAGOGASTRODUODENOSCOPY BIOPSY (N/A)  1 Day Post-Op  Reason for Referral: Generalized Muscle Weakness (M62.81)  Other abnormalities of gait and mobility (R26.89)  Inpatient: Payor: MEDICARE / Plan: MEDICARE PART A AND B / Product Type: *No Product type* /     ASSESSMENT:     REHAB RECOMMENDATIONS:   Recommendation to date pending progress:  Setting:  Home Health Therapy    Equipment:    None     ASSESSMENT:  Mr. Guido is admitted with GI Bleed and is experiencing a decline in his premorbid level of function. He would benefit from further PT while here to address these deficits: decreased general strength and endurance, standing balance and functional mobility. He plans on returning home at [place] and HH PT recommended. He has a rollator that he uses    FREQUENCY AND DURATION: Daily for duration of hospital stay or until stated goals are met, whichever comes first.    THERAPY PROGNOSIS: Good    PROBLEM LIST:   (Skilled intervention is medically necessary to address:)  Decreased ADL/Functional Activities  Decreased Activity Tolerance  Decreased AROM/PROM  Decreased Balance  Decreased Gait Ability  Decreased Strength  Decreased Transfer Abilities INTERVENTIONS PLANNED:   (Benefits and precautions of physical therapy have been discussed with the patient.)  Self Care Training  Therapeutic Activity  Therapeutic Exercise/HEP  Gait Training  Education       TREATMENT:        TREATMENT:   Therapeutic Activity (25 Minutes): Therapeutic activity included Supine to Sit, Scooting, Transfer Training, Ambulation on level ground, Sitting balance , Standing balance, and ADLs in standing to improve functional Activity tolerance, Balance, Coordination, Mobility, Strength, and ROM.    TREATMENT GRID:   Date:  5/24/24 Date:   Date:     Activity/Exercise Parameters Parameters Parameters   SUPINE: ankle DF/PF 10          Hip AB/AD 10          Heel Slides 10          SLR 10     SITTING: LAQ 10          Marching 10           Encouraged him to perform supine exs throughout day    AFTER TREATMENT PRECAUTIONS: Bed/Chair Locked, Call light within reach, Chair, Needs within reach, and RN notified    INTERDISCIPLINARY COLLABORATION:  RN/ PCT    EDUCATION: Education Given To: Patient  Education Provided: Role of Therapy;Plan of Care  Education Method: Verbal  Education Outcome: Verbalized understanding    TIME IN/OUT:  Time In: 0850  Time Out: 0915  Minutes: 25    Shirley Kapoor PT

## 2024-05-25 NOTE — CARE COORDINATION
Patient to discharge to return to Independent Living at [facility].  Dr. Hamlin says patient needs Home Health services.  Spoke with patient - he reports he is current with [facility].  Order faxed to [facility] and will contact them regarding discharge.     05/25/24 9558   Services At/After Discharge   Transition of Care Consult (CM Consult) Home Health   Internal Home Health No   Reason Outside Agency Chosen Patient already serviced by other home care/hospice agency   Condition of Participation: Discharge Planning   The Plan for Transition of Care is related to the following treatment goals: Increase independence   The Patient and/or Patient Representative was provided with a Choice of Provider? Patient   The Patient and/Or Patient Representative agree with the Discharge Plan? Yes

## 2024-05-28 ENCOUNTER — TELEPHONE (OUTPATIENT)
Dept: INTERNAL MEDICINE CLINIC | Facility: CLINIC | Age: 89
End: 2024-05-28

## 2024-05-28 NOTE — PROGRESS NOTES
Physician Progress Note      PATIENT:               JORGE BEARD  CSN #:                  [id number]  :                       1929  ADMIT DATE:       2024 5:42 PM  DISCH DATE:        2024 2:17 PM  RESPONDING  PROVIDER #:        Uli Hamlin MD          QUERY TEXT:    Patient admitted with GIB. Noted documentation of UTI. In order to support the   diagnosis of UTI, please include additional clinical indicators in your   documentation.  Or please document if the diagnosis of UTI has been ruled out   after further study.    The medical record reflects the following:  Risk Factors: chronic plummer  Clinical Indicators: UA with > 100 WBC, 4+ bacteria.  Urine cx showed >   100,000 colonies/ml mixed skin tania. Three or more types of organisms are   present.  This is indicative of contamination due to improper collection   technique.  Treatment: Rocephin  Options provided:  -- UTI present as evidenced by, Please document evidence.  -- UTI was ruled out  -- Other - I will add my own diagnosis  -- Disagree - Not applicable / Not valid  -- Disagree - Clinically unable to determine / Unknown  -- Refer to Clinical Documentation Reviewer    PROVIDER RESPONSE TEXT:    Provider is clinically unable to determine a response to this query.    Query created by: EVELYN PRECIADO on 2024 9:38 AM      Electronically signed by:  Uli Hamlin MD 2024 9:55 AM

## 2024-05-28 NOTE — TELEPHONE ENCOUNTER
Care Transitions Initial Follow Up Call    Outreach made within 2 business days of discharge: Yes    Patient: Kai Guido Patient : 1929   MRN: [id number]  Reason for Admission: There are no discharge diagnoses documented for the most recent discharge.  Discharge Date: 24       Spoke with: Patients son    Discharge department/facility: [facility] Patient Contact:  Was patient able to fill all prescriptions: Yes  Was patient instructed to bring all medications to the follow-up visit: Yes  Is patient taking all medications as directed in the discharge summary? Yes  Does patient understand their discharge instructions: Yes  Does patient have questions or concerns that need addressed prior to 7-14 day follow up office visit: no    Scheduled appointment with PCP within 7-14 days    Follow Up  Future Appointments   Date Time Provider Department Center   2024 10:00 AM Racquel Garcia APRN - CNP [facility]   2024 10:15 AM David Cisneros MD [id number] [facility]   3/6/2025  1:15 PM Kyra Kaba APRN - CNP [id number] [facility]       EMILIE EDDY MA

## 2024-05-28 NOTE — TELEPHONE ENCOUNTER
Carmen from [facility]    Pt was released from hospital on Saturday, for an upper G.I. Bleed wants to get verbal orders to resume home health care    Her phone number is [phone]

## 2024-05-28 NOTE — PROGRESS NOTES
Physician Progress Note      PATIENT:               JORGE BEARD  CSN #:                  [id number]  :                       1929  ADMIT DATE:       2024 5:42 PM  DISCH DATE:        2024 2:17 PM  RESPONDING  PROVIDER #:        Uli Hamlin MD          QUERY TEXT:    Patient admitted with GI bleeding.  EGD showed clean based gastric ulcer in   the cardia, no active bleeding.  If possible, please document in progress   notes and discharge summary the cause of the GI bleeding:      The medical record reflects the following:  Risk Factors: 89yo with hx afib on Eliquis  Clinical Indicators: HGB 8.9 on admit and 7.6 at DC.  EGD showed, clean based   gastric ulcer in the cardia. Normal duodenum. No active bleeding.  Treatment: GI consult.  PPI BID. Eliquis held.  Options provided:  -- GI bleeding due to gastric ulcer  -- GI bleeding due to :This patient has GI bleeding due to, please specify  -- Other - I will add my own diagnosis  -- Disagree - Not applicable / Not valid  -- Disagree - Clinically unable to determine / Unknown  -- Refer to Clinical Documentation Reviewer    PROVIDER RESPONSE TEXT:    This patient has GI bleeding due to gastric ulcer.    Query created by: EVELYN PRECIADO on 2024 9:28 AM      Electronically signed by:  Uli Hamlin MD 2024 9:36 AM

## 2024-06-01 PROBLEM — R07.9 CHEST PAIN: Status: ACTIVE | Noted: 2024-01-01

## 2024-06-01 PROBLEM — J81.1 PULMONARY EDEMA: Status: ACTIVE | Noted: 2024-01-01

## 2024-06-01 NOTE — CONSULTS
[facility] Cardiology Initial Cardiac Evaluation      Date of  Admission: 2024 11:51 AM     Primary Care Physician: Sheldon Schafer DO  Primary Cardiologist: Atilio Sood MD  Referring Physician: Jaden Shafer MD  Supervising Physician: ***    CC/Reason for evaluation: Chest Pain    HPI:  Kai Guido is a 89 y.o. male with prior history of CAD (Stents ), Atrial Fibrillation (Eliquis), HTN, HLD, anemia, admitted recently for GI bleed/gastric ulcer, ANABELLE (wears CPAP), TIA, CKD3 (one kidney) who presented today  to [facility] ED with mid substernal non-radiating chest pain unrelieved by nitroglycerin spray x6 doses.      ED Vitals:  BP: 106/67  HR: 93 RR: 16  O2: 98%-RA, T: 98.7F.  Workup in the ED included ECG showing atrial fibrillation with nonspecific ST changes, Rate: 73   CXR showed findings suspicious for pulmonary edema.  Labwork included elevated troponins: 104, 108; BNP: 9447, Na: 135  K: 4.1, BUN: 36, Cr: 1.67, WBC:9.8, HB.8--improved since  admission, Plt: 382.  Pt was given ASA, nitropaste, SL nitro, zofran, and Lasix 40mg and was admitted to hospitalist service.       Upon discussion at the bedside, pt states sharp chest pain symptoms began in the am when he was making breakfast.  He states the pain was not exacerbated by exertion or relieved by rest or nitroglycerin sprays x6 doses.  The pain was continuous and he states that he felt it did not resolve until he received ASA in the ED.   He denies any associated dizziness, nausea, vomiting, diarrhea, diaphoresis, increased activity intolerance, weight gain, abdominal swelling, sob, orthopnea, or paroxysmal nocturnal dyspnea.  Pt states he is compliant with all of his home medications, but has not taken his eliquis since last admission.       Of note, he was recently d/c'd on  with GI bleed.  EGD on  showed clean-based ulcer and biopsy without any active bleeding and d/c'd on protonix therapy x8 weeks with ulcer  History       Tobacco History       Smoking Status  Former Quit Date  1/1/1967 Smoking Tobacco Type  Cigarettes quit in 1/1/1967   Pack Year History     Packs/Day From To Years    0 1/1/1967  57.4    0.5   0.0      Smokeless Tobacco Use  Never              Alcohol History       Alcohol Use Status  Yes              Drug Use       Drug Use Status  No              Sexual Activity       Sexually Active  Not Asked                    Family History   Problem Relation Age of Onset    Diabetes Brother     Diabetes Sister     Diabetes Mother     Heart Disease Mother     Heart Attack Mother 81        MI    Kidney Disease Brother         ESRD 2/2 DM     Cancer Brother         liver         Current Facility-Administered Medications   Medication Dose Route Frequency    donepezil (ARICEPT) tablet 5 mg  5 mg Oral Nightly    atorvastatin (LIPITOR) tablet 20 mg  20 mg Oral Daily    aspirin EC tablet 325 mg  325 mg Oral Once    nitroglycerin (NITRO-BID) 2 % ointment 0.5 inch  0.5 inch Topical 3 times per day    nitroGLYCERIN (NITROSTAT) SL tablet 0.4 mg  0.4 mg SubLINGual Q5 Min PRN    sodium chloride flush 0.9 % injection 5-40 mL  5-40 mL IntraVENous 2 times per day    sodium chloride flush 0.9 % injection 5-40 mL  5-40 mL IntraVENous PRN    0.9 % sodium chloride infusion   IntraVENous PRN    ondansetron (ZOFRAN-ODT) disintegrating tablet 4 mg  4 mg Oral Q8H PRN    Or    ondansetron (ZOFRAN) injection 4 mg  4 mg IntraVENous Q6H PRN    polyethylene glycol (GLYCOLAX) packet 17 g  17 g Oral Daily PRN    acetaminophen (TYLENOL) tablet 650 mg  650 mg Oral Q6H PRN    Or    acetaminophen (TYLENOL) suppository 650 mg  650 mg Rectal Q6H PRN    sodium chloride flush 0.9 % injection 5-40 mL  5-40 mL IntraVENous 2 times per day    sodium chloride flush 0.9 % injection 5-40 mL  5-40 mL IntraVENous PRN    0.9 % sodium chloride infusion   IntraVENous PRN    ondansetron (ZOFRAN-ODT) disintegrating tablet 4 mg  4 mg Oral Q8H PRN    Or    ondansetron  TRIG 86 02/22/2023    HDL 59 02/22/2023    ALT <5 (L) 06/01/2024    AST 23 06/01/2024     (L) 06/01/2024    K 4.1 06/01/2024     06/01/2024    CREATININE 1.67 (H) 06/01/2024    BUN 36 (H) 06/01/2024    CO2 19 (L) 06/01/2024    TSH 2.090 03/02/2022    PSA 2.4 08/29/2019    INR 1.4 01/29/2024        Pt has been seen and examined by  ***. He agrees with the following assessment and plan.     Assessment/Plan:       Principal Problem:    Chest pain  Resolved on its own.    Atrial fibrillation with nonspecific ST changes with similar but improved morphology from ECG from recent admission Rate: 73.  Trops elevated although flat--likely elevated given pt history of CHF      Active Problems:  Pulmonary edema  Received lasix x1.       Hypertension  BP currently acceptable.  Continue home BP medications and consider titrating as needed.    CAD (coronary artery disease)  Previous C with stents in 2008. Continue BB, ASA, Statin on admission.  ECG with no acute ST changes concerning for STEMI at this time.  Troponins flat.  Troponin ordered x1 for later this evening per primary team.      Stage 3b chronic kidney disease (HCC)  Cr currently 1.67 from 1.19 on 5/25. Pt received Lasix 40mg in ED.  Monitor for symptomatic/clinical improvement with this dose and follow daily Cr closely given pt has only one kidney.  Strict I/O's, daily weights.          Paroxysmal atrial fibrillation (HCC)  Pt with hx of AMERICA Cardioversion 2/2 Afib in 2016 where pt returned to sinus rhythm post procedure, but has been in and out of Afib since 11/11/2023 per review of ECG's.   Eliquis held prior to admission.  Pt not showing signs of active bleeding.  Consider restarting given pt's CHADSVASC score below and previous stroke history (although pt denies this).  Potassium wnl.  Order Mg level.  Check daily labs/lytes and replete as needed to keep K>4, Mg>2.   [id number] Score for Atrial Fibrillation Stroke Risk   Risk   Factors  Component

## 2024-06-01 NOTE — ED NOTES
TRANSFER - OUT REPORT:    Verbal report given to Luz Maria SAXENA on Kai Guido  being transferred to room 320 for routine progression of patient care       Report consisted of patient's Situation, Background, Assessment and   Recommendations(SBAR).     Information from the following report(s) ED SBAR was reviewed with the receiving nurse.    Lines:   Peripheral IV 06/01/24 Left Antecubital (Active)   Site Assessment Clean, dry & intact 06/01/24 1705   Line Status Blood return noted;Normal saline locked;Flushed 06/01/24 1705   Line Care Connections checked and tightened 06/01/24 1705   Phlebitis Assessment No symptoms 06/01/24 1705   Infiltration Assessment 0 06/01/24 1705   Dressing Status Clean, dry & intact 06/01/24 1705   Dressing Type Transparent 06/01/24 1705       Peripheral IV 06/01/24 Right Forearm (Active)   Site Assessment Clean, dry & intact 06/01/24 1705   Line Status Blood return noted;Normal saline locked;Flushed 06/01/24 1705   Line Care Connections checked and tightened 06/01/24 1705   Phlebitis Assessment No symptoms 06/01/24 1705   Infiltration Assessment 0 06/01/24 1705   Dressing Status Clean, dry & intact 06/01/24 1705   Dressing Type Transparent 06/01/24 1705        Opportunity for questions and clarification was provided.      Patient transported with:  Registered Nurse      Sussy Epps RN  06/01/24 [id number]

## 2024-06-01 NOTE — H&P
Hospitalist History and Physical   Admit Date:  2024 11:51 AM   Name:  Kai Guido   Age:  89 y.o.  Sex:  male  :  1929   MRN:  [id number]   [facility]    Presenting/Chief Complaint: Chest Pain     Reason(s) for Admission: Chest pain [R07.9]     History of Present Illness:       Kai Guido is a 89 y.o. male with medical history of CAD, afib off eliquis currently, GI bleed/ gastric ulcer/anemia, CKD3, HTN who is evaluated with chest pain.   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 and h pylori     Denies reflux       FULL CODE  Taco B[employer]     Assessment & Plan:     Principal Problem:    Chest pain  Plan:   CAD (coronary artery disease)  Plan:   Admit observation remote tele   Cardiology consult   Trend troponin   Nitropaste  Asa if tolerant   Statin                 Active Problems:    Hypertension  Plan:   Holding metoprolol, norvasc with lower Bps                   Stage 3b chronic kidney disease (HCC)  Plan:   Solitary kidney:  Trend BMP             Paroxysmal atrial fibrillation (HCC)  Plan:     Secondary hypercoagulable state (HCC)  Plan:   Has not resumed eliquis yet  Holding metoprolol with lower Bps             Anemia  Plan:   HGB stable from recent admit   Resume protonix every 12 hours   Will need hpylori regimen- can defer to GI             Pulmonary edema  Plan:   S/p 1 dose IV lasix  On room air             PT/OT evals  PORTABLE    Result Date: 6/1/2024  Chest X-ray INDICATION: Chest pain. COMPARISON: 1/29/2024. TECHNIQUE: AP/PA view of the chest was obtained. FINDINGS: Findings suspicious for pulmonary edema. Platelike atelectasis is present within both perihilar regions..  The heart size is normal.  The bony thorax is intact.      Findings suspicious for pulmonary edema.         Signed:  Maranda Montalvo MD    Part of this note may have been written by using a voice dictation software.  The note has been proof read but may still contain some grammatical/other typographical errors.

## 2024-06-01 NOTE — ED PROVIDER NOTES
Emergency Department Provider Note       PCP: Sheldon Schafer DO   Age: 89 y.o.   Sex: male     DISPOSITION Admitted 06/01/2024 04:33:48 PM       ICD-10-CM    1. Chest pain, unspecified type  R07.9       2. Acute kidney injury (HCC)  N17.9       3. Elevated troponin  R79.89       4. Congestive heart failure, unspecified HF chronicity, unspecified heart failure type (HCC)  I50.9           Medical Decision Making     Patient presents with chest pain.  He is found to have an elevated troponin though both troponins together are flat.  This could be troponin leak related to his CHF and renal insufficiency.  Patient does have mild acute renal insufficiency.  Hemoglobin is 8.8 which is improved from his discharge hemoglobin 6 days ago.  Patient is not having any melena subjectively.  I have discussed the case with the cardiologist and we believe the elevated troponin is likely multifactorial.  Patient has not had any further chest pain here.  He also has acute renal failure and recent GI bleed feel he should be admitted to the hospitalist for observation.  Hospitalist has agreed to admit.  There is nothing to suggest pulmonary embolism, TAD, pneumonia, or any other emergent pathology.     1 or more acute illnesses that pose a threat to life or bodily function.   Discussion with external consultants.  Chronic medical problems impacting care include CAD, CHF.  Shared medical decision making was utilized in creating the patients health plan today.    I independently ordered and reviewed each unique test.  I reviewed external records: provider visit note from outside specialist.  I reviewed external records: previous lab results from outside ED.  I reviewed external records: previous imaging study including radiologist interpretation.     I interpreted the X-rays chest x-ray is consistent with pulmonary edema and cardiomegaly.  I have reviewed and agree with radiology report.  My Independent EKG Interpretation: sinus  Lived in the Last Year: 1     Unstable Housing in the Last Year: Patient unable to answer        Current Discharge Medication List        CONTINUE these medications which have NOT CHANGED    Details   pantoprazole (PROTONIX) 40 MG tablet Take 1 tablet by mouth in the morning and at bedtime  Qty: 60 tablet, Refills: 1      metoprolol tartrate (LOPRESSOR) 25 MG tablet Take 1 tablet by mouth 2 times daily  Qty: 60 tablet, Refills: 1      amLODIPine (NORVASC) 5 MG tablet Take 1 tablet by mouth daily  Qty: 30 tablet, Refills: 1      donepezil (ARICEPT) 5 MG tablet Take 1 tablet by mouth nightly  Qty: 30 tablet, Refills: 1      apixaban (ELIQUIS) 2.5 MG TABS tablet Take 1 tablet by mouth 2 times daily TAKE 1 TABLET TWICE A DAY  Qty: 180 tablet, Refills: 1      nitroGLYCERIN (NITROLINGUAL) 0.4 MG/SPRAY 0.4 mg spray Place 1 spray under the tongue every 5 minutes as needed for Chest pain  Qty: 1 each, Refills: 3      simvastatin (ZOCOR) 20 MG tablet Take 1 tablet by mouth nightly TAKE 1 TABLET NIGHTLY  Qty: 90 tablet, Refills: 3      blood glucose test strips (FREESTYLE LITE) strip Check fasting blood sugar daily  Qty: 100 each, Refills: 3    Associated Diagnoses: Impaired fasting glucose      Lancets MISC Check fasting blood sugar daily  Qty: 100 each, Refills: 0    Associated Diagnoses: Impaired fasting glucose      Blood Glucose-BP Monitor (BLOOD GLUCOSE-WRIST BP MONITOR) ELSA Check fasting blood sugar daily              Results for orders placed or performed during the hospital encounter of 06/01/24   XR CHEST PORTABLE    Narrative    Chest X-ray    INDICATION: Chest pain.    COMPARISON: 1/29/2024.    TECHNIQUE: AP/PA view of the chest was obtained.    FINDINGS: Findings suspicious for pulmonary edema. Platelike atelectasis is  present within both perihilar regions..  The heart size is normal.  The bony  thorax is intact.        Impression    Findings suspicious for pulmonary edema.     Troponin now then q90 min for 2  92 ms    Q-T Interval 407 ms    QTc Calculation (Bazett) 449 ms    R Axis 77 degrees    T Axis 247 degrees    Diagnosis       Atrial fibrillation  Repol abnrm suggests ischemia, anterolateral           XR CHEST PORTABLE   Final Result   Findings suspicious for pulmonary edema.                      No results for input(s): \"COVID19\" in the last 72 hours.    Voice dictation software was used during the making of this note.  This software is not perfect and grammatical and other typographical errors may be present.  This note has not been completely proofread for errors.     Jordan Sommer DO  06/01/24 [id number]

## 2024-06-01 NOTE — PROGRESS NOTES
4 Eyes Skin Assessment     NAME:  Kai Guido  YOB: 1929  MEDICAL RECORD NUMBER:  [id number]    The patient is being assessed for  Admission    I agree that at least one RN has performed a thorough Head to Toe Skin Assessment on the patient. ALL assessment sites listed below have been assessed.      Areas assessed by both nurses:    Head, Face, Ears, Shoulders, Back, Chest, Arms, Elbows, Hands, Sacrum. Buttock, Coccyx, Ischium, and Legs. Feet and Heels        Does the Patient have a Wound? No  *sacral reddness noted with shearing to distal portion of intergluteal cleft. Left upper arm healing tears from fall PTA       Cliff Prevention initiated by RN: Yes  Wound Care Orders initiated by RN: no      Pressure Injury (Stage 3,4, Unstageable, DTI, NWPT, and Complex wounds) if present, place Wound referral order by RN under : No    New Ostomies, if present place, Ostomy referral order under : No     Nurse 1 eSignature: Electronically signed by Amber Morejon RN on 6/1/24 at 7:29 PM EDT    **SHARE this note so that the co-signing nurse can place an eSignature**    Nurse 2 eSignature: Electronically signed by Kaylee Ruby RN on 6/1/24 at 7:35 PM EDT

## 2024-06-01 NOTE — ED TRIAGE NOTES
Pt arrived via EMS from [place]. Pt c/o CP X 2 hrs. Off blood thinner for concern for bleeding ulcers. Used nitro spray X 5 without relief. Pt states pain has subsided. 324 ASA. BP 96 systolic, HR 70s, sat 94 on RA. Denies SOB. Pt has edema in lower extremities. Per pt, not more swollen than his usual.

## 2024-06-02 NOTE — ICUWATCH
Rapid Response Team Note      Subjective: Responded to Code Stroke secondary to decreased LOC, left sided visual deficits, and left sided weakness.    Summary: Patient alert and oriented. PERRL. Moves all extremities to command with some weakness noted to LUE and LLE. Noted left sided inattention and left visual field deficits. Respirations unlabored on RA. A.fib on monitor. Not on anticoagulation since recent GIB. Dr. Newell at bedside to evaluate. Patient taken to STAT CT, CTA, and CTP without complication. Tele-neurology consult in progress.        See Rapid Response/Code Blue Narrator for full documentation    Outcome: Patient to remain in current location. Will follow-up per Rapid Response Team Clinical Rounding protocol.      Leonard Rivera RN  [facility]: [phone]  [facility]: [phone]

## 2024-06-02 NOTE — CONSENT
Informed Consent for Blood Component Transfusion Note    I have discussed with the patient the rationale for blood component transfusion; its benefits in treating or preventing fatigue, organ damage, or death; and its risk which includes mild transfusion reactions, rare risk of blood borne infection, or more serious but rare reactions. I have discussed the alternatives to transfusion, including the risk and consequences of not receiving transfusion. The patient had an opportunity to ask questions and had agreed to proceed with transfusion of blood components.    Electronically signed by FELICITA GANNON MD on 6/2/24 at 9:39 AM EDT

## 2024-06-02 NOTE — PROGRESS NOTES
Occupational Therapy Note:    Attempted to see patient this AM for physical therapy evaluation session. Patient on hold per RN today. RN in the room with patient and patient with reports of tiredness and maintaining eyes closed. Code S called around lunchtime. Will follow and re-attempt as schedule permits/patient available. Thank you,    Deirdre Casillas, OT     Rehab Caseload Tracker

## 2024-06-02 NOTE — PROGRESS NOTES
Code stroke was called.    Patient was alert awake oriented x 3 this morning.  Answering questions appropriately.    RN noticed that the patient has left-sided neglect.    I saw and evaluated the patient at bedside.    He is somewhat drowsy but wakes up easily and follows commands.    Accu-Chek was done and within normal limits.  Blood glucose was 142.    Patient has decreased strength in the left upper and lower extremity.  Left-sided neglect noticed.    Stat CT head ordered and showed no hemorrhage.  CTA head and neck ordered.    MRI Brain ordered.     Discussed with teleneurology.  CTA head and neck negative for large vessel occlusion.    Discussed risks/benefits of tPA with the patient and teleneurologist.  Given his recent GI bleed, he is at increased risk of dangerous bleeding.  Patient decided against tPA, which I agree.    Stroke protocol ordered.  Patient started on aspirin and statin changed to high-dose.    Current plan of care discussed with patient's son, Mr Bain over the phone today.

## 2024-06-02 NOTE — PROGRESS NOTES
visited with the pt in his room.  overviewed the chart prior to the visit, and upon arrival, consulted with the nurse about the pt. A staff member was working with the pt.  offered a silent prayer for the pt as requested. The pt was off and on asleep at the time of the visit. The pt appeared to be comfortable. The CH offered pastoral presence and prayed for the pt.  is available as needed.

## 2024-06-02 NOTE — PLAN OF CARE
Problem: Discharge Planning  Goal: Discharge to home or other facility with appropriate resources  Outcome: Progressing  Flowsheets (Taken 6/1/2024 1952)  Discharge to home or other facility with appropriate resources:   Arrange for needed discharge resources and transportation as appropriate   Identify barriers to discharge with patient and caregiver   Identify discharge learning needs (meds, wound care, etc)     Problem: Safety - Adult  Goal: Free from fall injury  Outcome: Progressing     Problem: ABCDS Injury Assessment  Goal: Absence of physical injury  Outcome: Progressing     Problem: Pain  Goal: Verbalizes/displays adequate comfort level or baseline comfort level  Outcome: Progressing  Flowsheets (Taken 6/1/2024 1952)  Verbalizes/displays adequate comfort level or baseline comfort level:   Encourage patient to monitor pain and request assistance   Assess pain using appropriate pain scale   Administer analgesics based on type and severity of pain and evaluate response

## 2024-06-02 NOTE — PROGRESS NOTES
GOALS:  LTG: Patient will tolerate safest, least restrictive oral diet without signs or symptoms of airway compromise.  STG: Patient will consume minced and moist textures and mildly thick liquids without overt signs or symptoms of aspiration.  STG: Patient will tolerate ongoing po trials without overt signs or symptoms of respiratory compromise in efforts to advance diet.  STG:  Patient will participate in speech language evaluation to assess expressive / receptive language as clinically indicated.  STG: Patient will perform laryngeal strengthening exercises x10 each with 90% accuracy to improve strength and coordination of swallowing function.  STG: Patient will participate in instrumental swallowing assessment to objectively assess oropharyngeal swallow if clinically indicated.     SPEECH LANGUAGE PATHOLOGY: DYSPHAGIA  Initial Assessment and Treatment Day: Day of Assessment    NAME: Kai Guido  : 1929  MRN: [id number]    ADMISSION DATE: 2024  PRIMARY DIAGNOSIS: Chest pain  Chest pain [R07.9]  Elevated troponin [R79.89]  Acute kidney injury (HCC) [N17.9]  Chest pain, unspecified type [R07.9]  Congestive heart failure, unspecified HF chronicity, unspecified heart failure type (HCC) [I50.9]    ICD-10: Treatment Diagnosis: R13.12 Dysphagia, Oropharyngeal Phase    RECOMMENDATIONS   Diet:  Diet Solids Recommendation: Minced & Moist  Liquid Consistency Recommendation: Mildly Thick (Nectar)    Medications: Meds in puree     Compensatory Swallowing Strategies: Alternate solids and liquids;Remain upright for 30-45 minutes after meals;Upright as possible for all oral intake   Recommendations: Modified barium swallow study;FEES    Therapeutic Interventions: Diet tolerance monitoring;Patient/Family education   Patient continues to require skilled intervention:  Yes. Recommend ongoing speech therapy services during this hospitalization      ASSESSMENT    Dysphagia Diagnosis: Mild pharyngeal stage

## 2024-06-02 NOTE — PROGRESS NOTES
Pt's behavior is consistent with pt's behavior with previous UTI's according to denzel Bain. Order obtained, UA collected from UA collection port on plummer and sent to lab for analysis.  1st unit of blood nearly complete.

## 2024-06-02 NOTE — PROGRESS NOTES
Hospitalist Progress Note   Admit Date:  2024 11:51 AM   Name:  Kai Guido   Age:  89 y.o.  Sex:  male  :  1929   MRN:  [id number]   Room:  [facility]/    Presenting/Chief Complaint: Chest Pain     Reason(s) for Admission: Chest pain [R07.9]  Elevated troponin [R79.89]  Acute kidney injury (HCC) [N17.9]  Chest pain, unspecified type [R07.9]  Congestive heart failure, unspecified HF chronicity, unspecified heart failure type (HCC) [I50.9]     Hospital Course:   Kai Guido is a 89 y.o. male with medical history of coronary artery disease, atrial fibrillation off of Eliquis recently due to GI bleed/gastric ulcer/anemia, chronic kidney disease stage III, hypertension, lives independently at [place] presented to the ER with chest pain.  EKG on admission shows atrial fibrillation.  Rate controlled.  Cardiology was consulted.      Subjective & 24hr Events:   Patient is resting in bed.  No fever no chills.  No chest pain this morning.  No shortness of breath.  No nausea no vomiting.  Reports feeling weak and tired.  Hemoglobin is 7.6 today from 8.8 yesterday.  Denies any dark stools or bright red blood per rectum.      Assessment & Plan:     This is a 89-year-old male with    Chest pain/coronary artery disease  Cardiology consulted.  Appreciate recommendations.  Cardiology recommends transfusion of 2 units of packed red blood cells.    Acute on chronic normocytic anemia  Hemoglobin 7.6 this morning from 8.8 yesterday.  Baseline hemoglobin seems to be 7.5-8.  Cardiology recommends transfusing 2 units of packed red blood cells as patient is symptomatic.  Protonix.    Paroxysmal atrial fibrillation  Secondary hypercoagulable state  Not on Eliquis currently due to anemia/GI bleed/peptic ulcer.    Hypertension  Antihypertensive medications on hold with borderline blood pressures.    CKD stage III/solitary kidney  Creatinine 1.5 this morning from 1.67 on admission.  Avoid nephrotoxic  Granulocytes Absolute 0.0 0.0 - 0.5 K/UL   Magnesium    Collection Time: 06/02/24  5:50 AM   Result Value Ref Range    Magnesium 1.8 1.8 - 2.4 mg/dL   Lipid Panel    Collection Time: 06/02/24  5:50 AM   Result Value Ref Range    Cholesterol, Total 108 0 - 200 MG/DL    Triglycerides 78 0 - 150 MG/DL    HDL 44 40 - 60 MG/DL    LDL Cholesterol 49 0 - 100 MG/DL    VLDL Cholesterol Calculated 16 6 - 23 MG/DL    Chol/HDL Ratio 2.5 0.0 - 5.0     PREPARE RBC (CROSSMATCH), 1 Units    Collection Time: 06/02/24  9:45 AM   Result Value Ref Range    History Check Historical check performed    TYPE AND SCREEN    Collection Time: 06/02/24 10:38 AM   Result Value Ref Range    Crossmatch expiration date 06/05/2024,2355     ABO/Rh A POSITIVE     Antibody Screen NEG     Unit Number [id number]     Product Code Blood Bank RC LR     Unit Divison 00     Dispense Status Blood Bank ALLOCATED     Crossmatch Result Compatible        No results for input(s): \"COVID19\" in the last 72 hours.    Current Meds:  Current Facility-Administered Medications   Medication Dose Route Frequency    potassium chloride (KLOR-CON M) extended release tablet 40 mEq  40 mEq Oral Once    magnesium oxide (MAG-OX) tablet 800 mg  800 mg Oral Once    0.9 % sodium chloride infusion   IntraVENous PRN    furosemide (LASIX) injection 20 mg  20 mg IntraVENous Once    furosemide (LASIX) injection 20 mg  20 mg IntraVENous Once    donepezil (ARICEPT) tablet 5 mg  5 mg Oral Nightly    atorvastatin (LIPITOR) tablet 20 mg  20 mg Oral Daily    aspirin EC tablet 325 mg  325 mg Oral Once    nitroglycerin (NITRO-BID) 2 % ointment 0.5 inch  0.5 inch Topical 3 times per day    nitroGLYCERIN (NITROSTAT) SL tablet 0.4 mg  0.4 mg SubLINGual Q5 Min PRN    sodium chloride flush 0.9 % injection 5-40 mL  5-40 mL IntraVENous 2 times per day    sodium chloride flush 0.9 % injection 5-40 mL  5-40 mL IntraVENous PRN    0.9 % sodium chloride infusion   IntraVENous PRN    ondansetron  (ZOFRAN-ODT) disintegrating tablet 4 mg  4 mg Oral Q8H PRN    Or    ondansetron (ZOFRAN) injection 4 mg  4 mg IntraVENous Q6H PRN    polyethylene glycol (GLYCOLAX) packet 17 g  17 g Oral Daily PRN    acetaminophen (TYLENOL) tablet 650 mg  650 mg Oral Q6H PRN    Or    acetaminophen (TYLENOL) suppository 650 mg  650 mg Rectal Q6H PRN    sodium chloride flush 0.9 % injection 5-40 mL  5-40 mL IntraVENous 2 times per day    sodium chloride flush 0.9 % injection 5-40 mL  5-40 mL IntraVENous PRN    0.9 % sodium chloride infusion   IntraVENous PRN    ondansetron (ZOFRAN-ODT) disintegrating tablet 4 mg  4 mg Oral Q8H PRN    Or    ondansetron (ZOFRAN) injection 4 mg  4 mg IntraVENous Q6H PRN    polyethylene glycol (GLYCOLAX) packet 17 g  17 g Oral Daily PRN    acetaminophen (TYLENOL) tablet 650 mg  650 mg Oral Q6H PRN    Or    acetaminophen (TYLENOL) suppository 650 mg  650 mg Rectal Q6H PRN       Signed:  Aditi Newell MD    Part of this note may have been written by using a voice dictation software.  The note has been proof read but may still contain some grammatical/other typographical errors.

## 2024-06-02 NOTE — CONSULTS
[facility] Cardiology Initial Cardiac Evaluation      Date of  Admission: 2024 11:51 AM     Primary Care Physician: Sheldon Schafer DO  Primary Cardiologist: Atilio Sood MD  Referring Physician: Jaden Shafer MD  Supervising Physician: Dr. Nicholson    CC/Reason for evaluation: Chest Pain    HPI:  Kai Guido is a 89 y.o. male with prior history of CAD (Stents ), Atrial Fibrillation (Eliquis), HTN, HLD, anemia, admitted recently for GI bleed/gastric ulcer, ANABELLE (wears CPAP), TIA, CKD3 (one kidney) who presented today  to [facility] ED with mid substernal non-radiating chest pain unrelieved by nitroglycerin spray x6 doses.      ED Vitals:  BP: 106/67  HR: 93 RR: 16  O2: 98%-RA, T: 98.7F.  Workup in the ED included ECG showing atrial fibrillation with nonspecific ST changes, Rate: 73   CXR showed findings suspicious for pulmonary edema.  Labwork included elevated troponins: 104, 108; BNP: 9447, Na: 135  K: 4.1, BUN: 36, Cr: 1.67, WBC:9.8, HB.8--improved since  admission, Plt: 382.  Pt was given ASA, nitropaste, SL nitro, zofran, and Lasix 40mg and was admitted to hospitalist service.       Upon discussion at the bedside, pt states sharp chest pain symptoms began in the am when he was making breakfast.  He states the pain was not exacerbated by exertion or relieved by rest or nitroglycerin sprays x6 doses.  The pain was continuous and he states that he felt it did not resolve until he received ASA in the ED.   He denies any associated dizziness, nausea, vomiting, diarrhea, diaphoresis, increased activity intolerance, weight gain, abdominal swelling, sob, orthopnea, or paroxysmal nocturnal dyspnea.  Pt states he is compliant with all of his home medications, but has not taken his eliquis since last admission.       Of note, he was recently d/c'd on  with GI bleed.  EGD on  showed clean-based ulcer and biopsy without any active bleeding and d/c'd on protonix therapy x8 weeks with ulcer

## 2024-06-02 NOTE — PROGRESS NOTES
Code Stroke called to evaluate left sided neglect.   Pt arouses easily to voice, answers questions appropriately, but did mention he felt a little confused upon 1200 assessment. Also noted pt appears to have difficulty seeing left visual field. Called and spoke with son to question if mild confusion was normal and if pt had vision concerns.States he has been complaining of eye issues over the past few months and they are supposed to see a specialist.  /72, .   Glucose 142.   NIH 9.

## 2024-06-02 NOTE — PROGRESS NOTES
Physical Therapy Note:    Attempted to see patient this AM for physical therapy evaluation session. Patient on hold per RN today. RN in the room with patient and patient with reports of tiredness and maintaining eyes closed. Will follow and re-attempt as schedule permits/patient available. Thank you,    Kathi Shah, PT     Rehab Caseload Tracker

## 2024-06-03 PROBLEM — I63.9 ACUTE ISCHEMIC STROKE (HCC): Status: ACTIVE | Noted: 2024-01-01

## 2024-06-03 NOTE — PROGRESS NOTES
ACUTE OCCUPATIONAL THERAPY GOALS:   (Developed with and agreed upon by patient and/or caregiver.)  Pt will complete grooming task standing EOS with MOD I and adaptive equipment as needed.  Pt will lower body dress and bathe with MOD I and adaptive equipment as needed.  Pt will complete OT activity for 25 mintues with 1-2 rest breaks to increase activity tolerance to perform ADLs and functional transfers.  Pt will complete functional transfer with MOD I and adaptive equipment as needed.   Pt will complete toilet task with MOD I and adaptive equipment as needed.  Pt will complete functional mobility for household distances with MOD I and adaptive equipment as needed.    Pt will attend to L side environment for 75% of session with Min Verbal Cues from therapist to increase safety awareness during ADL performance.   Pt will tolerate 15 minutes of LUE therapeutic activities to increase functional use of LUE during ADLs.     Timeframe: 7 Visits    OCCUPATIONAL THERAPY Initial Assessment, Daily Note, and AM       OT Visit Days: 1  Acknowledge Orders  Time  OT Charge Capture  Rehab Caseload Tracker      Kai Guido is a 89 y.o. male   PRIMARY DIAGNOSIS: Chest pain  Chest pain [R07.9]  Elevated troponin [R79.89]  Acute kidney injury (HCC) [N17.9]  Chest pain, unspecified type [R07.9]  Congestive heart failure, unspecified HF chronicity, unspecified heart failure type (HCC) [I50.9]  Acute ischemic stroke (HCC) [I63.9]       Reason for Referral: Generalized Muscle Weakness (M62.81)  Other lack of cordination (R27.8)  Difficulty in walking, Not elsewhere classified (R26.2)  History of falling (Z91.81)  Hemiplegia and hemiparesis following cerebral infarction affecting left non-dominant side (I69.354)  Inpatient: Payor: MEDICARE / Plan: MEDICARE PART A AND B / Product Type: *No Product type* /     ASSESSMENT:     REHAB RECOMMENDATIONS:   Recommendation to date pending progress:  Setting:  Home Health  increase safety awareness. Patient also participated in bed mobility, functional mobility, functional transfer, and energy conservation training to increase independence, decrease assistance required, increase activity tolerance, and increase safety awareness.     TREATMENT GRID:  N/A    AFTER TREATMENT PRECAUTIONS: Alarm Activated, Bed/Chair Locked, Call light within reach, Chair, Heels floated, Needs within reach, RN notified, and Visitors at bedside    INTERDISCIPLINARY COLLABORATION:  RN/ PCT, PT/ PTA, and OT/ SOUZA    EDUCATION:  Education Given To: Patient;Family  Education Provided: Role of Therapy;Plan of Care  Barriers to Learning: Hearing  Education Outcome: Verbalized understanding;Continued education needed    TOTAL TREATMENT DURATION AND TIME:  Time In: 1052  Time Out: 1141  Minutes: 49    TAMIAK DE LA GARZA

## 2024-06-03 NOTE — CARE COORDINATION
Patient hospitalized with chest pain. Code S on 6/2. Posterior cerebellar stroke documented.(L) side weakness. Neurology to consult..Patient recently discharged from [facility] 5/25 with [facility]. Readmission assessment completed.  CM met with patient and his son to assess for transition of care needs. Alert& oriented X 4. Reports his (L) side is moving better today. PT/OT working with patient. Lives in independent living at [facility]. Uses a rollator to ambulate. Verified PCP, demographic, and insurance. Prescriptions are affordable for patient. No DME needs. [facility] active with plan for [facility] at discharge for transition of care.   CM will continue to follow.     06/03/24 1366   Service Assessment   Patient Orientation Alert and Oriented   Cognition Alert   History Provided By Patient   Accompanied By/Relationship Son   Support Systems Children;Family Members;Home Care Staff  ([facility])   Patient's Healthcare Decision Maker is: Legal Next of Kin   PCP Verified by CM Yes   Last Visit to PCP Within last 6 months   Prior Functional Level Independent in ADLs/IADLs   Current Functional Level Independent in ADLs/IADLs   Can patient return to prior living arrangement Yes   Ability to make needs known: Good   Family able to assist with home care needs: Yes   Would you like for me to discuss the discharge plan with any other family members/significant others, and if so, who? Yes  (Son)   Financial Resources Medicare;Other (Comment)  ( supplement)   Community Resources None   CM/SW Referral Other (see comment)  (N/A)   Social/Functional History   Lives With Alone   Type of Home Apartment   Home Layout One level   Home Access Level entry   Bathroom Shower/Tub Walk-in shower   Home Equipment Rollator   Receives Help From Family;Home health  ([facility])   ADL Assistance Independent   Homemaking Assistance Needs assistance   Ambulation Assistance Needs assistance  (Rollator)   Transfer  Assistance Independent   Active  No   Patient's  Info Son   Mode of Transportation Car   Occupation Retired   Discharge Planning   Type of Residence Independent Living  ([facility])   Living Arrangements Alone   Current Services Prior To Admission Home Care   Potential Assistance Needed N/A   DME Ordered? No   Potential Assistance Purchasing Medications No   Type of Home Care Services OT;PT;Nursing Services   Patient expects to be discharged to: Independent living facility  ([facility] independent living.)   Services At/After Discharge   Transition of Care Consult (CM Consult) Home Health;Discharge Planning  ([facility])   Internal Home Health No   Reason Outside Agency Chosen Patient already serviced by other home care/hospice agency   Services At/After Discharge Home Health;OT;PT;Nursing services    Resource Information Provided? No   Mode of Transport at Discharge Other (see comment)  (Son to transport by car.)   Confirm Follow Up Transport Family   Condition of Participation: Discharge Planning   The Plan for Transition of Care is related to the following treatment goals: Home wi home health to assist and support return to baseline.   The Patient and/or Patient Representative was provided with a Choice of Provider? Patient   The Patient and/Or Patient Representative agree with the Discharge Plan? Yes   Freedom of Choice list was provided with basic dialogue that supports the patient's individualized plan of care/goals, treatment preferences, and shares the quality data associated with the providers?  Yes

## 2024-06-03 NOTE — PROGRESS NOTES
GOALS:  LTG: Patient will tolerate safest, least restrictive oral diet without signs or symptoms of airway compromise.  STG: Patient will consume soft and bite sized diet with thin liquids without overt signs or symptoms of aspiration. Updated 6/3/2024  STG: Patient will tolerate ongoing po trials without overt signs or symptoms of respiratory compromise in efforts to advance diet. Progressing 6/3/2024  STG: Patient will perform laryngeal strengthening exercises x10 each with 90% accuracy to improve strength and coordination of swallowing function. Ongoing 6/3/2024  STG: Patient will participate in instrumental swallowing assessment to objectively assess oropharyngeal swallow if clinically indicated. Ongoing 6/3/2024    LTG: Patient will improve cognitive- communicative function to perform daily activities at highest possible level.   STG: Patient will display orientation to time and situation with 90% accuracy using external aids.   STG: Patient will sustain attention to therapy tasks for 10 minute interval without assistance provided.       SPEECH LANGUAGE PATHOLOGY: Cognitive- Communicative Assessment and Dysphagia Treatment    NAME: Kai Guido  : 1929  MRN: [id number]    ADMISSION DATE: 2024  PRIMARY DIAGNOSIS: Chest pain  Chest pain [R07.9]  Elevated troponin [R79.89]  Acute kidney injury (HCC) [N17.9]  Chest pain, unspecified type [R07.9]  Congestive heart failure, unspecified HF chronicity, unspecified heart failure type (HCC) [I50.9]  Acute ischemic stroke (HCC) [I63.9]    ICD-10: Treatment Diagnosis: R13.12 Dysphagia, Oropharyngeal Phase    RECOMMENDATIONS   Diet:  Soft and Bite Sized Diet  Thin Liquids    Medications: As Tolerated    Patient does exhibit deficits with orientation, attention, insight. Recommend ongoing speech therapy services during acute phase of care.    Compensatory Swallowing Strategies:    Upright with all PO Intake  Small Bites/Sips    Recommend ongoing speech  therapy services during acute phase of care.    Patient continues to require skilled intervention:  Yes. Recommend ongoing speech therapy services during this hospitalization      ASSESSMENT    Dysphagia: Patient able to consume purees, solids, and thin liquids without overt signs of aspiration. Discussed status with patient and patient's son who are in agreement for diet advancement to soft and bite sized foods with thin liquids.     Patient reportedly with overt signs of aspiration on previous date. Will follow during acute phase of care to ensure tolerance of diet.     Cognitive- Communicative: When questioned of function patient does report mild confusion and \"responding slower\" on previous date. Son states patient normally without difficulty regarding cognitive function though also reports when difficulty has been observed in the past, patient likely developing a UTI. On this date, patient did require assistance with time orientation and suspect reduced insight into visual function (though patient was able to acknowledge changes with vision when questioned).     Speech therapy will treat for dysphagia, cognitive- communicative function during acute phase of care.     GENERAL    Subjective: Patient awake, alert, and agreeable to speech therapy services. Staff reports patient consumed meds with nectar thick liquids without difficulty. Patient's son, Theodore, present at end of session. Reports patient residing in an independent living apartment before admission though patient has required admission to skilled during though more for physical needs (femur fracture, for example).     History of Present Injury/Illness: Mr. Guido  has a past medical history of A-fib (HCC), Arrhythmia, Arthritis, BPH (benign prostatic hyperplasia), CAD (coronary artery disease), Chronic kidney disease, Hypercholesterolemia, Hypertension, ANABELLE (obstructive sleep apnea), ANABELLE on CPAP, and Stroke (HCC).. He also  has a past surgical history  that includes other cell; ptca w/ stent, ea add; Colonoscopy; Appendectomy; Urological Surgery; pr unlisted procedure cardiac surgery; Colonoscopy; Leg Surgery (Left, 11/11/2023); and Upper gastrointestinal endoscopy (N/A, 5/24/2024).    MRI of Brain:   1.  Right posterior cerebral artery vascular territory acute infarction.  2.  Parenchymal atrophy and chronic microvascular ischemia.    Prior Dysphagia History: Patient and son deny difficulty with PO intake prior to hospitalization.   Prior instrumental assessment: N/A    Pain: Patient does not c/o pain                OBJECTIVE    Dysphagia: Patient agreeable to PO trials consuming 6 oz of thin liquids via cup/straw, purees x's 4, x's 2 sarita crackers. Patient able to feed self post set up assistance. Oral phase of swallow was unremarkable. Pharyngeal phase of swallow was unremarkable as able to be assessed at bedside. No overt signs of aspiration were noted with all PO trials.     Discussed status with patient and son who are both in agreement for diet advancement. Recommend soft and bite sized foods with thin liquids. Speech therapy will follow during acute phase of care.     Cognitive- Communicative: Patient awake, alert for duration of session. Discussed events of previous date and patient does acknowledge difficulty with altered mental status and alertness. Patient also reports improved alertness today but also states of self, feeling \"slower\" regarding responsiveness. Per patient \"my mind isn't working like is normally does\".     Patient oriented to self, type of facility though not name of facility. Oriented to year and month. Assistance required with name of facility as well as day and date. After a 5 minute interval patient was successful in recalling name of facility, day, and date with verbal cue. Patient does acknowledge difficulty with vision and when attempting to read information from AM meal, patient stating \"I can only see a few letters and can't

## 2024-06-03 NOTE — PROGRESS NOTES
[facility] CARDIOLOGY PROGRESS NOTE           6/3/2024 4:03 PM    Admit Date: 6/1/2024      Subjective:   C/o visual field defect    Objective:      Vitals:    06/03/24 0330 06/03/24 0505 06/03/24 0941 06/03/24 1327   BP: 125/76 130/73 131/87 124/81   Pulse:  62 74 56   Resp:  16 16 18   Temp:  97.3 °F (36.3 °C) 98.1 °F (36.7 °C) 97.5 °F (36.4 °C)   TempSrc:  Oral Oral Oral   SpO2:  98% 95% 97%   Weight:       Height:           Physical Exam:  General-No Acute Distress  Neck- supple, no JVD  CV- irregular rate and rhythm no MRG  Lung- clear bilaterally  Abd- soft, nontender, nondistended  Ext- no edema bilaterally.  Skin- warm and dry    Data Review:   Recent Labs     06/02/24  0550 06/02/24  1354 06/03/24  0618   *  --  138   K 3.7  --  3.6   MG 1.8  --  1.8   BUN 33*  --  28*   WBC 8.6  --  8.9   HGB 7.6*  --  11.9*   HCT 25.0*  --  36.8*     --  271   INR  --  1.2  --    CHOL 108  --  120   HDL 44  --  48       Assessment/Plan:     Ascvd and unstable angina, resolved  Recent GIB due to PUD  Severe anemia, better after transfusion  Persistent a fib  HFpEF  Ckd  Acute occipital stroke  ////  I spoke to GI.  OK to start Eliquis.   ASA is stopped  Protonix is ordered       FELICITA GANNON MD  6/3/2024 4:03 PM

## 2024-06-03 NOTE — PROGRESS NOTES
ACUTE PHYSICAL THERAPY GOALS:   (Developed with and agreed upon by patient and/or caregiver.)  Pt will perform bed mobility Mod (I) c inc time, cueing and use of rails as needed in 7 therapy sessions.  Pt will perform sit-to-stand/ stand-to-sit transfers Mod (I) c use of LRAD/external supports as needed and no LOB or miss-steps in 7 therapy sessions.  Pt will ambulate 250 ft (I) with use of LRAD, no LOB or miss-steps and breaks as needed in 7 therapy sessions.  Pt will perform standing dynamic balance activities with minimal postural sway in 7 therapy sessions.  Pt will tolerate multiple sets and reps of BLE exercises in 7 therapy sessions.  Pt will (I) sweep vision from right-to-left 75% of the time while ambulating for improved safety while mobilizing in 7 therapy sessions.      PHYSICAL THERAPY Initial Assessment, Daily Note, and AM  (Link to Caseload Tracking: PT Visit Days : 1  Acknowledge Orders  Time In/Out  PT Charge Capture  Rehab Caseload Tracker    Kai Guido is a 89 y.o. male   PRIMARY DIAGNOSIS: Chest pain  Chest pain [R07.9]  Elevated troponin [R79.89]  Acute kidney injury (HCC) [N17.9]  Chest pain, unspecified type [R07.9]  Congestive heart failure, unspecified HF chronicity, unspecified heart failure type (HCC) [I50.9]  Acute ischemic stroke (HCC) [I63.9]       Reason for Referral: Other lack of cordination (R27.8)  Difficulty in walking, Not elsewhere classified (R26.2)  Other abnormalities of gait and mobility (R26.89)  Inpatient: Payor: MEDICARE / Plan: MEDICARE PART A AND B / Product Type: *No Product type* /     ASSESSMENT:     REHAB RECOMMENDATIONS:   Recommendation to date pending progress:  Setting:  Home Health Therapy    Equipment:    None  To Be Determined     ASSESSMENT:  Mr. Guido Is a 89 y.o. male presenting to PT after being admitted on 6/1/24 for CP. Code S called on 6/2/24 with MRI of the brain this morning (6/3/24) showing acute posterior circulation infarct. Pt comes

## 2024-06-03 NOTE — ICUWATCH
[facility] Clinical Rounding Nurse Progress Report      SUBJECTIVE: Called to assess patient secondary to  code stroke .      Vitals:    06/03/24 0212 06/03/24 0252 06/03/24 0330 06/03/24 0505   BP: 127/80 129/85 125/76 130/73   Pulse: 73   62   Resp: 18 17  16   Temp: 98 °F (36.7 °C) 97.5 °F (36.4 °C)  97.3 °F (36.3 °C)   TempSrc: Oral Oral  Oral   SpO2:    98%   Weight:       Height:              ASSESSMENT:  On arrival to room, I found patient to be resting in bed quietly. Patient is alert and oriented X 4. GCS 15, NIH 1. Denies any pain or SOB. Respirations even and unlabored. Bilateral lung sounds clear on RA. PT denies any needs or concerns at this time. Recent labs/notes/vitals reviewed and pt discussed with primary RN.    PLAN:  Will follow per [facility] Clinical Rounding Program protocol. Primary RN to call with concerns. MRI completed this morning. Results pending.    Addendum: MRI resulted. Attending notified of results. New consult placed to neurology. Dr. Zuñiga messaged with new consult via secure message.    Addison Villanueva RN  [place]: [phone]

## 2024-06-03 NOTE — PROGRESS NOTES
06/02/24 2042   NIV Type   $NIV $Daily Charge   NIV Started/Stopped On   Equipment Type Resmed   Mode Auto-PAP   Mask Type Full face mask   Mask Size Small   Assessment   Pulse 69   Respirations 16   SpO2 96 %   Comfort Level Good   Using Accessory Muscles No   Mask Compliance Good   Skin Assessment Clean, dry, & intact   Settings/Measurements   PIP Observed 6 cm H20   EPAP Min 6 cmH2O   EPAP Max 12 cmH2O   Mask Leak (lpm)   (mask fits good)   Patient's Home Machine No

## 2024-06-03 NOTE — TELEPHONE ENCOUNTER
Called pt's son and informed him that his father's biopsies from the recent endoscopy were positive for a bacteria known as H. Pylori. Advised him that the pt will need to take two antibiotics and the prescribed pantoprazole twice a day for 14 days, in addition to submitting a stool sample to the lab in 3 months. Pt's son verbalized understanding.    ----- Message from Selina Newberry MD sent at 6/3/2024  1:39 PM EDT -----  We can tell the patient that H.pylori test is positive, this bacteria can cause ulcers or gastritis therefore we need to treat it at this time with two antibiotics and omeprazole. After treatment patient needs repeat H.pylori stool antigen testing in 3 months to ensure that bacteria is cleared.     Antibiotics as below if no penicillin allergy:   Amoxicillin 1g BID for 14 days  Clarithromycin 500 mg BID for 14 days    Antibiotics as below if there is penicillin allergy:   Metronidazole 500 mg three times daily for 14 days  Clarithromycin 500 mg BID for 14 days    During antibiotic treatment he needs to stay on PPI twice daily (pantoprazole/omeprazole etc.).

## 2024-06-03 NOTE — PROGRESS NOTES
Hospitalist Progress Note   Admit Date:  2024 11:51 AM   Name:  Kai Guido   Age:  89 y.o.  Sex:  male  :  1929   MRN:  [id number]   Room:  [facility]/    Presenting/Chief Complaint: Chest Pain     Reason(s) for Admission: Chest pain [R07.9]  Elevated troponin [R79.89]  Acute kidney injury (HCC) [N17.9]  Chest pain, unspecified type [R07.9]  Congestive heart failure, unspecified HF chronicity, unspecified heart failure type (HCC) [I50.9]  Acute ischemic stroke (HCC) [I63.9]     Hospital Course:   Kai Guido is a 89 y.o. male with medical history of coronary artery disease, atrial fibrillation off of Eliquis recently due to GI bleed/gastric ulcer/anemia, chronic kidney disease stage III, hypertension, lives independently at [facility] presented to the ER with chest pain.  EKG on admission shows atrial fibrillation.  Rate controlled.  Cardiology was consulted.  Code stroke was called on 2024.  Stat CT head was done and negative for acute findings.  CTA head and neck negative for large vessel occlusion.  MRI of the brain shows acute posterior circulation infarct.  Neurology consulted.  Patient was recently taken off of anticoagulation due to acute GI bleed secondary to peptic ulcer disease.  He received 2 units of packed red blood cells yesterday.    Subjective & 24hr Events: 6/3  Patient is resting in bed.  Speech clear.  No facial droop.  Weakness in the left upper extremity improving.  No fever no chills.  No chest pain.      Assessment & Plan:     This is a 89-year-old male with    Chest pain/coronary artery disease  Cardiology consulted.  Appreciate recommendations.  Status post 2 units of packed red blood cells yesterday.    Acute right posterior cerebral artery vascular territory acute infarction.  Allow for permissive hypertension.  Code stroke called yesterday.  Stat CT head and CTA head and neck negative for acute findings.  MRI of the brain this morning shows acute  posterior circulation infarct.  Neurology consulted.  Appreciate recommendations.  Aspirin, statin  He has history of atrial fibrillation, patient was recently taken off of anticoagulation due to GI bleed.  May need to restart anticoagulation in the next 48 hours.  PT/OT/ST   Echo shows preserved left ventricular systolic function with EF of 55 to 60%.  Normal wall motion.  Moderate to severe aortic stenosis.  Moderate to severe tricuspid regurgitation.  Dilated left atrium.    Acute on chronic normocytic anemia  Hemoglobin is 11.9 this morning which could be a lab error after 2 units of packed red blood cells yesterday.  It was 7.6 yesterday.  Recent hemoglobin in February 2024 was 13.1.  He had a significant GI bleed on last admission 1 week ago.  EGD showed peptic ulcer.   Protonix.    Paroxysmal atrial fibrillation  Secondary hypercoagulable state  Not on Eliquis currently due to anemia/GI bleed/peptic ulcer.  Given his acute stroke, may need to restart his anticoagulation in the next 48 hours, if hemoglobin is stable and no active bleeding.  However he has risk of life-threatening bleeding.  Risk/benefits of anticoagulation were discussed with the patient and family.    UTI  Ceftriaxone pending cultures.    Hypertension  Antihypertensive medications on hold with borderline blood pressures.    CKD stage III/solitary kidney  Creatinine 1.3 this morning from 1.67 on admission.  Avoid nephrotoxic medications.    Chronic diastolic congestive heart failure  No signs of acute exacerbation.     Anticipated Discharge Arrangements:   Home hopefully in the next 24 to 48 hours.    PT/OT evals ordered?  Therapy evals ordered    Diet:  ADULT DIET; Dysphagia - Soft and Bite Sized  VTE prophylaxis: SCD's   Code status: Full Code      Non-peripheral Lines and Tubes (if present):      Urinary Catheter  (Active)        Telemetry (if present):  Cardiac/Telemetry Monitor On: Portable telemetry pack [facility]  Glucose    Collection Time: 06/02/24 12:23 PM   Result Value Ref Range    POC Glucose 142 (H) 65 - 100 mg/dL    Performed by: Noe Chenime-INR    Collection Time: 06/02/24  1:54 PM   Result Value Ref Range    Protime 16.0 (H) 11.3 - 14.9 sec    INR 1.2     Urinalysis w rflx microscopic    Collection Time: 06/02/24  6:54 PM   Result Value Ref Range    Color, UA YELLOW/STRAW      Appearance CLEAR      Specific Gravity, UA 1.019 1.001 - 1.023      pH, Urine 5.5 5.0 - 9.0      Protein, UA TRACE (A) NEG mg/dL    Glucose, Ur Negative mg/dL    Ketones, Urine Negative NEG mg/dL    Bilirubin, Urine Negative NEG      Blood, Urine TRACE (A) NEG      Urobilinogen, Urine 0.2 0.2 - 1.0 EU/dL    Nitrite, Urine Negative NEG      Leukocyte Esterase, Urine MODERATE (A) NEG      WBC, UA 20-50 0 /hpf    RBC, UA 0-3 0 /hpf    Epithelial Cells, UA 0-3 0 /hpf    BACTERIA, URINE 1+ (H) 0 /hpf    Yeast, UA MARKED      Other observations RESULTS VERIFIED MANUALLY     PREPARE RBC (CROSSMATCH), 1 Units    Collection Time: 06/03/24  1:30 AM   Result Value Ref Range    History Check Historical check performed    Magnesium    Collection Time: 06/03/24  6:18 AM   Result Value Ref Range    Magnesium 1.8 1.8 - 2.4 mg/dL   Hemoglobin A1c    Collection Time: 06/03/24  6:18 AM   Result Value Ref Range    Hemoglobin A1C 5.9 (H) 0 - 5.6 %    Estimated Avg Glucose 123 mg/dL   CBC with Auto Differential    Collection Time: 06/03/24  6:18 AM   Result Value Ref Range    WBC 8.9 4.3 - 11.1 K/uL    RBC 4.28 4.23 - 5.6 M/uL    Hemoglobin 11.9 (L) 13.6 - 17.2 g/dL    Hematocrit 36.8 (L) 41.1 - 50.3 %    MCV 86.0 82 - 102 FL    MCH 27.8 26.1 - 32.9 PG    MCHC 32.3 31.4 - 35.0 g/dL    RDW 17.3 (H) 11.9 - 14.6 %    Platelets 271 150 - 450 K/uL    MPV 8.5 (L) 9.4 - 12.3 FL    nRBC 0.00 0.0 - 0.2 K/uL    Differential Type AUTOMATED      Neutrophils % 68 43 - 78 %    Lymphocytes % 12 (L) 13 - 44 %    Monocytes % 16 (H) 4.0 - 12.0 %    Eosinophils % 3 0.5

## 2024-06-03 NOTE — CONSULTS
Neurology Consult Note       History: This is a 89-year-old man with a history of atrial fibrillation who is on Eliquis.  Eliquis was held due to GI bleeding.  The patient then developed sudden onset vision loss.  MRI of the brain shows a moderate to large size infarct in the right occipital lobe.      Current Facility-Administered Medications:     cefTRIAXone (ROCEPHIN) 1,000 mg in sterile water 10 mL IV syringe, 1,000 mg, IntraVENous, Q24H, Aditi Newell MD, 1,000 mg at 06/03/24 0921    potassium chloride (KLOR-CON M) extended release tablet 40 mEq, 40 mEq, Oral, Once, Aditi Newell MD    magnesium oxide (MAG-OX) tablet 800 mg, 800 mg, Oral, Once, Aditi Newell MD    0.9 % sodium chloride infusion, , IntraVENous, PRN, Gerardo Nicholson MD    furosemide (LASIX) injection 20 mg, 20 mg, IntraVENous, Once, Gerardo Nicholson MD    aspirin chewable tablet 81 mg, 81 mg, Oral, Daily, Aditi Newell MD, 81 mg at 06/03/24 0923    atorvastatin (LIPITOR) tablet 80 mg, 80 mg, Oral, Nightly, Aditi Newell MD    sodium chloride flush 0.9 % injection 5-40 mL, 5-40 mL, IntraVENous, 2 times per day, Aditi Newell MD, 10 mL at 06/03/24 0926    sodium chloride flush 0.9 % injection 5-40 mL, 5-40 mL, IntraVENous, PRN, Aditi Newell MD    0.9 % sodium chloride infusion, , IntraVENous, PRN, Aditi Newell MD    ondansetron (ZOFRAN-ODT) disintegrating tablet 4 mg, 4 mg, Oral, Q8H PRN **OR** ondansetron (ZOFRAN) injection 4 mg, 4 mg, IntraVENous, Q6H PRN, Aditi Newell MD    polyethylene glycol (GLYCOLAX) packet 17 g, 17 g, Oral, Daily PRN, Aditi Newell MD    labetalol (NORMODYNE;TRANDATE) injection 10 mg, 10 mg, IntraVENous, Q10 Min PRN, Aditi Newell MD    donepezil (ARICEPT) tablet 5 mg, 5 mg, Oral, Nightly, Jaden Shafer MD, 5 mg at 06/02/24 2037    aspirin EC tablet 325 mg, 325 mg, Oral, Once, Jaden Shafer MD    nitroGLYCERIN (NITROSTAT) SL tablet 0.4 mg, 0.4 mg, SubLINGual, Q5 Min PRN, Jaden Shafer MD    sodium chloride flush 0.9 % injection 5-40  mL, 5-40 mL, IntraVENous, 2 times per day, Jaden Shafer MD, 10 mL at 06/03/24 0926    sodium chloride flush 0.9 % injection 5-40 mL, 5-40 mL, IntraVENous, PRN, Jaden Shafer MD    sodium chloride flush 0.9 % injection 5-40 mL, 5-40 mL, IntraVENous, 2 times per day, Maranda Montalvo MD, 10 mL at 06/03/24 0926    sodium chloride flush 0.9 % injection 5-40 mL, 5-40 mL, IntraVENous, PRN, Maranda Montalvo MD    0.9 % sodium chloride infusion, , IntraVENous, PRN, Maranda Montalvo MD    acetaminophen (TYLENOL) tablet 650 mg, 650 mg, Oral, Q6H PRN **OR** acetaminophen (TYLENOL) suppository 650 mg, 650 mg, Rectal, Q6H PRN, Maranda Montalvo MD        Exam: Pertinent positives and negatives include:    General - Well developed, well nourished, in no apparent distress. Pleasant and conversant.   HEENT - Normocephalic, atraumatic.  Neck -No masses   Lungs - Normal work of breathing   Abdomen - No masses   Extremities - No edema and no rashes.   Psychiatric - Mood and affect are normal    Neurological examination     He follows commands.  Pupils are equal, round, and reactive to light.  On visual field testing he has a left homonymous hemianopsia.  Strength is full throughout.  Sensory examination is normal.    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  Unfortunately, Eliquis has been held due to GI bleeding.    Recommendations  When considered safe, restart Eliquis.  While this is being held aspirin 81 mg daily can be given.  Once Eliquis is restarted then stop aspirin.  Continue atorvastatin.  No further recommendations.  Neurology will sign off    Follow-up with Janice Hughes NP in TIA+ clinic.  Neurology will place this order.        Cumulative time spent today was 35 minutes which included chart review, obtaining history (from patient, family, or other providers), review of images, examining the patient, and counseling the patient  and/or family on medical condition.

## 2024-06-03 NOTE — PROGRESS NOTES
Radiologists called and informed Primary RN or the results of the MRI. MD was notified of the results by Primary RN.

## 2024-06-04 NOTE — PROGRESS NOTES
Monitor Room called and informed Primary RN that patient had an 8 beat run of VTACH. MD made aware.    Patent

## 2024-06-04 NOTE — PROGRESS NOTES
Patient complaining of chest pain 4/10. Tylenol given. MD notified. MD ordered stat EKG and troponin.   Cardiology NP on floor assessed patient and informed RN to give maloox for possible indigestion. VS were  in Afib, and /84. NP ordered IV pepcid and PO metoprolol be given.

## 2024-06-04 NOTE — PLAN OF CARE
0525  Pt complained of chest pain 10/10 pain In center of chest.    0529  Gave 1 nitro   /73  HR 62  Placed on 3L NC       0534  Chest pain unrelieved gave another nitro   /69    0536   BP 86/61  HR 71    EKG obtained showed Afib with competing junc pacemaker anterior infarct    0538  Contacted [facility]    Instructed to GI cocktail with lidocaine  And PRN tylenol ordered     SEE Notes   SEE ORDERS  SEE MAR      0606  Pain unrelieved   Contacted [facility]    Bp raised to 119/87  HR 77    0628  Morphine 4mg/mL  Ordered   Gave medication    0634  /80    0643  Pain relieved

## 2024-06-04 NOTE — PROGRESS NOTES
Hospitalist Progress Note   Admit Date:  2024 11:51 AM   Name:  Kai Guido   Age:  89 y.o.  Sex:  male  :  1929   MRN:  [id number]   Room:  [facility]/    Presenting/Chief Complaint: Chest Pain     Reason(s) for Admission: Chest pain [R07.9]  Elevated troponin [R79.89]  Acute kidney injury (HCC) [N17.9]  Chest pain, unspecified type [R07.9]  Congestive heart failure, unspecified HF chronicity, unspecified heart failure type (HCC) [I50.9]  Acute ischemic stroke (HCC) [I63.9]     Hospital Course:   Kai Guido is a 89 y.o. male with medical history of coronary artery disease, atrial fibrillation off of Eliquis recently due to GI bleed/gastric ulcer/anemia, chronic kidney disease stage III, hypertension, lives independently at [place] presented to the ER with chest pain.  EKG on admission shows atrial fibrillation.  Rate controlled.  Cardiology was consulted.  Code stroke was called on 2024.  Stat CT head was done and negative for acute findings.  CTA head and neck negative for large vessel occlusion.  MRI of the brain shows acute posterior circulation infarct.  Neurology consulted.  Patient was recently taken off of anticoagulation due to acute GI bleed secondary to peptic ulcer disease.  He received 2 units of packed red blood cells yesterday.    Subjective & 24hr Events:   Patient was complaining of severe chest pain this morning.  No fever no chills.  No shortness of breath.  No nausea no vomiting.    Assessment & Plan:     This is a 89-year-old male with    Chest pain/coronary artery disease.  Status post 2 units of packed red blood cells  patient still has significant chest pain this morning.  Cardiology on board.  Not a candidate for any invasive procedures at this time given his age.  Continue Eliquis, statin.    Acute right posterior cerebral artery vascular territory acute infarction.  Allow for permissive hypertension.  Code stroke called yesterday.  Stat CT  hour   Intake 290 ml   Output 2150 ml   Net -1860 ml         Physical Exam:     General:    Well nourished.  Alert awake elderly male, ill-appearing,  Head:  Normocephalic, atraumatic  Eyes:  Pale, no jaundice. Pupils equally round.  ENT:  Nares appear normal.  Moist oral mucosa  Neck:  No restricted ROM.  Trachea midline   CV:   RRR.  No m/r/g.  No jugular venous distension.  Lungs:   CTAB.  No wheezing, rhonchi, or rales.  Symmetric expansion.  Abdomen:   Soft, nontender, nondistended.  Extremities: No cyanosis or clubbing.  No edema  Skin:     No rashes.  Normal coloration.   Warm and dry.    Neuro:  CN II-XII grossly intact.  Left upper extremity weakness improved.  Patient still has some balance issues while walking.  No facial droop.  No slurred speech.  Psych:  Normal mood and affect.      I have personally reviewed labs and tests:  Recent Labs:  Recent Results (from the past 48 hour(s))   POCT Glucose    Collection Time: 06/02/24 12:23 PM   Result Value Ref Range    POC Glucose 142 (H) 65 - 100 mg/dL    Performed by: Noe    Protime-INR    Collection Time: 06/02/24  1:54 PM   Result Value Ref Range    Protime 16.0 (H) 11.3 - 14.9 sec    INR 1.2     Urinalysis w rflx microscopic    Collection Time: 06/02/24  6:54 PM   Result Value Ref Range    Color, UA YELLOW/STRAW      Appearance CLEAR      Specific Gravity, UA 1.019 1.001 - 1.023      pH, Urine 5.5 5.0 - 9.0      Protein, UA TRACE (A) NEG mg/dL    Glucose, Ur Negative mg/dL    Ketones, Urine Negative NEG mg/dL    Bilirubin, Urine Negative NEG      Blood, Urine TRACE (A) NEG      Urobilinogen, Urine 0.2 0.2 - 1.0 EU/dL    Nitrite, Urine Negative NEG      Leukocyte Esterase, Urine MODERATE (A) NEG      WBC, UA 20-50 0 /hpf    RBC, UA 0-3 0 /hpf    Epithelial Cells, UA 0-3 0 /hpf    BACTERIA, URINE 1+ (H) 0 /hpf    Yeast, UA MARKED      Other observations RESULTS VERIFIED MANUALLY     PREPARE RBC (CROSSMATCH), 1 Units    Collection Time: 06/03/24   Chol/HDL Ratio 2.5 0.0 - 5.0     CBC with Auto Differential    Collection Time: 06/04/24  4:44 AM   Result Value Ref Range    WBC 8.0 4.3 - 11.1 K/uL    RBC 4.46 4.23 - 5.6 M/uL    Hemoglobin 12.2 (L) 13.6 - 17.2 g/dL    Hematocrit 38.0 (L) 41.1 - 50.3 %    MCV 85.2 82 - 102 FL    MCH 27.4 26.1 - 32.9 PG    MCHC 32.1 31.4 - 35.0 g/dL    RDW 17.5 (H) 11.9 - 14.6 %    Platelets 300 150 - 450 K/uL    MPV 8.0 (L) 9.4 - 12.3 FL    nRBC 0.00 0.0 - 0.2 K/uL    Differential Type AUTOMATED      Neutrophils % 65 43 - 78 %    Lymphocytes % 13 13 - 44 %    Monocytes % 16 (H) 4.0 - 12.0 %    Eosinophils % 4 0.5 - 7.8 %    Basophils % 1 0.0 - 2.0 %    Immature Granulocytes % 1 0.0 - 5.0 %    Neutrophils Absolute 5.2 1.7 - 8.2 K/UL    Lymphocytes Absolute 1.0 0.5 - 4.6 K/UL    Monocytes Absolute 1.3 0.1 - 1.3 K/UL    Eosinophils Absolute 0.3 0.0 - 0.8 K/UL    Basophils Absolute 0.1 0.0 - 0.2 K/UL    Immature Granulocytes Absolute 0.0 0.0 - 0.5 K/UL   Basic Metabolic Panel w/ Reflex to MG    Collection Time: 06/04/24  4:44 AM   Result Value Ref Range    Sodium 139 136 - 145 mmol/L    Potassium 3.7 3.5 - 5.1 mmol/L    Chloride 104 98 - 107 mmol/L    CO2 25 20 - 28 mmol/L    Anion Gap 9 9 - 18 mmol/L    Glucose 101 (H) 70 - 99 mg/dL    BUN 23 8 - 23 MG/DL    Creatinine 1.38 (H) 0.80 - 1.30 MG/DL    Est, Glom Filt Rate 47 (L) >60 ml/min/1.73m2    Calcium 8.6 (L) 8.8 - 10.2 MG/DL   EKG 12 Lead    Collection Time: 06/04/24  5:38 AM   Result Value Ref Range    Ventricular Rate 73 BPM    QRS Duration 86 ms    Q-T Interval 424 ms    QTc Calculation (Bazett) 467 ms    R Axis 72 degrees    T Axis 254 degrees    Diagnosis       Atrial fibrillation  Poor R wave progression  ST & T wave abnormality, consider inferior ischemia  Abnormal ECG      Confirmed by MD COVARRUBIAS DANIEL ([id number]) on 6/4/2024 6:44:40 AM         No results for input(s): \"COVID19\" in the last 72 hours.    Current Meds:  Current Facility-Administered Medications   Medication

## 2024-06-04 NOTE — ICUWATCH
RRT Clinical Rounding Nurse Progress Report      SUBJECTIVE: Patient assessed secondary to recent rapid response.  Code S    Vitals:    06/04/24 0628 06/04/24 0630 06/04/24 0643 06/04/24 0808   BP:  110/80 106/82 113/80   Pulse:   75 79   Resp: 16   16   Temp:    97.4 °F (36.3 °C)   TempSrc:    Oral   SpO2:    94%   Weight:       Height:            DETERIORATION INDEX SCORE: 42    ASSESSMENT:  Pt lying awake in bed, eating breakfast, in NAD.  Alert and oriented x3. Some left visual field cuts.  Otherwise, neuro intact. MRI yest confirmed R occipital lobe infarct per Neuro notes.  Lung sounds with fine crackles.  On 3L NC, O2 Sat 95%, RR even and unlabored.  Afib, HR 90s on remote telemetry. BP stable.  Hgb up to 12.2 today.  Pt intermittently c/o chest discomfort, currently 7/10, describes as pressure.  Pt appears comfortable and in no distress.  MD is aware; per notes not a candidate for invasive procedures.  No other c/o or needs voiced by pt.      PLAN:  Will discharge from RRT Clinical Rounding Program per protocol. Please call if needed.    Cherise Mcdaniel RN  [place]: [phone]  [place]: [phone]

## 2024-06-04 NOTE — PROGRESS NOTES
Physical Therapy Note:    Attempted to see patient this AM for physical therapy treatment  session. Session deferred at time of attempt as pt with 10/10 CP at rest just prior to PT; RN aware. Will follow and re-attempt as schedule permits/patient available. Thank you,    Tomy Balderrama, PT     Rehab Caseload Tracker

## 2024-06-04 NOTE — MANAGEMENT PLAN
Notified by nursing that patient is having 10/10 sharp chest pain. He was given SL nitro x 2 without relief. BP now soft at 80/67. EKG with AFIB    Admitted with severe anemia requiring PRBC transfusion x2 on 6/2/24. HGB this AM 12.2. Also has AFIB with RVR with recent CVA. Eliquis was restarted yesterday along with PPI.     Plan:   -- order GI cocktail with lidocaine  -- has PRN tylenol ordered    Given recent severe anemia with transfusion and CVA, patient is not an invasive candidate. Will continue to treat symptoms.     Paulina Dotson, APRN - CNP

## 2024-06-04 NOTE — PROGRESS NOTES
GOALS:  GOALS:  LTG: Patient will tolerate safest, least restrictive oral diet without signs or symptoms of airway compromise.  STG: Patient will consume soft and bite sized diet with thin liquids without overt signs or symptoms of aspiration. Updated 6/3/2024  STG: Patient will tolerate ongoing po trials without overt signs or symptoms of respiratory compromise in efforts to advance diet. Progressing 6/3/2024  STG: Patient will perform laryngeal strengthening exercises x10 each with 90% accuracy to improve strength and coordination of swallowing function. Ongoing 6/3/2024  STG: Patient will participate in instrumental swallowing assessment to objectively assess oropharyngeal swallow if clinically indicated. Ongoing 6/3/2024    LTG: Patient will improve cognitive- communicative function to perform daily activities at highest possible level.   STG: Patient will display orientation to time and situation with 90% accuracy using external aids. PROGRESSING 24  STG: Patient will sustain attention to therapy tasks for 10 minute interval without assistance provided. PROGRESSING 24  STG: Patient will complete problem solving tasks to promote safety and reasoning with 80% accuracy given min assistance. ADDED and PROGRESSING 24    SPEECH LANGUAGE PATHOLOGY: COMBINED DYSPHAGIA and COGNITIVE-COMMUNICATION Daily Note #2    Acknowledge Order  I  Therapy Time  I   Charges     I  Rehab Caseload Tracker    NAME: Kai Guido  : 1929  MRN: [id number]    ADMISSION DATE: 2024  PRIMARY DIAGNOSIS: Chest pain    ICD-10: Treatment Diagnosis: R13.12 Dysphagia, Oropharyngeal Phase  R41.841 Cognitive-Communication Deficit    RECOMMENDATIONS   Diet:    Soft and Bite-Sized  Thin Liquids    Medication: as tolerated   Compensatory Swallowing Strategies:   Slow rate of intake  Small bites/sips  Upright as possible for all oral intake   Therapeutic Intervention:   Patient/family education  Dysphagia  assistance or maximum use     of strategies with partial po intake   [] 1 Severe dysphagia- NPO. Unable to tolerate any po safely     MODIFIED NASIM SCALE (mRS) SCORE:   Score: 3 Description   [] 0  No Symptoms   [] 1 No significant disability despite symptoms; Able to carry out all usual duties and activities.   [] 2 Slight Disability: Unable to carry out all previous activities, but able to look after own affairs without assistance.    [x] 3 Moderate Disability Requires some help, but is able to walk without assistance.   [] 4 Moderately Severe Disability Unable to walk without assistance and unable to attend to own bodily needs without assistance.    [] 5 Severe disability Bedridden, incontinent, and requiring constant nursing care and attention.        PLAN    Duration/Frequency: Continue to follow patient 3x/week for duration of hospitalization and/or until goals met    Rehabilitation Potential For Stated Goals: Good    Interdisciplinary Collaboration: RN/ PCT    Medical Necessity    Patient is expected to demonstrate progress in swallow function to improve swallow safety, work toward diet advancement, and decrease aspiration risk.  Patient is expected to demonstrate progress in cognitive ability to increase independence with activities of daily living.    Education:   Patient educated on Speech therapy recommendations, Role of speech therapy, SLP plan, and Diet recommendations  Education provided to Patient, Family/Caregiver, and RN  Education response: Verbalizes understanding    Safety:   Call light within reach  In Bed  Family/visitors at bedside    Therapy Time:  Time In: 1306  Time Out: 1325  Minutes: 19    KEVON CLEMENTS  6/4/2024 1:43 PM

## 2024-06-04 NOTE — PROGRESS NOTES
Comprehensive Nutrition Assessment    Type and Reason for Visit: Initial, Positive Nutrition Screen  Malnutrition Screening Tool: Malnutrition Screen  Have you recently lost weight without trying?: 24 to 33 pounds (3 points)  Have you been eating poorly because of a decreased appetite?: No (0 points)  Malnutrition Screening Tool Score: 3    Nutrition Recommendations/Plan:   Meals and Snacks:  Diet: Continue current order  Nutrition Supplement Therapy:  Medical food supplement therapy:  Initiate Ensure Enlive three times per day (this provides 350 kcal and 20 grams protein per bottle)- chocolate     Malnutrition Assessment:  Malnutrition Status: At risk for malnutrition (Comment) (advanced age, poor intake, wt loss)    wasting noted however age related wasting is likely contributing  Nutrition Assessment:  Nutrition History: Pt reports poor intake for some time due to poor appetite. He stated he occasionally drinks chocolate protein shakes. He stated he adds peanut butter to his shakes for extra protein.      Do You Have Any Cultural, Episcopal, or Ethnic Food Preferences?: No   Weight History: Pt reports wt loss of ~30lb in the past 6 weeks. Per EMR wt hx review 6/2/23 147lb (IM), 12/4 148lb (nephro).   Nutrition Background:       PMH significant for CAD, afib, gastric ulcer, GI bleed, anemia, CKD3, and HTN. Pt admitted with chest pain. He had a CVA on 6/2.   Nutrition Interval:  Pt seen sitting in bed with son present. Pt reports hx as above. He stated he ate some of his eggs and sausage at breakfast this am. He stated he had a chocolate Ensure this am however he then started having chest pain. We discussed these were likely unrelated. He is agreeable to have Ensure with meals.     Current Nutrition Therapies:  ADULT DIET; Dysphagia - Soft and Bite Sized    Current Intake:   Average Meal Intake: 26-50%        Anthropometric Measures:  Height: 171.5 cm (5' 7.5\")  Current Body Wt: 61.7 kg (136 lb) (6/4), Weight  source: Bed Scale  BMI: 21, Underweight (BMI less than 22) age over 65  Admission Body Weight: 65.8 kg (145 lb) (6/1- no wt method)  Ideal Body Weight (Kg) (Calculated): 69 kg (151 lbs),    BMI Category Underweight (BMI less than 22) age over 65  Estimated Daily Nutrient Needs:  Energy (kcal/day): 0791-7465 (25-30 kcal/kg) (Kcal/kg Weight used: 61.7 kg Current  Protein (g/day): 62-74 (1-1.2 g/kg) Weight Used: (Current) 61.7 kg  Fluid (ml/day):   (1 ml/kcal)    Nutrition Diagnosis:   Inadequate oral intake related to  (poor appetite) as evidenced by poor intake prior to admission, weight loss (pt reports barriers to po as above)  Nutrition Interventions:   Food and/or Nutrient Delivery: Continue Current Diet, Start Oral Nutrition Supplement     Coordination of Nutrition Care: Continue to monitor while inpatient      Goals:      Active Goal: PO intake 50% or greater, by next RD assessment       Nutrition Monitoring and Evaluation:      Food/Nutrient Intake Outcomes: Food and Nutrient Intake, Supplement Intake  Physical Signs/Symptoms Outcomes: Weight, Meal Time Behavior    Discharge Planning:    Continue Oral Nutrition Supplement    Kimberly Staton RD

## 2024-06-04 NOTE — PROGRESS NOTES
[facility] CARDIOLOGY PROGRESS NOTE           6/4/2024 8:16 AM    Admit Date: 6/1/2024         Subjective: Complains of 10 out of 10 chest pain earlier this morning, when seen says chest pain had resolved.  History of A-fib recent ocular stroke.  Also with a GI bleed.  Eliquis was started yesterday. H/H stable this AM    ROS:  Cardiovascular:  As noted above    Objective:      Vitals:    06/04/24 0628 06/04/24 0630 06/04/24 0643 06/04/24 0808   BP:  110/80 106/82 113/80   Pulse:   75 79   Resp: 16   16   Temp:    97.4 °F (36.3 °C)   TempSrc:    Oral   SpO2:    94%   Weight:       Height:               Physical Exam:  General: Well Developed, Well Nourished, No Acute Distress, Alert & Oriented x 3, Appropriate mood  Neck: supple, no JVD  Heart: S1S2 with RRR without murmurs or gallops  Lungs: Clear throughout auscultation bilaterally without adventitious sounds  Abd: soft, nontender, nondistended, with good bowel sounds  Ext: no edema bilaterally  Skin: warm and dry      Data Review:   Recent Labs     06/02/24  0550 06/02/24  1354 06/03/24  0618 06/04/24  0444   *  --  138 139   K 3.7  --  3.6 3.7   MG 1.8  --  1.8  --    BUN 33*  --  28* 23   WBC 8.6  --  8.9 8.0   HGB 7.6*  --  11.9* 12.2*   HCT 25.0*  --  36.8* 38.0*     --  271 300   INR  --  1.2  --   --    CHOL 108  --  120  --    HDL 44  --  48  --        No results for input(s): \"TNIPOC\" in the last 72 hours.    Invalid input(s): \"TROIQ\"        Assessment/Plan:     Principal Problem:    Chest pain  Active Problems:    Hypertension    CAD (coronary artery disease)    Stage 3b chronic kidney disease (HCC)    Paroxysmal atrial fibrillation (HCC)    Secondary hypercoagulable state (HCC)    Anemia    Pulmonary edema    Acute ischemic stroke (HCC)  Resolved Problems:    * No resolved hospital problems. *    A/P  1)  Afib - OAC as above, rate control  2) CP -he is not a candidate for invasive procedures of any kind.  He is in his  10th decade of life he is currently admitted for a GI bleed.  Will continue to treat symptoms alone.  He is on Eliquis and statin.  3) UTI - abx per primary team    Govind Dejesus MD  6/4/2024 8:16 AM

## 2024-06-05 PROBLEM — N17.9 ACUTE KIDNEY INJURY (HCC): Status: ACTIVE | Noted: 2024-01-01

## 2024-06-05 PROBLEM — R53.81 DEBILITY: Status: ACTIVE | Noted: 2024-01-01

## 2024-06-05 PROBLEM — Z71.89 ADVANCED CARE PLANNING/COUNSELING DISCUSSION: Status: ACTIVE | Noted: 2024-01-01

## 2024-06-05 PROBLEM — Z51.5 ENCOUNTER FOR PALLIATIVE CARE: Status: ACTIVE | Noted: 2024-01-01

## 2024-06-05 PROBLEM — R54 FRAILTY: Status: ACTIVE | Noted: 2024-01-01

## 2024-06-05 PROBLEM — R79.89 ELEVATED TROPONIN: Status: ACTIVE | Noted: 2017-07-09

## 2024-06-05 PROBLEM — I50.9 CONGESTIVE HEART FAILURE (HCC): Status: ACTIVE | Noted: 2024-01-01

## 2024-06-05 NOTE — INTERDISCIPLINARY ROUNDS
Multi-D Rounds/Checklist (leapfrog):  Lines: can any be removed?: None    Urinary Catheter  (Active)      DVT Prophylaxis: Contraindicated  Vent: N/A  Nutrition Ordered/appropriate: Ordered  Can antibiotics or other drugs be stopped? Yes/End Date set Yes/No  Inpat Anti-Infectives (From admission, onward)       Start     Ordered Stop    06/04/24 1400  metroNIDAZOLE (FLAGYL) tablet 500 mg  500 mg,   Oral,   3 times per day         06/04/24 0928 06/11/24 1359    06/04/24 0945  clarithromycin (BIAXIN) tablet 500 mg  500 mg,   Oral,   2 times per day         06/04/24 0928 06/11/24 0859    06/03/24 0800  cefTRIAXone (ROCEPHIN) 1,000 mg in sterile water 10 mL IV syringe  1,000 mg,   IntraVENous,   EVERY 24 HOURS         06/03/24 0727 06/10/24 0759                  Consults needed:  ?palliative care  A: Is pain control adequate? (has PRNs? Stop drip?) N/A  B: Sedation break and SBT? N/A  C: Is sedation choice appropriate? N/A  D: Delirium/CAM-ICU? No  E: Mobility goals/appropriateness? N/A  F: Family update and plan? Son is surrogate decision maker and is being updated daily by primary attending and nursing staff.    BERLIN Curtis

## 2024-06-05 NOTE — PROGRESS NOTES
Occupational Therapy Note:    Occupational therapy services are being discontinued at this time d/t patient's decline in medical status and subsequent transfer to the ICU. Please re-consult therapy services with MD chenems appropriate.    Galina Gamez, OTR/L, CLT

## 2024-06-05 NOTE — PROGRESS NOTES
ACUTE PHYSICAL THERAPY GOALS:   (Developed with and agreed upon by patient and/or caregiver.)  UPDATED 6/5/24  Pt will perform bed mobility Mod (I) c inc time, cueing and use of rails as needed in 7 therapy sessions.  Pt will perform sit-to-stand/ stand-to-sit transfers Mod (I) c use of LRAD/external supports as needed and no LOB or miss-steps in 7 therapy sessions.  Pt will ambulate 100 ft with SUPERVISION no LOB or miss-steps and breaks as needed in 7 therapy sessions.  Pt will perform standing dynamic balance activities with SUPERVISION for 15 minutes in 7 therapy sessions.  Pt will tolerate multiple sets and reps of BLE exercises in 7 therapy sessions.  Pt will (I) sweep vision from right-to-left 75% of the time while ambulating for improved safety while mobilizing in 7 therapy sessions.      PHYSICAL THERAPY Re-evaluation and AM  (Link to Caseload Tracking: PT Visit Days : 1  Acknowledge Orders  Time In/Out  PT Charge Capture  Rehab Caseload Tracker    Kai Guido is a 89 y.o. male   PRIMARY DIAGNOSIS: Chest pain  Chest pain [R07.9]  Elevated troponin [R79.89]  Acute kidney injury (HCC) [N17.9]  Chest pain, unspecified type [R07.9]  Congestive heart failure, unspecified HF chronicity, unspecified heart failure type (HCC) [I50.9]  Acute ischemic stroke (HCC) [I63.9]       Reason for Referral: Other lack of cordination (R27.8)  Difficulty in walking, Not elsewhere classified (R26.2)  Other abnormalities of gait and mobility (R26.89)  Inpatient: Payor: MEDICARE / Plan: MEDICARE PART A AND B / Product Type: *No Product type* /     ASSESSMENT:     REHAB RECOMMENDATIONS:   Recommendation to date pending progress:  Setting:  Inpatient Rehab Facility    Equipment:    To Be Determined     ASSESSMENT:  Per initial evaluation: \"Mr. Guido Is a 89 y.o. male presenting to PT after being admitted on 6/1/24 for CP. Code S called on 6/2/24 with MRI of the brain this morning (6/3/24) showing acute posterior  entry  Bathroom Shower/Tub: Walk-in shower  Home Equipment: Rollator  Receives Help From: Family, Home health (Interim home health)  ADL Assistance: Independent  Homemaking Assistance: Needs assistance  Ambulation Assistance: Needs assistance (Rollator)  Transfer Assistance: Independent  Active : No  Patient's  Info: Son  Mode of Transportation: Car  Occupation: Retired  Additional Comments: Lives at [place] but mostly IND. Uses walker to ambulate. Has a history of falling.    OBJECTIVE:     PAIN: VITALS / O2: PRECAUTION / LINES / DRAINS:   Pre Treatment: 0/10         Post Treatment: 0/10 Vitals        Oxygen       Continuous Pulse Oximetry, Field Catheter, IV, and Telemetry     RESTRICTIONS/PRECAUTIONS:  Restrictions/Precautions: Fall Risk                 GROSS EVALUATION: LE Intact Impaired (Comments):   AROM [x]      PROM []    Strength [x]      Balance []  Fair standing balance   Posture [] Forward Head  Rounded Shoulders  Thoracic Kyphosis   Sensation [x]      Coordination [x]     Tone []  Generally atrophic but WFL   Edema []    Activity Tolerance []  Slightly diminished from baseline with inc cueing required for safety    []      COGNITION/  PERCEPTION: Intact Impaired (Comments):   Orientation [x]     Vision []  L inattention/ L neglect   Hearing []  [place]    Cognition  []  Inc cueing for safety d/t lack of insight to deficits     MOBILITY: I Mod I S SBA CGA Min Mod Max Total  NT x2 Comments:   Bed Mobility    Rolling [] [] [] [x] [] [] [] [] [] [] []    Supine to Sit [] [] [] [x] [] [] [] [] [] [] []    Scooting [] [] [] [x] [] [] [] [] [] [] []    Sit to Supine [] [] [] [] [] [] [] [] [] [] []    Transfers    Sit to Stand [] [] [] [] [x] [] [] [] [] [] []    Bed to Chair [] [] [] [] [x] [x] [] [] [] [] [] RW/gait belt for all   Stand to Sit [] [] [] [] [x] [] [] [] [] [] []     [] [] [] [] [] [] [] [] [] [] []    I=Independent, Mod I=Modified Independent, S=Supervision, SBA=Standby Assistance,  CGA=Contact Guard Assistance,   Min=Minimal Assistance, Mod=Moderate Assistance, Max=Maximal Assistance, Total=Total Assistance, NT=Not Tested    GAIT: I Mod I S SBA CGA Min Mod Max Total  NT x2 Comments:   Level of Assistance [] [] [] [] [x] [x] [] [] [] [] [x] -Cueing to attend to L visual field    Distance   30 ft    DME Gait Belt and Rolling Walker    Gait Quality Decreased rachel , Decreased step clearance, Decreased step length, Narrow base of support, Path deviations , Shuffling , Trunk flexion, and Trunk sway increased    Weightbearing Status Restrictions/Precautions  Restrictions/Precautions: Fall Risk    Stairs      I=Independent, Mod I=Modified Independent, S=Supervision, SBA=Standby Assistance, CGA=Contact Guard Assistance,   Min=Minimal Assistance, Mod=Moderate Assistance, Max=Maximal Assistance, Total=Total Assistance, NT=Not Tested    PLAN:   FREQUENCY AND DURATION: 3 times/week for duration of hospital stay or until stated goals are met, whichever comes first.    THERAPY PROGNOSIS: Fair    PROBLEM LIST:   (Skilled intervention is medically necessary to address:)  Decreased ADL/Functional Activities  Decreased Activity Tolerance  Decreased Balance  Decreased Coordination  Decreased Gait Ability  Decreased Safety Awareness  Decreased Transfer Abilities INTERVENTIONS PLANNED:   (Benefits and precautions of physical therapy have been discussed with the patient.)  Self Care Training  Therapeutic Activity  Therapeutic Exercise/HEP  Neuromuscular Re-education  Gait Training  Education       TREATMENT:   RE-EVALUATION: LOW COMPLEXITY: (Untimed Charge)  The initial evaluation charge encompasses clinical chart review, objective assessment, interpretation of assessment, and skilled monitoring of the patient's response to treatment in order to develop a plan of care.     TREATMENT:   Co-Treatment PT/OT necessary due to patient's decreased overall endurance/tolerance levels, as well as need for high level

## 2024-06-05 NOTE — PROGRESS NOTES
attempted to visit patient.  Patient appeared to be sleeping comfortably at time and did not wake to name being called.   provided prayer and is available to follow up.  Peace be with you,  Signed by  SCOTTIE OjedaDiv.   [phone]

## 2024-06-05 NOTE — INTERDISCIPLINARY ROUNDS
Multi-D Rounds/Checklist (leapfrog):  Lines: can any be removed?: None    Urinary Catheter  (Active)      DVT Prophylaxis: Contraindicated  Vent: N/A  Nutrition Ordered/appropriate: Ordered  Can antibiotics or other drugs be stopped? Yes/End Date set Yes/No  Inpat Anti-Infectives (From admission, onward)       Start     Ordered Stop    06/04/24 1400  metroNIDAZOLE (FLAGYL) tablet 500 mg  500 mg,   Oral,   3 times per day         06/04/24 0928 06/11/24 1359    06/04/24 0945  clarithromycin (BIAXIN) tablet 500 mg  500 mg,   Oral,   2 times per day         06/04/24 0928 06/11/24 0859    06/03/24 0800  cefTRIAXone (ROCEPHIN) 1,000 mg in sterile water 10 mL IV syringe  1,000 mg,   IntraVENous,   EVERY 24 HOURS         06/03/24 0727 06/10/24 0759                  Consults needed:  ?palliative care  A: Is pain control adequate? (has PRNs? Stop drip?) N/A  B: Sedation break and SBT? N/A  C: Is sedation choice appropriate? N/A  D: Delirium/CAM-ICU? No  E: Mobility goals/appropriateness? N/A  F: Family update and plan? Son is surrogate decision maker and is being updated daily by primary attending and nursing staff.    Romy Matthews, APRN - CNP

## 2024-06-05 NOTE — WOUND CARE
Consulted for Sacrum/LFA skin tear.    (Not pictured) LFA skin tear, 0.5x0.2cm, partial thickness, pink/red, scant serosanguinous drainage, skin fragile/ecchymosis. Recommend wound cleanser, xeroform, small silicone border foam dressing every other day/PRN.    Coccyx/buttocks with 9x14cm redness across, with 4x6cm Pressure injury stg 1 nonblanchable redness on coccyx, skin intact, no drainage. Recommend large clear Optiview dressing while skin intact (if skin breaks use large pink silicone border foam dressing) every other day/PRN. Educated on frequent turning/repositioning while in bed. Would benefit from a chair/donut cushion while up in recliner to offload pressure, will order from materials.

## 2024-06-05 NOTE — PROGRESS NOTES
[facility] Saunders/[facility] Critical Care Note:: 6/5/2024  Kai Guido  Admission Date: 6/1/2024     Length of Stay: 2 days    Background: 89 y.o. male with HTN, HLD, CKD stage IIIb, PAF, who was recently hospitalized for GI bleed and had his anticoagulation/Eliquis held and subsequently had CVA (acute posterior circulation infarct).  Patient was not a candidate for neuro-intervention or tPA due to recent GI bleeding.  Patient also has been complaining of chest pain.  Evaluated by cardiology not felt to be candidate for intervention.  Chest pain is intermittent and troponins are elevated.  Rapid response was called due to hypotension and bradycardia.  Patient was started on dopamine and critical care was asked to see the patient due to ICU admission and pressure support.        Notable PMH:  has a past medical history of A-fib (HCC), Arrhythmia, Arthritis, BPH (benign prostatic hyperplasia), CAD (coronary artery disease), Chronic kidney disease, Congestive heart failure (HCC), Hypercholesterolemia, Hypertension, ANABELLE (obstructive sleep apnea), ANABELLE on CPAP, and Stroke (HCC).    24 Hour events:   Pt had rapid response overnight with bradycardia and hypotension.   Moved to ICU and started on levophed and nitro for chest pain.   Off levophed and nitro.   Pt seen by ST and cleared for soft bite sized and thin liquids.   Pt working with PT/OT.   He is on 2L O2. He denies complaints other than chest soreness.  Pt on levophed.      Review of Systems: Comprehensive ROS negative except in HPI    Lines: (insertion date)    Urinary Catheter  (Active)      Drips: current dose (range)  Dose (mcg/kg/min) Dopamine: 0 mcg/kg/min (Tachy)     Pertinent Exam:         Blood pressure 117/73, pulse (!) 114, temperature 97.6 °F (36.4 °C), temperature source Axillary, resp. rate 20, height 1.715 m (5' 7.5\"), weight 59.6 kg (131 lb 6.3 oz), SpO2 96 %.   Intake/Output Summary (Last 24 hours) at 6/5/2024 4951  Last data filed at  hours.  Ventilator Settings Ideal body weight: 67.2 kg (148 lb 4.1 oz)  Mode FIO2 Rate Tidal Volume Pressure                           Peak airway pressure:     Minute ventilation:    ABG:No results for input(s): \"PHAPOC\", \"AIY2EJPW\", \"WM5BWQP\", \"XNE8HNB\", \"BE\" in the last 72 hours.  Assessment and Plan:  (Medical Decision Making)   Impression: 89 y.o. male with HTN, HLD, CKD stage IIIb, PAF, who was recently hospitalized for GI bleed and had his anticoagulation/Eliquis held and subsequently had CVA (acute posterior circulation infarct).  Patient was not a candidate for neuro-intervention or tPA due to recent GI bleeding.  Patient also has been complaining of chest pain.  Evaluated by cardiology not felt to be candidate for intervention.  Chest pain is intermittent and troponins are elevated.  Rapid response was called due to hypotension and bradycardia.  Patient was started on dopamine and critical care was asked to see the patient due to ICU admission and pressure support..    NEURO:   Sedation: N/A  Analgesia: N/A  Paralytics: N?A    CV:   Volume Status: -8.7L since admission, +4.6cc in last 24*  NSTEMI: troponins elevated, cardiology has seen and pt is not a candidate for any intervention.     PULM:   Acute hypoxemic/hypercapneic respiratory failure:  wean O2 as tolerated     RENAL:  CHAITANYA: Cr 1.47, monitor   Electrolytes: replete as needed     GI:   Nutrition: taking PO per ST recommendations.     HEME:   Anemia: hgb 12.7  Thrombocytopenia: plts 308  Anticoagulation: on eliquis       ENDO:   DM: monitor glucose levels     Skin: no decub, turns, preventive care    Prophy: on eliquis and protnix       Full Code    The patient is critically ill with respiratory failure, circulatory failure and requires high complexity decision making for assessment and support including frequent ventilator adjustment, frequent evaluation and titration of therapies , application of advanced monitoring technologies and extensive

## 2024-06-05 NOTE — PROGRESS NOTES
4 Eyes Skin Assessment     NAME:  Kai Guido  YOB: 1929  MEDICAL RECORD NUMBER:  [id number]    The patient is being assessed for  Transfer to New Unit    I agree that at least one RN has performed a thorough Head to Toe Skin Assessment on the patient. ALL assessment sites listed below have been assessed.      Areas assessed by both nurses:    Head, Face, Ears, Shoulders, Back, Chest, Arms, Elbows, Hands, Sacrum. Buttock, Coccyx, Ischium, Legs. Feet and Heels, and Under Medical Devices         Does the Patient have a Wound? Yes wound(s) were present on assessment. LDA wound assessment was Initiated and completed by RN       Cliff Prevention initiated by RN: Yes  Wound Care Orders initiated by RN: Yes    Pressure Injury (Stage 3,4, Unstageable, DTI, NWPT, and Complex wounds) if present, place Wound referral order by RN under : Yes    New Ostomies, if present place, Ostomy referral order under : No     Nurse 1 eSignature: Electronically signed by Shivani Wolf RN on 6/5/24 at 2:47 AM EDT    **SHARE this note so that the co-signing nurse can place an eSignature**    Nurse 2 eSignature: Electronically signed by Tessa Bo RN on 6/5/24 at 2:49 AM EDT

## 2024-06-05 NOTE — PROGRESS NOTES
Rapid response:  Rapid response was called for hypotension, bradycardia.  Patient is a 89 years old history of coronary artery disease, chronic atrial fibrillation off anticoagulation due to GI bleed, gastric ulcer, CKD stage III, admitted with code stroke MRI shows acute posterior circulation infarct.  Patient was initiated back on Eliquis as per neurology recommendation, tonight patient blood pressure was systolics in 80s, heart rate in 40s.  Patient is still full code at this time.  Metoprolol was initiated last night.  Patient will be moved to ICU due to hypotension, initiated on dopamine to keep systolic more than 100 due to stroke to perfuse brain.    Patient is critically ill.  Without intervention, there is a high probability of acute organ impairment or life-threatening deterioration in the patient's condition from: Hypotension  Critical care interventions: Dopamine drip.  Total critical care time spent: 35 minutes.    Time is indicative of direct patient attendance at bedside and on the patient's floor nearby.  Includes time spent at bedside performing history and exam, performing chart review, discussing findings and treatment plan with patient and/or family, discussing patient with nursing staff, consultants and colleagues, and ordering/reviewing pertinent laboratory and radiographic evaluations.  Time excludes procedures.  CPT:  00536: First 30-74 minutes  87206: Each block of 30 min. beyond 74

## 2024-06-05 NOTE — PROGRESS NOTES
[facility] CARDIOLOGY PROGRESS NOTE           6/5/2024 12:36 PM    Admit Date: 6/1/2024      Subjective:   Bradycardia and hypotension last night, transferred to ICU and briefly on nitro and dopamine but both have been stopped. Trop peaked 5100 and down to 4500. Eliquis dose decreased to 2.5 mg. No CP per nursing since nitro paste placed.  Remains in fib, rate 110-120 in chair.  Pt without hearing aids and unable to hear any questions.     Saw palliative care, elected DNR status.     ROS:  Cardiovascular:  As noted above  HEENT: Decreased hearing     Objective:      Vitals:    06/05/24 1115 06/05/24 1130 06/05/24 1145 06/05/24 1200   BP: 124/68 (!) 110/57 131/64    Pulse: (!) 116 (!) 112 (!) 124 (!) 126   Resp:       Temp:       TempSrc:       SpO2: 96% 95% 94% (!) 84%   Weight:       Height:           Physical Exam:  General-No Acute Distress, 3L  Neck- supple, no JVD  CV- irregular, 2/6 murmur   Lung- clear bilaterally  Abd- soft, nontender, nondistended  Ext- no edema bilaterally.  Skin- warm and dry    Tele: a fib w rate 110    6/1/24 echo EF 55-60%, mod-severe AS, mild-mod MR, mod-severe TR     Data Review:   Recent Labs     06/02/24  1354 06/03/24  0618 06/04/24  0444 06/04/24  2315 06/05/24  0513   NA  --  138   < > 132* 134*   K  --  3.6   < > 4.8 4.7   MG  --  1.8  --  2.0  --    BUN  --  28*   < > 29* 34*   WBC  --  8.9   < > 14.7* 13.9*   HGB  --  11.9*   < > 12.5* 12.7*   HCT  --  36.8*   < > 40.4* 40.5*   PLT  --  271   < > 296 308   INR 1.2  --   --  1.7  --    CHOL  --  120  --   --   --    HDL  --  48  --   --   --     < > = values in this interval not displayed.     Serum creatinine: 1.47 mg/dL (H) 06/05/24 0513  Estimated creatinine clearance: 26 mL/min (A)    Assessment/Plan:    Chest pain  Active Problems:    Hypertension    CAD (coronary artery disease)    Stage 3b chronic kidney disease (HCC)    Paroxysmal atrial fibrillation (HCC)    Secondary hypercoagulable state

## 2024-06-05 NOTE — CONSULTS
PULMONARY/CRITICAL CARE CONSULT NOTE           6/5/2024    Kai Guido                        Date of Admission:  6/1/2024    The patient's chart is reviewed and the patient is discussed with the staff.    Subjective:     Patient is a 89 y.o.  male seen and evaluated at the request of Dr. Moore  Lorman Juan Diego is 89-year-old male who HTN, HLD, CKD stage IIIb, PAF, who was recently hospitalized for GI bleed and had his anticoagulation/Eliquis held and subsequently had CVA (acute posterior circulation infarct).  Patient was not a candidate for neuro-intervention or tPA due to recent GI bleeding.  Patient also has been complaining of chest pain.  Evaluated by cardiology not felt to be candidate for intervention.  Chest pain is intermittent and troponins are elevated.  Rapid response was called due to hypotension and bradycardia.  Patient was started on dopamine and critical care was asked to see the patient due to ICU admission and pressure support.  Review of Systems: Comprehensive ROS negative except in HPI    Current Outpatient Medications   Medication Instructions    amLODIPine (NORVASC) 5 mg, Oral, DAILY    apixaban (ELIQUIS) 2.5 mg, Oral, 2 TIMES DAILY, TAKE 1 TABLET TWICE A DAY    blood glucose test strips (FREESTYLE LITE) strip Check fasting blood sugar daily    Blood Glucose-BP Monitor (BLOOD GLUCOSE-WRIST BP MONITOR) ELSA Check fasting blood sugar daily    clarithromycin (BIAXIN) 500 mg, Oral, 2 TIMES DAILY    donepezil (ARICEPT) 5 mg, Oral, NIGHTLY    Lancets MISC Check fasting blood sugar daily    metoprolol tartrate (LOPRESSOR) 25 mg, Oral, 2 TIMES DAILY    metroNIDAZOLE (FLAGYL) 500 mg, Oral, 2 TIMES DAILY    nitroGLYCERIN (NITROLINGUAL) 0.4 MG/SPRAY 0.4 mg spray 1 spray, SubLINGual, EVERY 5 MIN PRN    pantoprazole (PROTONIX) 40 mg, Oral, 2 times daily    simvastatin (ZOCOR) 20 mg, Oral, NIGHTLY, TAKE 1 TABLET NIGHTLY      Past Medical History:   Diagnosis Date    A-fib  (HCC)     Arrhythmia     PALPITATION    Arthritis     BPH (benign prostatic hyperplasia)     CAD (coronary artery disease)     STENTS HEART     Chronic kidney disease     HAS ONLY 1 KIDNEY    Hypercholesterolemia     Hypertension     ANABELLE (obstructive sleep apnea) 2023    ANABELLE on CPAP     Stroke (HCC)     TIA secondary to holding of eliquis      Past Surgical History:   Procedure Laterality Date    APPENDECTOMY      exploratory     COLONOSCOPY      COLONOSCOPY      LEG SURGERY Left 2023    FEMUR IM NAIL WIL INSERTION ANTEGRADE, Gamma Nail performed by Robert [facility]son MD at [facility]    OTHER CELL      LEFT KIDNEY REMOVED for malformation; 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     HI UNLISTED PROCEDURE CARDIAC SURGERY      stent to circ    PTCA W/ STENT, EA ADD      UPPER GASTROINTESTINAL ENDOSCOPY N/A 2024    ESOPHAGOGASTRODUODENOSCOPY BIOPSY [place] [facility] [place]    UROLOGICAL SURGERY      Kidney removed      Social History     Socioeconomic History    Marital status:      Spouse name: Not on file    Number of children: Not on file    Years of education: Not on file    Highest education level: Not on file   Occupational History    Not on file   Tobacco Use    Smoking status: Former     Current packs/day: 0.00     Types: Cigarettes     Quit date: 1967     Years since quittin.4    Smokeless tobacco: Never   Vaping Use    Vaping Use: Never used   Substance and Sexual Activity    Alcohol use: Yes    Drug use: No    Sexual activity: Not on file   Other Topics Concern    Not on file   Social History Narrative    Not on file     Social Determinants of Health     Financial Resource Strain: Low Risk  (2023)    Overall Financial Resource Strain (CARDIA)     Difficulty of Paying Living Expenses: Not hard at all   Food Insecurity: No Food Insecurity (2024)    Hunger Vital Sign     Worried About Running Out of Food in the Last Year: Never true     Ran Out of Food in the Last

## 2024-06-05 NOTE — CARE COORDINATION
Chart reviewed and pt discussed in am IDR s/p tx from tele to ICU post RR for hypotension and bradycardia. Levo and Marvin, but off now. NC 3L O2 per RT. Currently up in BSC and PT/OT has worked with pt. LAZARUS for return to [place] with HH per previous notes. CM following for LAZARUS needs/change in needs. LOS 2 days.

## 2024-06-05 NOTE — PROGRESS NOTES
Physical Therapy Note:    Therapist is discontinuing acute PT services secondary to transfer to the ICU. Please re-consult acute PT services when medically appropriate. Thank you,    Faiza Montoya, PT, DPT

## 2024-06-05 NOTE — PROGRESS NOTES
Hospitalist Progress Note   Admit Date:  2024 11:51 AM   Name:  Kai Guido   Age:  89 y.o.  Sex:  male  :  1929   MRN:  [id number]   Room:  [facility]/    Presenting/Chief Complaint: Chest Pain     Reason(s) for Admission: Chest pain [R07.9]  Elevated troponin [R79.89]  Acute kidney injury (HCC) [N17.9]  Chest pain, unspecified type [R07.9]  Congestive heart failure, unspecified HF chronicity, unspecified heart failure type (HCC) [I50.9]  Acute ischemic stroke (HCC) [I63.9]     Hospital Course:   Mr. Guido is a 89 y/o WM with a h/o CAD, afib (off Eliquis due to GIB, ulcer, anemia), CKD3, HTN who was admitted to our service  on  with chest pain. He lives independently at [place]. EKG on admission showed rate controlled afib. Cardiology was consulted. Cardiology was consulted. Transfused for unstable angina with Hgb 7.6. Not a candidate for invasive procedures due to age. Code S called  for acute vision loss. CT head and CTA head/neck were unremarkable. Brain MRI showed CVA in the R posterior cerebral artery territory. Neurology was consulted and recommended to resume Eliquis when felt safe, until then use ASA; when and if Eliquis is resumed, stop aspirin. He continued to have chest pain. On 6/4 PM he developed bradycardia and hypotension. He was transferred to the ICU for dopamine.    Subjective & 24hr Events:   Moved to ICU overnight due to bradycardia and HoTN. Dopamine ultimately dc due to tachycardia. Says it's more chest discomfort now rather than true pain. SBP low 100s. He is up in the chair and pleasant.    Assessment & Plan:   # Hypotension // bradycardia   - Dopamine dc due to tachycardia. Metoprolol is on hold. Con't remote tele.    # Chest pain // NSTEMI // elevated troponin   - No invasive procedures planned. Con't medical management.    # Acute CVA, R posterior cerebral artery territory   - Con't atorvastatin and Eliquis (afib)    # Acute cystitis   - GNRs and yeast in  urine, only 10k-50k CFUs.     # HTN   - Medications on hold with hypotension overnight on 6/4.    # CKD3   - Cr at baseline, 1.47, on 6/5.    # Chronic dCHF // atrial fibrillation   - Euvolemic   - BB held as above; con't Eliquis    # GI bleed // H. Pylori    - Monitor H/H with resumption of Eliquis    - Surgical path from 5/24 reviewed -- acute on chronic gastritics with \"rare organisms morphologically suspicious for helicobacter pylori\". He is on triple therapy with clarithromycin, metronidazole and pantoprazole.    Anticipated Discharge Arrangements: Transfer to floor.  PT/OT evals ordered?  Therapy evals ordered  Diet:  ADULT DIET; Dysphagia - Soft and Bite Sized  ADULT ORAL NUTRITION SUPPLEMENT; Breakfast, Lunch, Dinner; Standard High Calorie/High Protein Oral Supplement  VTE prophylaxis: Already on anticoagulation  Code status: Full Code      Non-peripheral Lines and Tubes (if present):      Urinary Catheter  (Active)        Telemetry (if present):  Cardiac/Telemetry Monitor On: Bedside monitor in use        Hospital Problems:  Principal Problem:    Chest pain  Active Problems:    Hypertension    CAD (coronary artery disease)    Elevated troponin    Stage 3b chronic kidney disease (HCC)    Paroxysmal atrial fibrillation (HCC)    Secondary hypercoagulable state (HCC)    Anemia    Pulmonary edema    Acute ischemic stroke (HCC)    Acute kidney injury (HCC)    Congestive heart failure (HCC)  Resolved Problems:    * No resolved hospital problems. *      Objective:   Patient Vitals for the past 24 hrs:   Temp Pulse Resp BP SpO2   06/05/24 0800 -- (!) 108 27 108/73 96 %   06/05/24 0745 -- (!) 112 17 121/83 98 %   06/05/24 0730 -- (!) 110 16 111/74 98 %   06/05/24 0715 97.5 °F (36.4 °C) (!) 117 15 109/84 97 %   06/05/24 0700 -- (!) 111 10 112/83 97 %   06/05/24 0645 -- (!) 114 20 117/73 96 %   06/05/24 0630 -- (!) 115 13 128/81 96 %   06/05/24 0615 -- (!) 113 18 125/81 96 %   06/05/24 0600 -- (!) 113 23 117/88 97 %  (HH) 0 - 22 ng/L   Lactic Acid    Collection Time: 06/05/24  6:35 AM   Result Value Ref Range    Lactic Acid 3.0 (H) 0.5 - 2.0 mmol/L   Troponin    Collection Time: 06/05/24  6:35 AM   Result Value Ref Range    Troponin T 5106.0 (HH) 0 - 22 ng/L   Lactic Acid    Collection Time: 06/05/24  8:54 AM   Result Value Ref Range    Lactic Acid 2.7 (H) 0.5 - 2.0 mmol/L   Troponin    Collection Time: 06/05/24  8:54 AM   Result Value Ref Range    Troponin T 4539.0 (HH) 0 - 22 ng/L       No results for input(s): \"COVID19\" in the last 72 hours.    Current Meds:  Current Facility-Administered Medications   Medication Dose Route Frequency    norepinephrine (LEVOPHED) 4 mg in sodium chloride 0.9 % 250 mL infusion  1-30 mcg/min IntraVENous Continuous    aluminum & magnesium hydroxide-simethicone (MAALOX) 200-200-20 MG/5ML suspension 30 mL  30 mL Oral Q6H PRN    lidocaine viscous hcl (XYLOCAINE) 2 % solution 15 mL  15 mL Mouth/Throat Q3H PRN    clarithromycin (BIAXIN) tablet 500 mg  500 mg Oral 2 times per day    metroNIDAZOLE (FLAGYL) tablet 500 mg  500 mg Oral 3 times per day    [Held by provider] metoprolol tartrate (LOPRESSOR) tablet 12.5 mg  12.5 mg Oral BID    cefTRIAXone (ROCEPHIN) 1,000 mg in sterile water 10 mL IV syringe  1,000 mg IntraVENous Q24H    pantoprazole (PROTONIX) tablet 40 mg  40 mg Oral BID AC    apixaban (ELIQUIS) tablet 5 mg  5 mg Oral BID    potassium chloride (KLOR-CON M) extended release tablet 40 mEq  40 mEq Oral Once    magnesium oxide (MAG-OX) tablet 800 mg  800 mg Oral Once    furosemide (LASIX) injection 20 mg  20 mg IntraVENous Once    atorvastatin (LIPITOR) tablet 80 mg  80 mg Oral Nightly    sodium chloride flush 0.9 % injection 5-40 mL  5-40 mL IntraVENous 2 times per day    sodium chloride flush 0.9 % injection 5-40 mL  5-40 mL IntraVENous PRN    ondansetron (ZOFRAN-ODT) disintegrating tablet 4 mg  4 mg Oral Q8H PRN    Or    ondansetron (ZOFRAN) injection 4 mg  4 mg IntraVENous Q6H PRN

## 2024-06-05 NOTE — PLAN OF CARE
2215    Pt HR dropped to 40s  BP 76/52  PT lethargic, drowsy, feeling weak and dizzy.    2217  Contacted [facility] MD,   Trop resulted elevated @ 3585  HR 44    2230  250mL Bolus ordered   Slight improvement with vitals signs    2241  96/58  HR 50s    2248  Rapid Response called    2250  MD bedside   BP 86/60    2253  BP 91/50  HR 38    Pt moved to ICU.

## 2024-06-05 NOTE — CONSULTS
Patient: Kai Guido MRN: [id number]  SSN: [id number]    YOB: 1929  Age: 89 y.o.  Sex: male       Date of Request: 6/5/2024  Date of Consult:  6/5/2024  Reason for Consult:  goals of care and medical decision making  Requesting Physician: Dr. Benitez     Assessment/Plan:     Principal Diagnosis:    Debility, Unspecified  R53.81    Additional Diagnoses:   Frailty  R54  Counseling, Encounter for Medical Advice  Z71.9  Encounter for Palliative Care  Z51.5    Palliative Performance Scale (PPS):       Medical Decision Making:   Reviewed and summarized labs and imaging from admission.  Met with pt at bedside.  Pt notes some chest pain but improved from prior.  He states he ate his breakfast without any issues.  Denies nausea, dyspnea.  We discussed ongoing care and overall wishes.  Pt has a son, Theodore, who is POA.  We reviewed code status and pt states he wishes to be DNR.  He says when the time comes he is ready to go to [facility].  DNR order placed.  Relayed conversation to Dr. Benitez.  Will follow loosely.      I spent greater than 25 mins on advanced care planning.      Will discuss findings with members of the interdisciplinary team.      Thank you for this referral.         Subjective:     History obtained from:  Patient and Chart    Chief Complaint: general weakness  History of Present Illness:   89 y.o. male with medical history of coronary artery disease, atrial fibrillation off of Eliquis recently due to GI bleed/gastric ulcer/anemia, chronic kidney disease stage III, hypertension, lives independently at [place] presented to the ER with chest pain.  EKG on admission shows atrial fibrillation.  Rate controlled.  Cardiology was consulted.  Code stroke was called on 6/2/2024.  Stat CT head was done and negative for acute findings.  CTA head and neck negative for large vessel occlusion.  MRI of the brain shows acute posterior circulation infarct.  Neurology consulted.  Patient was recently  taken off of anticoagulation due to acute GI bleed secondary to peptic ulcer disease.  He received 2 units of packed red blood cells     Advance Directive: Yes       Code Status:  DNR            Health Care Power of : pt son, Theodore, is POA.      Past Medical History:   Diagnosis Date    A-fib (HCC)     Arrhythmia     PALPITATION    Arthritis     BPH (benign prostatic hyperplasia)     CAD (coronary artery disease)     STENTS HEART     Chronic kidney disease     HAS ONLY 1 KIDNEY    Congestive heart failure (HCC) 2024    Hypercholesterolemia     Hypertension     ANABELLE (obstructive sleep apnea) 2023    ANABELLE on CPAP     Stroke (HCC)     TIA secondary to holding of eliquis       Past Surgical History:   Procedure Laterality Date    APPENDECTOMY      exploratory     COLONOSCOPY      COLONOSCOPY      LEG SURGERY Left 2023    FEMUR IM NAIL WIL INSERTION ANTEGRADE, Gamma Nail performed by Robetr Jacobson MD at [facility] MAIN OR    OTHER CELL      LEFT KIDNEY REMOVED for malformation; no cancer     ME UNLISTED PROCEDURE CARDIAC SURGERY      stent to circ    PTCA W/ STENT, EA ADD      UPPER GASTROINTESTINAL ENDOSCOPY N/A 2024    ESOPHAGOGASTRODUODENOSCOPY BIOPSY performed [place] Selina [place]berry [place] [facility] [place]    UROLOGICAL SURGERY      Kidney removed      Family History   Problem Relation Age of Onset    Diabetes Brother     Diabetes Sister     Diabetes Mother     Heart Disease Mother     Heart Attack Mother 81        MI    Kidney Disease Brother         ESRD 2/2 DM     Cancer Brother         liver       Social History     Tobacco Use    Smoking status: Former     Current packs/day: 0.00     Types: Cigarettes     Quit date: 1967     Years since quittin.4    Smokeless tobacco: Never   Substance Use Topics    Alcohol use: Yes     Prior to Admission medications    Medication Sig Start Date End Date Taking? Authorizing Provider   metroNIDAZOLE (FLAGYL) 500 MG tablet Take 1 tablet by mouth 2  kg/m²        Physical Exam:    General:  Cooperative. No acute distress.   Eyes:  Conjunctivae/corneas clear    Nose: Nares normal. Septum midline.   Neck: Supple, symmetrical, trachea midline, no JVD   Lungs:   Clear to auscultation bilaterally, unlabored   Heart:  Regular rate and rhythm, no murmur    Abdomen:   Soft, non-tender, non-distended. Positive bowel sounds   Extremities: Normal, atraumatic, no cyanosis or edema   Skin: Skin color, texture, turgor normal. No rash or lesions.   Neurologic: Nonfocal   Psych: Alert and oriented       Signed By: Lux Hammond MD     June 5, 2024

## 2024-06-05 NOTE — PROGRESS NOTES
ACUTE OCCUPATIONAL THERAPY GOALS:   (Developed with and agreed upon by patient and/or caregiver.) GOALS PER RE-EVAL on 6/5/2024  Pt will complete grooming task standing EOS with MOD I and adaptive equipment as needed.  Pt will lower body dress and bathe with MOD I and adaptive equipment as needed.  Pt will complete OT activity for 25 mintues with 1-2 rest breaks to increase activity tolerance to perform ADLs and functional transfers.  Pt will complete functional transfer with MOD I and adaptive equipment as needed.   Pt will complete toilet task with MOD I and adaptive equipment as needed.  Pt will complete functional mobility for household distances with MOD I and adaptive equipment as needed.    Pt will attend to L side environment for 75% of session with Min Verbal Cues from therapist to increase safety awareness during ADL performance.   Pt will tolerate 15 minutes of LUE therapeutic activities to increase functional use of LUE during ADLs.     Timeframe: 7 Visits    OCCUPATIONAL THERAPY Daily Note, Re-evaluation, and AM       OT Visit Days: 1  Acknowledge Orders  Time  OT Charge Capture  Rehab Caseload Tracker      Kai Guido is a 89 y.o. male   PRIMARY DIAGNOSIS: Chest pain  Chest pain [R07.9]  Elevated troponin [R79.89]  Acute kidney injury (HCC) [N17.9]  Chest pain, unspecified type [R07.9]  Congestive heart failure, unspecified HF chronicity, unspecified heart failure type (HCC) [I50.9]  Acute ischemic stroke (HCC) [I63.9]       Reason for Referral: Generalized Muscle Weakness (M62.81)  Other lack of cordination (R27.8)  Difficulty in walking, Not elsewhere classified (R26.2)  History of falling (Z91.81)  Hemiplegia and hemiparesis following cerebral infarction affecting left non-dominant side (I69.354)  Inpatient: Payor: MEDICARE / Plan: MEDICARE PART A AND B / Product Type: *No Product type* /     ASSESSMENT:     REHAB RECOMMENDATIONS:   Recommendation to date pending  unsupported sitting and standing with minimal verbal cueing to increase independence, decrease assistance required, increase activity tolerance, and increase safety awareness. Patient also participated in bed mobility, functional mobility, functional transfer, and energy conservation training to increase independence, decrease assistance required, increase activity tolerance, and increase safety awareness.     TREATMENT GRID:  N/A    AFTER TREATMENT PRECAUTIONS: Alarm Activated, Bed/Chair Locked, Call light within reach, Chair, Heels floated, Needs within reach, and RN notified    INTERDISCIPLINARY COLLABORATION:  RN/ PCT, PT/ PTA, and OT/ SOZUA    EDUCATION:  Education Given To: Patient  Education Provided: Plan of Care  Barriers to Learning: Hearing  Education Outcome: Verbalized understanding;Continued education needed    TOTAL TREATMENT DURATION AND TIME:  Time In: 0914  Time Out: 0954  Minutes: 40    TAMIKA DE LA GARZA

## 2024-06-06 PROBLEM — R26.9 GAIT ABNORMALITY: Status: ACTIVE | Noted: 2024-01-01

## 2024-06-06 PROBLEM — Z51.89 ENCOUNTER FOR OTHER SPECIFIED AFTERCARE: Status: ACTIVE | Noted: 2024-01-01

## 2024-06-06 PROBLEM — Z78.9 DECREASED ACTIVITIES OF DAILY LIVING (ADL): Status: ACTIVE | Noted: 2024-01-01

## 2024-06-06 PROBLEM — R09.02 HYPOXEMIA: Status: ACTIVE | Noted: 2024-01-01

## 2024-06-06 NOTE — PROGRESS NOTES
Patient has worsening oxygenation status.  Currently requiring 9.5 L.  Chest x-ray shows worsening vascular congestion.  Patient has known history of diastolic heart failure.  Urine output also has started to slow down and is getting dark.  Case was discussed at length with patient's son at bedside.      Given patient's imaging findings and clinical findings, will start diuresing patient.  40 mg IV Lasix to be given now and 1 additional dose in the morning.  Monitor for strict I's and O's and response.  Will hold IV fluids for now.  Will hold Kayexalate given patient will be given Lasix which should help with hyperkalemia.    As per discussion with son, he does not want his father to be on dialysis if renal function worsens.  Will continue following with ongoing goals of care discussions.

## 2024-06-06 NOTE — PROGRESS NOTES
TRANSFER - IN REPORT:    Verbal report received from HEMANTH Hi on Kai Guido  being received from ICU for routine progression of patient care      Report consisted of patient's Situation, Background, Assessment and   Recommendations(SBAR).     Information from the following report(s) Nurse Handoff Report was reviewed with the receiving nurse.    Opportunity for questions and clarification was provided.      Assessment completed upon patient's arrival to unit and care assumed.

## 2024-06-06 NOTE — PROGRESS NOTES
Physical Therapy Note:    Attempted to see patient this PM for physical therapy treatment  session. Patient resting in bed with son at bedside. Patient politely requests therapy to check back tomorrow as he has not been feeling well this late morning and early afternoo . Will follow and re-attempt as schedule permits/patient available. Thank you,    NILESH MEIER, PT     Rehab Caseload Tracker

## 2024-06-06 NOTE — PROGRESS NOTES
RT called to patients room by HEMANTH Cagle. RT discussed wearing Resmed Cpap with patient. Patient stated that he did not want to wear, he would rather wear the nasal Cannula. RT informed RN of patients refusal. RT informed patient and RN to call if needed.       06/06/24 0206   Oxygen Therapy/Pulse Ox   O2 Therapy Oxygen   O2 Flow Rate (L/min) 4 L/min   SpO2 94 %   Pulse Oximeter Device Mode Continuous   Pulse Oximeter Device Location Finger   $Pulse Oximeter $Spot check (multiple/continuous)

## 2024-06-06 NOTE — PROGRESS NOTES
Hospitalist Progress Note   Admit Date:  2024 11:51 AM   Name:  Kai Guido   Age:  89 y.o.  Sex:  male  :  1929   MRN:  [id number]   Room:      Presenting/Chief Complaint: Chest Pain     Reason(s) for Admission: Chest pain [R07.9]  Elevated troponin [R79.89]  Acute kidney injury (HCC) [N17.9]  Chest pain, unspecified type [R07.9]  Congestive heart failure, unspecified HF chronicity, unspecified heart failure type (HCC) [I50.9]  Acute ischemic stroke (HCC) [I63.9]     Hospital Course:   Kai Guido is a 89 y.o. male with medical history of CAD, afib (off Eliquis due to GIB, ulcer, anemia), CKD3, HTN who was admitted to our service  on  with chest pain. He lives independently at [place]. EKG on admission showed rate controlled afib. Cardiology was consulted.  Transfused for unstable angina with Hgb 7.6. Not a candidate for invasive procedures due to age.  Code S called  for acute vision loss. CT head and CTA head/neck were unremarkable. Brain MRI showed CVA in the R posterior cerebral artery territory. Neurology was consulted and recommended to resume Eliquis when felt safe, until then use ASA; when and if Eliquis is resumed, stop aspirin. He continued to have chest pain. On 6/4 PM he developed bradycardia and hypotension. He was transferred to the ICU for dopamine but was discontinued due to tachycardia.  His SBP however did improve.      Given his blood pressure was stable off of pressor support, patient was transferred back to the medical floor for further treatment.    Subjective & 24hr Events:   Patient was seen and evaluated at bedside this morning.  Currently states that he is having some vision issues.  Denies any fevers, chills, chest pain, nausea, vomiting, breathing issues.  Reports minimal appetite.      Assessment & Plan:     Hypertension  Bradycardia  May be the result of patient's NSTEMI.  Currently in rate controlled A-fib.  Cardiology following, not  recommending any further intervention given advanced age  -Follow-up cardiology  -Monitor on telemetry  -Holding metoprolol    Chest pain  NSTEMI  Patient had elevated troponins with associated chest pain.  Not a good candidate for intervention as per cardiology given advanced age.  Currently chest pain-free this morning.  -Follow-up cardiology rec  -Continue medical management    Acute CVA of the right posterior cerebral artery  Patient suffered acute CVA while inpatient.  Currently having some vision issues.  PT/OT following and recommending inpatient rehab.  As per neurology, will continue with Eliquis and statin.  -Continue with atorvastatin and Eliquis  -Continue PT/OT -recommending inpatient rehab    Acute UTI  Urine culture growing ,000 CFU's of Pseudomonas.  Was on ceftriaxone.  As per discussion with pharmacy, will switch to cefepime which it is sensitive to  -Continue cefepime x 3 days, EOT 6/9    CHAITANYA on CKD 3  Hyperkalemia  Metabolic acidosis  Creatinine acutely went up today.  Noted to be around 2.0 with baseline less than 1.5.  May be due to poor p.o. intake and prerenal causes.  Patient hyperkalemic as well as showing some metabolic acidosis today.  -Start IV fluid  -1 dose Kayexalate now, repeat BMP afternoon  -Monitor BMP daily, dose meds for reduced GFR, avoid nephrotoxic med     Chronic dCHF  Atrial fibrillation  Secondary hypercoagulable state  Does not appear to be fluid overloaded, if anything appears to be fluid down.  Echo showing preserved EF but does have severe aortic stenosis.  Need to be aware by diuresis and fluid status  -Continue monitoring clinically  -Holding metoprolol due to hypotension  -Continue Eliquis    History of recent GI bleed  H. Pylori   No active signs and symptoms of bleeding currently.  Currently back on Eliquis.  Patient is on triple therapy for H. pylori.  -Continue with clindamycin, metronidazole, pantoprazole      Anticipated Discharge Arrangements:  12.3 FL    nRBC 0.00 0.0 - 0.2 K/uL    Differential Type AUTOMATED      Neutrophils % 83 (H) 43 - 78 %    Lymphocytes % 4 (L) 13 - 44 %    Monocytes % 12 4.0 - 12.0 %    Eosinophils % 0 (L) 0.5 - 7.8 %    Basophils % 0 0.0 - 2.0 %    Immature Granulocytes % 1 0.0 - 5.0 %    Neutrophils Absolute 11.5 (H) 1.7 - 8.2 K/UL    Lymphocytes Absolute 0.5 0.5 - 4.6 K/UL    Monocytes Absolute 1.7 (H) 0.1 - 1.3 K/UL    Eosinophils Absolute 0.0 0.0 - 0.8 K/UL    Basophils Absolute 0.0 0.0 - 0.2 K/UL    Immature Granulocytes Absolute 0.1 0.0 - 0.5 K/UL   Basic Metabolic Panel w/ Reflex to MG    Collection Time: 06/05/24  5:13 AM   Result Value Ref Range    Sodium 134 (L) 136 - 145 mmol/L    Potassium 4.7 3.5 - 5.1 mmol/L    Chloride 97 (L) 98 - 107 mmol/L    CO2 24 20 - 28 mmol/L    Anion Gap 13 9 - 18 mmol/L    Glucose 243 (H) 70 - 99 mg/dL    BUN 34 (H) 8 - 23 MG/DL    Creatinine 1.47 (H) 0.80 - 1.30 MG/DL    Est, Glom Filt Rate 44 (L) >60 ml/min/1.73m2    Calcium 8.8 8.8 - 10.2 MG/DL   Lactic Acid    Collection Time: 06/05/24  5:13 AM   Result Value Ref Range    Lactic Acid 3.4 (H) 0.5 - 2.0 mmol/L   Troponin    Collection Time: 06/05/24  5:13 AM   Result Value Ref Range    Troponin T 4873.0 (HH) 0 - 22 ng/L   Lactic Acid    Collection Time: 06/05/24  6:35 AM   Result Value Ref Range    Lactic Acid 3.0 (H) 0.5 - 2.0 mmol/L   Troponin    Collection Time: 06/05/24  6:35 AM   Result Value Ref Range    Troponin T 5106.0 (HH) 0 - 22 ng/L   Lactic Acid    Collection Time: 06/05/24  8:54 AM   Result Value Ref Range    Lactic Acid 2.7 (H) 0.5 - 2.0 mmol/L   Troponin    Collection Time: 06/05/24  8:54 AM   Result Value Ref Range    Troponin T 4539.0 (HH) 0 - 22 ng/L   Lactic Acid    Collection Time: 06/05/24 10:48 AM   Result Value Ref Range    Lactic Acid 3.7 (HH) 0.5 - 2.0 mmol/L   Lactic Acid    Collection Time: 06/05/24  5:10 PM   Result Value Ref Range    Lactic Acid 3.4 (H) 0.5 - 2.0 mmol/L   Lactic Acid    Collection Time:  06/05/24 11:04 PM   Result Value Ref Range    Lactic Acid 4.2 (HH) 0.5 - 2.0 mmol/L   Basic Metabolic Panel    Collection Time: 06/06/24  3:25 AM   Result Value Ref Range    Sodium 131 (L) 136 - 145 mmol/L    Potassium 5.5 (H) 3.5 - 5.1 mmol/L    Chloride 98 98 - 107 mmol/L    CO2 18 (L) 20 - 28 mmol/L    Anion Gap 15 9 - 18 mmol/L    Glucose 192 (H) 70 - 99 mg/dL    BUN 47 (H) 8 - 23 MG/DL    Creatinine 2.00 (H) 0.80 - 1.30 MG/DL    Est, Glom Filt Rate 30 (L) >60 ml/min/1.73m2    Calcium 8.9 8.8 - 10.2 MG/DL   Magnesium    Collection Time: 06/06/24  3:25 AM   Result Value Ref Range    Magnesium 2.3 1.8 - 2.4 mg/dL   CBC    Collection Time: 06/06/24  3:25 AM   Result Value Ref Range    WBC 16.8 (H) 4.3 - 11.1 K/uL    RBC 4.70 4.23 - 5.6 M/uL    Hemoglobin 12.7 (L) 13.6 - 17.2 g/dL    Hematocrit 41.6 41.1 - 50.3 %    MCV 88.5 82 - 102 FL    MCH 27.0 26.1 - 32.9 PG    MCHC 30.5 (L) 31.4 - 35.0 g/dL    RDW 18.5 (H) 11.9 - 14.6 %    Platelets 242 150 - 450 K/uL    MPV 8.9 (L) 9.4 - 12.3 FL    nRBC 0.00 0.0 - 0.2 K/uL       No results for input(s): \"COVID19\" in the last 72 hours.    Current Meds:  Current Facility-Administered Medications   Medication Dose Route Frequency    0.9 % sodium chloride infusion   IntraVENous Continuous    ceFEPIme (MAXIPIME) 2,000 mg in sterile water 20 mL IV syringe  2,000 mg IntraVENous Once    Followed by    [START ON 6/7/2024] cefepime (MAXIPIME) 1,000 mg in sodium chloride 0.9 % 50 mL IVPB (mini-bag)  1,000 mg IntraVENous Q24H    sodium polystyrene (KAYEXALATE) 15 GM/60ML suspension 15 g  15 g Oral Once    norepinephrine (LEVOPHED) 4 mg in sodium chloride 0.9 % 250 mL infusion  1-30 mcg/min IntraVENous Continuous    apixaban (ELIQUIS) tablet 2.5 mg  2.5 mg Oral BID    aluminum & magnesium hydroxide-simethicone (MAALOX) 200-200-20 MG/5ML suspension 30 mL  30 mL Oral Q6H PRN    lidocaine viscous hcl (XYLOCAINE) 2 % solution 15 mL  15 mL Mouth/Throat Q3H PRN    clarithromycin (BIAXIN) tablet

## 2024-06-06 NOTE — PROGRESS NOTES
Occupational Therapy Note:    Attempted to see patient this PM for occupational therapy treatment  session. Patient resting in bed with son at bedside. Patient politely requests therapy to check back tomorrow as he has not been feeling well this late morning and early afternoon. Will follow and re-attempt as schedule permits/patient available.     Thank you,  Kyra Miranda, OTR/L     Rehab Caseload Tracker

## 2024-06-06 NOTE — CARE COORDINATION
Pt has been recommended for acute rehab.  Discussed with Dr. Hamlin.  Consult placed for  IRC to assess pt.

## 2024-06-06 NOTE — PROGRESS NOTES
06/05/24 3640   NIV Type   $NIV $Daily Charge   NIV Started/Stopped On   Equipment Type Resmed   Mode Auto-PAP   Mask Type Full face mask   Mask Size Small   Assessment   Pulse 56   Respirations 22   SpO2 95 %   Comfort Level Good   Using Accessory Muscles No   Mask Compliance Good   Skin Assessment Clean, dry, & intact   Settings/Measurements   PIP Observed 6 cm H20   EPAP Min 6 cmH2O   EPAP Max 12 cmH2O   O2 Flow Rate (L/min) 4 L/min  (bled in)   Mask Leak (lpm)   (no leak noted)   Patient's Home Machine No

## 2024-06-06 NOTE — CONSULTS
[facility]  Inpatient Rehab Consult        Admission Date: 6/1/2024  Primary Care Provider: Sheldon Schafer DO  Specialty Group / Referring Service: Medicine      Chief Complaint : Lethargy  Admitting Diagnosis:   Chest pain [R07.9]  Elevated troponin [R79.89]  Acute kidney injury (HCC) [N17.9]  Chest pain, unspecified type [R07.9]  Congestive heart failure, unspecified HF chronicity, unspecified heart failure type (HCC) [I50.9]  Acute ischemic stroke (HCC) [I63.9]    Principal Problem:    Chest pain  Active Problems:    Hypertension    CAD (coronary artery disease)    Elevated troponin    Stage 3b chronic kidney disease (HCC)    Paroxysmal atrial fibrillation (HCC)    Secondary hypercoagulable state (HCC)    Anemia    Pulmonary edema    Acute ischemic stroke (HCC)    Acute kidney injury (HCC)    Congestive heart failure (HCC)    Frailty    Encounter for palliative care    Advanced care planning/counseling discussion    Debility    Hypoxemia  Resolved Problems:    * No resolved hospital problems. *      Acute Rehab Diagnoses:  Encounter for rehabilitation [Z51.89]   Abnormality of gait and mobility [R26.9]  Decreased independence for activities of daily living (ADL) [Z78.9]  Physical debility / deconditioning [R53.81]      Medical Dx:  Past Medical History:   Diagnosis Date    A-fib (HCC)     Arrhythmia     PALPITATION    Arthritis     BPH (benign prostatic hyperplasia)     CAD (coronary artery disease)     STENTS HEART 2008    Chronic kidney disease     HAS ONLY 1 KIDNEY    Congestive heart failure (HCC) 6/5/2024    Hypercholesterolemia     Hypertension     ANABELLE (obstructive sleep apnea) 02/06/2023    ANABELLE on CPAP     Stroke (HCC)     TIA secondary to holding of eliquis        Date of Evaluation: June 6, 2024    Subjective       HPI: Kai Guido is a 89 y.o. male patient who presented to [facility] on 6/1/2024 secondary to chest pain. Per H&P \" Kai Guido is a  ([id number]) on 6/4/2024 6:44:40 AM     EKG 12 Lead    Collection Time: 06/04/24  6:44 PM   Result Value Ref Range    Ventricular Rate 115 BPM    QRS Duration 96 ms    Q-T Interval 342 ms    QTc Calculation (Bazett) 473 ms    R Axis -68 degrees    T Axis 109 degrees    Diagnosis       Atrial fibrillation with rapid ventricular response with premature   ventricular or aberrantly conducted complexes  ST elevation, consider early repolarization, pericarditis, or injury  Abnormal ECG    Confirmed by THIAGO SULLIVAN ()AMADEO ([id number]) on 6/4/2024 10:11:05 PM     Troponin    Collection Time: 06/04/24  7:13 PM   Result Value Ref Range    Troponin T 2858.0 (HH) 0 - 22 ng/L   Troponin    Collection Time: 06/04/24  8:45 PM   Result Value Ref Range    Troponin T 3585.0 (HH) 0 - 22 ng/L   EKG 12 Lead    Collection Time: 06/04/24  8:51 PM   Result Value Ref Range    Ventricular Rate 97 BPM    QRS Duration 90 ms    Q-T Interval 372 ms    QTc Calculation (Bazett) 472 ms    R Axis -77 degrees    T Axis 103 degrees    Diagnosis       Atrial fibrillation with premature ventricular or aberrantly conducted   complexes  Left anterior fascicular block  Cannot rule out Anterolateral infarct (cited on or before 04-JUN-2024)  Cannot rule out Inferior injury pattern  ** ** ACUTE MI / STEMI ** **  Consider right ventricular involvement in acute inferior infarct  Abnormal ECG  When compared with ECG of 04-JUN-2024 18:44,  Serial changes of evolving Anterior infarct Present  Serial changes of evolving Anterolateral infarct Present  Confirmed by THIAGO SULLIVAN ()AMADEO ([id number]) on 6/4/2024 10:19:26 PM     POCT Glucose    Collection Time: 06/04/24 10:50 PM   Result Value Ref Range    POC Glucose 241 (H) 65 - 100 mg/dL    Performed by: Nora    Troponin    Collection Time: 06/04/24 11:15 PM   Result Value Ref Range    Troponin T 3692.0 (HH) 0 - 22 ng/L   Lactic Acid    Collection Time: 06/04/24 11:15 PM   Result Value Ref Range    Lactic  on Admission: Good/Fair        Assessment and Plan:       //Gait and Self-care deficits secondary to acute CVA  //Vision loss  //NSTEMI  //Decreased independence with ADLs  //Gait instability  -On going medical work up includes: Acute hypoxic respiratory failure requiring increased supplemental O2, UTI treatment, electrolyte derangements, CHAITANYA, hypotension  -Thank you for this consult.  Not currently medically stable to start 3 hours of therapy daily at this time.  Once medical status improves, and he is able to complete physical and endurance activities on less than 6-8 L of supplemental O2, PM&R to reevaluate patient at that time  -Thank you for this consult.  PM&R to continue monitoring functional needs and rehab potential during acute hospital stay          Code status: DNR     Jocelynn Driver D.O. PM&R  Inpatient Rehabilitation Medical Director    June 6, 2024

## 2024-06-06 NOTE — PLAN OF CARE
Problem: Discharge Planning  Goal: Discharge to home or other facility with appropriate resources  6/6/2024 1602 by Braydon Zavaleta RN  Outcome: Progressing  Flowsheets (Taken 6/6/2024 1602)  Discharge to home or other facility with appropriate resources:   Identify barriers to discharge with patient and caregiver   Arrange for needed discharge resources and transportation as appropriate   Identify discharge learning needs (meds, wound care, etc)   Refer to discharge planning if patient needs post-hospital services based on physician order or complex needs related to functional status, cognitive ability or social support system  6/6/2024 0331 by Gurjit Guan, RN  Outcome: Progressing  Flowsheets  Taken 6/6/2024 0130 by Gurjit Guan, RN  Discharge to home or other facility with appropriate resources:   Identify barriers to discharge with patient and caregiver   Arrange for needed discharge resources and transportation as appropriate   Identify discharge learning needs (meds, wound care, etc)   Refer to discharge planning if patient needs post-hospital services based on physician order or complex needs related to functional status, cognitive ability or social support system  Taken 6/5/2024 1930 by Sussy Recio RN  Discharge to home or other facility with appropriate resources: Identify barriers to discharge with patient and caregiver     Problem: Safety - Adult  Goal: Free from fall injury  6/6/2024 1602 by Braydon Zavaleta RN  Outcome: Progressing  Flowsheets (Taken 6/6/2024 1602)  Free From Fall Injury: Instruct family/caregiver on patient safety  6/6/2024 0331 by Gurjit Guan RN  Outcome: Progressing  Flowsheets (Taken 6/6/2024 0130)  Free From Fall Injury: Instruct family/caregiver on patient safety     Problem: Pain  Goal: Verbalizes/displays adequate comfort level or baseline comfort level  6/6/2024 1602 by Braydon Zavaleta RN  Outcome: Progressing  Flowsheets (Taken 6/6/2024 1602)  Verbalizes/displays  adequate comfort level or baseline comfort level:   Encourage patient to monitor pain and request assistance   Assess pain using appropriate pain scale   Administer analgesics based on type and severity of pain and evaluate response   Implement non-pharmacological measures as appropriate and evaluate response   Notify Licensed Independent Practitioner if interventions unsuccessful or patient reports new pain  6/6/2024 0331 by Gurjit Guan, RN  Outcome: Progressing  Flowsheets  Taken 6/6/2024 0130 by Gurjit Guan, RN  Verbalizes/displays adequate comfort level or baseline comfort level:   Encourage patient to monitor pain and request assistance   Assess pain using appropriate pain scale  Taken 6/5/2024 1930 by Sussy Recio, RN  Verbalizes/displays adequate comfort level or baseline comfort level:   Encourage patient to monitor pain and request assistance   Assess pain using appropriate pain scale

## 2024-06-06 NOTE — PLAN OF CARE
Problem: Discharge Planning  Goal: Discharge to home or other facility with appropriate resources  Outcome: Progressing  Flowsheets  Taken 6/6/2024 0130 by Gurjit Guan, RN  Discharge to home or other facility with appropriate resources:   Identify barriers to discharge with patient and caregiver   Arrange for needed discharge resources and transportation as appropriate   Identify discharge learning needs (meds, wound care, etc)   Refer to discharge planning if patient needs post-hospital services based on physician order or complex needs related to functional status, cognitive ability or social support system  Taken 6/5/2024 1930 by Sussy Recio, RN  Discharge to home or other facility with appropriate resources: Identify barriers to discharge with patient and caregiver     Problem: Safety - Adult  Goal: Free from fall injury  Outcome: Progressing  Flowsheets (Taken 6/6/2024 0130)  Free From Fall Injury: Instruct family/caregiver on patient safety     Problem: ABCDS Injury Assessment  Goal: Absence of physical injury  Outcome: Progressing  Flowsheets (Taken 6/6/2024 0130)  Absence of Physical Injury: Implement safety measures based on patient assessment     Problem: Pain  Goal: Verbalizes/displays adequate comfort level or baseline comfort level  Outcome: Progressing  Flowsheets  Taken 6/6/2024 0130 by Gurjit Guan, RN  Verbalizes/displays adequate comfort level or baseline comfort level:   Encourage patient to monitor pain and request assistance   Assess pain using appropriate pain scale  Taken 6/5/2024 1930 by Sussy Recio, RN  Verbalizes/displays adequate comfort level or baseline comfort level:   Encourage patient to monitor pain and request assistance   Assess pain using appropriate pain scale     Problem: Chronic Conditions and Co-morbidities  Goal: Patient's chronic conditions and co-morbidity symptoms are monitored and maintained or improved  Outcome: Progressing  Flowsheets  Taken 6/6/2024  breakdown  Description: 1.  Monitor for areas of redness and/or skin breakdown  2.  Assess vascular access sites hourly  3.  Every 4-6 hours minimum:  Change oxygen saturation probe site  4.  Every 4-6 hours:  If on nasal continuous positive airway pressure, respiratory therapy assess nares and determine need for appliance change or resting period.  Outcome: Progressing

## 2024-06-06 NOTE — PROGRESS NOTES
Comprehensive Nutrition Assessment    Type and Reason for Visit: Reassess  Malnutrition Screening Tool: Malnutrition Screen  Have you recently lost weight without trying?: 24 to 33 pounds (3 points)  Have you been eating poorly because of a decreased appetite?: No (0 points)  Malnutrition Screening Tool Score: 3    Nutrition Recommendations/Plan:   Meals and Snacks:  Diet: Continue current order  Nutrition Supplement Therapy:  Medical food supplement therapy:  Continue Ensure Enlive three times per day (this provides 350 kcal and 20 grams protein per bottle)- chocolate     Malnutrition Assessment:  Malnutrition Status: At risk for malnutrition (Comment) (advanced age, poor intake, wt loss)    wasting noted however age related wasting is likely contributing  Nutrition Assessment:  Nutrition History: Pt reports poor intake for some time due to poor appetite. He stated he occasionally drinks chocolate protein shakes. He stated he adds peanut butter to his shakes for extra protein.      Do You Have Any Cultural, Mormon, or Ethnic Food Preferences?: No   Weight History: Pt reports wt loss of ~30lb in the past 6 weeks. Per EMR wt hx review 6/2/23 147lb (IM), 12/4 148lb (nephro).   Nutrition Background:       PMH significant for CAD, afib, gastric ulcer, GI bleed, anemia, CKD3, and HTN. Pt admitted with chest pain. He had a CVA on 6/2.   Nutrition Interval:  Pt seen sitting in bed eating lunch. Son present. Pt reports he is experiencing early satiety.  RD observed lunch with a few bites taken and Ensure mostly consumed. Pt stated he is finished with lunch. Son reports pts intake has been minimal recently. Pt also endorses nausea. Son reports last BM was 3 days ago. Discussed with Braydon SAXENA who reports pt has prn miralax if needed.     Current Nutrition Therapies:  ADULT DIET; Dysphagia - Soft and Bite Sized  ADULT ORAL NUTRITION SUPPLEMENT; Breakfast, Lunch, Dinner; Standard High Calorie/High Protein Oral  Supplement    Current Intake:   Average Meal Intake: 1-25% Average Supplements Intake: %      Anthropometric Measures:  Height: 171.5 cm (5' 7.5\")  Current Body Wt: 59.6 kg (131 lb 6.3 oz) (6/4), Weight source: Bed Scale  BMI: 20.3, Underweight (BMI less than 22) age over 65  Admission Body Weight: 65.8 kg (145 lb) (6/1- no wt method)  Ideal Body Weight (Kg) (Calculated): 69 kg (151 lbs),    BMI Category Underweight (BMI less than 22) age over 65  Estimated Daily Nutrient Needs:  Energy (kcal/day): 8116-3472 (25-30 kcal/kg) (Kcal/kg Weight used: 61.7 kg Current  Protein (g/day): 62-74 (1-1.2 g/kg) Weight Used: (Current) 61.7 kg  Fluid (ml/day):   (1 ml/kcal)    Nutrition Diagnosis:   Inadequate oral intake related to  (poor appetite) as evidenced by poor intake prior to admission, weight loss (pt reports barriers to po as above)  Nutrition Interventions:   Food and/or Nutrient Delivery: Continue Oral Nutrition Supplement, Continue Current Diet     Coordination of Nutrition Care: Continue to monitor while inpatient      Goals:   Previous Goal Met: Progressing toward Goal(s)  Active Goal: PO intake 50% or greater, by next RD assessment       Nutrition Monitoring and Evaluation:      Food/Nutrient Intake Outcomes: Food and Nutrient Intake, Supplement Intake  Physical Signs/Symptoms Outcomes: Weight, Meal Time Behavior    Discharge Planning:    Continue Oral Nutrition Supplement    Kimberly Staton RD

## 2024-06-06 NOTE — PROGRESS NOTES
4 Eyes Skin Assessment     NAME:  Kai Guido  YOB: 1929  MEDICAL RECORD NUMBER:  [id number]    The patient is being assessed for  Transfer to New Unit    I agree that at least one RN has performed a thorough Head to Toe Skin Assessment on the patient. ALL assessment sites listed below have been assessed.      Areas assessed by both nurses:    Head, Face, Ears, Shoulders, Back, Chest, Arms, Elbows, Hands, Sacrum. Buttock, Coccyx, Ischium, Legs. Feet and Heels, and Under Medical Devices         Does the Patient have a Wound? Yes wound(s) were present on assessment. LDA wound assessment was Initiated and completed by RN       Cliff Prevention initiated by RN: Yes  Wound Care Orders initiated by RN: Yes    Pressure Injury (Stage 3,4, Unstageable, DTI, NWPT, and Complex wounds) if present, place Wound referral order by RN under : Wound care on board for Stage 1 sacrum    New Ostomies, if present place, Ostomy referral order under : No     Nurse 1 eSignature: Electronically signed by GABRIELLA WALDRON RN on 6/6/24 at 2:17 AM EDT    **SHARE this note so that the co-signing nurse can place an eSignature**    Nurse 2 eSignature: {Esignature:[id number]}

## 2024-06-06 NOTE — PROGRESS NOTES
Spiritual Care Visit, initial visit.    Visited with patient at bedside.    Prayed for patient's healing and health.    Visit by Viral Simon, Staff . Gloria., Ervin.CLAUDIA., B.A.

## 2024-06-06 NOTE — PROGRESS NOTES
Kai Guido  Admission Date: 6/1/2024         Daily Progress Note: 6/6/2024    The patient's chart is reviewed and the patient is discussed with the staff.    Background: 89 y.o. male with HTN, HLD, CKD stage IIIb, PAF, who was recently hospitalized for GI bleed and had his anticoagulation/Eliquis held and subsequently had CVA (acute posterior circulation infarct).  Patient was not a candidate for neuro-intervention or tPA due to recent GI bleeding.  Patient also has been complaining of chest pain.  Evaluated by cardiology not felt to be candidate for intervention.  Chest pain is intermittent and troponins are elevated.  Rapid response was called due to hypotension and bradycardia.  Patient was started on dopamine and critical care was asked to see the patient due to ICU admission and pressure support.    Subjective:     Complaining of no appetite. On 5 lpm NC. No edema noted. BP stable     Current Facility-Administered Medications   Medication Dose Route Frequency    0.9 % sodium chloride infusion   IntraVENous Continuous    norepinephrine (LEVOPHED) 4 mg in sodium chloride 0.9 % 250 mL infusion  1-30 mcg/min IntraVENous Continuous    apixaban (ELIQUIS) tablet 2.5 mg  2.5 mg Oral BID    aluminum & magnesium hydroxide-simethicone (MAALOX) 200-200-20 MG/5ML suspension 30 mL  30 mL Oral Q6H PRN    lidocaine viscous hcl (XYLOCAINE) 2 % solution 15 mL  15 mL Mouth/Throat Q3H PRN    clarithromycin (BIAXIN) tablet 500 mg  500 mg Oral 2 times per day    metroNIDAZOLE (FLAGYL) tablet 500 mg  500 mg Oral 3 times per day    [Held by provider] metoprolol tartrate (LOPRESSOR) tablet 12.5 mg  12.5 mg Oral BID    cefTRIAXone (ROCEPHIN) 1,000 mg in sterile water 10 mL IV syringe  1,000 mg IntraVENous Q24H    pantoprazole (PROTONIX) tablet 40 mg  40 mg Oral BID AC    magnesium oxide (MAG-OX) tablet 800 mg  800 mg Oral Once    atorvastatin (LIPITOR) tablet 80 mg  80 mg Oral Nightly    sodium chloride flush  bleed and had his anticoagulation/Eliquis held and subsequently had CVA (acute posterior circulation infarct).  Patient was not a candidate for neuro-intervention or tPA due to recent GI bleeding.  Patient also has been complaining of chest pain.  Evaluated by cardiology not felt to be candidate for intervention.  Chest pain is intermittent and troponins are elevated.  Rapid response was called due to hypotension and bradycardia.  Patient was started on dopamine and critical care was asked to see the patient due to ICU admission and pressure support.       Chest pain    CAD (coronary artery disease)    Elevated troponin  Plan: cardiology following, not candidate for any intervention     Paroxysmal atrial fibrillation (HCC)    Secondary hypercoagulable state (HCC)  Plan: on eliquis     Pulmonary edema    Hypoxia   Plan: euvolemic now; still on O2, now up to 5 lpm   Does not wear O2 at home; try to wean as tolerates     Acute ischemic stroke (HCC)  Plan: per primary; on lipitor and eliquis     GIB/H.pylori  Plan: Hgb stable, on clarithromycin, flagyl and protonix     Acute kidney injury (HCC)    Stage 3b chronic kidney disease (HCC)  Plan: renal function worsening     Frailty    Encounter for palliative care    Advanced care planning/counseling discussion    Debility  Plan: now a DNR which is appropriate   Palliative care following       More than 50% of the time documented was spent in face-to-face contact with the patient and in the care of the patient on the floor/unit where the patient is located.    In this split/shared evaluation I performed performed a medically appropriate history and exam, counseled and educated the patient and/or family member, documented information in EMR, and coordinated care. which accounted for 14 minutes of clinical time.     ROSETTE Farah NP    In this split/shared evaluation I performed reviewed the patients's H&P, available images, labs, cultures., discussed case in detail

## 2024-06-06 NOTE — PROGRESS NOTES
TRANSFER - OUT REPORT:    Verbal report given to HEMANTH Guan on Kai Guido  being transferred to 2234 for routine progression of patient care       Report consisted of patient's Situation, Background, Assessment and   Recommendations(SBAR).     Information from the following report(s) Nurse Handoff Report, Adult Overview, Intake/Output, MAR, Recent Results, and Cardiac Rhythm a fib  was reviewed with the receiving nurse.       Opportunity for questions and clarification was provided.      Patient transported with:  Monitor, O2 @ 3lpm, and Registered Nurse

## 2024-06-07 PROBLEM — Z78.9 DECREASED ACTIVITIES OF DAILY LIVING (ADL): Status: RESOLVED | Noted: 2024-01-01 | Resolved: 2024-01-01

## 2024-06-07 PROBLEM — K59.01 SLOW TRANSIT CONSTIPATION: Status: ACTIVE | Noted: 2023-01-01

## 2024-06-07 PROBLEM — R26.81 GAIT INSTABILITY: Status: ACTIVE | Noted: 2024-01-01

## 2024-06-07 PROBLEM — Z51.89 ENCOUNTER FOR OTHER SPECIFIED AFTERCARE: Status: RESOLVED | Noted: 2024-01-01 | Resolved: 2024-01-01

## 2024-06-07 PROBLEM — R26.9 GAIT ABNORMALITY: Status: RESOLVED | Noted: 2024-01-01 | Resolved: 2024-01-01

## 2024-06-07 PROBLEM — J96.01 ACUTE HYPOXEMIC RESPIRATORY FAILURE (HCC): Status: ACTIVE | Noted: 2024-01-01

## 2024-06-07 PROBLEM — R00.1 BRADYCARDIA: Status: RESOLVED | Noted: 2024-01-01 | Resolved: 2024-01-01

## 2024-06-07 PROBLEM — R26.81 GAIT INSTABILITY: Status: RESOLVED | Noted: 2024-01-01 | Resolved: 2024-01-01

## 2024-06-07 PROBLEM — R41.89 IMPAIRED COGNITION: Status: ACTIVE | Noted: 2023-01-01

## 2024-06-07 PROBLEM — I5A MYOCARDIAL INJURY: Status: ACTIVE | Noted: 2024-01-01

## 2024-06-07 PROBLEM — G62.9 PERIPHERAL POLYNEUROPATHY: Status: ACTIVE | Noted: 2023-01-01

## 2024-06-07 PROBLEM — R41.0 CONFUSION: Status: RESOLVED | Noted: 2024-01-01 | Resolved: 2024-01-01

## 2024-06-07 PROBLEM — S62.607A CLOSED FRACTURE OF PHALANX OF LEFT LITTLE FINGER: Status: RESOLVED | Noted: 2024-01-01 | Resolved: 2024-01-01

## 2024-06-07 PROBLEM — E87.20 LACTIC ACIDOSIS: Status: ACTIVE | Noted: 2024-01-01

## 2024-06-07 NOTE — CONSULTS
[facility] CONSULT NOTE    Admission Date:  6/1/2024    Admission Diagnosis:  Chest pain [R07.9]  Elevated troponin [R79.89]  Acute kidney injury (HCC) [N17.9]  Chest pain, unspecified type [R07.9]  Congestive heart failure, unspecified HF chronicity, unspecified heart failure type (HCC) [I50.9]  Acute ischemic stroke (HCC) [I63.9]    Consulting physician: Matthew  Reason for consult: CHAITANYA    Subjective:   History of Present Illness: This is a 89 y.o. male with HTN, HLD, CKD stage IIIb, PAF, who was recently hospitalized for GI bleed and had his anticoagulation/Eliquis held and subsequently had CVA (acute posterior circulation infarct).  Patient was not a candidate for neuro-intervention or tPA due to recent GI bleeding. Complicated by fluid overload and worsening hypoxia. Seen by Pulmonary, oxygen titrated. Start in Lasix gtt in addition to zaroxolyn. Minimal response. Creatinine  increasing to 2.75 with hyperkalemia to 5.8. Treated medically. He is arousable, [place] but comfortable on high flow oxygen.    Past Medical History:   Diagnosis Date    A-fib (HCC)     Arrhythmia     PALPITATION    Arthritis     BPH (benign prostatic hyperplasia)     CAD (coronary artery disease)     STENTS HEART 2008    Chronic kidney disease     HAS ONLY 1 KIDNEY    Congestive heart failure (HCC) 6/5/2024    Hypercholesterolemia     Hypertension     ANABELLE (obstructive sleep apnea) 02/06/2023    ANABELLE on CPAP     Stroke (HCC)     TIA secondary to holding of eliquis       Past Surgical History:   Procedure Laterality Date    APPENDECTOMY      exploratory     COLONOSCOPY      COLONOSCOPY      LEG SURGERY Left 11/11/2023    FEMUR IM NAIL WIL INSERTION ANTEGRADE, Gamma Nail performed by Robert Jacobson MD at 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  Neck - supple with JVD, no thyromegaly.  No lymphadenopathy.  CV - irregular rate and rhythm,   Lung - decreased bilaterally, lungs expand symmetrically  Chest wall - normal appearance  Abd - soft, nontender, bowel sounds present, no hepatosplenomegaly  Ext - no clubbing, no cyanosis, no edema  Neurologic - nonfocal  Genitourinary - bladder nonpalpable  Psychiatric: Normal mood and affect.      Data Review:   Recent Labs     06/05/24 0513 06/06/24 0325 06/07/24  0340   WBC 13.9* 16.8* 17.6*   HGB 12.7* 12.7* 11.7*   HCT 40.5* 41.6 36.7*    242 257     Recent Labs     06/04/24  2315 06/05/24  0513 06/06/24 0325 06/06/24  1959 06/07/24  0340   *   < > 131* 135* 129*   K 4.8   < > 5.5* 6.0* 5.8*   CL 98   < > 98 99 93*   CO2 20   < > 18* 16* 19*   BUN 29*   < > 47* 60* 68*   MG 2.0  --  2.3  --   --    PHOS 3.8  --   --   --   --     < > = values in this interval not displayed.     No results for input(s): \"PH\", \"PCO2\", \"PO2\" in the last 72 hours.    Problem List:     Patient Active Problem List    Diagnosis Date Noted    RLS (restless legs syndrome) 02/06/2023    Other fatigue 02/06/2023    ANABELLE (obstructive sleep apnea) 02/06/2023    TIA (transient ischemic attack) 11/02/2018    Benign prostatic hyperplasia with urinary retention 10/27/2016    Hypoxemia 06/06/2024    Gait abnormality 06/06/2024    Decreased activities of daily living (ADL) 06/06/2024    Acute kidney injury (HCC) 06/05/2024    Congestive heart failure (HCC) 06/05/2024    Frailty 06/05/2024    Encounter for palliative care 06/05/2024    Encounter for other specified aftercare

## 2024-06-07 NOTE — CARE COORDINATION
Pt is for discharge to [facility] today for care.  Son and other family at bedside.  [facility] transport arranged around 1715 with DNR.     06/07/24 1640   Service Assessment   Patient's Healthcare Decision Maker is: Legal Next of Kin   Social/Functional History   Lives With Alone   Type of Home Apartment   Home Layout One level   Home Access Level entry   Bathroom Shower/Tub Walk-in shower   Home Equipment Rollator   Receives Help From Family;Home health  (Interim home health)   ADL Assistance Independent   Homemaking Assistance Needs assistance   Ambulation Assistance Needs assistance  (Rollator)   Transfer Assistance Independent   Active  No   Patient's  Info Son   Mode of Transportation Car   Occupation Retired   Services At/After Discharge   Transition of Care Consult (CM Consult) Discharge Planning;Hospice House;Hospice   Internal Hospice Yes   Services At/After Discharge Hospice  ([facility])   [place] Resource Information Provided? No   Mode of Transport at Discharge Other (see comment)  ([facility] with DNR)   Condition of Participation: Discharge Planning   The Plan for Transition of Care is related to the following treatment goals: Pt to transition to In pt hospice care at [facility].   The Patient and/or Patient Representative was provided with a Choice of Provider? Patient Representative   Name of the Patient Representative who was provided with the Choice of Provider and agrees with the Discharge Plan?  son   The Patient and/Or Patient Representative agree with the Discharge Plan? Yes   Freedom of Choice list was provided with basic dialogue that supports the patient's individualized plan of care/goals, treatment preferences, and shares the quality data associated with the providers?  Yes

## 2024-06-07 NOTE — PLAN OF CARE
Problem: Discharge Planning  Goal: Discharge to home or other facility with appropriate resources  6/6/2024 2226 by Lucy Gonzalez RN  Outcome: Progressing  Flowsheets (Taken 6/6/2024 1905)  Discharge to home or other facility with appropriate resources:   Identify barriers to discharge with patient and caregiver   Arrange for needed discharge resources and transportation as appropriate   Identify discharge learning needs (meds, wound care, etc)     Problem: ABCDS Injury Assessment  Goal: Absence of physical injury  Outcome: Progressing  Flowsheets (Taken 6/6/2024 2225)  Absence of Physical Injury: Implement safety measures based on patient assessment

## 2024-06-07 NOTE — PROGRESS NOTES
Hospitalist Progress Note   Admit Date:  2024 11:51 AM   Name:  Kai Guido   Age:  89 y.o.  Sex:  male  :  1929   MRN:  [id number]   Room:      Presenting/Chief Complaint: Chest Pain     Reason(s) for Admission: Chest pain [R07.9]  Elevated troponin [R79.89]  Acute kidney injury (HCC) [N17.9]  Chest pain, unspecified type [R07.9]  Congestive heart failure, unspecified HF chronicity, unspecified heart failure type (HCC) [I50.9]  Acute ischemic stroke (HCC) [I63.9]     Hospital Course:   Kai Guido is a 89 y.o. male with medical history of CAD, afib (off Eliquis due to GIB, ulcer, anemia), CKD3, HTN who was admitted to our service  on  with chest pain. He lives independently at [place]. EKG on admission showed rate controlled afib. Cardiology was consulted.  Transfused for unstable angina with Hgb 7.6. Not a candidate for invasive procedures due to age.  Code S called  for acute vision loss. CT head and CTA head/neck were unremarkable. Brain MRI showed CVA in the R posterior cerebral artery territory. Neurology was consulted and recommended to resume Eliquis when felt safe, until then use ASA; when and if Eliquis is resumed, stop aspirin. He continued to have chest pain. On 6/4 PM he developed bradycardia and hypotension. He was transferred to the ICU for dopamine but was discontinued due to tachycardia.  His SBP however did improve.      Given his blood pressure was stable off of pressor support, patient was transferred back to the medical floor for further treatment.  While in the medical floor, patient started to have worsening respiratory issues.  Renal function also appeared to worsen as well.  Chest x-ray showed patient was in fulminant pulmonary edema.  Patient was placed on diuretics with minimal improvement.  Patient became oliguric.  Nephrology was consulted who recommended palliative measures given patient is not good candidate for dialysis.    Patient  has been made comfort care on 6/7 after discussion with son.    Subjective & 24hr Events:   Patient was seen and evaluated at bedside this morning.  Patient states that he feels okay but having difficulty breathing at times.  Denies any fevers, chills, chest pain, nausea, vomiting.      Assessment & Plan:     Hypertension  Bradycardia  Chest pain  NSTEMI  Acute CVA of the right posterior cerebral artery  Acute UTI  CHAITANYA on CKD 3  Hyperkalemia  Metabolic acidosis  Chronic dCHF  Atrial fibrillation  History of recent GI bleed  H. Pylori     Patient continued to become fluid overloaded.  Repeat chest x-ray shows no improvement despite patient being on Lasix drip overnight.  Going into oliguric renal failure.  Nephrology consulted and recommending palliative measures given patient is not good candidate for dialysis.    Discussed with son at length face-to-face patient's prognosis and options.  At this point time, patient to be made comfort care only.  He is requiring high flow at 90% / 60 L to maintain saturation greater than 92%.  Will start patient on morphine as needed as well as Ativan.  As per son's request, would like to have family see him prior to starting benzos and anxiolytics.    Hospice has been consulted by case management.  Will assess to see if patient can go home with home hospice services as that is what the son would like    Plan:  -Comfort measures only  -As needed morphine and Ativan for shortness of breath and anxiety  -Discontinue all antibiotics and medication  -Comfort feeds okay  -Follow-up hospice consult  -Supplemental O2 as needed to maintain patient comfort  -No further lab draws or imaging studies        Anticipated Discharge Arrangements:   Home Hospice    PT/OT evals ordered?  Therapy evals ordered  Diet:  ADULT DIET; Dysphagia - Soft and Bite Sized  ADULT ORAL NUTRITION SUPPLEMENT; Breakfast, Lunch, Dinner; Standard High Calorie/High Protein Oral Supplement  VTE prophylaxis: Already on  1724 -- -- -- -- 94 %   06/06/24 1540 97.5 °F (36.4 °C) 62 24 106/72 90 %   06/06/24 1346 -- -- -- (!) 101/57 --         Oxygen Therapy  SpO2: 92 %  Pulse Oximetry Type: Continuous  Pulse via Oximetry: 60 beats per minute  Pulse Oximeter Device Mode: Continuous  Pulse Oximeter Device Location: Finger  O2 Device: Heated high flow cannula  Oximetry Probe Site Changed: Yes  Skin Assessment: Clean, dry, & intact  Skin Protection for O2 Device: No  FiO2 : 90 %  O2 Flow Rate (L/min): 60 L/min  Oxygen Therapy: Supplemental oxygen    Estimated body mass index is 20.28 kg/m² as calculated from the following:    Height as of this encounter: 1.715 m (5' 7.5\").    Weight as of this encounter: 59.6 kg (131 lb 6.3 oz).    Intake/Output Summary (Last 24 hours) at 6/7/2024 1248  Last data filed at 6/7/2024 1005  Gross per 24 hour   Intake 0 ml   Output 150 ml   Net -150 ml           Physical Exam:   General:    Frail elderly male, no acute distress.  Sitting up in bed.  Head:  Normocephalic, atraumatic  Eyes:  Sclerae appear normal.  Pupils equally round.  ENT:  Nares appear normal.  Moist oral mucosa  Neck:  No restricted ROM.  Trachea midline   CV:   Irregularly irregular, rate 80s.  Systolic murmur.  No jugular venous distension.  Lungs:   Bilateral crackles, RR 22.  Abdomen:   Soft, nontender, nondistended.  Extremities: No cyanosis or clubbing.  No edema  Skin:     No rashes.  Normal coloration.   Warm and dry.    Neuro:  CN II-XII grossly intact.  Strength 5/5 bilateral upper and lower extremities.  Psych:  Normal mood and affect.      I have personally reviewed labs and tests:  Recent Labs:  Recent Results (from the past 48 hour(s))   Lactic Acid    Collection Time: 06/05/24  5:10 PM   Result Value Ref Range    Lactic Acid 3.4 (H) 0.5 - 2.0 mmol/L   Lactic Acid    Collection Time: 06/05/24 11:04 PM   Result Value Ref Range    Lactic Acid 4.2 (HH) 0.5 - 2.0 mmol/L   Basic Metabolic Panel    Collection Time: 06/06/24  3:25 AM  450 PG/ML   POCT Glucose    Collection Time: 06/07/24  3:41 AM   Result Value Ref Range    POC Glucose 349 (H) 65 - 100 mg/dL    Performed by: Santi    POCT Glucose    Collection Time: 06/07/24  4:38 AM   Result Value Ref Range    POC Glucose 329 (H) 65 - 100 mg/dL    Performed by: Brynn    POCT Glucose    Collection Time: 06/07/24  6:19 AM   Result Value Ref Range    POC Glucose 341 (H) 65 - 100 mg/dL    Performed by: Santi    POCT Glucose    Collection Time: 06/07/24 11:30 AM   Result Value Ref Range    POC Glucose 343 (H) 65 - 100 mg/dL    Performed by: Marilyn        No results for input(s): \"COVID19\" in the last 72 hours.    Current Meds:  Current Facility-Administered Medications   Medication Dose Route Frequency    morphine (PF) injection 2 mg  2 mg IntraVENous Q2H PRN    Or    morphine (PF) injection 4 mg  4 mg IntraVENous Q2H PRN    glycopyrrolate (ROBINUL) injection 0.2 mg  0.2 mg IntraVENous Q4H PRN    LORazepam (ATIVAN) 1 mg in sodium chloride (PF) 0.9 % 10 mL injection  1 mg IntraVENous Q6H PRN    lidocaine viscous hcl (XYLOCAINE) 2 % solution 15 mL  15 mL Mouth/Throat Q3H PRN    sodium chloride flush 0.9 % injection 5-40 mL  5-40 mL IntraVENous 2 times per day    0.9 % sodium chloride infusion   IntraVENous PRN    acetaminophen (TYLENOL) tablet 650 mg  650 mg Oral Q6H PRN    Or    acetaminophen (TYLENOL) suppository 650 mg  650 mg Rectal Q6H PRN       Signed:  Uli Hamlin MD    Part of this note may have been written by using a voice dictation software.  The note has been proof read but may still contain some grammatical/other typographical errors.

## 2024-06-07 NOTE — ICUWATCH
[facility] Clinical Rounding Nurse Progress Report      SUBJECTIVE: Patient assessed secondary to transfer from critical care.      Vitals:    06/06/24 1900 06/06/24 2000 06/06/24 2018 06/06/24 2125   BP: 102/72 109/64     Pulse: 65 65  62   Resp:    20   Temp:   97.3 °F (36.3 °C)    TempSrc:       SpO2: 90% 90%  92%   Weight:       Height:            DETERIORATION INDEX SCORE: 59    ASSESSMENT:  Pt is resting in bed, eyes closed at time of exam. Unlabored, slightly tachypneic breathing on 12L HFNC. Lasix gtt infusing. Bedside cardiac monitor showing AF 50-60's. VSS.    PLAN:  Will follow per RRT Clinical Rounding Program protocol.    Sj Davis RN  [facility]: [phone]  [facility]: [phone]

## 2024-06-07 NOTE — PLAN OF CARE
Problem: Discharge Planning  Goal: Discharge to home or other facility with appropriate resources  Outcome: Progressing     Problem: Safety - Adult  Goal: Free from fall injury  Outcome: Progressing     Problem: ABCDS Injury Assessment  Goal: Absence of physical injury  Outcome: Progressing     Problem: Pain  Goal: Verbalizes/displays adequate comfort level or baseline comfort level  Outcome: Progressing  Flowsheets (Taken 6/7/2024 0700)  Verbalizes/displays adequate comfort level or baseline comfort level:   Encourage patient to monitor pain and request assistance   Assess pain using appropriate pain scale     Problem: Chronic Conditions and Co-morbidities  Goal: Patient's chronic conditions and co-morbidity symptoms are monitored and maintained or improved  Outcome: Progressing     Problem: Confusion  Goal: Confusion, delirium, dementia, or psychosis is improved or at baseline  Description: INTERVENTIONS:  1. Assess for possible contributors to thought disturbance, including medications, impaired vision or hearing, underlying metabolic abnormalities, dehydration, psychiatric diagnoses, and notify attending LIP  2. [place] high risk fall precautions, as indicated  3. Provide frequent short contacts to provide reality reorientation, refocusing and direction  4. Decrease environmental stimuli, including noise as appropriate  5. Monitor and intervene to maintain adequate nutrition, hydration, elimination, sleep and activity  6. If unable to ensure safety without constant attention obtain sitter and review sitter guidelines with assigned personnel  7. Initiate Psychosocial CNS and Spiritual Care consult, as indicated  Outcome: Progressing  Flowsheets (Taken 6/7/2024 0701)  Effect of thought disturbance (confusion, delirium, dementia, or psychosis) are managed with adequate functional status: Assess for contributors to thought disturbance, including medications, impaired vision or hearing, underlying metabolic

## 2024-06-07 NOTE — PROGRESS NOTES
Dr. Moore paged \"pt c/o being tired of breathing and reports his belly is tired from breathing. he is using accessory muscles. BL lungs have fine crackles and wheezing. O2 89-91% on 12L HiFlo NC. I had him on the non rebreather (his sats have been the same on the mask vs NC) but he isnt tolerating the mask and prefers the hiflo. Lasix gtt started around 6:30p and urine output has been 150 of gabby urine since. Per Boubacar's note he is aware of the slowed urine OP and darker color. his repeat CBC showed a K of 6 glucose 394 and Crt 2.52 @ 2000. Please advise.\" See orders.      Pt monitoring in progress. Night charge RN informed of events.

## 2024-06-07 NOTE — PLAN OF CARE
Problem: Discharge Planning  Goal: Discharge to home or other facility with appropriate resources  6/7/2024 1737 by Taniya Shine RN  Outcome: Completed  6/7/2024 1718 by Taniya Shine RN  Outcome: Progressing     Problem: Safety - Adult  Goal: Free from fall injury  6/7/2024 1737 by Tainya Shine RN  Outcome: Completed  6/7/2024 1718 by Taniya Shine RN  Outcome: Progressing     Problem: ABCDS Injury Assessment  Goal: Absence of physical injury  6/7/2024 1737 by Taniya Shine RN  Outcome: Completed  6/7/2024 1718 by Taniya Shine RN  Outcome: Progressing     Problem: Pain  Goal: Verbalizes/displays adequate comfort level or baseline comfort level  6/7/2024 1737 by Taniya Shine RN  Outcome: Completed  6/7/2024 1718 by Taniya Shine RN  Outcome: Progressing  Flowsheets (Taken 6/7/2024 0700)  Verbalizes/displays adequate comfort level or baseline comfort level:   Encourage patient to monitor pain and request assistance   Assess pain using appropriate pain scale     Problem: Chronic Conditions and Co-morbidities  Goal: Patient's chronic conditions and co-morbidity symptoms are monitored and maintained or improved  6/7/2024 1737 by Taniya Shine RN  Outcome: Completed  6/7/2024 1718 by Taniya Shine RN  Outcome: Progressing     Problem: Confusion  Goal: Confusion, delirium, dementia, or psychosis is improved or at baseline  Description: INTERVENTIONS:  1. Assess for possible contributors to thought disturbance, including medications, impaired vision or hearing, underlying metabolic abnormalities, dehydration, psychiatric diagnoses, and notify attending LIP  2. [place] high risk fall precautions, as indicated  3. Provide frequent short contacts to provide reality reorientation, refocusing and direction  4. Decrease environmental stimuli, including noise as appropriate  5. Monitor and intervene to maintain adequate nutrition, hydration, elimination, sleep and activity  6. If unable to ensure safety without  constant attention obtain sitter and review sitter guidelines with assigned personnel  7. Initiate Psychosocial CNS and Spiritual Care consult, as indicated  6/7/2024 1737 by Taniya Shine RN  Outcome: Completed  6/7/2024 1718 by Taniya Shine RN  Outcome: Progressing  Flowsheets (Taken 6/7/2024 0701)  Effect of thought disturbance (confusion, delirium, dementia, or psychosis) are managed with adequate functional status: Assess for contributors to thought disturbance, including medications, impaired vision or hearing, underlying metabolic abnormalities, dehydration, psychiatric diagnoses, notify LIP     Problem: Skin/Tissue Integrity  Goal: Absence of new skin breakdown  Description: 1.  Monitor for areas of redness and/or skin breakdown  2.  Assess vascular access sites hourly  3.  Every 4-6 hours minimum:  Change oxygen saturation probe site  4.  Every 4-6 hours:  If on nasal continuous positive airway pressure, respiratory therapy assess nares and determine need for appliance change or resting period.  6/7/2024 1737 by Taniya Shine RN  Outcome: Completed  6/7/2024 1718 by Taniya Shine RN  Outcome: Progressing

## 2024-06-07 NOTE — ACP (ADVANCE CARE PLANNING)
[facility] Hospitalist Service  At the heart of better care     Advance Care Planning   Admit Date:  2024 11:51 AM   Name:  Kai Guido   Age:  89 y.o.  Sex:  male  :  1929   MRN:  [id number]   Room:  [address] [place] [address]    Kai Guido has capacity to make his own decisions:   No    If pt unable to make decisions, POA/surrogate decision maker:  Son    Other people present:   Son    Patient / surrogate decision-maker directed code status:  DNR/DNI    Other ACP topics discussed, if applicable:   Discussed possibility of hospice or comfort measures.   Discussed with patient at length with patient's son.  Patient's poor prognosis was explained in detail.  Multiple options regarding his care were presented.  Given patient's impending renal failure and the fact that he would not like dialysis, patient has been made comfort care at this time.  Hospice consulted as son would like patient to return home if possible.  Case management aware.    Patient or surrogate consented to discussion of the current conditions, workup, management plans, prognosis, and the risk for further deterioration.  Time spent: 22 minutes in direct discussion.      Signed:  Uli Hamlin MD

## 2024-06-07 NOTE — PROGRESS NOTES
Odell Guido (son [phone]) called to update of PM shift events. Questions and concerns answered appropriately.

## 2024-06-07 NOTE — CARE COORDINATION
LMSW updated by Dr Hamlin that family has made decision for comfort care and hospice order placed.  Referral sent to [facility] to assess pt for hospice care options.

## 2024-06-07 NOTE — PROGRESS NOTES
SPEECH LANGUAGE PATHOLOGY  Patient going comfort care/hospice. Will discharge from speech therapy caseload. Thank you.    Janice Delgado, SLP

## 2024-06-07 NOTE — DISCHARGE SUMMARY
Hospitalist Discharge Summary   Admit Date:  2024 11:51 AM   DC Note date: 2024  Name:  Kai Guido   Age:  89 y.o.  Sex:  male  :  1929   MRN:  [id number]   Room:  [address] [place] [address]  PCP:  Sheldon Schafer DO    Presenting Complaint: Chest Pain     Initial Admission Diagnosis: Chest pain [R07.9]  Elevated troponin [R79.89]  Acute kidney injury (HCC) [N17.9]  Chest pain, unspecified type [R07.9]  Congestive heart failure, unspecified HF chronicity, unspecified heart failure type (HCC) [I50.9]  Acute ischemic stroke (HCC) [I63.9]     Problem List for this Hospitalization (present on admission):    Principal Problem:    Acute ischemic stroke (HCC)  Active Problems:    Hypertension    CAD (coronary artery disease)    Elevated troponin    Stage 3b chronic kidney disease (HCC)    Paroxysmal atrial fibrillation (HCC)    Secondary hypercoagulable state (HCC)    Anemia    Chest pain    Pulmonary edema    Acute kidney injury (HCC)    Congestive heart failure (HCC)    Frailty    Encounter for palliative care    Encounter for other specified aftercare    Debility    Hypoxemia    Gait abnormality    Decreased activities of daily living (ADL)  Resolved Problems:    * No resolved hospital problems. *      Hospital Course:  Kai Guido is a 89 y.o. male with medical history of CAD, afib (off Eliquis due to GIB, ulcer, anemia), CKD3, HTN who was admitted to our service  on  with chest pain. He lives independently at [place]. EKG on admission showed rate controlled afib. Cardiology was consulted.  Transfused for unstable angina with Hgb 7.6. Not a candidate for invasive procedures due to age.  Code S called  for acute vision loss. CT head and CTA head/neck were unremarkable. Brain MRI showed CVA in the R posterior cerebral artery territory. Neurology was consulted and recommended to resume Eliquis when felt safe, until then use ASA; when and if Eliquis is resumed, stop aspirin. He continued to have  Granulocytes Absolute 0.1 0.0 - 0.5 K/UL   Basic Metabolic Panel w/ Reflex to MG    Collection Time: 06/07/24  3:40 AM   Result Value Ref Range    Sodium 129 (L) 136 - 145 mmol/L    Potassium 5.8 (H) 3.5 - 5.1 mmol/L    Chloride 93 (L) 98 - 107 mmol/L    CO2 19 (L) 20 - 28 mmol/L    Anion Gap 17 9 - 18 mmol/L    Glucose 354 (H) 70 - 99 mg/dL    BUN 68 (H) 8 - 23 MG/DL    Creatinine 2.75 (H) 0.80 - 1.30 MG/DL    Est, Glom Filt Rate 21 (L) >60 ml/min/1.73m2    Calcium 9.0 8.8 - 10.2 MG/DL   Brain Natriuretic Peptide    Collection Time: 06/07/24  3:40 AM   Result Value Ref Range    NT Pro-BNP >70,000 (H) 0 - 450 PG/ML   POCT Glucose    Collection Time: 06/07/24  3:41 AM   Result Value Ref Range    POC Glucose 349 (H) 65 - 100 mg/dL    Performed by: Santi    POCT Glucose    Collection Time: 06/07/24  4:38 AM   Result Value Ref Range    POC Glucose 329 (H) 65 - 100 mg/dL    Performed by: Brynn    POCT Glucose    Collection Time: 06/07/24  6:19 AM   Result Value Ref Range    POC Glucose 341 (H) 65 - 100 mg/dL    Performed by: Santi    POCT Glucose    Collection Time: 06/07/24 11:30 AM   Result Value Ref Range    POC Glucose 343 (H) 65 - 100 mg/dL    Performed by: Marilyn        No results for input(s): \"COVID19\" in the last 72 hours.    Recent Vital Data:  Patient Vitals for the past 24 hrs:   Temp Pulse Resp BP SpO2   06/07/24 1600 -- -- -- -- 90 %   06/07/24 1500 -- -- -- -- (!) 89 %   06/07/24 1328 -- -- 26 -- --   06/07/24 1300 98 °F (36.7 °C) 69 24 (!) 81/50 92 %   06/07/24 1215 97.2 °F (36.2 °C) 72 18 (!) 84/72 92 %   06/07/24 1200 -- 68 -- (!) 79/56 92 %   06/07/24 1100 -- 63 -- (!) 94/52 (!) 89 %   06/07/24 1000 -- 68 -- 98/60 94 %   06/07/24 0900 -- 65 -- 95/63 91 %   06/07/24 0800 -- 68 -- (!) 97/58 93 %   06/07/24 0733 -- 62 20 -- 94 %   06/07/24 0700 98 °F (36.7 °C) 63 22 107/68 94 %   06/07/24 0600 -- 64 -- 106/65 93 %   06/07/24 0500 -- 66 -- 101/70 (!) 83 %

## 2024-06-07 NOTE — PROGRESS NOTES
Kai Guido  Admission Date: 6/1/2024         Daily Progress Note: 6/7/2024    The patient's chart is reviewed and the patient is discussed with the staff.    Background: 89 y.o. male with HTN, HLD, CKD stage IIIb, PAF, who was recently hospitalized for GI bleed and had his anticoagulation/Eliquis held and subsequently had CVA (acute posterior circulation infarct).  Patient was not a candidate for neuro-intervention or tPA due to recent GI bleeding.  Patient also has been complaining of chest pain.  Evaluated by cardiology not felt to be candidate for intervention.  Chest pain is intermittent and troponins are elevated.  Rapid response was called due to hypotension and bradycardia.  Patient was started on dopamine and critical care was asked to see the patient due to ICU admission and pressure support.    Subjective:     He continues requiring high oxygen requirement and currently on 90% FiO2 with 60 L/min flow and O2 saturation only about 92%.  Still on Bumex drip at 5 mg an hour with negative balance only 150 ml    Current Facility-Administered Medications   Medication Dose Route Frequency    furosemide (LASIX) injection 40 mg  40 mg IntraVENous Once    glucose chewable tablet 16 g  4 tablet Oral PRN    dextrose bolus 10% 125 mL  125 mL IntraVENous PRN    Or    dextrose bolus 10% 250 mL  250 mL IntraVENous PRN    Glucagon Emergency KIT 1 mg  1 mg SubCUTAneous PRN    dextrose 10 % infusion   IntraVENous Continuous PRN    sodium zirconium cyclosilicate (LOKELMA) oral suspension 5 g  5 g Oral TID    sodium zirconium cyclosilicate (LOKELMA) oral suspension 10 g  10 g Oral TID    insulin lispro (HUMALOG,ADMELOG) injection vial 0-8 Units  0-8 Units SubCUTAneous TID WC    insulin lispro (HUMALOG,ADMELOG) injection vial 0-4 Units  0-4 Units SubCUTAneous Nightly    cefepime (MAXIPIME) 1,000 mg in sodium chloride 0.9 % 50 mL IVPB (mini-bag)  1,000 mg IntraVENous Q24H    [Held by provider] furosemide  06/02/2024  1:19 PM (Final)    Interpretation Summary    Left Ventricle: Normal left ventricular systolic function with a visually estimated EF of 55 - 60%. Left ventricle size is normal. Normal wall thickness. Normal wall motion.    Aortic Valve: Severely calcified cusp. 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 AV mean gradient is 9 mmHg. AV peak velocity is 1.9 m/s. LVOT:AV VTI Index is 0.38. AV area by continuity VTI is 1.2 cm2. AV Stroke Volume index is 26.1 mL/m2. Gradients appear undermeasured due to suboptimal windows for doppler interrogation.    Mitral Valve: Moderate annular calcification of the mitral valve. Mild to moderate regurgitation.    Tricuspid Valve: Moderate to severe regurgitation. The estimated RVSP is 43 mmHg.    Left Atrium: Left atrium is dilated.    Right Atrium: Right atrium is dilated.    Aorta: Mildly dilated aortic root. Ao root diameter is 4.1 cm. Mildly dilated ascending aorta. Ao ascending diameter is 3.8 cm.    Signed by: Cristino Kern DO on 6/2/2024  1:19 PM    Assessment and Plan:  (Medical Decision Making)   Impression: 89 y.o. male with HTN, HLD, CKD stage IIIb, PAF, who was recently hospitalized for GI bleed and had his anticoagulation/Eliquis held and subsequently had CVA (acute posterior circulation infarct).  Patient was not a candidate for neuro-intervention or tPA due to recent GI bleeding.  Patient also has been complaining of chest pain.  Evaluated by cardiology not felt to be candidate for intervention.  Chest pain is intermittent and troponins are elevated.  Rapid response was called due to hypotension and bradycardia.  Patient was started on dopamine and critical care was asked to see the patient due to ICU admission and pressure support.       Chest pain    CAD (coronary artery disease)    Elevated troponin  Plan: cardiology following, not candidate for any intervention     Paroxysmal atrial fibrillation (HCC)    Secondary hypercoagulable state

## 2024-06-07 NOTE — PROGRESS NOTES
Dr. Moore paged via perfect serve with the following    \"06/07/24 03:40  Sodium: 129 (L)  Potassium: 5.8 (H)  Chloride: 93 (L)  CARBON DIOXIDE: 19 (L)  BUN,BUNPL: 68 (H)  Creatinine: 2.75 (H)  Anion Gap: 17  Est, Glom Filt Rate: 21 (L)  Glucose: 354 (H)    Just want to let you know what his AM labs are. per rapid nurse i was told to hold off on giving IV Lasix to see AM Crt result. it has increased to 2.75 and FBSG has been in the mid 300s since i last paged. do you still want me to give the 1 time IV lasix dose or any other interventions for his electrolytes?\"    MD responds and states he will put in orders for lokelma and to hold off on giving 1 time IV lasix dose and let AM team decide.

## 2024-06-07 NOTE — PROGRESS NOTES
Physical Therapy Note:    Pt and family have decided on comfort care and hospice at this time.  Will discharge pt from acute PT.  Thank you.    NILESH MEIER, PT     Rehab Caseload Tracker

## 2024-06-07 NOTE — PROGRESS NOTES
Physician Progress Note      PATIENT:               JORGE BEARD  CSN #:                  [id number]  :                       1929  ADMIT DATE:       2024 11:51 AM  DISCH DATE:  RESPONDING  PROVIDER #:        Uli Haji MD          QUERY TEXT:    Pt admitted with NSTEMI.  Pt noted to have increased supplemental o2 needs..   If possible, please document in the progress notes and discharge summary if   you are evaluating and/or treating any of the following:    The medical record reflects the following:  Risk Factors: Advanced age, CHF  Clinical Indicators: patient became tachypneic with RR as high as 39,   increased o2 demand at 9.5L HFNC, SpO2 hit low of 84%, note of accessory   muscle use, diaphragmatic, expiratory wheezing  Treatment: supplemental o2    Thank you,  Danette NARANJO RN  Options provided:  -- Acute respiratory failure with hypoxia  -- Acute respiratory failure with hypercapnia  -- Acute respiratory failure with hypoxia and hypercapnia  -- Hypoxia only  -- Other - I will add my own diagnosis  -- Disagree - Not applicable / Not valid  -- Disagree - Clinically unable to determine / Unknown  -- Refer to Clinical Documentation Reviewer    PROVIDER RESPONSE TEXT:    This patient is in acute respiratory failure with hypoxia.    Query created by: Racquel Torres on 2024 6:01 AM      Electronically signed by:  Uli Haji MD 2024 6:55 AM

## 2024-10-25 NOTE — ASSESSMENT & PLAN NOTE
Patient has mixed obstructive and central sleep apnea, previous baseline study reviewed and showed less than 50% centrals but he is having worsening centrals on PAP therapy, recent BiPAP titration showed improved but not total control. Download today suggest he is having significant oral leak and we will try to switch to an oronasal mask which he already has, showed him out to monitor for leak as well as his events per hour on his machine each morning. If this is not well controlled he is to give us a call and we briefly discussed ASV titration which would be our next step. He is symptomatic with nocturia and daytime sleepiness at times despite bilevel therapy. Jeramy Screen bring photo id , insurance, credit cards. nothing to eat from midnight, clears till  am dos. no smoking/alcohol/drugs/jewelry/valuables . wear loose comfort clothes. must have an escort. no candy/chewing gum /no milk. address and phone # provided/yes

## (undated) DEVICE — SHEET,DRAPE,53X77,STERILE: Brand: MEDLINE

## (undated) DEVICE — SUTURE MCRYL SZ 2-0 L27IN ABSRB UD SH L26MM TAPERPOINT NDL Y417H

## (undated) DEVICE — REAMER SHAFT, MOD.TRINKLE: Brand: BIXCUT

## (undated) DEVICE — PAD,ABDOMINAL,5"X9",ST,LF,25/BX: Brand: MEDLINE INDUSTRIES, INC.

## (undated) DEVICE — TFN: Brand: MEDLINE INDUSTRIES, INC.

## (undated) DEVICE — AIRLIFE™ OXYGEN TUBING 7 FEET (2.1 M) CRUSH RESISTANT OXYGEN TUBING, VINYL TIPPED: Brand: AIRLIFE™

## (undated) DEVICE — FORCEPS BX L240CM JAW DIA2.4MM ORNG L CAP W/ NDL DISP RAD

## (undated) DEVICE — PRECISION PIN TAPERED: Brand: GAMMA

## (undated) DEVICE — KIT ARMOR C DRP COLLAPSIBLE AND SELF EXP TOP CVR FOR FLUOROSCOPIC

## (undated) DEVICE — ENDOSCOPIC KIT 1.1+ OP4 CA DE 2 GWN AAMI LEVEL 3

## (undated) DEVICE — CONTAINER FORMALIN PREFILLED 10% NBF 60ML

## (undated) DEVICE — DRILL, AO, STERILE

## (undated) DEVICE — INTENDED FOR TISSUE SEPARATION, AND OTHER PROCEDURES THAT REQUIRE A SHARP SURGICAL BLADE TO PUNCTURE OR CUT.: Brand: BARD-PARKER ® STAINLESS STEEL BLADES

## (undated) DEVICE — FREEHAND DRILL

## (undated) DEVICE — GAUZE,SPONGE,4"X4",12PLY,WOVEN,NS,LF: Brand: MEDLINE

## (undated) DEVICE — KENDALL RADIOLUCENT FOAM MONITORING ELECTRODE RECTANGULAR SHAPE: Brand: KENDALL

## (undated) DEVICE — 7 DAY SILVER-COATED ANTIMICROBIAL BARRIER DRESSING: Brand: ACTICOAT 7  4" X 5"

## (undated) DEVICE — SINGLE PORT MANIFOLD: Brand: NEPTUNE 2

## (undated) DEVICE — K-WIRE

## (undated) DEVICE — CANNULA NSL ORAL AD FOR CAPNOFLEX CO2 O2 AIRLFE

## (undated) DEVICE — GLOVE ORANGE PI 7 1/2   MSG9075

## (undated) DEVICE — CONNECTOR TBNG OD5-7MM O2 END DISP

## (undated) DEVICE — GUIDE WIRE, BALL-TIPPED, STERILE

## (undated) DEVICE — BLOCK BITE AD 60FR W/ VELC STRP ADDRESSES MOST PT AND

## (undated) DEVICE — DRAPE,U/SHT,SPLIT,FILM,60X84,STERILE: Brand: MEDLINE

## (undated) DEVICE — GLOVE SURG SZ 8 L12IN FNGR THK79MIL GRN LTX FREE

## (undated) DEVICE — SHEET, DRAPE, SPLIT, STERILE: Brand: MEDLINE

## (undated) DEVICE — SOLUTION IRRIG 1000ML 0.9% SOD CHL USP POUR PLAS BTL